# Patient Record
Sex: FEMALE | Race: BLACK OR AFRICAN AMERICAN | Employment: UNEMPLOYED | URBAN - METROPOLITAN AREA
[De-identification: names, ages, dates, MRNs, and addresses within clinical notes are randomized per-mention and may not be internally consistent; named-entity substitution may affect disease eponyms.]

---

## 2017-04-26 ENCOUNTER — HOSPITAL ENCOUNTER (EMERGENCY)
Age: 37
Discharge: HOME | End: 2017-04-26
Payer: COMMERCIAL

## 2017-04-26 VITALS
RESPIRATION RATE: 18 BRPM | TEMPERATURE: 98.8 F | OXYGEN SATURATION: 100 % | WEIGHT: 174 LBS | HEART RATE: 85 BPM | BODY MASS INDEX: 33.98 KG/M2 | DIASTOLIC BLOOD PRESSURE: 74 MMHG | SYSTOLIC BLOOD PRESSURE: 118 MMHG

## 2017-04-26 DIAGNOSIS — M54.50 CHRONIC LEFT-SIDED LOW BACK PAIN WITHOUT SCIATICA: ICD-10-CM

## 2017-04-26 DIAGNOSIS — G89.29 CHRONIC LEFT-SIDED LOW BACK PAIN WITHOUT SCIATICA: ICD-10-CM

## 2017-04-26 DIAGNOSIS — R60.9 DEPENDENT EDEMA: Primary | ICD-10-CM

## 2017-04-26 LAB
ALBUMIN SERPL-MCNC: 4.3 G/DL (ref 3.9–4.9)
ALP BLD-CCNC: 61 U/L (ref 40–130)
ALT SERPL-CCNC: 10 U/L (ref 0–33)
ANION GAP SERPL CALCULATED.3IONS-SCNC: 20 MEQ/L (ref 7–13)
AST SERPL-CCNC: 16 U/L (ref 0–35)
BACTERIA: ABNORMAL /HPF
BASOPHILS ABSOLUTE: 0 K/UL (ref 0–0.2)
BASOPHILS RELATIVE PERCENT: 0.4 %
BILIRUB SERPL-MCNC: 0.4 MG/DL (ref 0–1.2)
BILIRUBIN URINE: ABNORMAL
BLOOD, URINE: ABNORMAL
BUN BLDV-MCNC: 19 MG/DL (ref 6–20)
CALCIUM SERPL-MCNC: 9.3 MG/DL (ref 8.6–10.2)
CHLORIDE BLD-SCNC: 98 MEQ/L (ref 98–107)
CLARITY: ABNORMAL
CO2: 18 MEQ/L (ref 22–29)
COLOR: ABNORMAL
CREAT SERPL-MCNC: 0.68 MG/DL (ref 0.5–0.9)
EOSINOPHILS ABSOLUTE: 0 K/UL (ref 0–0.7)
EOSINOPHILS RELATIVE PERCENT: 0.1 %
EPITHELIAL CELLS, UA: ABNORMAL /HPF
GFR AFRICAN AMERICAN: >60
GFR NON-AFRICAN AMERICAN: >60
GLOBULIN: 3.7 G/DL (ref 2.3–3.5)
GLUCOSE BLD-MCNC: 82 MG/DL (ref 74–109)
GLUCOSE URINE: NEGATIVE MG/DL
HCT VFR BLD CALC: 36 % (ref 37–47)
HEMOGLOBIN: 11.7 G/DL (ref 12–16)
KETONES, URINE: >=80 MG/DL
LEUKOCYTE ESTERASE, URINE: ABNORMAL
LYMPHOCYTES ABSOLUTE: 0.9 K/UL (ref 1–4.8)
LYMPHOCYTES RELATIVE PERCENT: 10.8 %
MCH RBC QN AUTO: 28.4 PG (ref 27–31.3)
MCHC RBC AUTO-ENTMCNC: 32.4 % (ref 33–37)
MCV RBC AUTO: 87.8 FL (ref 82–100)
MONOCYTES ABSOLUTE: 0.5 K/UL (ref 0.2–0.8)
MONOCYTES RELATIVE PERCENT: 6.5 %
MUCUS: PRESENT
NEUTROPHILS ABSOLUTE: 6.8 K/UL (ref 1.4–6.5)
NEUTROPHILS RELATIVE PERCENT: 82.2 %
NITRITE, URINE: NEGATIVE
PDW BLD-RTO: 15.4 % (ref 11.5–14.5)
PH UA: 5.5 (ref 5–9)
PLATELET # BLD: 192 K/UL (ref 130–400)
POTASSIUM SERPL-SCNC: 3.8 MEQ/L (ref 3.5–5.1)
PRO-BNP: 44 PG/ML
PROTEIN UA: 30 MG/DL
RBC # BLD: 4.11 M/UL (ref 4.2–5.4)
RBC UA: ABNORMAL /HPF (ref 0–2)
SODIUM BLD-SCNC: 136 MEQ/L (ref 132–144)
SPECIFIC GRAVITY UA: 1.03 (ref 1–1.03)
TOTAL PROTEIN: 8 G/DL (ref 6.4–8.1)
URINE REFLEX TO CULTURE: YES
UROBILINOGEN, URINE: 1 E.U./DL
WBC # BLD: 8.3 K/UL (ref 4.8–10.8)
WBC UA: ABNORMAL /HPF (ref 0–5)

## 2017-04-26 PROCEDURE — 2580000003 HC RX 258: Performed by: PERSONAL EMERGENCY RESPONSE ATTENDANT

## 2017-04-26 PROCEDURE — 6360000002 HC RX W HCPCS: Performed by: PERSONAL EMERGENCY RESPONSE ATTENDANT

## 2017-04-26 PROCEDURE — 96375 TX/PRO/DX INJ NEW DRUG ADDON: CPT

## 2017-04-26 PROCEDURE — 36415 COLL VENOUS BLD VENIPUNCTURE: CPT

## 2017-04-26 PROCEDURE — 87086 URINE CULTURE/COLONY COUNT: CPT

## 2017-04-26 PROCEDURE — 99284 EMERGENCY DEPT VISIT MOD MDM: CPT

## 2017-04-26 PROCEDURE — 81001 URINALYSIS AUTO W/SCOPE: CPT

## 2017-04-26 PROCEDURE — 83880 ASSAY OF NATRIURETIC PEPTIDE: CPT

## 2017-04-26 PROCEDURE — 96374 THER/PROPH/DIAG INJ IV PUSH: CPT

## 2017-04-26 PROCEDURE — 80053 COMPREHEN METABOLIC PANEL: CPT

## 2017-04-26 PROCEDURE — 85025 COMPLETE CBC W/AUTO DIFF WBC: CPT

## 2017-04-26 RX ORDER — MORPHINE SULFATE 4 MG/ML
4 INJECTION, SOLUTION INTRAMUSCULAR; INTRAVENOUS ONCE
Status: COMPLETED | OUTPATIENT
Start: 2017-04-26 | End: 2017-04-26

## 2017-04-26 RX ORDER — ONDANSETRON 2 MG/ML
4 INJECTION INTRAMUSCULAR; INTRAVENOUS ONCE
Status: COMPLETED | OUTPATIENT
Start: 2017-04-26 | End: 2017-04-26

## 2017-04-26 RX ORDER — 0.9 % SODIUM CHLORIDE 0.9 %
500 INTRAVENOUS SOLUTION INTRAVENOUS ONCE
Status: COMPLETED | OUTPATIENT
Start: 2017-04-26 | End: 2017-04-26

## 2017-04-26 RX ADMIN — MORPHINE SULFATE 2 MG: 4 INJECTION, SOLUTION INTRAMUSCULAR; INTRAVENOUS at 18:36

## 2017-04-26 RX ADMIN — SODIUM CHLORIDE 500 ML: 9 INJECTION, SOLUTION INTRAVENOUS at 18:33

## 2017-04-26 RX ADMIN — ONDANSETRON 4 MG: 2 INJECTION, SOLUTION INTRAMUSCULAR; INTRAVENOUS at 18:34

## 2017-04-26 ASSESSMENT — ENCOUNTER SYMPTOMS
VOMITING: 0
SHORTNESS OF BREATH: 0
COUGH: 0
SORE THROAT: 0
DIARRHEA: 0
BACK PAIN: 1
BLOOD IN STOOL: 0
NAUSEA: 0
ABDOMINAL PAIN: 0
RHINORRHEA: 0
COLOR CHANGE: 0

## 2017-04-26 ASSESSMENT — PAIN DESCRIPTION - ORIENTATION: ORIENTATION: RIGHT;LEFT;LOWER

## 2017-04-26 ASSESSMENT — PAIN DESCRIPTION - DESCRIPTORS: DESCRIPTORS: ACHING;PRESSURE

## 2017-04-26 ASSESSMENT — PAIN SCALES - GENERAL
PAINLEVEL_OUTOF10: 9
PAINLEVEL_OUTOF10: 7

## 2017-04-26 ASSESSMENT — PAIN DESCRIPTION - PAIN TYPE: TYPE: ACUTE PAIN

## 2017-04-26 ASSESSMENT — PAIN DESCRIPTION - LOCATION: LOCATION: LEG

## 2017-04-27 ENCOUNTER — OFFICE VISIT (OUTPATIENT)
Dept: INTERNAL MEDICINE | Age: 37
End: 2017-04-27

## 2017-04-27 VITALS
WEIGHT: 174 LBS | DIASTOLIC BLOOD PRESSURE: 80 MMHG | TEMPERATURE: 97.9 F | HEIGHT: 61 IN | BODY MASS INDEX: 32.85 KG/M2 | HEART RATE: 75 BPM | OXYGEN SATURATION: 98 % | SYSTOLIC BLOOD PRESSURE: 98 MMHG | RESPIRATION RATE: 72 BRPM

## 2017-04-27 DIAGNOSIS — M54.50 ACUTE BILATERAL LOW BACK PAIN WITHOUT SCIATICA: ICD-10-CM

## 2017-04-27 DIAGNOSIS — S76.319A HAMSTRING STRAIN, UNSPECIFIED LATERALITY, INITIAL ENCOUNTER: ICD-10-CM

## 2017-04-27 DIAGNOSIS — G89.29 CHRONIC BILATERAL THORACIC BACK PAIN: Primary | ICD-10-CM

## 2017-04-27 DIAGNOSIS — M79.604 PAIN IN BOTH LOWER EXTREMITIES: ICD-10-CM

## 2017-04-27 DIAGNOSIS — M79.605 PAIN IN BOTH LOWER EXTREMITIES: ICD-10-CM

## 2017-04-27 DIAGNOSIS — M54.6 CHRONIC BILATERAL THORACIC BACK PAIN: Primary | ICD-10-CM

## 2017-04-27 PROCEDURE — 99213 OFFICE O/P EST LOW 20 MIN: CPT | Performed by: FAMILY MEDICINE

## 2017-04-27 ASSESSMENT — PATIENT HEALTH QUESTIONNAIRE - PHQ9
2. FEELING DOWN, DEPRESSED OR HOPELESS: 0
SUM OF ALL RESPONSES TO PHQ QUESTIONS 1-9: 0
1. LITTLE INTEREST OR PLEASURE IN DOING THINGS: 0
SUM OF ALL RESPONSES TO PHQ9 QUESTIONS 1 & 2: 0

## 2017-04-28 LAB — URINE CULTURE, ROUTINE: NORMAL

## 2017-05-01 ENCOUNTER — HOSPITAL ENCOUNTER (OUTPATIENT)
Dept: GENERAL RADIOLOGY | Age: 37
Discharge: HOME | End: 2017-05-01
Payer: COMMERCIAL

## 2017-05-01 DIAGNOSIS — M54.6 CHRONIC BILATERAL THORACIC BACK PAIN: ICD-10-CM

## 2017-05-01 DIAGNOSIS — G89.29 CHRONIC BILATERAL THORACIC BACK PAIN: ICD-10-CM

## 2017-05-01 PROCEDURE — 72072 X-RAY EXAM THORAC SPINE 3VWS: CPT

## 2017-05-01 PROCEDURE — 72110 X-RAY EXAM L-2 SPINE 4/>VWS: CPT

## 2017-05-10 ENCOUNTER — HOSPITAL ENCOUNTER (OUTPATIENT)
Dept: PHYSICAL THERAPY | Age: 37
Setting detail: THERAPIES SERIES
Discharge: HOME | End: 2017-05-10
Payer: COMMERCIAL

## 2017-06-06 ENCOUNTER — OFFICE VISIT (OUTPATIENT)
Dept: INTERNAL MEDICINE | Age: 37
End: 2017-06-06

## 2017-06-06 VITALS
HEART RATE: 71 BPM | WEIGHT: 174.6 LBS | RESPIRATION RATE: 16 BRPM | SYSTOLIC BLOOD PRESSURE: 100 MMHG | TEMPERATURE: 98.1 F | OXYGEN SATURATION: 96 % | DIASTOLIC BLOOD PRESSURE: 60 MMHG | BODY MASS INDEX: 34.28 KG/M2 | HEIGHT: 60 IN

## 2017-06-06 DIAGNOSIS — M54.50 CHRONIC BILATERAL LOW BACK PAIN WITHOUT SCIATICA: Primary | ICD-10-CM

## 2017-06-06 DIAGNOSIS — Z12.31 SCREENING MAMMOGRAM, ENCOUNTER FOR: ICD-10-CM

## 2017-06-06 DIAGNOSIS — G89.29 CHRONIC BILATERAL LOW BACK PAIN WITHOUT SCIATICA: Primary | ICD-10-CM

## 2017-06-06 PROCEDURE — 99213 OFFICE O/P EST LOW 20 MIN: CPT | Performed by: FAMILY MEDICINE

## 2017-12-05 ENCOUNTER — OFFICE VISIT (OUTPATIENT)
Dept: INTERNAL MEDICINE | Age: 37
End: 2017-12-05

## 2017-12-05 VITALS
RESPIRATION RATE: 16 BRPM | DIASTOLIC BLOOD PRESSURE: 78 MMHG | WEIGHT: 176 LBS | HEIGHT: 60 IN | BODY MASS INDEX: 34.55 KG/M2 | SYSTOLIC BLOOD PRESSURE: 112 MMHG | HEART RATE: 79 BPM | OXYGEN SATURATION: 97 % | TEMPERATURE: 98.2 F

## 2017-12-05 DIAGNOSIS — Z01.419 WELL WOMAN EXAM WITH ROUTINE GYNECOLOGICAL EXAM: Primary | ICD-10-CM

## 2017-12-05 PROCEDURE — G8427 DOCREV CUR MEDS BY ELIG CLIN: HCPCS | Performed by: FAMILY MEDICINE

## 2017-12-05 PROCEDURE — 1036F TOBACCO NON-USER: CPT | Performed by: FAMILY MEDICINE

## 2017-12-05 PROCEDURE — 99395 PREV VISIT EST AGE 18-39: CPT | Performed by: FAMILY MEDICINE

## 2017-12-05 PROCEDURE — G8417 CALC BMI ABV UP PARAM F/U: HCPCS | Performed by: FAMILY MEDICINE

## 2017-12-05 PROCEDURE — G8484 FLU IMMUNIZE NO ADMIN: HCPCS | Performed by: FAMILY MEDICINE

## 2017-12-05 NOTE — PROGRESS NOTES
and family histories were reviewed and updated as appropriate. PHYSICAL EXAM   General Appearance:  Alert oriented pleasant cooperative  Lungs: Clear to auscultation no wheezes rhonchi or rales  Heart: Regular rate and rhythm without murmurs rubs or gallops  Breast exam normal breasts bilaterally without nipple discharge masses or adenopathy  Abdomen: Soft nontender no masses rebound or guarding  Genitalia: Normal external genitalia and no prolapse noted no abnormalities noted. Pap done with normal cervix no discharge in the vaginal area or ulcerations. Bimanual exam no midline or adnexal masses or tenderness. Extremities: No rashes or edema and brace on left ankle neurologically intact. Assessment:  1. Well woman exam with routine gynecological exam  Pap Smear  Continue with tight and exercise and therapy with left ankle brace per Dr. Armando Parraa will get a mammogram on her next year when she turns 34. Plan:  No current outpatient prescriptions on file. No current facility-administered medications for this visit. Orders Placed This Encounter   Procedures    Pap Smear     Patient History:    No LMP recorded. OBGYN Status: Having periods  Past Surgical History:  :  SECTION  No date: EYE SURGERY      Smoking status: Never Smoker                                                              Smokeless tobacco: Never Used                           Standing Status:   Future     Standing Expiration Date:   2018     Order Specific Question:   Collection Type     Answer: Thin Prep     Order Specific Question:   Prior Abnormal Pap Test     Answer:   No     Order Specific Question:   Screening or Diagnostic     Answer:   Screening     Order Specific Question:   HPV Requested? Answer:   Yes - If Abnormal Reflex HPV     Order Specific Question:   High Risk Patient     Answer:   N/A       No orders of the defined types were placed in this encounter.             Return in about 1 year (around 12/5/2018).     Dr. Jose M Buck      12/5/17  5:14 PM

## 2017-12-06 DIAGNOSIS — Z01.419 WELL WOMAN EXAM WITH ROUTINE GYNECOLOGICAL EXAM: ICD-10-CM

## 2018-10-03 ENCOUNTER — OFFICE VISIT (OUTPATIENT)
Dept: INTERNAL MEDICINE CLINIC | Age: 38
End: 2018-10-03
Payer: COMMERCIAL

## 2018-10-03 VITALS
TEMPERATURE: 98 F | HEART RATE: 66 BPM | DIASTOLIC BLOOD PRESSURE: 70 MMHG | SYSTOLIC BLOOD PRESSURE: 126 MMHG | RESPIRATION RATE: 16 BRPM | HEIGHT: 60 IN | OXYGEN SATURATION: 98 % | WEIGHT: 175.6 LBS | BODY MASS INDEX: 34.47 KG/M2

## 2018-10-03 DIAGNOSIS — E78.2 MIXED HYPERLIPIDEMIA: Primary | ICD-10-CM

## 2018-10-03 DIAGNOSIS — Z11.4 ENCOUNTER FOR SCREENING FOR HIV: ICD-10-CM

## 2018-10-03 DIAGNOSIS — Z12.31 VISIT FOR SCREENING MAMMOGRAM: ICD-10-CM

## 2018-10-03 PROCEDURE — G8427 DOCREV CUR MEDS BY ELIG CLIN: HCPCS | Performed by: FAMILY MEDICINE

## 2018-10-03 PROCEDURE — 99213 OFFICE O/P EST LOW 20 MIN: CPT | Performed by: FAMILY MEDICINE

## 2018-10-03 PROCEDURE — G8484 FLU IMMUNIZE NO ADMIN: HCPCS | Performed by: FAMILY MEDICINE

## 2018-10-03 PROCEDURE — 1036F TOBACCO NON-USER: CPT | Performed by: FAMILY MEDICINE

## 2018-10-03 PROCEDURE — G8417 CALC BMI ABV UP PARAM F/U: HCPCS | Performed by: FAMILY MEDICINE

## 2018-10-03 ASSESSMENT — PATIENT HEALTH QUESTIONNAIRE - PHQ9
SUM OF ALL RESPONSES TO PHQ QUESTIONS 1-9: 0
SUM OF ALL RESPONSES TO PHQ9 QUESTIONS 1 & 2: 0
1. LITTLE INTEREST OR PLEASURE IN DOING THINGS: 0
2. FEELING DOWN, DEPRESSED OR HOPELESS: 0
SUM OF ALL RESPONSES TO PHQ QUESTIONS 1-9: 0

## 2018-10-03 NOTE — PROGRESS NOTES
numbness or tingling. No loss of function. Hematology: Negative for bleeding and easy bruising. Immunology:  Negative for environmental allergies and food allergies. Physical Exam    Patient's medication, allergies, past medical, surgical, social and family histories were reviewed and updated as appropriate. PHYSICAL EXAM   General appearance: Alert oriented pleasant cooperative in no acute distress. HEENT: Eyes clear nonicteric facial muscles symmetrical.  TMs and canals normal hearing good ex line neck: Soft nontender no adenopathy or masses no carotid bruits  Lungs: Clear to auscultation no wheezes rhonchi or rales  Heart: Regular rate and rhythm without murmurs rubs or gallops  Extremities: No rashes or edema she does a brace at her ankle limits the right ankle and she is walking without any other ambulatory aid this time. Assessment:   Diagnosis Orders   1. Mixed hyperlipidemia  Comprehensive Metabolic Panel    Lipid, Fasting   2. Visit for screening mammogram  [facility] DIGITAL SCREEN W CAD BILATERAL   3. Encounter for screening for HIV  Hiv-1,-2 W/Reflex To Hiv-1 Western Blot               Plan:  Current Outpatient Prescriptions   Medication Sig Dispense Refill    Multiple Vitamins-Minerals (HAIR SKIN AND NAILS FORMULA PO) Take 1 tablet by mouth daily       No current facility-administered medications for this visit.       Orders Placed This Encounter   Procedures    [facility] DIGITAL SCREEN W CAD BILATERAL     Standing Status:   Future     Standing Expiration Date:   10/3/2019     Scheduling Instructions:      US if needed     Order Specific Question:   Reason for exam:     Answer:   routine    Hiv-1,-2 W/Reflex To Hiv-1 Western Blot     Standing Status:   Future     Standing Expiration Date:   10/3/2019    Comprehensive Metabolic Panel     Standing Status:   Future     Standing Expiration Date:   10/3/2019    Lipid, Fasting     Standing Status:   Future     Standing Expiration Date:   10/3/2019

## 2018-10-19 DIAGNOSIS — Z11.4 ENCOUNTER FOR SCREENING FOR HIV: ICD-10-CM

## 2018-10-19 DIAGNOSIS — E78.2 MIXED HYPERLIPIDEMIA: ICD-10-CM

## 2018-10-19 LAB
ALBUMIN SERPL-MCNC: 4 G/DL (ref 3.9–4.9)
ALP BLD-CCNC: 39 U/L (ref 40–130)
ALT SERPL-CCNC: 14 U/L (ref 0–33)
ANION GAP SERPL CALCULATED.3IONS-SCNC: 12 MEQ/L (ref 7–13)
AST SERPL-CCNC: 13 U/L (ref 0–35)
BILIRUB SERPL-MCNC: <0.2 MG/DL (ref 0–1.2)
BUN BLDV-MCNC: 19 MG/DL (ref 6–20)
CALCIUM SERPL-MCNC: 8.9 MG/DL (ref 8.6–10.2)
CHLORIDE BLD-SCNC: 104 MEQ/L (ref 98–107)
CHOLESTEROL, FASTING: 178 MG/DL (ref 0–199)
CO2: 25 MEQ/L (ref 22–29)
CREAT SERPL-MCNC: 0.64 MG/DL (ref 0.5–0.9)
GFR AFRICAN AMERICAN: >60
GFR NON-AFRICAN AMERICAN: >60
GLOBULIN: 3.7 G/DL (ref 2.3–3.5)
GLUCOSE BLD-MCNC: 91 MG/DL (ref 74–109)
HDLC SERPL-MCNC: 42 MG/DL (ref 40–59)
LDL CHOLESTEROL CALCULATED: 123 MG/DL (ref 0–129)
POTASSIUM SERPL-SCNC: 3.7 MEQ/L (ref 3.5–5.1)
SODIUM BLD-SCNC: 141 MEQ/L (ref 132–144)
TOTAL PROTEIN: 7.7 G/DL (ref 6.4–8.1)
TRIGLYCERIDE, FASTING: 66 MG/DL (ref 0–200)

## 2018-10-20 LAB — HIV-1 AND HIV-2 ANTIBODIES: NEGATIVE

## 2018-11-07 ENCOUNTER — HOSPITAL ENCOUNTER (OUTPATIENT)
Dept: WOMENS IMAGING | Age: 38
Discharge: HOME | End: 2018-11-09
Payer: COMMERCIAL

## 2018-11-07 DIAGNOSIS — Z12.31 VISIT FOR SCREENING MAMMOGRAM: ICD-10-CM

## 2018-11-07 PROCEDURE — 77067 SCR MAMMO BI INCL CAD: CPT

## 2019-09-26 ENCOUNTER — OFFICE VISIT (OUTPATIENT)
Dept: FAMILY MEDICINE CLINIC | Age: 39
End: 2019-09-26
Payer: COMMERCIAL

## 2019-09-26 VITALS
RESPIRATION RATE: 16 BRPM | DIASTOLIC BLOOD PRESSURE: 64 MMHG | OXYGEN SATURATION: 97 % | TEMPERATURE: 98.3 F | HEIGHT: 60 IN | BODY MASS INDEX: 34.29 KG/M2 | HEART RATE: 82 BPM | SYSTOLIC BLOOD PRESSURE: 100 MMHG

## 2019-09-26 DIAGNOSIS — Z12.31 VISIT FOR SCREENING MAMMOGRAM: ICD-10-CM

## 2019-09-26 DIAGNOSIS — E78.2 MIXED HYPERLIPIDEMIA: Primary | ICD-10-CM

## 2019-09-26 PROCEDURE — G8417 CALC BMI ABV UP PARAM F/U: HCPCS | Performed by: FAMILY MEDICINE

## 2019-09-26 PROCEDURE — G8427 DOCREV CUR MEDS BY ELIG CLIN: HCPCS | Performed by: FAMILY MEDICINE

## 2019-09-26 PROCEDURE — 1036F TOBACCO NON-USER: CPT | Performed by: FAMILY MEDICINE

## 2019-09-26 PROCEDURE — 99213 OFFICE O/P EST LOW 20 MIN: CPT | Performed by: FAMILY MEDICINE

## 2019-09-26 RX ORDER — PREDNISOLONE ACETATE 10 MG/ML
SUSPENSION/ DROPS OPHTHALMIC
Refills: 1 | COMMUNITY
Start: 2019-09-16 | End: 2020-02-20 | Stop reason: ALTCHOICE

## 2019-09-26 ASSESSMENT — PATIENT HEALTH QUESTIONNAIRE - PHQ9
SUM OF ALL RESPONSES TO PHQ QUESTIONS 1-9: 0
SUM OF ALL RESPONSES TO PHQ9 QUESTIONS 1 & 2: 0
2. FEELING DOWN, DEPRESSED OR HOPELESS: 0
SUM OF ALL RESPONSES TO PHQ QUESTIONS 1-9: 0
1. LITTLE INTEREST OR PLEASURE IN DOING THINGS: 0

## 2019-09-26 NOTE — PROGRESS NOTES
bruising. Immunology:  Negative for environmental allergies and food allergies. Physical Exam    Patient's medication, allergies, past medical, surgical, social and family histories were reviewed and updated as appropriate. PHYSICAL EXAM   General appearance: Alert oriented pleasant cooperative no acute distress in wheelchair  HEENT: Eyes clear nonicteric facial muscles symmetrical.  Hearing comprehension appropriate  Neck: Soft nontender no adenopathy  Lungs: Clear to auscultation no wheezes rhonchi or rales  Heart: Regular rate and rhythm without murmurs rubs or gallops. Assessment:   Diagnosis Orders   1. Mixed hyperlipidemia  Lipid Panel    Comprehensive Metabolic Panel   2. Visit for screening mammogram  [facility] DIGITAL DIAGNOSTIC W OR WO CAD BILATERAL               Plan:  Current Outpatient Medications   Medication Sig Dispense Refill    Multiple Vitamins-Minerals (HAIR SKIN AND NAILS FORMULA PO) Take 1 tablet by mouth daily      prednisoLONE acetate (PRED FORTE) 1 % ophthalmic suspension PLACE 1 DROP INTO OPERATIVE EYE 4 TIMES A DAY FOR 10 DAYS THEN TAPER AS DIRECTED  1     No current facility-administered medications for this visit. Orders Placed This Encounter   Procedures    [facility] DIGITAL DIAGNOSTIC W OR WO CAD BILATERAL     Standing Status:   Future     Standing Expiration Date:   9/25/2020     Scheduling Instructions:      US if needed     Order Specific Question:   Reason for exam:     Answer:   routine    Lipid Panel     Standing Status:   Future     Standing Expiration Date:   9/25/2020     Order Specific Question:   Is Patient Fasting?/# of Hours     Answer:   unknown    Comprehensive Metabolic Panel     Standing Status:   Future     Standing Expiration Date:   9/25/2020       No orders of the defined types were placed in this encounter. She refuses a flu shot        Return in about 1 year (around 9/26/2020).     Dr. Meet Evans      9/26/19  10:03 AM

## 2019-11-13 ENCOUNTER — HOSPITAL ENCOUNTER (OUTPATIENT)
Dept: WOMENS IMAGING | Age: 39
Discharge: HOME | End: 2019-11-15
Payer: COMMERCIAL

## 2019-11-13 ENCOUNTER — APPOINTMENT (OUTPATIENT)
Dept: ULTRASOUND IMAGING | Age: 39
End: 2019-11-13
Payer: COMMERCIAL

## 2019-11-13 DIAGNOSIS — Z12.31 VISIT FOR SCREENING MAMMOGRAM: ICD-10-CM

## 2019-11-13 DIAGNOSIS — Z12.31 ENCOUNTER FOR SCREENING MAMMOGRAM FOR BREAST CANCER: ICD-10-CM

## 2019-11-13 PROCEDURE — 77063 BREAST TOMOSYNTHESIS BI: CPT

## 2019-11-18 ENCOUNTER — TELEPHONE (OUTPATIENT)
Dept: FAMILY MEDICINE CLINIC | Age: 39
End: 2019-11-18

## 2019-11-25 ENCOUNTER — APPOINTMENT (OUTPATIENT)
Dept: ULTRASOUND IMAGING | Age: 39
End: 2019-11-25
Payer: COMMERCIAL

## 2019-11-25 ENCOUNTER — HOSPITAL ENCOUNTER (OUTPATIENT)
Dept: WOMENS IMAGING | Age: 39
Discharge: HOME | End: 2019-11-27
Payer: COMMERCIAL

## 2019-11-25 DIAGNOSIS — Z12.31 VISIT FOR SCREENING MAMMOGRAM: ICD-10-CM

## 2019-11-25 PROCEDURE — G0279 TOMOSYNTHESIS, MAMMO: HCPCS

## 2019-12-13 DIAGNOSIS — E78.2 MIXED HYPERLIPIDEMIA: ICD-10-CM

## 2019-12-13 LAB
ALBUMIN SERPL-MCNC: 4 G/DL (ref 3.5–4.6)
ALP BLD-CCNC: 47 U/L (ref 40–130)
ALT SERPL-CCNC: 11 U/L (ref 0–33)
ANION GAP SERPL CALCULATED.3IONS-SCNC: 11 MEQ/L (ref 9–15)
AST SERPL-CCNC: 13 U/L (ref 0–35)
BILIRUB SERPL-MCNC: <0.2 MG/DL (ref 0.2–0.7)
BUN BLDV-MCNC: 20 MG/DL (ref 6–20)
CALCIUM SERPL-MCNC: 9.4 MG/DL (ref 8.5–9.9)
CHLORIDE BLD-SCNC: 102 MEQ/L (ref 95–107)
CHOLESTEROL, TOTAL: 191 MG/DL (ref 0–199)
CO2: 26 MEQ/L (ref 20–31)
CREAT SERPL-MCNC: 0.68 MG/DL (ref 0.5–0.9)
GFR AFRICAN AMERICAN: >60
GFR NON-AFRICAN AMERICAN: >60
GLOBULIN: 4.2 G/DL (ref 2.3–3.5)
GLUCOSE BLD-MCNC: 93 MG/DL (ref 70–99)
HDLC SERPL-MCNC: 48 MG/DL (ref 40–59)
LDL CHOLESTEROL CALCULATED: 127 MG/DL (ref 0–129)
POTASSIUM SERPL-SCNC: 4.2 MEQ/L (ref 3.4–4.9)
SODIUM BLD-SCNC: 139 MEQ/L (ref 135–144)
TOTAL PROTEIN: 8.2 G/DL (ref 6.3–8)
TRIGL SERPL-MCNC: 79 MG/DL (ref 0–150)

## 2019-12-14 DIAGNOSIS — R77.9 ELEVATED SERUM PROTEIN LEVEL: Primary | ICD-10-CM

## 2020-02-20 ENCOUNTER — OFFICE VISIT (OUTPATIENT)
Dept: FAMILY MEDICINE CLINIC | Age: 40
End: 2020-02-20
Payer: COMMERCIAL

## 2020-02-20 VITALS
TEMPERATURE: 97.6 F | WEIGHT: 152 LBS | HEIGHT: 61 IN | OXYGEN SATURATION: 99 % | DIASTOLIC BLOOD PRESSURE: 78 MMHG | BODY MASS INDEX: 28.7 KG/M2 | SYSTOLIC BLOOD PRESSURE: 120 MMHG | HEART RATE: 77 BPM | RESPIRATION RATE: 16 BRPM

## 2020-02-20 PROCEDURE — 1036F TOBACCO NON-USER: CPT | Performed by: FAMILY MEDICINE

## 2020-02-20 PROCEDURE — G8417 CALC BMI ABV UP PARAM F/U: HCPCS | Performed by: FAMILY MEDICINE

## 2020-02-20 PROCEDURE — G8484 FLU IMMUNIZE NO ADMIN: HCPCS | Performed by: FAMILY MEDICINE

## 2020-02-20 PROCEDURE — G8427 DOCREV CUR MEDS BY ELIG CLIN: HCPCS | Performed by: FAMILY MEDICINE

## 2020-02-20 PROCEDURE — 99213 OFFICE O/P EST LOW 20 MIN: CPT | Performed by: FAMILY MEDICINE

## 2020-02-20 ASSESSMENT — PATIENT HEALTH QUESTIONNAIRE - PHQ9
SUM OF ALL RESPONSES TO PHQ9 QUESTIONS 1 & 2: 0
SUM OF ALL RESPONSES TO PHQ QUESTIONS 1-9: 0
SUM OF ALL RESPONSES TO PHQ QUESTIONS 1-9: 0
1. LITTLE INTEREST OR PLEASURE IN DOING THINGS: 0
2. FEELING DOWN, DEPRESSED OR HOPELESS: 0

## 2020-02-26 ENCOUNTER — HOSPITAL ENCOUNTER (OUTPATIENT)
Dept: PHYSICAL THERAPY | Age: 40
Setting detail: THERAPIES SERIES
Discharge: HOME | End: 2020-02-26
Payer: COMMERCIAL

## 2020-03-03 ENCOUNTER — HOSPITAL ENCOUNTER (OUTPATIENT)
Dept: PHYSICAL THERAPY | Age: 40
Setting detail: THERAPIES SERIES
Discharge: HOME | End: 2020-03-03
Payer: COMMERCIAL

## 2020-03-03 PROCEDURE — 97162 PT EVAL MOD COMPLEX 30 MIN: CPT

## 2020-03-03 NOTE — PLAN OF CARE
Flexion: 4/5  R Knee Extension: 4+/5  R Ankle Dorsiflexion: 4/5  R Ankle Plantar flexion: 2/5  R Ankle Inversion: 4-/5  R Ankle Eversion: 3-/5  R Great Toe Extension: 4/5  Strength LLE  L Hip Flexion: 3+/5  L Hip Extension: 2/5  L Hip ABduction: 3-/5  L Knee Flexion: 4/5  L Knee Extension: 4+/5  L Ankle Dorsiflexion: 3+/5  L Ankle Plantar Flexion: 2/5  L Ankle Inversion: 3+/5  L Ankle Eversion: 3+/5  L Great Toe Extension: 4/5        Strength Other  Other: Decreased core strength observed based off functional mobility   [] yes  [x] no   Long term goal 2: Patient will ambulate independently for 150ft with sc with improved bilateral LE clearance and symmetry. Ambulation 1  Surface: carpet  Device: Rolling Walker  Assistance: Stand by assistance  Quality of Gait: decreased bilateral LE clearance during gait, forward flexed posture  Gait Deviations: Slow Marlys, Decreased step length, Decreased step height, Shuffles  Distance: 50 ft    [] yes  [x] no   Long term goal 3: Patient will ascend/descend 4-6'' steps x 3 using bilateral HRs independently with reciprocal pattern. Stairs  # Steps : 4  Stairs Height: 6\"  Rails: Bilateral  Assistance: Stand by assistance  Comment: non-reciprocal pattern     [] yes  [x] no   Long term goal 4: Grider balance >/= 45/56 to demonstrate improved standing balance and reduce risk for falls. Outcomes Measures:  Grider Balance Score: 23        [] yes  [x] no       Body structures, Functions, Activity limitations: Decreased ROM, Decreased strength, Decreased functional mobility , Decreased endurance, Decreased balance, Decreased posture  Assessment: Patient reports difficulty with ambulation after left ankle surgery last year with several months of NWB due to wound care. Upon PT evaluation, patient demonstrates decreased ankle ROM with impaired LE/core strength making ambulation more difficulty. Currently patient uses ww for ambulation and report multiple falls at home.   Further outpatient

## 2020-03-03 NOTE — PROGRESS NOTES
50 ft  Stairs  # Steps : 4  Stairs Height: 6\"  Rails: Bilateral  Assistance: Stand by assistance  Comment: non-reciprocal pattern     Transfers  Sit to Stand: Modified independent  Stand to sit: Modified independent  Comment: uses bilateral UEs to perform transfers    Strength RLE  R Hip Flexion: 3+/5  R Hip Extension: 2/5  R Hip ABduction: 3-/5  R Knee Flexion: 4/5  R Knee Extension: 4+/5  R Ankle Dorsiflexion: 4/5  R Ankle Plantar flexion: 2/5  R Ankle Inversion: 4-/5  R Ankle Eversion: 3-/5  R Great Toe Extension: 4/5  Strength LLE  L Hip Flexion: 3+/5  L Hip Extension: 2/5  L Hip ABduction: 3-/5  L Knee Flexion: 4/5  L Knee Extension: 4+/5  L Ankle Dorsiflexion: 3+/5  L Ankle Plantar Flexion: 2/5  L Ankle Inversion: 3+/5  L Ankle Eversion: 3+/5  L Great Toe Extension: 4/5        Strength Other  Other: Decreased core strength observed based off functional mobility             AROM LLE (degrees)  L Ankle Dorsiflexion 0-20: neutral  L Ankle Plantar Flexion 0-45: 40 deg  L Ankle Forefoot Inversion 0-40: 20 deg  L Ankle Forefoot Eversion 0-20: 10 deg                 Observation/Palpation  Posture: Fair(rounded shoulders, forward head posture)  Edema: slight edema in left ankle  Bed mobility  Rolling to Left: Modified independent  Rolling to Right: Modified independent  Supine to Sit: Modified independent  Sit to Supine: Modified independent  Comment: increased time needed to complete          Additional Measures  Other: Grider balance: 23/56       Exercises:   Exercises  Exercise 1: ankle ROM*  Exercise 2: gastroc stretch*  Exercise 3: Tband ankle strengthening*  Exercise 4: 2 way SLR, bridging*  Exercise 5: seated exercises: hip adduction with ball, hip abduction with band, marching, ankle pumps, LAQ, hamstring curls*  Exercise 6: NuStep*  Exercise 7: static standing balance*  Exercise 8: gait training in // bars to increase step length and clearance*  Exercise 9: static standing on foam*    *Indicates exercise,modality, or manual techniques to be initiated when appropriate  Assessment: Body structures, Functions, Activity limitations: Decreased ROM, Decreased strength, Decreased functional mobility , Decreased endurance, Decreased balance, Decreased posture  Assessment: Patient reports difficulty with ambulation after left ankle surgery last year with several months of NWB due to wound care. Upon PT evaluation, patient demonstrates decreased ankle ROM with impaired LE/core strength making ambulation more difficulty. Currently patient uses ww for ambulation and report multiple falls at home. Further outpatient PT recommended to improve strength, ROM, and balance for overall quality of life. Prognosis: Good  Discharge Recommendations: Continue to assess pending progress        Decision Making: Medium Complexity  History: left ankle sx, hx of head injury, hx of left knee dislocation  Exam: decreased LE strength, decreased balance, impaired ankle ROM  Clinical Presentation: evolving        Plan  Frequency/Duration:  Plan  Times per week: 2  Plan weeks: 8  Current Treatment Recommendations: Strengthening, ROM, Balance Training, Functional Mobility Training, Transfer Training, Gait Training, Stair training, Neuromuscular Re-education, Manual Therapy - Soft Tissue Mobilization, Manual Therapy - Joint Manipulation, Home Exercise Program, Safety Education & Training, Patient/Caregiver Education & Training, Equipment Evaluation, Education, & procurement, Modalities, Aquatics         Patient Education  New Education Provided: PT Education: Goals;PT Role;Plan of Care    POST-PAIN     Pain Rating (0-10 pain scale):   0/10  Location and pain description same as pre-treatment unless indicated. Action: [] NA  [] Call Physician  [] Perform HEP  [] Meds as prescribed    Evaluation and patient rights have been reviewed and patient agrees with plan of care.   Yes  [x]  No  []   Explain:       Jose Martin Fall Risk Assessment  Risk Electronically signed by Luz Lehman PT on 3/3/20 at 1:43 PM

## 2020-03-09 ENCOUNTER — HOSPITAL ENCOUNTER (OUTPATIENT)
Dept: PHYSICAL THERAPY | Age: 40
Setting detail: THERAPIES SERIES
Discharge: HOME | End: 2020-03-09
Payer: COMMERCIAL

## 2020-03-09 PROCEDURE — 97112 NEUROMUSCULAR REEDUCATION: CPT

## 2020-03-09 PROCEDURE — 97110 THERAPEUTIC EXERCISES: CPT

## 2020-03-11 ENCOUNTER — HOSPITAL ENCOUNTER (OUTPATIENT)
Dept: PHYSICAL THERAPY | Age: 40
Setting detail: THERAPIES SERIES
Discharge: HOME | End: 2020-03-11
Payer: COMMERCIAL

## 2020-03-11 PROCEDURE — 97112 NEUROMUSCULAR REEDUCATION: CPT

## 2020-03-11 PROCEDURE — 97110 THERAPEUTIC EXERCISES: CPT

## 2020-03-11 ASSESSMENT — PAIN DESCRIPTION - ORIENTATION: ORIENTATION: LEFT

## 2020-03-11 ASSESSMENT — PAIN DESCRIPTION - LOCATION: LOCATION: ANKLE

## 2020-03-11 ASSESSMENT — PAIN SCALES - GENERAL: PAINLEVEL_OUTOF10: 5

## 2020-03-11 NOTE — PROGRESS NOTES
impaired balance, decreased LE/core strength  Prognosis: Good       Goals:  Short term goals  Time Frame for Short term goals: 4 weeks  Short term goal 1: Patient will demonstrate increased ease with performing bed mobility and transfers independently. Short term goal 2: Patient [facility] for improved functional tolerance. Short term goal 3: Patient will be independent with HEP. Long term goals  Time Frame for Long term goals : 8 weeks  Long term goal 1: Patient will increase strength in bilateral LEs >/= 4+/5 for improved ambulation and standing. Long term goal 2: Patient will ambulate independently for 150ft with sc with improved bilateral LE clearance and symmetry. Long term goal 3: Patient will ascend/descend 4-6'' steps x 3 using bilateral HRs independently with reciprocal pattern. Long term goal 4: Grider balance >/= 45/56 to demonstrate improved standing balance and reduce risk for falls. Progress toward goals: strength, balance    POST-PAIN       Pain Rating (0-10 pain scale):  5 /10   Location and pain description same as pre-treatment unless indicated. Action: [] NA   [] Perform HEP  [x] Meds as prescribed  [] Modalities as prescribed   [] Call Physician     Frequency/Duration:  Plan  Times per week: 2  Plan weeks: 8  Current Treatment Recommendations: Strengthening, ROM, Balance Training, Functional Mobility Training, Transfer Training, Gait Training, Stair training, Neuromuscular Re-education, Manual Therapy - Soft Tissue Mobilization, Manual Therapy - Joint Manipulation, Home Exercise Program, Safety Education & Training, Patient/Caregiver Education & Training, Equipment Evaluation, Education, & procurement, Modalities, Aquatics  Plan Comment: transfer care to [facility] PT     Pt to continue current HEP. See objective section for any therapeutic exercise changes, additions or modifications this date.     PT Individual Minutes  Time In: 6687  Time Out: 3216  Minutes: 59  Timed Code Treatment Minutes: 58 Minutes  Procedure Minutes:0     Timed Activity Minutes Units   Ther Ex 35 2   Neuro aaron 23 2       Signature:  Electronically signed by Nani Lopez PT on 3/11/20 at 1:00 PM EDT

## 2020-03-13 ENCOUNTER — APPOINTMENT (OUTPATIENT)
Dept: PHYSICAL THERAPY | Age: 40
End: 2020-03-13
Payer: COMMERCIAL

## 2020-03-16 ENCOUNTER — HOSPITAL ENCOUNTER (OUTPATIENT)
Dept: PHYSICAL THERAPY | Age: 40
Setting detail: THERAPIES SERIES
Discharge: HOME | End: 2020-03-16
Payer: COMMERCIAL

## 2020-03-16 NOTE — PROGRESS NOTES
[address]       Physical Therapy  Cancellation/No-show Note  Patient Name:  Myesha Nash  :  1980   Date:  3/16/2020  Referring Practitioner: Dr. Cassandra Díaz   Diagnosis: instability lt ankle joint, PTTD, Lt leg weakness, balance disorder     Visit Information:  PT Visit Information  PT Insurance Information: [employer]  Total # of Visits Approved: 30  Total # of Visits to Date: 3  No Show: 0  Canceled Appointment: 1  Progress Note Counter: 3/16, cxl 3/16    For today's appointment patient:  [x]  Cancelled  []  Rescheduled appointment  []  No-show   []  Called pt to remind of next appointment     Reason given by patient:  [x]  Patient ill  []  Conflicting appointment  []  No transportation    []  Conflict with work  []  No reason given  []  Other:       Comments:       Signature: Electronically signed by Elmira Nolasco PTA on 3/16/20 at 9:26 AM EDT

## 2020-03-18 ENCOUNTER — HOSPITAL ENCOUNTER (OUTPATIENT)
Dept: PHYSICAL THERAPY | Age: 40
Setting detail: THERAPIES SERIES
Discharge: HOME | End: 2020-03-18
Payer: COMMERCIAL

## 2020-03-18 PROCEDURE — 97110 THERAPEUTIC EXERCISES: CPT

## 2020-03-18 PROCEDURE — 97112 NEUROMUSCULAR REEDUCATION: CPT

## 2020-03-18 NOTE — PROGRESS NOTES
[address] [address]  Outpatient Physical Therapy    Treatment Note        Date: 3/18/2020  Patient: Jac Qureshi  : 1980  ACCT #: [de-identified]  Referring Practitioner: Dr. Tayler Mays   Diagnosis: instability lt ankle joint, PTTD, Lt leg weakness, balance disorder     Visit Information:  PT Visit Information  PT Insurance Information: [employer]  Total # of Visits Approved: 30  Total # of Visits to Date: 4  No Show: 0  Canceled Appointment: 1  Progress Note Counter:     Subjective: Reports was late coming from testing for son and missed last appointment. Has been compliant with HEP and completed some exercises this am before coming to PT. HEP Compliance:  [x] Good [] Fair [] Poor [] Reports not doing due to:    Vital Signs  Patient Currently in Pain: No   Pain Screening  Patient Currently in Pain: No    OBJECTIVE:   Exercises  Exercise 4: 2 way SLR x10 ea kaz - A/A with hip abd, vc's for QS with SLR  Exercise 5: Sink ex x10  Exercise 7: Static standin UE 1' x2, no UEs 1' x2  Exercise 8: Gait drills: F in // bars focusing on increased step length and foot clearance  Exercise 10: Bridges 5s x10  Exercise 11: Clams x10 kaz  Exercise 14: Supine hip abd with SB x10 kaz - focusing on toe up vs ER hip  Exercise 15: Single stepping over s/c x10 kaz  Exercise 20: HEP: sink ex    Assessment:   Activity Tolerance  Activity Tolerance: Patient Tolerated treatment well    Body structures, Functions, Activity limitations: Decreased ROM, Decreased strength, Decreased functional mobility , Decreased endurance, Decreased balance, Decreased posture  Assessment: Vc's needed for QS with each SLR due to quad lag when completing. Continues to require cuing with ambulating in and out of clinic to improve upright posture and foot clearance.   Initiated sink ex to progress strengthening at home - pt requires vc's to reduce speed and improve ROM  Treatment Diagnosis: LE weakness, impaired balance, decreased LE/core Minutes  Procedure Minutes:0     Timed Activity Minutes Units   Ther Ex 25 2   Neuro aaron 30 2       Signature:  Electronically signed by Cammie Arguello PT on 3/18/20 at 11:01 AM EDT

## 2020-03-19 ENCOUNTER — HOSPITAL ENCOUNTER (OUTPATIENT)
Dept: PHYSICAL THERAPY | Age: 40
Setting detail: THERAPIES SERIES
Discharge: HOME | End: 2020-03-19
Payer: COMMERCIAL

## 2020-03-19 PROCEDURE — 97112 NEUROMUSCULAR REEDUCATION: CPT

## 2020-03-19 PROCEDURE — 97110 THERAPEUTIC EXERCISES: CPT

## 2020-03-19 PROCEDURE — 97116 GAIT TRAINING THERAPY: CPT

## 2020-03-19 NOTE — PROGRESS NOTES
before RB. Initiated hip hikes and ankle strengthening to progress strength. Initiated NDT to improve foot clearance with fair follow through d/t fatigue. Treatment Diagnosis: LE weakness, impaired balance, decreased LE/core strength          Goals:  Short term goals  Time Frame for Short term goals: 4 weeks  Short term goal 1: Patient will demonstrate increased ease with performing bed mobility and transfers independently. Short term goal 2: Patient will increase ankle AROM to [facility] for improved functional tolerance. Short term goal 3: Patient will be independent with HEP. Long term goals  Time Frame for Long term goals : 8 weeks  Long term goal 1: Patient will increase strength in bilateral LEs >/= 4+/5 for improved ambulation and standing. Long term goal 2: Patient will ambulate independently for 150ft with sc with improved bilateral LE clearance and symmetry. Long term goal 3: Patient will ascend/descend 4-6'' steps x 3 using bilateral HRs independently with reciprocal pattern. Long term goal 4: Grider balance >/= 45/56 to demonstrate improved standing balance and reduce risk for falls. Progress toward goals:balance, strength, gait    POST-PAIN       Pain Rating (0-10 pain scale):  0 /10   Location and pain description same as pre-treatment unless indicated.    Action: [x] NA   [] Perform HEP  [] Meds as prescribed  [] Modalities as prescribed   [] Call Physician     Frequency/Duration:  Plan  Times per week: 2  Plan weeks: 8  Current Treatment Recommendations: Strengthening, ROM, Balance Training, Functional Mobility Training, Transfer Training, Gait Training, Stair training, Neuromuscular Re-education, Manual Therapy - Soft Tissue Mobilization, Manual Therapy - Joint Manipulation, Home Exercise Program, Safety Education & Training, Patient/Caregiver Education & Training, Equipment Evaluation, Education, & procurement, Modalities, Aquatics  Plan Comment[email or web address]     Pt to continue

## 2020-03-23 ENCOUNTER — HOSPITAL ENCOUNTER (OUTPATIENT)
Dept: PHYSICAL THERAPY | Age: 40
Setting detail: THERAPIES SERIES
Discharge: HOME | End: 2020-03-23
Payer: COMMERCIAL

## 2020-03-23 PROCEDURE — 97110 THERAPEUTIC EXERCISES: CPT

## 2020-03-23 ASSESSMENT — PAIN SCALES - GENERAL: PAINLEVEL_OUTOF10: 0

## 2020-03-25 ENCOUNTER — APPOINTMENT (OUTPATIENT)
Dept: PHYSICAL THERAPY | Age: 40
End: 2020-03-25
Payer: COMMERCIAL

## 2020-03-30 ENCOUNTER — APPOINTMENT (OUTPATIENT)
Dept: PHYSICAL THERAPY | Age: 40
End: 2020-03-30
Payer: COMMERCIAL

## 2020-04-01 ENCOUNTER — HOSPITAL ENCOUNTER (OUTPATIENT)
Dept: PHYSICAL THERAPY | Age: 40
Setting detail: THERAPIES SERIES
Discharge: HOME | End: 2020-04-01
Payer: COMMERCIAL

## 2020-04-03 NOTE — PROGRESS NOTES
Physical Therapy-Left Message for Patient Note                                  Date:  4/3/2020  Patient Name:  Arden Lorenzo  :  1980   MRN:  [id number]  Referring Practitioner: Dr. Margaret Milton   Diagnosis: instability lt ankle joint, PTTD, Lt leg weakness, balance disorder        Attempt to reach patient by phone this date. Message left with the following information: Therapist called to f/u on pt's completion of HEP as well as inquire about use of teletherapy.        Signature: Electronically signed by Danyell Bowen PTA on 4/3/20 at 9:07 AM EDT
posture  Assessment: Patient demonstrates fatigue with exercises needing rest breaks for improved tolerance. Patient demonstrates understanding of exercises but needs occasionally verbal cues. At this time due to Matthewloulou patient will be placed on hold. Treatment Diagnosis: LE weakness, impaired balance, decreased LE/core strength          Goals:  Short term goals  Time Frame for Short term goals: 4 weeks  Short term goal 1: Patient will demonstrate increased ease with performing bed mobility and transfers independently. Short term goal 2: Patient will increase ankle AROM to [facility] for improved functional tolerance. Short term goal 3: Patient will be independent with HEP. Long term goals  Time Frame for Long term goals : 8 weeks  Long term goal 1: Patient will increase strength in bilateral LEs >/= 4+/5 for improved ambulation and standing. Long term goal 2: Patient will ambulate independently for 150ft with sc with improved bilateral LE clearance and symmetry. Long term goal 3: Patient will ascend/descend 4-6'' steps x 3 using bilateral HRs independently with reciprocal pattern. Long term goal 4: Grider balance >/= 45/56 to demonstrate improved standing balance and reduce risk for falls. Progress toward goals: strength, gait    POST-PAIN       Pain Rating (0-10 pain scale):   0/10   Location and pain description same as pre-treatment unless indicated.    Action: [] NA   [] Perform HEP  [] Meds as prescribed  [] Modalities as prescribed   [] Call Physician     Frequency/Duration:  Plan  Times per week: 2  Plan weeks: 8  Specific instructions for Next Treatment: pt on hold due to COVID  Current Treatment Recommendations: Strengthening, ROM, Balance Training, Functional Mobility Training, Transfer Training, Gait Training, Stair training, Neuromuscular Re-education, Manual Therapy - Soft Tissue Mobilization, Manual Therapy - Joint Manipulation, Home Exercise Program, Safety Education & Training, Patient/Caregiver

## 2020-04-20 NOTE — PROGRESS NOTES
Therapist spoke with [address] 9[address] parent/guardian of Kemal Lanier on 4/20/2020 to discuss diverting Vipin Reyes's plan of care to a virtual visit platform. Therapist reviewed Consent for Telehealth Services document with patient/guardian: Your Provider(s) have discussed the use of Telehealth and the Service with you, including the benefits and risks of such and you have provided oral consent to your Provider(s) for the use of Telehealth and the Service. Patient/guardian Verbalized consent. Patient/guardian Requested paper copy of consent form. Therapist collected a home inventory of technology and materials to plan for virtual visits. Technology: Android phone. Printer: No  My Chart Access: No   Email: "[email or web address]  Patient Communication Preference: Phone  Space: Open area    Current insurance coverage reviewed. Yes   Therapist reviewed Teletherapy Expectations document with parent/guardian of Kemal Lanier. Parent/guardian Verbalized consent.        Signature: Electronically signed by Esperanza Alston PTA on 4/20/20 at 3:02 PM EDT

## 2020-04-20 NOTE — PROGRESS NOTES
Physical Therapy Home Exercise Program Follow Up Note                                  Date:  2020  Patient Name:  Mark Goodman  :  1980   MRN:  [id number]  Referring Practitioner: Dr. Gerald Albrecht   Diagnosis: instability lt ankle joint, PTTD, Lt leg weakness, balance disorder        Therapist called patient to follow up on HEP. Pt reports compliance with HEP. Requested progressed HEP. Therapist sent pt progressed HEP of banded bridges and clams, as well as YTB, to progress LE strengthening. Pt also expressed interest in video therapy. Obtained verbal consents and scheduled pt for videotherapy next week.      Signature: Electronically signed by Imelda Abernathy PTA on 20 at 3:14 PM EDT

## 2020-05-05 ENCOUNTER — HOSPITAL ENCOUNTER (OUTPATIENT)
Dept: PHYSICAL THERAPY | Age: 40
Setting detail: THERAPIES SERIES
Discharge: HOME | End: 2020-05-05
Payer: COMMERCIAL

## 2020-05-05 NOTE — PROGRESS NOTES
Therapy                            Cancellation/No-show Note      Date:  2020  Patient Name:  Michael Luciano  :  1980   MRN:  [id number]  Referring Practitioner: Dr. Buckner Osgood   Diagnosis: instability lt ankle joint, PTTD, Lt leg weakness, balance disorder     Visit Information:  PT Visit Information  PT Insurance Information: [employer]  Total # of Visits Approved: 30  Total # of Visits to Date: 6  No Show: 1  Canceled Appointment: 2  Progress Note Counter:  (cx 20)    For today's appointment patient:  [x]  Cancelled  []  Rescheduled appointment  []  No-show   []  Called pt to remind of next appointment     Reason given by patient:  []  Patient ill  []  Conflicting appointment  []  No transportation    []  Conflict with work  []  No reason given  [x]  Other:  Patient arrived for virtual visit 32' late. Pt with poor video and sound quality. Appointment cancelled due to technical problems. Pt did schedule to return to outpatient clinic for next week. [x] Pt has future appointments scheduled, no follow up needed  [] Pt requests to be on hold.     Reason:   If > 2 weeks please discuss with therapist.  [] Therapist to call pt for follow up     Comments:       Signature: Electronically signed by Mandy Aguilar PT on 20 at 2:45 PM EDT

## 2020-05-12 ENCOUNTER — HOSPITAL ENCOUNTER (OUTPATIENT)
Dept: PHYSICAL THERAPY | Age: 40
Setting detail: THERAPIES SERIES
Discharge: HOME | End: 2020-05-12
Payer: COMMERCIAL

## 2020-05-12 PROCEDURE — 97116 GAIT TRAINING THERAPY: CPT

## 2020-05-12 PROCEDURE — 97112 NEUROMUSCULAR REEDUCATION: CPT

## 2020-05-12 NOTE — PROGRESS NOTES
[address]     Ph: [phone]  Fax: [phone]    [] Certification  [x] Recertification []  Plan of Care  [] Progress Note [] Discharge      To:  Dr. Kirill Ivory       From:  Ric Kwok, PT  Patient: Gavi Nash     : 1980  Diagnosis: instability lt ankle joint, PTTD, Lt leg weakness, balance disorder      Date: 2020  Treatment Diagnosis: LE weakness, impaired balance, decreased LE/core strength       Progress Report Period from:  3/3/2020  to 2020    Total # of Visits to Date: 7   No Show: 1    Canceled Appointment: 2     OBJECTIVE:   Short Term Goals - Time Frame for Short term goals: 4 weeks    Goals Current/Discharge status  Met   Short term goal 1: Patient will demonstrate increased ease with performing bed mobility and transfers independently. Bed mobility  Rolling to Left: Modified independent  Rolling to Right: Modified independent  Supine to Sit: Modified independent  Sit to Supine: Modified independent  Scooting: Modified independent  Comment: Increased time and effort to complete  Bed Mobility  Scooting: Modified independent     Transfers  Sit to Stand: Modified independent  Stand to sit: Modified independent  Comment: Relies on kaz UEs or post knee use [] yes  [x] no   Short term goal 2: Patient will increase ankle AROM to [facility] for improved functional tolerance. AROM LLE (degrees)  L Ankle Dorsiflexion 0-20: -3 deg  L Ankle Plantar Flexion 0-45: 40 deg  L Ankle Forefoot Inversion 0-40: 32 deg  L Ankle Forefoot Eversion 0-20: 15 deg [] yes  [x] no   Short term goal 3: Patient will be independent with HEP. Independent and ongoing [x] yes  [] no     Long Term Goals - Time Frame for Long term goals : 8 weeks  Goals Current/ Discharge status Met   Long term goal 1: Patient will increase strength in bilateral LEs >/= 4+/5 for improved ambulation and standing.  Strength RLE  R Hip Flexion: 4-/5  R Hip Extension: 2-/5  R Hip ABduction: 3-/5  R Knee Flexion: 5/5  R Knee Extension: 5/5  R Ankle Dorsiflexion: 4-/5  R Ankle Inversion: 4-/5  R Ankle Eversion: 3/5  Strength LLE  L Hip Flexion: 4/5  L Hip Extension: 2-/5  L Hip ABduction: 3-/5  L Knee Flexion: 4/5  L Knee Extension: 5/5  L Ankle Dorsiflexion: 3+/5  L Ankle Inversion: 3+/5  L Ankle Eversion: 3+/5 [] yes  [x] no   Long term goal 2: Patient will ambulate independently for 150ft with sc with improved bilateral LE clearance and symmetry. Ambulation 1  Surface: carpet  Device: Rolling Walker  Quality of Gait: Poor kaz foot clearance with Lt foot drop and decreased toe clearance, mild FWD flexed posture, vc's to improve foot clearance with poor carryover, decreased kaz hip and knee flexion  Gait Deviations: Slow Marlys, Decreased step length, Decreased step height  Distance: 115' [] yes  [x] no   Long term goal 3: Patient will ascend/descend 4-6'' steps x 3 using bilateral HRs independently with reciprocal pattern. Stairs  # Steps : 12  Stairs Height: 6\"  Rails: Bilateral  Assistance: Stand by assistance, Supervision  Comment: NR [] yes  [x] no   Long term goal 4: Grider balance >/= 45/56 to demonstrate improved standing balance and reduce risk for falls. Grider Balance Score: 18 [] yes  [x] no    NEW GOAL: TUG with WW </= 40sec to improve safety with mobility. Timed Up and Go: 64. 1(with [employer]) [] yes  [x] no        Body structures, Functions, Activity limitations: Decreased ROM, Decreased strength, Decreased functional mobility , Decreased endurance, Decreased balance, Decreased posture  Assessment: Pt reassessed as pt has not been seen in >30days due to COVID 19 pandemic. Pt with a significant decline in static standing balance as seen with 5 point decrease in Grider score. Pt with no improvement in kaz LE strength with some changes in AROM.   Pt continues to ambulate with poor kaz foto clearance with a Lt foot drop with no improvement with

## 2020-05-18 ENCOUNTER — HOSPITAL ENCOUNTER (OUTPATIENT)
Dept: PHYSICAL THERAPY | Age: 40
Setting detail: THERAPIES SERIES
Discharge: HOME | End: 2020-05-18
Payer: COMMERCIAL

## 2020-05-18 PROCEDURE — 97110 THERAPEUTIC EXERCISES: CPT

## 2020-05-18 PROCEDURE — 97112 NEUROMUSCULAR REEDUCATION: CPT

## 2020-05-18 PROCEDURE — 97116 GAIT TRAINING THERAPY: CPT

## 2020-05-18 NOTE — PROGRESS NOTES
Patient/Caregiver Education & Training, Equipment Evaluation, Education, & procurement, Modalities, Aquatics  Plan Comment: transfer care to [facility]     Pt to continue current HEP. See objective section for any therapeutic exercise changes, additions or modifications this date.          PT Individual Minutes  Time In: 8423  Time Out: 3923  Minutes: 60     Procedure Minutes:0     Timed Activity Minutes Units   Ther Ex 35 2   Gait 10 1   Neuro 15 1       Signature:  Electronically signed by Radhames Carter PTA on 5/18/20 at 4:11 PM EDT

## 2020-05-20 ENCOUNTER — HOSPITAL ENCOUNTER (OUTPATIENT)
Dept: PHYSICAL THERAPY | Age: 40
Setting detail: THERAPIES SERIES
Discharge: HOME | End: 2020-05-20
Payer: COMMERCIAL

## 2020-05-20 PROCEDURE — 97110 THERAPEUTIC EXERCISES: CPT

## 2020-05-20 PROCEDURE — 97112 NEUROMUSCULAR REEDUCATION: CPT

## 2020-05-27 ENCOUNTER — HOSPITAL ENCOUNTER (OUTPATIENT)
Dept: PHYSICAL THERAPY | Age: 40
Setting detail: THERAPIES SERIES
Discharge: HOME | End: 2020-05-27
Payer: COMMERCIAL

## 2020-05-27 PROCEDURE — 97110 THERAPEUTIC EXERCISES: CPT

## 2020-05-27 PROCEDURE — 97112 NEUROMUSCULAR REEDUCATION: CPT

## 2020-05-27 NOTE — PROGRESS NOTES
term goals  Time Frame for Short term goals: 4 weeks  Short term goal 1: Patient will demonstrate increased ease with performing bed mobility and transfers independently. Short term goal 2: Patient will increase ankle AROM to [facility] for improved functional tolerance. Short term goal 3: Patient will be independent with HEP. Long term goals  Time Frame for Long term goals : 8 weeks  Long term goal 1: Patient will increase strength in bilateral LEs >/= 4+/5 for improved ambulation and standing. Long term goal 2: Patient will ambulate independently for 150ft with sc with improved bilateral LE clearance and symmetry. Long term goal 3: Patient will ascend/descend 4-6'' steps x 3 using bilateral HRs independently with reciprocal pattern. Long term goal 4: Grider balance >/= 45/56 to demonstrate improved standing balance and reduce risk for falls. Progress toward goals: Balance, strength, gait    POST-PAIN       Pain Rating (0-10 pain scale):   2/10 ; LE soreness  Location and pain description same as pre-treatment unless indicated. Action: [] NA   [] Perform HEP  [] Meds as prescribed  [x] Modalities as prescribed   [] Call Physician     Frequency/Duration:  Plan  Times per week: 2  Plan weeks: 8  Current Treatment Recommendations: Strengthening, ROM, Balance Training, Functional Mobility Training, Transfer Training, Gait Training, Stair training, Neuromuscular Re-education, Manual Therapy - Soft Tissue Mobilization, Manual Therapy - Joint Manipulation, Home Exercise Program, Safety Education & Training, Patient/Caregiver Education & Training, Equipment Evaluation, Education, & procurement, Modalities, Aquatics  Plan Comment: transfer care to [facility]     Pt to continue current HEP. See objective section for any therapeutic exercise changes, additions or modifications this date.          PT Individual Minutes  Time In: 1402  Time Out: 1500  Minutes: 62     Procedure Minutes:0     Timed Activity Minutes Units   Ther Ex 23 2   Neuro 35 2       Signature:  Electronically signed by Griffin Eubanks PTA on 5/27/20 at 12:02 PM EDT

## 2020-06-03 ENCOUNTER — HOSPITAL ENCOUNTER (OUTPATIENT)
Dept: PHYSICAL THERAPY | Age: 40
Setting detail: THERAPIES SERIES
Discharge: HOME | End: 2020-06-03
Payer: COMMERCIAL

## 2020-06-03 PROCEDURE — 97112 NEUROMUSCULAR REEDUCATION: CPT

## 2020-06-03 PROCEDURE — 97110 THERAPEUTIC EXERCISES: CPT

## 2020-06-03 ASSESSMENT — PAIN SCALES - GENERAL: PAINLEVEL_OUTOF10: 8

## 2020-06-03 ASSESSMENT — PAIN DESCRIPTION - ORIENTATION: ORIENTATION: LEFT

## 2020-06-03 ASSESSMENT — PAIN DESCRIPTION - LOCATION: LOCATION: ANKLE

## 2020-06-03 ASSESSMENT — PAIN DESCRIPTION - DESCRIPTORS: DESCRIPTORS: SORE

## 2020-06-03 NOTE — PROGRESS NOTES
Treatment Minutes: 55 Minutes  Procedure Minutes:0     Timed Activity Minutes Units   Ther Ex 10 1   Neuro 50 3       Signature:  Electronically signed by Rafaela Nunez PTA on 6/3/20 at 11:11 AM EDT

## 2020-06-08 ENCOUNTER — HOSPITAL ENCOUNTER (OUTPATIENT)
Dept: PHYSICAL THERAPY | Age: 40
Setting detail: THERAPIES SERIES
Discharge: HOME | End: 2020-06-08
Payer: COMMERCIAL

## 2020-06-08 PROCEDURE — 97112 NEUROMUSCULAR REEDUCATION: CPT

## 2020-06-08 PROCEDURE — 97116 GAIT TRAINING THERAPY: CPT

## 2020-06-08 ASSESSMENT — PAIN DESCRIPTION - LOCATION: LOCATION: ANKLE

## 2020-06-08 ASSESSMENT — PAIN DESCRIPTION - DESCRIPTORS: DESCRIPTORS: SORE

## 2020-06-08 ASSESSMENT — PAIN SCALES - GENERAL: PAINLEVEL_OUTOF10: 7

## 2020-06-08 ASSESSMENT — PAIN DESCRIPTION - ORIENTATION: ORIENTATION: LEFT

## 2020-06-08 NOTE — PROGRESS NOTES
Functions, Activity limitations: Decreased ROM, Decreased strength, Decreased functional mobility , Decreased endurance, Decreased balance, Decreased posture  Assessment: Pt demo's slowly improving Grider score, however continued risk for falls at 27/56. Pt continues to have difficulty with varied NISHA unsupported, however able to stand 1\" in // bars w/o UE support with supervision. Pt able to perform stair training with NR<> reciprocal pattern inconsistantly. Pt ambulates with decreased foot clearance and heel strike, improved with VCs, poor carryover. Treatment Diagnosis: LE weakness, impaired balance, decreased LE/core strength          Goals:  Short term goals  Time Frame for Short term goals: 4 weeks  Short term goal 1: Patient will demonstrate increased ease with performing bed mobility and transfers independently. Short term goal 2: Patient will increase ankle AROM to [facility] for improved functional tolerance. Short term goal 3: Patient will be independent with HEP. Long term goals  Time Frame for Long term goals : 8 weeks  Long term goal 1: Patient will increase strength in bilateral LEs >/= 4+/5 for improved ambulation and standing. Long term goal 2: Patient will ambulate independently for 150ft with sc with improved bilateral LE clearance and symmetry. Long term goal 3: Patient will ascend/descend 4-6'' steps x 3 using bilateral HRs independently with reciprocal pattern. Long term goal 4: Grider balance >/= 45/56 to demonstrate improved standing balance and reduce risk for falls. Progress toward goals:balance, stairs    POST-PAIN       Pain Rating (0-10 pain scale): 7  /10   Location and pain description same as pre-treatment unless indicated.    Action: [] NA   [] Perform HEP  [x] Meds as prescribed  [] Modalities as prescribed   [] Call Physician     Frequency/Duration:  Plan  Times per week: 2  Plan weeks: 8  Current Treatment Recommendations: Strengthening, ROM, Balance Training, Functional Mobility

## 2020-06-10 ENCOUNTER — HOSPITAL ENCOUNTER (OUTPATIENT)
Dept: PHYSICAL THERAPY | Age: 40
Setting detail: THERAPIES SERIES
Discharge: HOME | End: 2020-06-10
Payer: COMMERCIAL

## 2020-06-10 PROCEDURE — 97110 THERAPEUTIC EXERCISES: CPT

## 2020-06-10 PROCEDURE — 97112 NEUROMUSCULAR REEDUCATION: CPT

## 2020-06-10 ASSESSMENT — PAIN DESCRIPTION - LOCATION: LOCATION: ANKLE;FOOT

## 2020-06-10 ASSESSMENT — PAIN SCALES - GENERAL: PAINLEVEL_OUTOF10: 8

## 2020-06-10 ASSESSMENT — PAIN DESCRIPTION - ORIENTATION: ORIENTATION: LEFT

## 2020-06-10 ASSESSMENT — PAIN DESCRIPTION - DESCRIPTORS: DESCRIPTORS: SORE

## 2020-06-10 NOTE — PROGRESS NOTES
term goals  Time Frame for Short term goals: 4 weeks  Short term goal 1: Patient will demonstrate increased ease with performing bed mobility and transfers independently. Short term goal 2: Patient will increase ankle AROM to [facility] for improved functional tolerance. Short term goal 3: Patient will be independent with HEP. Long term goals  Time Frame for Long term goals : 8 weeks  Long term goal 1: Patient will increase strength in bilateral LEs >/= 4+/5 for improved ambulation and standing. Long term goal 2: Patient will ambulate independently for 150ft with sc with improved bilateral LE clearance and symmetry. Long term goal 3: Patient will ascend/descend 4-6'' steps x 3 using bilateral HRs independently with reciprocal pattern. Long term goal 4: Grider balance >/= 45/56 to demonstrate improved standing balance and reduce risk for falls. Progress toward goals: Balance, strength    POST-PAIN       Pain Rating (0-10 pain scale):   8/10   Location and pain description same as pre-treatment unless indicated. Action: [] NA   [] Perform HEP  [x] Meds as prescribed  [] Modalities as prescribed   [] Call Physician     Frequency/Duration:  Plan  Times per week: 2  Plan weeks: 8  Current Treatment Recommendations: Strengthening, ROM, Balance Training, Functional Mobility Training, Transfer Training, Gait Training, Stair training, Neuromuscular Re-education, Manual Therapy - Soft Tissue Mobilization, Manual Therapy - Joint Manipulation, Home Exercise Program, Safety Education & Training, Patient/Caregiver Education & Training, Equipment Evaluation, Education, & procurement, Modalities, Aquatics  Plan Comment: transfer care to [facility]     Pt to continue current HEP. See objective section for any therapeutic exercise changes, additions or modifications this date.          PT Individual Minutes  Time In: 2772  Time Out: 1500  Minutes: 57  Timed Code Treatment Minutes: 56 Minutes  Procedure Minutes:0     Timed Activity Minutes Units   Ther Ex 33 2   Neuro 23 2       Signature:  Electronically signed by Leila Neville PTA on 6/10/20 at 10:28 AM EDT

## 2020-06-15 ENCOUNTER — HOSPITAL ENCOUNTER (OUTPATIENT)
Dept: PHYSICAL THERAPY | Age: 40
Setting detail: THERAPIES SERIES
Discharge: HOME | End: 2020-06-15
Payer: COMMERCIAL

## 2020-06-15 PROCEDURE — 97112 NEUROMUSCULAR REEDUCATION: CPT

## 2020-06-15 PROCEDURE — 97116 GAIT TRAINING THERAPY: CPT

## 2020-06-15 ASSESSMENT — PAIN DESCRIPTION - ORIENTATION: ORIENTATION: LEFT

## 2020-06-15 ASSESSMENT — PAIN SCALES - GENERAL: PAINLEVEL_OUTOF10: 7

## 2020-06-15 ASSESSMENT — PAIN DESCRIPTION - DESCRIPTORS: DESCRIPTORS: THROBBING

## 2020-06-15 ASSESSMENT — PAIN DESCRIPTION - LOCATION: LOCATION: ANKLE

## 2020-06-15 NOTE — PROGRESS NOTES
[address] [address]  Outpatient Physical Therapy    Treatment Note        Date: 6/15/2020  Patient: Bruce Kehr  : 1980  ACCT #: [de-identified]  Referring Practitioner: Dr. Eduard Morrell   Diagnosis: instability lt ankle joint, PTTD, Lt leg weakness, balance disorder     Visit Information:  PT Visit Information  PT Insurance Information: [employer]  Total # of Visits Approved: 30  Total # of Visits to Date: 14  No Show: 2  Canceled Appointment: 2  Progress Note Counter:     Subjective: Pt reporting inc'd pain d/t forgetting to take a pain pill     HEP Compliance:  [] Good [x] Fair [x] Poor [] Reports not doing due to:    Vital Signs  Patient Currently in Pain: Yes   Pain Screening  Patient Currently in Pain: Yes  Pain Assessment  Pain Level: 7  Pain Location: Ankle  Pain Orientation: Left  Pain Descriptors: Throbbing    OBJECTIVE:   Exercises  Exercise 2: 4\" pivot/GS x 10 kza  Exercise 8: Gait drills: F/L/R and march and hold- VCs to improve posture,trialed gait training with SC for technique and 1 ue support  Exercise 11: Standing balance: reaching for bean bags within and OOBOS,  0-1 UE support  Exercise 17: 4\" alt taps 2 UE support required, trialed 1 ue on same side with fear x 5 Rt, x 2 Lt  Exercise 20: HEP: Inc compliance with HEP, (Ankle TB)           Ambulation 1  Surface: carpet  Device: Rolling Walker  Assistance: Stand by assistance  Quality of Gait: Decreased Kaz hip flexion for foot clearance, decreased marlys, decreased heelstrike, improved HS with VCs, poor carryover  Gait Deviations: Slow Marlys, Decreased step length, Decreased step height  Distance: 100ft     *Indicates exercise, modality, or manual techniques to be initiated when appropriate    Assessment:         Body structures, Functions, Activity limitations: Decreased ROM, Decreased strength, Decreased functional mobility , Decreased endurance, Decreased balance, Decreased posture  Assessment: Continued static and dynamic care to [facility]     Pt to continue current HEP. See objective section for any therapeutic exercise changes, additions or modifications this date.          PT Individual Minutes  Time In: 4481  Time Out: 1500  Minutes: 62     Procedure Minutes:0   Timed Activity Minutes Units   Neuro 42 3   Gait 15 1       Signature:  Electronically signed by Yasmin Keller PTA on 6/15/20 at 5:19 PM EDT

## 2020-06-16 NOTE — PROGRESS NOTES
[address]       Physical Therapy  Cancellation/No-show Note  Patient Name:  Keesha Estrada  :  1980   Date:  2020  Referring Practitioner: Dr. Alyson Deleon   Diagnosis: instability lt ankle joint, PTTD, Lt leg weakness, balance disorder     Visit Information:  PT Visit Information  PT Insurance Information: [employer]  Total # of Visits Approved: 30  Total # of Visits to Date: 14  No Show: 2  Canceled Appointment: 3  Progress Note Counter:  Cs 2020    For today's appointment patient:  [x]  Cancelled  []  Rescheduled appointment  []  No-show   []  Called pt to remind of next appointment     Reason given by patient:  []  Patient ill  [x]  Conflicting appointment  []  No transportation    []  Conflict with work  []  No reason given  []  Other:       Comments:       Signature: Electronically signed by Zina Ponce PTA on 20 at 10:10 AM EDT

## 2020-06-17 ENCOUNTER — HOSPITAL ENCOUNTER (OUTPATIENT)
Dept: PHYSICAL THERAPY | Age: 40
Setting detail: THERAPIES SERIES
Discharge: HOME | End: 2020-06-17
Payer: COMMERCIAL

## 2020-06-19 PROBLEM — R26.9 ABNORMALITY OF GAIT: Status: ACTIVE | Noted: 2019-09-19

## 2020-06-19 PROBLEM — Z96.1 PSEUDOPHAKIA: Status: ACTIVE | Noted: 2019-10-29

## 2020-06-19 PROBLEM — M25.569 PAIN IN JOINT, LOWER LEG: Status: ACTIVE | Noted: 2020-06-19

## 2020-06-19 PROBLEM — Z98.890 S/P YAG CAPSULOTOMY, RIGHT: Status: ACTIVE | Noted: 2019-10-29

## 2020-06-19 PROBLEM — R53.1 WEAKNESS: Status: ACTIVE | Noted: 2019-09-19

## 2020-06-22 ENCOUNTER — HOSPITAL ENCOUNTER (OUTPATIENT)
Dept: PHYSICAL THERAPY | Age: 40
Setting detail: THERAPIES SERIES
Discharge: HOME | End: 2020-06-22
Payer: COMMERCIAL

## 2020-06-22 ENCOUNTER — OFFICE VISIT (OUTPATIENT)
Dept: NEUROLOGY | Age: 40
End: 2020-06-22
Payer: COMMERCIAL

## 2020-06-22 VITALS
HEART RATE: 85 BPM | DIASTOLIC BLOOD PRESSURE: 69 MMHG | WEIGHT: 143 LBS | BODY MASS INDEX: 27.47 KG/M2 | SYSTOLIC BLOOD PRESSURE: 112 MMHG

## 2020-06-22 PROBLEM — M21.372 FOOT DROP, LEFT FOOT: Status: ACTIVE | Noted: 2020-06-22

## 2020-06-22 PROBLEM — R26.0 ATAXIC GAIT: Status: ACTIVE | Noted: 2019-09-19

## 2020-06-22 PROBLEM — M54.16 LUMBAR RADICULOPATHY: Status: ACTIVE | Noted: 2020-06-22

## 2020-06-22 PROBLEM — M21.962 ACQUIRED DEFORMITY OF LEFT FOOT: Status: ACTIVE | Noted: 2020-06-22

## 2020-06-22 PROCEDURE — G8427 DOCREV CUR MEDS BY ELIG CLIN: HCPCS | Performed by: PSYCHIATRY & NEUROLOGY

## 2020-06-22 PROCEDURE — 97112 NEUROMUSCULAR REEDUCATION: CPT

## 2020-06-22 PROCEDURE — 97110 THERAPEUTIC EXERCISES: CPT

## 2020-06-22 PROCEDURE — 1036F TOBACCO NON-USER: CPT | Performed by: PSYCHIATRY & NEUROLOGY

## 2020-06-22 PROCEDURE — 97116 GAIT TRAINING THERAPY: CPT

## 2020-06-22 PROCEDURE — G8417 CALC BMI ABV UP PARAM F/U: HCPCS | Performed by: PSYCHIATRY & NEUROLOGY

## 2020-06-22 PROCEDURE — 99204 OFFICE O/P NEW MOD 45 MIN: CPT | Performed by: PSYCHIATRY & NEUROLOGY

## 2020-06-22 ASSESSMENT — ENCOUNTER SYMPTOMS
NAUSEA: 0
SHORTNESS OF BREATH: 0
CHOKING: 0
TROUBLE SWALLOWING: 0
BACK PAIN: 0
PHOTOPHOBIA: 0
COLOR CHANGE: 0
VOMITING: 0

## 2020-06-22 NOTE — PROGRESS NOTES
[address] [address]  Outpatient Physical Therapy    Treatment Note        Date: 2020  Patient: Myla Landau  : 1980  ACCT #: [de-identified]  Referring Practitioner: Dr. Isabel Márquez   Diagnosis: instability lt ankle joint, PTTD, Lt leg weakness, balance disorder     Visit Information:  PT Visit Information  PT Insurance Information: [employer]  Total # of Visits Approved: 30  Total # of Visits to Date: 15  No Show: 2  Canceled Appointment: 3  Progress Note Counter: 15/16     Subjective: Pt reports upcoming MRI for Brain, and Lt foot/ankle. Pain meds before tx today.      HEP Compliance:  [] Good [x] Fair [x] Poor [] Reports not doing due to:    Vital Signs  Patient Currently in Pain: Denies   Pain Screening  Patient Currently in Pain: Denies    OBJECTIVE:   Exercises  Exercise 1: STS from chair 2x 5( timed, see trans) without UEs  Exercise 2: 4\" pivot/GS x 8 kaz  Exercise 3: single stepping with 1 ue support, focus on stride length  Exercise 8: Gait drills: F/L/R and march and hold- VCs to improve posture  Exercise 17: 4\" alt taps 2 UE support required x 10  Exercise 20: HEP:  (verbal)STS from chair        Ambulation 1  Surface: carpet  Device: Rolling Walker  Assistance: Stand by assistance, Supervision  Quality of Gait: Decreased Kaz hip flexion for foot clearance, decreased marlys, decreased heelstrike, improved HS with VCs, poor carryover  Gait Deviations: Slow Marlys, Decreased step length, Decreased step height  Distance: clinical       Stairs  # Steps : 12  Stairs Height: 6\"  Rails: Bilateral  Assistance: Stand by assistance, Supervision, Contact guard assistance  Comment: NR<> Recip with verbal cues and CGA for recip, tends to stop with ea step      Transfers  Comment: 5 x STS w/o Ue, post use= 45.93\",  x 5reps with fwd reach and decreased LE bracing, 2nd attempt approx 53secs with le bracing and occ 1 ue support, tall chair    Strength: [x] NT  [] MMT completed:     ROM: [] NT  [x] ROM measurements:      AROM LLE (degrees)  L Ankle Dorsiflexion 0-20: 20 deg  L Ankle Plantar Flexion 0-45: 39 deg  L Ankle Forefoot Inversion 0-40: 38 deg  L Ankle Forefoot Eversion 0-20: 20 deg     *Indicates exercise, modality, or manual techniques to be initiated when appropriate    Assessment: Body structures, Functions, Activity limitations: Decreased ROM, Decreased strength, Decreased functional mobility , Decreased endurance, Decreased balance, Decreased posture  Assessment: Pt progressing towards stair goal with cueing for reciprocal pattern. Pt with improved Lt ankle rom improving functional mobility however pt continues to demo inconsistant decr'd foot clearance during Gait. Pt desires to progress to no AD, however, pt fearful without kaz ue support. Treatment Diagnosis: LE weakness, impaired balance, decreased LE/core strength          Goals:  Short term goals  Time Frame for Short term goals: 4 weeks  Short term goal 1: Patient will demonstrate increased ease with performing bed mobility and transfers independently. Short term goal 2: Patient will increase ankle AROM to [facility] for improved functional tolerance. Short term goal 3: Patient will be independent with HEP. Long term goals  Time Frame for Long term goals : 8 weeks  Long term goal 1: Patient will increase strength in bilateral LEs >/= 4+/5 for improved ambulation and standing. Long term goal 2: Patient will ambulate independently for 150ft with sc with improved bilateral LE clearance and symmetry. Long term goal 3: Patient will ascend/descend 4-6'' steps x 3 using bilateral HRs independently with reciprocal pattern. Long term goal 4: Grider balance >/= 45/56 to demonstrate improved standing balance and reduce risk for falls. Progress toward goals:balance, gait/stairs    POST-PAIN       Pain Rating (0-10 pain scale):   8/10   Location and pain description same as pre-treatment unless indicated.    Action: [] NA   [] Perform HEP  [x] Meds as prescribed  [] Modalities as prescribed   [] Call Physician     Frequency/Duration:  Plan  Times per week: 2  Plan weeks: 8  Specific instructions for Next Treatment: D/C nv? Current Treatment Recommendations: Strengthening, ROM, Balance Training, Functional Mobility Training, Transfer Training, Gait Training, Stair training, Neuromuscular Re-education, Manual Therapy - Soft Tissue Mobilization, Manual Therapy - Joint Manipulation, Home Exercise Program, Safety Education & Training, Patient/Caregiver Education & Training, Equipment Evaluation, Education, & procurement, Modalities, Aquatics  Plan Comment: transfer care to [facility]     Pt to continue current HEP. See objective section for any therapeutic exercise changes, additions or modifications this date.          PT Individual Minutes  Time In: 2361  Time Out: 1500  Minutes: 62     Procedure Minutes:0     Timed Activity Minutes Units   Ther Ex 15 1   Neuro 28 2   Gait 10 1       Signature:  Electronically signed by Estephanie Rizzo PTA on 6/22/20 at 4:16 PM EDT

## 2020-06-22 NOTE — PROGRESS NOTES
Subjective:      Patient ID: Cristi Beal is a 37 y.o. female who presents today for:  Chief Complaint   Patient presents with    New Patient     leg weakness, had surgery on left foot and then got an ulcer on same foot so she wasnt aloowed to exercise or wlk so they think its muscle atrophy, no numbness or weaknes, but loss of balance, no recent falls she catches herself. HPI 40-year-old right-handed female with a history of leg weakness. Patient tore a tendon in the year  when she tripped over a stationary bike up. She has had multiple surgeries in the left foot. Patient reports of pain in the foot when she starts to walk. She does not have pain at a stationary level when she is not walking. She has mild weakness of foot plantar flexion. There appears to be another history 5 years ago she fell in the bathroom and had a head injury with sutures. She has had walking and balance issues since then. She did not lose consciousness at that time. She is also complained of back pain which occurred at the same time. She is walking with a walker. She has been seen by few doctors. Extensive foot surgeries have been done on the left. She was born full-term normal delivery with a  section and was in special at school. She denies any bladder discomfort. She is otherwise independent in her activity of daily living. Denies  any ongoing headaches denies any neck pain and no numbness or tingling of her lower extremity or upper extremities.     Past Medical History:   Diagnosis Date    Dislocated knee     Right knee    PTTD (posterior tibial tendon dysfunction)      Past Surgical History:   Procedure Laterality Date    CATARACT REMOVAL WITH IMPLANT Right     CATARACT REMOVAL WITH IMPLANT Left      SECTION  2013    EYE SURGERY      FOOT SURGERY Left      Social History     Socioeconomic History    Marital status:      Spouse name: Not on file    Number of children: found. Lab Results   Component Value Date    WBC 8.3 04/26/2017    RBC 4.11 04/26/2017    HGB 11.7 04/26/2017    HCT 36.0 04/26/2017    MCV 87.8 04/26/2017    MCH 28.4 04/26/2017    MCHC 32.4 04/26/2017    RDW 15.4 04/26/2017     04/26/2017    MPV 10.7 06/09/2014     Lab Results   Component Value Date     12/13/2019    K 4.2 12/13/2019     12/13/2019    CO2 26 12/13/2019    BUN 20 12/13/2019    CREATININE 0.68 12/13/2019    GFRAA >60.0 12/13/2019    LABGLOM >60.0 12/13/2019    GLUCOSE 93 12/13/2019    PROT 8.2 12/13/2019    LABALBU 4.0 12/13/2019    CALCIUM 9.4 12/13/2019    BILITOT <0.2 12/13/2019    ALKPHOS 47 12/13/2019    AST 13 12/13/2019    ALT 11 12/13/2019     Lab Results   Component Value Date    PROTIME 9.4 01/11/2013    INR 0.9 01/11/2013     No results found for: TSH, YRRWISAS07, FOLATE, FERRITIN, IRON, TIBC, PTRFSAT, TSH, FREET4  Lab Results   Component Value Date    TRIG 79 12/13/2019    HDL 48 12/13/2019    LDLCALC 127 12/13/2019     Lab Results   Component Value Date    LABBENZ NotDTCD 01/11/2013     No results found for: LITHIUM, DILFRTOT, VALPROATE    Assessment:       Diagnosis Orders   1. Ataxic gait  MRI Brain WO Contrast    MRI Lumbar Spine WO Contrast   2. Foot drop, left foot     3. Acquired deformity of left foot     4. Lumbar radiculopathy       Gait ataxia with loss of balance which appears to be multifactorial.  Patient had  considerable left foot deformities from surgical interventions with almost no Achilles tendon. She has mild foot drop with weakness of dorsiflexion plantarflexion eversion and inversion. This is all related to her foot injury patient does not report any paresthesias but only has pain upon walking and this is pressure pain in the foot. This does not suggest a neuropathy. An underlying lumbar Radiculopatht must be  Ruled  out given that she had a fall in the past and complains of back pain.     Also patient had head injury without any loss of

## 2020-06-24 ENCOUNTER — HOSPITAL ENCOUNTER (OUTPATIENT)
Dept: PHYSICAL THERAPY | Age: 40
Setting detail: THERAPIES SERIES
Discharge: HOME | End: 2020-06-24
Payer: COMMERCIAL

## 2020-06-24 PROCEDURE — 97110 THERAPEUTIC EXERCISES: CPT

## 2020-06-24 PROCEDURE — 97112 NEUROMUSCULAR REEDUCATION: CPT

## 2020-06-24 ASSESSMENT — PAIN DESCRIPTION - DESCRIPTORS: DESCRIPTORS: THROBBING

## 2020-06-24 ASSESSMENT — PAIN SCALES - GENERAL: PAINLEVEL_OUTOF10: 4

## 2020-06-24 ASSESSMENT — PAIN DESCRIPTION - ORIENTATION: ORIENTATION: LEFT

## 2020-06-24 ASSESSMENT — PAIN DESCRIPTION - LOCATION: LOCATION: ANKLE

## 2020-06-24 NOTE — PROGRESS NOTES
[address] [address]  Outpatient Physical Therapy    Treatment Note        Date: 2020  Patient: Km Herrera  : 1980  ACCT #: [de-identified]  Referring Practitioner: Dr. Eunice Carlson   Diagnosis: instability lt ankle joint, PTTD, Lt leg weakness, balance disorder     Visit Information:  PT Visit Information  PT Insurance Information: [employer]  Total # of Visits Approved: 30  Total # of Visits to Date: 16  No Show: 2  Canceled Appointment: 3  Progress Note Counter:     Subjective: Pt reports not taking pain meds before session. Feels she could benefit from continuing with PT to address balance and strength. HEP Compliance:  [] Good [x] Fair [] Poor [] Reports not doing due to:    Vital Signs  Patient Currently in Pain: Yes   Pain Screening  Patient Currently in Pain: Yes  Pain Assessment  Pain Level: 4  Pain Location: Ankle  Pain Orientation: Left  Pain Descriptors: Throbbing    OBJECTIVE:   Exercises  Exercise 2: 4\" pivot x 10 kaz  Exercise 8: Gait drills: F/L/R and march and - VCs to improve posture  Exercise 10: Alt toe taps on 4'' box 1-2 UE support  Exercise 19: Grider tasks-                    Ambulation 1  Surface: carpet  Device: Rolling Walker  Assistance: Stand by assistance, Supervision  Quality of Gait: Flexed posture, decreased hip flexion for foot clearance, Trendelenburg with stance on Lt  Distance: 150ft              Strength: [] NT  [x] MMT completed:  Strength RLE  R Hip Flexion: 4-/5  R Hip Extension: 2-/5  R Hip ABduction: 3-/5  R Knee Flexion: 5/5  R Knee Extension: 5/5  R Ankle Dorsiflexion: 4-/5  Strength LLE  L Hip Flexion: 4/5  L Hip Extension: 2-/5  L Hip ABduction: 3-/5  L Knee Flexion: 4/5  L Knee Extension: 5/5  L Ankle Dorsiflexion: 3+/5             *Indicates exercise, modality, or manual techniques to be initiated when appropriate    Assessment:         Body structures, Functions, Activity limitations: Decreased ROM, Decreased strength, Decreased functional mobility , Decreased endurance, Decreased balance, Decreased posture  Assessment: Pt with no change in strength through MMT. Decrease in Grider score by 3 points, increased difficulty completing dynamic tasks without UEs with pt reporting fear of falling. Education on importance of increasing activity and HEP to further improve strength and balance. Will decrease to one time a week as pt is plateauing towards goals. Treatment Diagnosis: LE weakness, impaired balance, decreased LE/core strength  Prognosis: Good       Goals:  Short term goals  Time Frame for Short term goals: 4 weeks  Short term goal 1: Patient will demonstrate increased ease with performing bed mobility and transfers independently. Short term goal 2: Patient will increase ankle AROM to [facility] for improved functional tolerance. Short term goal 3: Patient will be independent with HEP. Long term goals  Time Frame for Long term goals : 8 weeks  Long term goal 1: Patient will increase strength in bilateral LEs >/= 4+/5 for improved ambulation and standing. Long term goal 2: Patient will ambulate independently for 150ft with sc with improved bilateral LE clearance and symmetry. Long term goal 3: Patient will ascend/descend 4-6'' steps x 3 using bilateral HRs independently with reciprocal pattern. Long term goal 4: Grider balance >/= 45/56 to demonstrate improved standing balance and reduce risk for falls. Progress toward goals: Balance, strength    POST-PAIN       Pain Rating (0-10 pain scale):  6 /10   Location and pain description same as pre-treatment unless indicated.    Action: [] NA   [] Perform HEP  [x] Meds as prescribed  [] Modalities as prescribed   [] Call Physician     Frequency/Duration:  Plan  Times per week: 2  Plan weeks: 8  Specific instructions for Next Treatment: Decrease frequency  Current Treatment Recommendations: Strengthening, ROM, Balance Training, Functional Mobility Training, Transfer Training, Gait Training, Stair training, Neuromuscular Re-education, Manual Therapy - Soft Tissue Mobilization, Manual Therapy - Joint Manipulation, Home Exercise Program, Safety Education & Training, Patient/Caregiver Education & Training, Equipment Evaluation, Education, & procurement, Modalities, Aquatics  Plan Comment: transfer care to [facility] PT     Pt to continue current HEP. See objective section for any therapeutic exercise changes, additions or modifications this date.          PT Individual Minutes  Time In: 2608  Time Out: 4484  Minutes: 57  Timed Code Treatment Minutes: 56 Minutes  Procedure Minutes:0     Timed Activity Minutes Units   Ther Ex 10 1   Neuro 46 3       Signature:  Electronically signed by Jacquelyn Andrade PTA on 6/24/20 at 12:56 PM EDT

## 2020-06-29 ENCOUNTER — HOSPITAL ENCOUNTER (OUTPATIENT)
Dept: PHYSICAL THERAPY | Age: 40
Setting detail: THERAPIES SERIES
Discharge: HOME | End: 2020-06-29
Payer: COMMERCIAL

## 2020-06-29 PROCEDURE — 97112 NEUROMUSCULAR REEDUCATION: CPT

## 2020-06-29 PROCEDURE — 97110 THERAPEUTIC EXERCISES: CPT

## 2020-06-29 ASSESSMENT — PAIN DESCRIPTION - DESCRIPTORS: DESCRIPTORS: THROBBING

## 2020-06-29 ASSESSMENT — PAIN DESCRIPTION - LOCATION: LOCATION: ANKLE

## 2020-06-29 ASSESSMENT — PAIN DESCRIPTION - ORIENTATION: ORIENTATION: LEFT

## 2020-06-29 ASSESSMENT — PAIN SCALES - GENERAL: PAINLEVEL_OUTOF10: 7

## 2020-06-29 NOTE — PROGRESS NOTES
Goals:  Short term goals  Time Frame for Short term goals: 2 weeks  Short term goal 1: Patient will be independent with HEP. Long term goals  Time Frame for Long term goals : 8 weeks  Long term goal 1: Patient will increase strength in bilateral LEs >/= 4+/5 for improved ambulation and standing. Long term goal 2: Patient will ambulate independently for 150ft with sc with improved bilateral LE clearance and symmetry. Long term goal 3: Patient will ascend/descend 4-6'' steps x 3 using bilateral HRs independently with reciprocal pattern. Long term goal 4: Grider balance >/= 45/56 to demonstrate improved standing balance and reduce risk for falls. Progress toward goals: strength, gait    POST-PAIN       Pain Rating (0-10 pain scale):   5/10   Location and pain description same as pre-treatment unless indicated. Action: [] NA   [] Perform HEP  [x] Meds as prescribed  [] Modalities as prescribed   [] Call Physician     Frequency/Duration:  Plan  Times per week: 1  Plan weeks: 4  Current Treatment Recommendations: Strengthening, ROM, Balance Training, Functional Mobility Training, Transfer Training, Gait Training, Stair training, Neuromuscular Re-education, Manual Therapy - Soft Tissue Mobilization, Manual Therapy - Joint Manipulation, Home Exercise Program, Safety Education & Training, Patient/Caregiver Education & Training, Equipment Evaluation, Education, & procurement, Modalities, Aquatics  Plan Comment: transfer care to [facility] PT     Pt to continue current HEP. See objective section for any therapeutic exercise changes, additions or modifications this date.          PT Individual Minutes  Time In: 4335  Time Out: 1600  Minutes: 57  Timed Code Treatment Minutes: 55 Minutes  Procedure Minutes:0     Timed Activity Minutes Units   Neuro 25 2   There ex 30 2       Signature:  Electronically signed by Marina Chen PTA on 6/29/20 at 9:26 AM EDT

## 2020-07-08 ENCOUNTER — HOSPITAL ENCOUNTER (OUTPATIENT)
Dept: PHYSICAL THERAPY | Age: 40
Setting detail: THERAPIES SERIES
Discharge: HOME | End: 2020-07-08
Payer: COMMERCIAL

## 2020-07-08 PROCEDURE — 97112 NEUROMUSCULAR REEDUCATION: CPT

## 2020-07-08 PROCEDURE — 97110 THERAPEUTIC EXERCISES: CPT

## 2020-07-08 ASSESSMENT — PAIN DESCRIPTION - LOCATION: LOCATION: ANKLE

## 2020-07-08 ASSESSMENT — PAIN SCALES - GENERAL: PAINLEVEL_OUTOF10: 7

## 2020-07-08 ASSESSMENT — PAIN DESCRIPTION - DESCRIPTORS: DESCRIPTORS: SORE

## 2020-07-08 ASSESSMENT — PAIN DESCRIPTION - ORIENTATION: ORIENTATION: LEFT

## 2020-07-08 NOTE — PROGRESS NOTES
[address] [address]  Outpatient Physical Therapy    Treatment Note        Date: 2020  Patient: Vance Singer  : 1980  ACCT #: [de-identified]  Referring Practitioner: Dr. Khushbu Lobo   Diagnosis: instability lt ankle joint, PTTD, Lt leg weakness, balance disorder     Visit Information:  PT Visit Information  [facility]  Total # of Visits Approved: 30  Total # of Visits to Date: 25  No Show: 2  Canceled Appointment: 3  Progress Note Counter: 2/    Subjective: Pt reports doing stairs more at home at [address] [place] [address], [address]. HEP Compliance:  [] Good [x] Fair [] Poor [] Reports not doing due to:    Vital Signs  Patient Currently in Pain: Yes   Pain Screening  Patient Currently in Pain: Yes  Pain Assessment  Pain Level: 7  Pain Location: Ankle  Pain Orientation: Left  Pain Descriptors: Sore    OBJECTIVE:   Exercises  Exercise 1: STS x5 without UEs  Exercise 7: 4'' step ups F/L 6-10 reps  Exercise 8: Gait drills: F/L/R and march and hold- VCs to improve posture  Exercise 11: Standing balance: reaching for cones within and OOBOS, 1 UE support  Exercise 15: Single stepping over s/c with reaching x10 F/L  Exercise 17: 4\" alt taps 2 UE support required x 10  Exercise 18: Hip hikes x 10 in seated - VCs to improve technique               *Indicates exercise, modality, or manual techniques to be initiated when appropriate    Assessment: Body structures, Functions, Activity limitations: Decreased ROM, Decreased strength, Decreased functional mobility , Decreased endurance, Decreased balance, Decreased posture  Assessment: Initiated step ups to increase LE Strength. VCs to increase step length such as with step ups and gait. Pt with increased fatigue at end of session, required W/C ride to exit facility.    Treatment Diagnosis: LE weakness, impaired balance, decreased LE/core strength  Prognosis: Good       Goals:  Short term goals  Time Frame for Short term goals: 2 weeks  Short term goal 1: Patient will be independent with HEP. Long term goals  Time Frame for Long term goals : 8 weeks  Long term goal 1: Patient will increase strength in bilateral LEs >/= 4+/5 for improved ambulation and standing. Long term goal 2: Patient will ambulate independently for 150ft with sc with improved bilateral LE clearance and symmetry. Long term goal 3: Patient will ascend/descend 4-6'' steps x 3 using bilateral HRs independently with reciprocal pattern. Long term goal 4: Grider balance >/= 45/56 to demonstrate improved standing balance and reduce risk for falls. Progress toward goals: Balance, gait    POST-PAIN       Pain Rating (0-10 pain scale):   3/10   Location and pain description same as pre-treatment unless indicated. Action: [] NA   [] Perform HEP  [x] Meds as prescribed  [] Modalities as prescribed   [] Call Physician     Frequency/Duration:  Plan  Times per week: 1  Plan weeks: 4  Current Treatment Recommendations: Strengthening, ROM, Balance Training, Functional Mobility Training, Transfer Training, Gait Training, Stair training, Neuromuscular Re-education, Manual Therapy - Soft Tissue Mobilization, Manual Therapy - Joint Manipulation, Home Exercise Program, Safety Education & Training, Patient/Caregiver Education & Training, Equipment Evaluation, Education, & procurement, Modalities, Aquatics  Plan Comment: transfer care to [facility] PT     Pt to continue current HEP. See objective section for any therapeutic exercise changes, additions or modifications this date.          PT Individual Minutes  Time In: 3914  Time Out: 1500  Minutes: 57  Timed Code Treatment Minutes: 55 Minutes  Procedure Minutes:0     Timed Activity Minutes Units   Ther Ex 23 2   Neuro 32 2       Signature:  Electronically signed by Jose Lorenz PTA on 7/8/20 at 8:14 AM EDT

## 2020-07-15 ENCOUNTER — HOSPITAL ENCOUNTER (OUTPATIENT)
Dept: PHYSICAL THERAPY | Age: 40
Setting detail: THERAPIES SERIES
Discharge: HOME | End: 2020-07-15
Payer: COMMERCIAL

## 2020-07-15 PROCEDURE — 97110 THERAPEUTIC EXERCISES: CPT

## 2020-07-15 PROCEDURE — 97116 GAIT TRAINING THERAPY: CPT

## 2020-07-15 PROCEDURE — 97112 NEUROMUSCULAR REEDUCATION: CPT

## 2020-07-15 NOTE — PROGRESS NOTES
[address] [address]  Outpatient Physical Therapy    Treatment Note        Date: 7/15/2020  Patient: Julita Juárez  : 1980  ACCT #: [de-identified]  Referring Practitioner: Dr. Jessie Welsh   Diagnosis: instability lt ankle joint, PTTD, Lt leg weakness, balance disorder     Visit Information:  PT Visit Information  PT Insurance Information: [employer]  Total # of Visits Approved: 30  Total # of Visits to Date: 23  No Show: 2  Canceled Appointment: 3  Progress Note Counter: 3/4    Subjective: Pt reports occ using S/C at home. Discussed with pt that [employer] would best be used at this time due to instability and decreased balance. Pt reports she recently received a manual treadmill, only propels from pt walking on it vs electric. HEP Compliance:  [x] Good [] Fair [] Poor [] Reports not doing due to:    Vital Signs  Patient Currently in Pain: Yes   Pain Screening  Patient Currently in Pain: Yes    OBJECTIVE:   Exercises  Exercise 4: Static standing with fingertip support: FT/FA, semitandem, FA EC  Exercise 6: Nustep Level 2 for 8 min to increase LE strength and endurance   Exercise 7: 4'' step ups F/L x10  Exercise 10: Alt toe taps on 4'' box 1 UE support  Exercise 14: Grider tasks: alt taps with 1-2 ue support  Exercise 15: Single stepping over s/c with reaching x10 F/L  Exercise 18: Hip hikes x 10 in seated - VCs to improve technique  Exercise 20: HEP: reviewed/education on current HEP; sheets re-issued to patient                   Ambulation 1  Surface: carpet  Device: Rolling Walker  Assistance: Stand by assistance, Supervision  Quality of Gait: Improved Thomas foot clearance with initial gait, decreased with fatigue, flexed posture  Distance: 115ft                       *Indicates exercise, modality, or manual techniques to be initiated when appropriate    Assessment:         Body structures, Functions, Activity limitations: Decreased ROM, Decreased strength, Decreased functional mobility , Decreased endurance, therapeutic exercise changes, additions or modifications this date.          PT Individual Minutes  Time In: 4588  Time Out: 1501  Minutes: 58  Timed Code Treatment Minutes: 56 Minutes  Procedure Minutes:0     Timed Activity Minutes Units   Ther Ex 16 1   Gait 10 1   Neuro 30 2       Signature:  Electronically signed by Madeleine Mcburney, PTA on 7/15/20 at 11:33 AM EDT

## 2020-07-22 ENCOUNTER — HOSPITAL ENCOUNTER (OUTPATIENT)
Dept: PHYSICAL THERAPY | Age: 40
Setting detail: THERAPIES SERIES
Discharge: HOME | End: 2020-07-22
Payer: COMMERCIAL

## 2020-07-22 PROCEDURE — 97110 THERAPEUTIC EXERCISES: CPT

## 2020-07-22 PROCEDURE — 97112 NEUROMUSCULAR REEDUCATION: CPT

## 2020-07-22 PROCEDURE — 97116 GAIT TRAINING THERAPY: CPT

## 2020-07-22 ASSESSMENT — PAIN DESCRIPTION - ORIENTATION: ORIENTATION: LEFT

## 2020-07-22 ASSESSMENT — PAIN DESCRIPTION - LOCATION: LOCATION: ANKLE

## 2020-07-22 ASSESSMENT — PAIN SCALES - GENERAL: PAINLEVEL_OUTOF10: 9

## 2020-07-22 ASSESSMENT — PAIN DESCRIPTION - DESCRIPTORS: DESCRIPTORS: ACHING

## 2020-07-22 NOTE — PROGRESS NOTES
[address]     Ph: [phone]  Fax: [phone]    [] Certification  [] Recertification []  Plan of Care  [] Progress Note [x] Discharge      To:  Dr. Fatimah Bahena       From:  Otis Ohara, PT  Patient: Moose Escobedo     : 1980  Diagnosis: instability lt ankle joint, PTTD, Lt leg weakness, balance disorder      Date: 2020  Treatment Diagnosis: LE weakness, impaired balance, decreased LE/core strength       Progress Report Period from:  2020  to 2020    Total # of Visits to Date: 20   No Show: 2    Canceled Appointment: 3     OBJECTIVE:   Short Term Goals - Time Frame for Short term goals: 2 weeks    Goals Current/Discharge status  Met   Short term goal 1: Patient will be independent with HEP. Independent; Fair compliance [x] yes  [] no     Long Term Goals - Time Frame for Long term goals : 8 weeks  Goals Current/ Discharge status Met   Long term goal 1: Patient will increase strength in bilateral LEs >/= 4+/5 for improved ambulation and standing. Strength RLE  R Hip Flexion: 4-/5  R Hip Extension: 2-/5  R Hip ABduction: 3-/5  R Knee Flexion: 5/5  R Knee Extension: 5/5  Strength LLE  L Hip Flexion: 4-/5  L Hip Extension: 2-/5  L Hip ABduction: 3-/5  L Knee Flexion: 4/5  L Knee Extension: 5/5 [] yes  [x] no   Long term goal 2: Patient will ambulate independently for 150ft with sc with improved bilateral LE clearance and symmetry. Pt ambulates using [address] as safest device, fair foot clearance depending on fatigue, VCs to improve posture [] yes  [x] no   Long term goal 3: Patient will ascend/descend 4-6'' steps x 3 using bilateral HRs independently with reciprocal pattern. 4-6'' steps x3 with Thomas HRs, prefers NR pattern  [] yes  [x] no   Long term goal 4: Grider balance >/= 45/56 to demonstrate improved standing balance and reduce risk for falls.  Grider/56 [] yes  [x] no       Body structures, Functions, Activity limitations: Decreased ROM, Decreased strength, Decreased functional mobility , Decreased endurance, Decreased balance, Decreased posture  Assessment: Pt scoring 21/56 on Grider test, decrease by 3 points since last tested possibly due to fear of falling. No change in strength through MMT. Pt plateauing towards goals, will D/C at this time. Education on importance of increasing activity and compliance with HEP to further progress/maintain gains. Prognosis: Good      PLAN: [x] D/C  Frequency/Duration:  Plan  Plan Comment: D/C                     Patient Status:[] Continue/ Initiate plan of Care    [x] Discharge PT. Recommend pt continue with HEP. [] Additional visits requested, Please re-certify for additional visits:        Obj info by:  Electronically signed by Dick Hickman PTA on 7/22/2020 at 3:11 PM\    Signature: Electronically signed by Flip Valles PT on 7/27/2020 at 3:19 PM      If you have any questions or concerns, please don't hesitate to call. Thank you for your referral.    I have reviewed this plan of care and certify a need for medically necessary rehabilitation services.     Physician Signature:__________________________________________________________  Date:  Please sign and return

## 2020-07-22 NOTE — PROGRESS NOTES
*Indicates exercise, modality, or manual techniques to be initiated when appropriate    Assessment: Body structures, Functions, Activity limitations: Decreased ROM, Decreased strength, Decreased functional mobility , Decreased endurance, Decreased balance, Decreased posture  Assessment: Pt scoring 21/56 on Grider test, decrease by 3 points since last tested possibly due to fear of falling. No change in strength through MMT. Pt plateauing towards goals, will D/C at this time. Education on importance of increasing activity and compliance with HEP to further progress/maintain gains. Treatment Diagnosis: LE weakness, impaired balance, decreased LE/core strength  Prognosis: Good       Goals:  Short term goals  Time Frame for Short term goals: 2 weeks  Short term goal 1: Patient will be independent with HEP. Long term goals  Time Frame for Long term goals : 8 weeks  Long term goal 1: Patient will increase strength in bilateral LEs >/= 4+/5 for improved ambulation and standing. Long term goal 2: Patient will ambulate independently for 150ft with sc with improved bilateral LE clearance and symmetry. Long term goal 3: Patient will ascend/descend 4-6'' steps x 3 using bilateral HRs independently with reciprocal pattern. Long term goal 4: Grider balance >/= 45/56 to demonstrate improved standing balance and reduce risk for falls. Progress toward goals: Balance, strength     POST-PAIN       Pain Rating (0-10 pain scale):   6/10   Location and pain description same as pre-treatment unless indicated. Action: [] NA   [] Perform HEP  [x] Meds as prescribed  [] Modalities as prescribed   [] Call Physician     Frequency/Duration:  Plan  Plan Comment: D/C     Pt to continue current HEP. See objective section for any therapeutic exercise changes, additions or modifications this date.          PT Individual Minutes  Time In: 1400  Time Out: 1500  Minutes: 60  Timed Code Treatment Minutes: 58 Minutes  Procedure Minutes:0     Timed Activity Minutes Units   Ther Ex 30 2   Gait  10 1   Neuro 18 1       Signature:  Electronically signed by Camila Lopes PTA on 7/22/20 at 11:31 AM EDT

## 2020-08-04 ENCOUNTER — TELEPHONE (OUTPATIENT)
Dept: NEUROLOGY | Age: 40
End: 2020-08-04

## 2020-08-04 NOTE — TELEPHONE ENCOUNTER
FYI      MRI Lumbar spine was approved    MRI brain was denied because the chart notes talked only about back issues    Veldon Goldmann

## 2020-08-05 ENCOUNTER — APPOINTMENT (OUTPATIENT)
Dept: MRI IMAGING | Age: 40
End: 2020-08-05
Payer: COMMERCIAL

## 2020-08-05 ENCOUNTER — HOSPITAL ENCOUNTER (OUTPATIENT)
Dept: MRI IMAGING | Age: 40
Discharge: HOME | End: 2020-08-07
Payer: COMMERCIAL

## 2020-08-05 PROCEDURE — 72148 MRI LUMBAR SPINE W/O DYE: CPT

## 2020-08-18 ENCOUNTER — OFFICE VISIT (OUTPATIENT)
Dept: FAMILY MEDICINE CLINIC | Age: 40
End: 2020-08-18
Payer: COMMERCIAL

## 2020-08-18 VITALS
HEIGHT: 61 IN | DIASTOLIC BLOOD PRESSURE: 76 MMHG | OXYGEN SATURATION: 97 % | SYSTOLIC BLOOD PRESSURE: 118 MMHG | WEIGHT: 151 LBS | RESPIRATION RATE: 16 BRPM | BODY MASS INDEX: 28.51 KG/M2 | TEMPERATURE: 98.4 F | HEART RATE: 92 BPM

## 2020-08-18 DIAGNOSIS — M47.816 ARTHRITIS OF LUMBAR SPINE: ICD-10-CM

## 2020-08-18 DIAGNOSIS — R77.9 ELEVATED SERUM PROTEIN LEVEL: ICD-10-CM

## 2020-08-18 PROBLEM — R53.1 WEAKNESS: Status: RESOLVED | Noted: 2019-09-19 | Resolved: 2020-08-18

## 2020-08-18 PROBLEM — Z98.890 S/P YAG CAPSULOTOMY, RIGHT: Status: RESOLVED | Noted: 2019-10-29 | Resolved: 2020-08-18

## 2020-08-18 LAB
ALBUMIN SERPL-MCNC: 4.1 G/DL (ref 3.5–4.6)
ALP BLD-CCNC: 45 U/L (ref 40–130)
ALT SERPL-CCNC: 10 U/L (ref 0–33)
ANION GAP SERPL CALCULATED.3IONS-SCNC: 10 MEQ/L (ref 9–15)
AST SERPL-CCNC: 14 U/L (ref 0–35)
BILIRUB SERPL-MCNC: 0.3 MG/DL (ref 0.2–0.7)
BUN BLDV-MCNC: 25 MG/DL (ref 6–20)
CALCIUM SERPL-MCNC: 9.2 MG/DL (ref 8.5–9.9)
CHLORIDE BLD-SCNC: 107 MEQ/L (ref 95–107)
CO2: 26 MEQ/L (ref 20–31)
CREAT SERPL-MCNC: 0.71 MG/DL (ref 0.5–0.9)
GFR AFRICAN AMERICAN: >60
GFR NON-AFRICAN AMERICAN: >60
GLOBULIN: 3.8 G/DL (ref 2.3–3.5)
GLUCOSE BLD-MCNC: 87 MG/DL (ref 70–99)
POTASSIUM SERPL-SCNC: 4.3 MEQ/L (ref 3.4–4.9)
SEDIMENTATION RATE, ERYTHROCYTE: 55 MM (ref 0–20)
SODIUM BLD-SCNC: 143 MEQ/L (ref 135–144)
TOTAL PROTEIN: 7.9 G/DL (ref 6.3–8)

## 2020-08-18 PROCEDURE — G8417 CALC BMI ABV UP PARAM F/U: HCPCS | Performed by: FAMILY MEDICINE

## 2020-08-18 PROCEDURE — G8427 DOCREV CUR MEDS BY ELIG CLIN: HCPCS | Performed by: FAMILY MEDICINE

## 2020-08-18 PROCEDURE — 99213 OFFICE O/P EST LOW 20 MIN: CPT | Performed by: FAMILY MEDICINE

## 2020-08-18 PROCEDURE — 1036F TOBACCO NON-USER: CPT | Performed by: FAMILY MEDICINE

## 2020-08-18 NOTE — PROGRESS NOTES
Patient: Ryan Freitas    YOB: 1980    Date: 8/18/20    Chief Complaint   Patient presents with    Hyperlipidemia     follow up. She had MRI of her back done 8/5/2020. Patient Active Problem List    Diagnosis Date Noted    Foot drop, left foot 06/22/2020    Acquired deformity of left foot 06/22/2020    Lumbar radiculopathy 06/22/2020    Pain in joint, lower leg 06/19/2020     Overview Note:     Overview:   right knee pain as it was dislocated      Posterior tibial tendon dysfunction     Pseudophakia 10/29/2019    Ataxic gait 09/19/2019    Mixed hyperlipidemia 06/08/2015    Obesity, unspecified 09/16/2005     Overview Note:     Overview: Body Mass Index is 33.02 kg/(m^2). Lost about five pounds since 6/05         No Known Allergies    Vitals:    08/18/20 1128   BP: 118/76   Site: Right Upper Arm   Position: Sitting   Cuff Size: Small Adult   Pulse: 92   Resp: 16   Temp: 98.4 °F (36.9 °C)   TempSrc: Oral   SpO2: 97%   Weight: 151 lb (68.5 kg)   Height: 5' 1\" (1.549 m)      Body mass index is 28.53 kg/m². PHQ Scores 2/20/2020 9/26/2019 10/3/2018 4/27/2017   PHQ2 Score 0 0 0 0   PHQ9 Score 0 0 0 0     Interpretation of Total Score Depression Severity: 1-4 = Minimal depression, 5-9 = Mild depression, 10-14 = Moderate depression, 15-19 = Moderately severe depression, 20-27 = Severe depression    HPI    She comes in today to follow-up on her general medical health. She is actually feeling well and has recently seen a neurologist.  This is a great idea as she is reached maximum therapeutic value on physical therapy she has not she still has quite a bit of a foot drop and needs to walk with a walker. We reviewed the results of the MRI of her back which shows a lot of arthritis but no fractures or tumors. She does have some cystic structures that may be an issue and I do not know what additional testing needs to be done.   I totally agree that she has the MRI of her brain done which was denied by insurance. Hopefully neurology will be able to get this approved. I need to follow-up on her elevated protein level. She is up-to-date in everything else and is not due for mammogram until later in the year. Review of Systems    Constitutional: Negative for fatigue, fever and sweats. HEENT: Negative for eye discharge and vision loss. Negative for ear drainage, hearing loss and nasal drainage. Respiratory: Negative for cough, dyspnea and wheezing. Cardiovascular:  Negative for chest pain, claudication and irregular heartbeat/palpitations. Gastrointestinal: Negative for abdominal pain, nausea, constipation and diarrhea. Genitourinary: Negative for dysuria, hematuria, polyuria, dysmenorrhea, menorrhagia and vaginal discharge. Metabolic/Endocrine: Negative for cold intolerance, heat intolerance, polydipsia and polyphagia. No unintended weight loss or weight gain. Neuro/Psychiatric: Negative for gait disturbance. Negative for psychiatric symptoms. Dermatologic: Negative for pruritus and rash. Musculoskeletal: Negative for bone/joint symptoms. No numbness or tingling. No loss of function. Hematology: Negative for bleeding and easy bruising. Immunology:  Negative for environmental allergies and food allergies. Physical Exam    Patient's medication, allergies, past medical, surgical, social and family histories were reviewed and updated as appropriate. PHYSICAL EXAM   General appearance: Alert oriented pleasant cooperative no acute distress wearing a mask vitals excellent  Lungs: Clear to auscultation without wheezes rhonchi or rales  Heart: Regular rate and rhythm without murmurs rubs or gallops  Extremities: I was able to watch her walker she even has trouble standing up without the use of the walker. And then she walks with a foot her left foot turned out in a foot slapping gait. Using a wheeled walker. Assessment:   Diagnosis Orders   1.  Elevated serum protein level  Protein Electrophoresis, Serum    Immunofixation Electrophoresis    Comprehensive Metabolic Panel   2. Arthritis of lumbar spine  Sedimentation Rate   3. Instability of left ankle joint                 Plan:  Current Outpatient Medications   Medication Sig Dispense Refill    MULTIPLE VITAMINS-MINERALS ER PO Take 1 tablet by mouth       No current facility-administered medications for this visit. Orders Placed This Encounter   Procedures    Protein Electrophoresis, Serum     Standing Status:   Future     Standing Expiration Date:   8/18/2021    Immunofixation Electrophoresis     Standing Status:   Future     Standing Expiration Date:   8/18/2021    Comprehensive Metabolic Panel     Standing Status:   Future     Standing Expiration Date:   8/18/2021    Sedimentation Rate     Standing Status:   Future     Standing Expiration Date:   8/18/2021       No orders of the defined types were placed in this encounter. Return in about 1 year (around 8/18/2021).     Dr. Gayatri Sanchez      8/18/20  11:48 AM

## 2020-08-21 LAB
ALBUMIN SERPL-MCNC: 3.95 G/DL (ref 3.75–5.01)
ALPHA-1-GLOBULIN: 0.31 G/DL (ref 0.19–0.46)
ALPHA-2-GLOBULIN: 0.81 G/DL (ref 0.48–1.05)
BETA GLOBULIN: 1.07 G/DL (ref 0.48–1.1)
EER IMMUNOFIX ELECTROPHORESIS GEL: NORMAL
GAMMA GLOBULIN: 1.45 G/DL (ref 0.62–1.51)
IMMUNOFIX ELECTROPHORESIS GEL: NORMAL
PROTEIN ELECTROPHORESIS, SERUM: NORMAL
SPE/IFE INTERPRETATION: NORMAL
TOTAL PROTEIN: 7.6 G/DL (ref 6.3–8.2)

## 2020-08-31 DIAGNOSIS — M47.816 ARTHRITIS OF LUMBAR SPINE: ICD-10-CM

## 2020-09-02 LAB
ANA IGG, ELISA: NORMAL
CCP IGG ANTIBODIES: 4 UNITS (ref 0–19)
RHEUMATOID FACTOR: <10 IU/ML (ref 0–14)

## 2020-09-09 ENCOUNTER — TELEPHONE (OUTPATIENT)
Dept: FAMILY MEDICINE CLINIC | Age: 40
End: 2020-09-09

## 2020-09-09 RX ORDER — ETODOLAC 400 MG/1
400 TABLET, FILM COATED ORAL 2 TIMES DAILY PRN
Qty: 60 TABLET | Refills: 0 | Status: SHIPPED | OUTPATIENT
Start: 2020-09-09 | End: 2020-10-06

## 2020-09-09 NOTE — TELEPHONE ENCOUNTER
Patient called and is asking if you can send in something for her arthritis. I asked her to be more specific about the medication. She said you know about her arthritis and she doesn't see Dr. Meche Mccormack until the end of the month. She did not have the name of a medication, just stating she needs something to take.   Please advise

## 2020-09-30 ENCOUNTER — OFFICE VISIT (OUTPATIENT)
Dept: NEUROLOGY | Age: 40
End: 2020-09-30
Payer: COMMERCIAL

## 2020-09-30 VITALS — SYSTOLIC BLOOD PRESSURE: 119 MMHG | HEART RATE: 89 BPM | DIASTOLIC BLOOD PRESSURE: 72 MMHG

## 2020-09-30 PROCEDURE — 99214 OFFICE O/P EST MOD 30 MIN: CPT | Performed by: PSYCHIATRY & NEUROLOGY

## 2020-09-30 PROCEDURE — G8417 CALC BMI ABV UP PARAM F/U: HCPCS | Performed by: PSYCHIATRY & NEUROLOGY

## 2020-09-30 PROCEDURE — G8427 DOCREV CUR MEDS BY ELIG CLIN: HCPCS | Performed by: PSYCHIATRY & NEUROLOGY

## 2020-09-30 PROCEDURE — 1036F TOBACCO NON-USER: CPT | Performed by: PSYCHIATRY & NEUROLOGY

## 2020-09-30 ASSESSMENT — ENCOUNTER SYMPTOMS
VOMITING: 0
SHORTNESS OF BREATH: 0
BACK PAIN: 0
NAUSEA: 0
TROUBLE SWALLOWING: 0
COLOR CHANGE: 0
CHOKING: 0
PHOTOPHOBIA: 0

## 2020-09-30 NOTE — PROGRESS NOTES
Subjective:      Patient ID: Hebert Smith is a 37 y.o. female who presents today for:  Chief Complaint   Patient presents with    Follow-up     Pt is still having balance problems she is walking with a walker. last fall was about a month ago. Pt states that she was told to use the walked till they knew what was going on with her back.  Results     MRI result to go over. HPI 78-year-old right-handed female with a history of leg weakness. Patient tore a tendon in the year  when she tripped over a stationary bike. She had multiple surgeries in the left foot and since then she has had difficulty walking. She 5 years ago fell in the bathroom hit her head and had a head injury. The details of this are not clear the extent of the injury is not clear and she has been walking since then. Her concerns are a central pathology and we had recommended an MRI of the brain the insurance company refused we had obtained an MRI of the lumbosacral spine which does not show anything significant. She still has difficulty walking. She was quite independent in her activity of daily living otherwise prior to this. She has not any bowel or bladder dysfunction. No sensory levels are reported. She fell in August when he lost her balance but not have any injuries.     Past Medical History:   Diagnosis Date    Dislocated knee     Right knee    PTTD (posterior tibial tendon dysfunction)      Past Surgical History:   Procedure Laterality Date    CATARACT REMOVAL WITH IMPLANT Right     CATARACT REMOVAL WITH IMPLANT Left      SECTION  2013    EYE SURGERY      FOOT SURGERY Left      Social History     Socioeconomic History    Marital status:      Spouse name: Not on file    Number of children: Not on file    Years of education: Not on file    Highest education level: Not on file   Occupational History    Not on file   Social Needs    Financial resource strain: Not on file   Andrew-Marilyn insecurity     Worry: Not on file     Inability: Not on file    Transportation needs     Medical: Not on file     Non-medical: Not on file   Tobacco Use    Smoking status: Never Smoker    Smokeless tobacco: Never Used   Substance and Sexual Activity    Alcohol use: No    Drug use: No    Sexual activity: Not Currently   Lifestyle    Physical activity     Days per week: Not on file     Minutes per session: Not on file    Stress: Not on file   Relationships    Social connections     Talks on phone: Not on file     Gets together: Not on file     Attends Mosque service: Not on file     Active member of club or organization: Not on file     Attends meetings of clubs or organizations: Not on file     Relationship status: Not on file    Intimate partner violence     Fear of current or ex partner: Not on file     Emotionally abused: Not on file     Physically abused: Not on file     Forced sexual activity: Not on file   Other Topics Concern    Not on file   Social History Narrative    Not on file     Family History   Problem Relation Age of Onset    Hypertension Mother     Diabetes Maternal Uncle     Cancer Maternal Grandmother         stomach cancer     No Known Allergies    Current Outpatient Medications   Medication Sig Dispense Refill    etodolac (LODINE) 400 MG tablet Take 1 tablet by mouth 2 times daily as needed (arthritis pain) 60 tablet 0    MULTIPLE VITAMINS-MINERALS ER PO Take 1 tablet by mouth       No current facility-administered medications for this visit. Review of Systems   Constitutional: Negative for fever. HENT: Negative for ear pain, tinnitus and trouble swallowing. Eyes: Negative for photophobia and visual disturbance. Respiratory: Negative for choking and shortness of breath. Cardiovascular: Negative for chest pain and palpitations. Gastrointestinal: Negative for nausea and vomiting.    Musculoskeletal: Negative for back pain, gait problem, joint swelling, myalgias, neck pain and neck stiffness. Skin: Negative for color change. Allergic/Immunologic: Negative for food allergies. Neurological: Negative for dizziness, tremors, seizures, syncope, facial asymmetry, speech difficulty, weakness, light-headedness, numbness and headaches. Psychiatric/Behavioral: Negative for behavioral problems, confusion, hallucinations and sleep disturbance. Objective:   /72 (Site: Left Upper Arm, Position: Sitting, Cuff Size: Medium Adult)   Pulse 89     Physical Exam  Vitals signs reviewed. Eyes:      Pupils: Pupils are equal, round, and reactive to light. Neck:      Musculoskeletal: Normal range of motion. Cardiovascular:      Rate and Rhythm: Normal rate and regular rhythm. Heart sounds: No murmur. Pulmonary:      Effort: Pulmonary effort is normal.      Breath sounds: Normal breath sounds. Abdominal:      General: Bowel sounds are normal.   Musculoskeletal: Normal range of motion. Skin:     General: Skin is warm. Neurological:      Mental Status: She is alert and oriented to person, place, and time. Cranial Nerves: No cranial nerve deficit. Sensory: No sensory deficit. Motor: No abnormal muscle tone. Coordination: Coordination normal.      Gait: Gait abnormal.      Deep Tendon Reflexes: Reflexes abnormal. Babinski sign absent on the right side. Babinski sign absent on the left side. Psychiatric:         Mood and Affect: Mood normal.     Some ankle reflex notable on the foot on the left but otherwise she is hyperreflexic. She is mild spastic gait and walks with a walker    Mri Lumbar Spine Wo Contrast    Result Date: 8/5/2020  MRI lumbar spine HISTORY: Ataxic gait, back pain and leg weakness. COMPARISON: 5/1/2017 lumbar spine x-ray. TECHNIQUE: Sagittal T1, T2, and inversion recovery. Axial T1 and T2. FINDINGS: For the purposes of enumerating the lumbar disc levels the lowest lumbar-type disc will be referred to as L5-S1. Conus medullaris is unremarkable terminating just below the L1-2 disc level. No tethered cord. The lumbar spinal canal is capacious. Canal does not definitively meet the criteria of dural ectasia with the thecal sac AP diameter not significantly greater at S1 than the more cephalad levels (e.g. 15-17 mm at S1 compared to 15-17 mm at the remaining levels), and borderline posterior vertebral body scalloping no greater than 3.5 mm. Mild dextroscoliosis. Vertebral body heights are maintained. No compression deformities or marrow replacing process. T11-12 disc is included on the sagittal T1 and inversion recovery. It demonstrates mild to moderate narrowing and desiccation. No disc-related anterior extradural defect, spinal canal or foraminal stenosis T12-L1 disc has moderate to severe disc space narrowing and desiccation. Moderate anterior endplate spurs and disc bulging. No posterior disc related anterior extradural defect, canal or foraminal stenosis. L1-2 (L1): Disc space height and degree of hydration unremarkable. No disc-related anterior extradural defect, canal or foraminal stenosis. L2-3 (L2): Mild narrowing of the left side of the disc related to concavity of the scoliosis. Disc level is otherwise unremarkable. No canal or foraminal stenosis. L3-4 (L3): Moderate disc space narrowing and diffuse loss of T2 signal indicating desiccation. Subtle anterior disc bulging and endplate spurring. No significant posterior disc anterior extradural defect. No canal or foraminal stenosis. L4-5 (L4): Disc space height and degree of hydration is unremarkable. Loss of the posterior disc concavity but no significant mass effect on the thecal sac. No canal or foraminal stenosis. As previously described the spinal canal is very capacious at this level measuring 20 mm in AP dimension. Mild left greater than right facet joint arthrosis. L5-S1 (L5): Disc space height and degree of hydration maintained. No canal stenosis.  Moderate to severe left facet joint arthrosis with associated mild to moderate foraminal stenosis. Facet joint arthrosis on the right is mild and there is no significant  foraminal narrowing. 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 There is no definite spondylolysis. There is a cluster of parapelvic cysts on the left measuring in maximum transverse dimension 3.9 x 3.2 cm. There is a question of thickening of the anterior wall versus artifact. 2 x 1 cm right parapelvic cyst.     Thoracolumbar junction and L3-4 degenerative disc disease without disc herniation or canal stenosis. Moderate-severe left L5-S1 facet arthrosis with associated mild to moderate foraminal stenosis. Capacious lumbar spinal canal not definitively meeting criteria of dural ectasia. Parapelvic cysts larger on the left. Mild anterior wall thickening on the left. Recommend correlation with ultrasound.       Lab Results   Component Value Date    WBC 8.3 04/26/2017    RBC 4.11 04/26/2017    HGB 11.7 04/26/2017    HCT 36.0 04/26/2017    MCV 87.8 04/26/2017    MCH 28.4 04/26/2017    MCHC 32.4 04/26/2017    RDW 15.4 04/26/2017     04/26/2017    MPV 10.7 06/09/2014     Lab Results   Component Value Date     08/18/2020    K 4.3 08/18/2020     08/18/2020    CO2 26 08/18/2020    BUN 25 08/18/2020    CREATININE 0.71 08/18/2020    GFRAA >60.0 08/18/2020    LABGLOM >60.0 08/18/2020    GLUCOSE 87 08/18/2020    PROT 7.9 08/18/2020    PROT 7.6 08/18/2020    LABALBU 4.1 08/18/2020    LABALBU 3.95 08/18/2020    CALCIUM 9.2 08/18/2020    BILITOT 0.3 08/18/2020    ALKPHOS 45 08/18/2020    AST 14 08/18/2020    ALT 10 08/18/2020     Lab Results   Component Value Date    PROTIME 9.4 01/11/2013    INR 0.9 01/11/2013     No results found for: TSH, NLLYHXAL04, FOLATE, FERRITIN, IRON, TIBC, PTRFSAT, TSH, FREET4  Lab Results   Component Value Date    TRIG 79 12/13/2019    HDL 48 12/13/2019    LDLCALC 127 12/13/2019     Lab Results

## 2020-10-16 ENCOUNTER — HOSPITAL ENCOUNTER (OUTPATIENT)
Dept: MRI IMAGING | Age: 40
Discharge: HOME | End: 2020-10-18
Payer: COMMERCIAL

## 2020-10-16 PROCEDURE — 70551 MRI BRAIN STEM W/O DYE: CPT

## 2020-10-16 PROCEDURE — 72141 MRI NECK SPINE W/O DYE: CPT

## 2020-10-26 ENCOUNTER — TELEPHONE (OUTPATIENT)
Dept: NEUROLOGY | Age: 40
End: 2020-10-26

## 2020-10-26 NOTE — TELEPHONE ENCOUNTER
Call patient on home number and cell phone both with no answers.   Message left advising her no new orders at this time and that treatment plan will be discussed at her follow-up appointment

## 2020-10-26 NOTE — TELEPHONE ENCOUNTER
----- Message from Sathya Pichardo MD sent at 10/25/2020  1:42 PM EDT -----  Patient's MRI  Brain  was reviewed and patients have extensive white matter lesions some are burnt out old lesions and these findings are consistent underlying diagnosis of multiple sclerosis which have been going on for years. This can be discussed further in a follow-up for further treatment options if at all. Cervical spine MRI does not show anything significant.

## 2021-01-06 ENCOUNTER — OFFICE VISIT (OUTPATIENT)
Dept: NEUROLOGY | Age: 41
End: 2021-01-06
Payer: COMMERCIAL

## 2021-01-06 VITALS — SYSTOLIC BLOOD PRESSURE: 112 MMHG | DIASTOLIC BLOOD PRESSURE: 59 MMHG | HEART RATE: 85 BPM

## 2021-01-06 DIAGNOSIS — R26.0 ATAXIC GAIT: ICD-10-CM

## 2021-01-06 DIAGNOSIS — G35 MS (MULTIPLE SCLEROSIS) (HCC): Primary | ICD-10-CM

## 2021-01-06 DIAGNOSIS — G35 MS (MULTIPLE SCLEROSIS) (HCC): ICD-10-CM

## 2021-01-06 DIAGNOSIS — M21.372 FOOT DROP, LEFT FOOT: ICD-10-CM

## 2021-01-06 LAB
ALBUMIN SERPL-MCNC: 4.1 G/DL (ref 3.5–4.6)
ALP BLD-CCNC: 48 U/L (ref 40–130)
ALT SERPL-CCNC: 10 U/L (ref 0–33)
AST SERPL-CCNC: 13 U/L (ref 0–35)
BASOPHILS ABSOLUTE: 0 K/UL (ref 0–0.2)
BASOPHILS RELATIVE PERCENT: 0.6 %
BILIRUB SERPL-MCNC: 0.3 MG/DL (ref 0.2–0.7)
BILIRUBIN DIRECT: <0.2 MG/DL (ref 0–0.4)
BILIRUBIN, INDIRECT: NORMAL MG/DL (ref 0–0.6)
EOSINOPHILS ABSOLUTE: 0.1 K/UL (ref 0–0.7)
EOSINOPHILS RELATIVE PERCENT: 1.6 %
HAV IGM SER IA-ACNC: NORMAL
HCT VFR BLD CALC: 36.2 % (ref 37–47)
HEMOGLOBIN: 11.9 G/DL (ref 12–16)
HEPATITIS B CORE IGM ANTIBODY: NORMAL
HEPATITIS B SURFACE ANTIGEN INTERPRETATION: NORMAL
HEPATITIS C ANTIBODY INTERPRETATION: NORMAL
HEPATITIS INTERPRETATION:: NORMAL
LYMPHOCYTES ABSOLUTE: 0.8 K/UL (ref 1–4.8)
LYMPHOCYTES RELATIVE PERCENT: 9.9 %
MCH RBC QN AUTO: 31.4 PG (ref 27–31.3)
MCHC RBC AUTO-ENTMCNC: 32.8 % (ref 33–37)
MCV RBC AUTO: 95.7 FL (ref 82–100)
MONOCYTES ABSOLUTE: 0.6 K/UL (ref 0.2–0.8)
MONOCYTES RELATIVE PERCENT: 7.7 %
NEUTROPHILS ABSOLUTE: 6.2 K/UL (ref 1.4–6.5)
NEUTROPHILS RELATIVE PERCENT: 80.2 %
PDW BLD-RTO: 15.4 % (ref 11.5–14.5)
PLATELET # BLD: 202 K/UL (ref 130–400)
RBC # BLD: 3.78 M/UL (ref 4.2–5.4)
TOTAL PROTEIN: 7.5 G/DL (ref 6.3–8)
WBC # BLD: 7.7 K/UL (ref 4.8–10.8)

## 2021-01-06 PROCEDURE — G8484 FLU IMMUNIZE NO ADMIN: HCPCS | Performed by: PSYCHIATRY & NEUROLOGY

## 2021-01-06 PROCEDURE — G8427 DOCREV CUR MEDS BY ELIG CLIN: HCPCS | Performed by: PSYCHIATRY & NEUROLOGY

## 2021-01-06 PROCEDURE — 99214 OFFICE O/P EST MOD 30 MIN: CPT | Performed by: PSYCHIATRY & NEUROLOGY

## 2021-01-06 PROCEDURE — G8417 CALC BMI ABV UP PARAM F/U: HCPCS | Performed by: PSYCHIATRY & NEUROLOGY

## 2021-01-06 PROCEDURE — 1036F TOBACCO NON-USER: CPT | Performed by: PSYCHIATRY & NEUROLOGY

## 2021-01-06 ASSESSMENT — ENCOUNTER SYMPTOMS
COLOR CHANGE: 0
SHORTNESS OF BREATH: 0
TROUBLE SWALLOWING: 0
NAUSEA: 0
CHOKING: 0
BACK PAIN: 1
PHOTOPHOBIA: 0
VOMITING: 0

## 2021-01-06 NOTE — PROGRESS NOTES
Subjective:      Patient ID: Adeline Alatorre is a 41 y.o. female who presents today for:  Chief Complaint   Patient presents with    Follow-up     Patient states that her back has been bothering her so bad at times that she can't moce, especially if she does to much moving around. Other wise no concerns today. HPI 49-year-old right-handed female with a history of extremity weakness. Patient has had multiple surgeries in the left foot and since has difficulty walking. Patient's gait ataxia appears to be worsening and demyelination seen in multiple sclerosis a consideration there for the MRI of the brain. The insurance continued  to refuse the MRI. We tried again and we were able to obtain the MRI. Of the cervical spine is normal there appears to be 2 areas of interest which may show demyelination  Of the brain shows extensive white matter lesions with burnt out lesions with axonal loss as well as demyelinating. These findings are consistent with multiple sclerosis. Patient finally has a diagnosis which we are trying to pursue for the last whole year. On close questioning does appear that her symptoms have been going on for years. Her mother tells me that she has been walking with difficulty since she was young. Patient has a primary progressive course. She has slowly worsened over time. She is doing very well actually in terms of walking with a walker now.   She is not any clinical relapses  Past Medical History:   Diagnosis Date    Dislocated knee     Right knee    PTTD (posterior tibial tendon dysfunction)      Past Surgical History:   Procedure Laterality Date    CATARACT REMOVAL WITH IMPLANT Right     CATARACT REMOVAL WITH IMPLANT Left      SECTION  2013    EYE SURGERY      FOOT SURGERY Left      Social History     Socioeconomic History    Marital status:      Spouse name: Not on file    Number of children: Not on file    Years of education: Not on file  Highest education level: Not on file   Occupational History    Not on file   Social Needs    Financial resource strain: Not on file    Food insecurity     Worry: Not on file     Inability: Not on file    Transportation needs     Medical: Not on file     Non-medical: Not on file   Tobacco Use    Smoking status: Never Smoker    Smokeless tobacco: Never Used   Substance and Sexual Activity    Alcohol use: No    Drug use: No    Sexual activity: Not Currently   Lifestyle    Physical activity     Days per week: Not on file     Minutes per session: Not on file    Stress: Not on file   Relationships    Social connections     Talks on phone: Not on file     Gets together: Not on file     Attends [employer] service: Not on file     Active member of club or organization: Not on file     Attends meetings of clubs or organizations: Not on file     Relationship status: Not on file    Intimate partner violence     Fear of current or ex partner: Not on file     Emotionally abused: Not on file     Physically abused: Not on file     Forced sexual activity: Not on file   Other Topics Concern    Not on file   Social History Narrative    Not on file     Family History   Problem Relation Age of Onset    Hypertension Mother     Diabetes Maternal Uncle     Cancer Maternal Grandmother         stomach cancer     No Known Allergies    Current Outpatient Medications   Medication Sig Dispense Refill    vitamin D 25 MCG (1000 UT) CAPS Take by mouth      etodolac (LODINE) 400 MG tablet TAKE 1 TABLET BY MOUTH 2 TIMES DAILY AS NEEDED FOR (ARTHRITIS PAIN) 60 tablet 2    MULTIPLE VITAMINS-MINERALS ER PO Take 1 tablet by mouth       No current facility-administered medications for this visit. Review of Systems   Constitutional: Negative for fever. HENT: Negative for ear pain, tinnitus and trouble swallowing. Eyes: Negative for photophobia and visual disturbance. Respiratory: Negative for choking and shortness of breath. Cardiovascular: Negative for chest pain and palpitations. Gastrointestinal: Negative for nausea and vomiting. Musculoskeletal: Positive for back pain and gait problem. Negative for joint swelling, myalgias, neck pain and neck stiffness. Skin: Negative for color change. Allergic/Immunologic: Negative for food allergies. Neurological: Positive for tremors and weakness. Negative for dizziness, seizures, syncope, facial asymmetry, speech difficulty, light-headedness, numbness and headaches. Psychiatric/Behavioral: Negative for behavioral problems, confusion, hallucinations and sleep disturbance. Objective:   BP (!) 112/59 (Site: Right Upper Arm, Position: Sitting, Cuff Size: Large Adult)   Pulse 85     Physical Exam  Vitals signs reviewed. Eyes:      Pupils: Pupils are equal, round, and reactive to light. Neck:      Musculoskeletal: Normal range of motion. Cardiovascular:      Rate and Rhythm: Normal rate and regular rhythm. Heart sounds: No murmur. Pulmonary:      Effort: Pulmonary effort is normal.      Breath sounds: Normal breath sounds. Abdominal:      General: Bowel sounds are normal.   Musculoskeletal: Normal range of motion. Skin:     General: Skin is warm. Neurological:      Mental Status: She is alert and oriented to person, place, and time. Cranial Nerves: No cranial nerve deficit. Sensory: No sensory deficit. Motor: No abnormal muscle tone. Coordination: Coordination normal.      Deep Tendon Reflexes: Reflexes are normal and symmetric. Babinski sign absent on the right side. Babinski sign absent on the left side. Psychiatric:         Mood and Affect: Mood normal.     Gait ataxia with walking with a walker she is hyperreflexic throughout. She has ankle weakness on the left.     Mri Cervical Spine Wo Contrast    Result Date: 10/16/2020 EXAMINATION: MRI BRAIN WO CONTRAST, MRI CERVICAL SPINE WO CONTRAST CLINICAL HISTORY: R26.0 Ataxic gait ICD10 COMPARISONS: Brain CT from August 29, 2014 TECHNIQUE: Multiplanar multisequence images of the brain were obtained without contrast. Diffusion perfusion imaging was obtained. FINDINGS:  There are no extra-axial collections. There is no evidence of hemorrhage. There are no areas of signal abnormality on perfusion diffusion imaging to suggest ischemia. The susceptibility images do not demonstrate evidence of hemosiderin deposition within  the brain parenchyma or the leptomeninges. There is preservation of the gray-white matter differentiation. There are extensive areas of T2/FLAIR signal abnormality in the subcortical and periventricular white matter in the confluent distribution especially surrounding the atria of both lateral ventricles. Some lesions are oriented perpendicular to the corpus callosum and there are other scattered discontiguous subcortical lesions. There are no white matter lesions in the posterior fossa. There is prominence of the sulci and ventricles consistent with moderate global cerebral atrophy and chronic involutional changes. The midline structures are intact, the corpus callosum is within normal limits. The region of the pineal gland and the sella turcica are unremarkable. There are no space-occupying lesions in the posterior fossa. The basilar cisterns are patent. The craniocervical junction is unremarkable. The visualized portions of the orbits are within normal limits, the globes are intact. The visualized portions of the paranasal sinuses are within normal limits. The calvarium and soft tissues are unremarkable. disc herniation or central canal narrowing. The facet joints are unremarkable. There is no narrowing of the neural foramina. C6-7:There is no disc herniation or central canal narrowing. The facet joints are unremarkable. There is no narrowing of the neural foramina. C7-T1:There is no disc herniation or central canal narrowing. The facet joints are unremarkable. There is no narrowing of the neural foramina. IMPRESSION:  1. There is no acute fracture or subluxation. 2. The cord is normal in course and caliber. There are foci of signal abnormality most likely between C3 and C6 on both the right and left sides of the cord which may represent areas of demyelination. The findings may be secondary to edema and process such as multiple sclerosis versus prior ischemia or other etiologies. 3. There is mild multilevel spondylosis including intervertebral disc space narrowing at every level and very degrees of disc bulges as described in greater detail in each level above.     Mri Brain Wo Contrast    Result Date: 10/16/2020 disc herniation or central canal narrowing. The facet joints are unremarkable. There is no narrowing of the neural foramina. C6-7:There is no disc herniation or central canal narrowing. The facet joints are unremarkable. There is no narrowing of the neural foramina. C7-T1:There is no disc herniation or central canal narrowing. The facet joints are unremarkable. There is no narrowing of the neural foramina. IMPRESSION:  1. There is no acute fracture or subluxation. 2. The cord is normal in course and caliber. There are foci of signal abnormality most likely between C3 and C6 on both the right and left sides of the cord which may represent areas of demyelination. The findings may be secondary to edema and process such as multiple sclerosis versus prior ischemia or other etiologies. 3. There is mild multilevel spondylosis including intervertebral disc space narrowing at every level and very degrees of disc bulges as described in greater detail in each level above.       Lab Results   Component Value Date    WBC 8.3 04/26/2017    RBC 4.11 04/26/2017    HGB 11.7 04/26/2017    HCT 36.0 04/26/2017    MCV 87.8 04/26/2017    MCH 28.4 04/26/2017    MCHC 32.4 04/26/2017    RDW 15.4 04/26/2017     04/26/2017    MPV 10.7 06/09/2014     Lab Results   Component Value Date     08/18/2020    K 4.3 08/18/2020     08/18/2020    CO2 26 08/18/2020    BUN 25 08/18/2020    CREATININE 0.71 08/18/2020    GFRAA >60.0 08/18/2020    LABGLOM >60.0 08/18/2020    GLUCOSE 87 08/18/2020    PROT 7.9 08/18/2020    PROT 7.6 08/18/2020    LABALBU 4.1 08/18/2020    LABALBU 3.95 08/18/2020    CALCIUM 9.2 08/18/2020    BILITOT 0.3 08/18/2020    ALKPHOS 45 08/18/2020    AST 14 08/18/2020    ALT 10 08/18/2020     Lab Results   Component Value Date    PROTIME 9.4 01/11/2013    INR 0.9 01/11/2013     No results found for: TSH, AGRZRRRY40, FOLATE, FERRITIN, IRON, TIBC, PTRFSAT, TSH, FREET4  Lab Results   Component Value Date TRIG 79 12/13/2019    HDL 48 12/13/2019    LDLCALC 127 12/13/2019     Lab Results   Component Value Date    LABBENZ NotDTCD 01/11/2013     No results found for: LITHIUM, DILFRTOT, VALPROATE    Assessment:       Diagnosis Orders   1. MS (multiple sclerosis) ([facility] [address]     2. Foot drop, left foot     3. Ataxic gait     Primary progressive multiple sclerosis just diagnosed today. Patient actually was seen here for a foot drop with injuries and gait ataxia. We had continue to pursue MRIs though the insurance company continued to refuse this. We were finally able to get the MRI of the brain and cervical spine and it is positive for multiple sclerosis. Has classic white matter lesions burnt out lesions at T1 lesions and atrophy. Patient has been slowly worsening with falls. I recommended that patient should be treated with [facility] [place]. We will pre-CERT her for the same with laboratory test.  We have shown her the scans and discussed the expectations as oculus is not a treatment for MS but will prevent further progression and she may show some response as well. Patient does understand the expectations of the medications and side effects which have been discussed. Patient had extensive relations for her left foot deformity though or suspect has always been demyelination. Facundo Henning MD, [place], American Board of Psychiatry & Neurology  Board Certified in Vascular Neurology  Board Certified in Neuromuscular Medicine  Certified in [facility]:      No orders of the defined types were placed in this encounter. No orders of the defined types were placed in this encounter. No follow-ups on file.       Tripp Mcdermott MD

## 2021-01-07 ENCOUNTER — TELEPHONE (OUTPATIENT)
Dept: NEUROLOGY | Age: 41
End: 2021-01-07

## 2021-01-08 LAB
QUANTI TB GOLD PLUS: NEGATIVE
QUANTI TB1 MINUS NIL: 0 IU/ML (ref 0–0.34)
QUANTI TB2 MINUS NIL: 0 IU/ML (ref 0–0.34)
QUANTIFERON MITOGEN: 7.94 IU/ML
QUANTIFERON NIL: 0.04 IU/ML

## 2021-01-09 LAB
VARICELLA ZOSTER AB IGM: 0.25 ISR
VZV IGG SER QL IA: 1331 IV

## 2021-02-02 ENCOUNTER — TELEPHONE (OUTPATIENT)
Dept: NEUROLOGY | Age: 41
End: 2021-02-02

## 2021-02-02 RX ORDER — DIPHENHYDRAMINE HCL 25 MG
25 TABLET ORAL ONCE
Status: CANCELLED
Start: 2021-02-02 | End: 2021-02-02

## 2021-02-02 RX ORDER — METHYLPREDNISOLONE SODIUM SUCCINATE 125 MG/2ML
125 INJECTION, POWDER, LYOPHILIZED, FOR SOLUTION INTRAMUSCULAR; INTRAVENOUS ONCE
Status: CANCELLED | OUTPATIENT
Start: 2021-02-02

## 2021-02-02 RX ORDER — ACETAMINOPHEN 325 MG/1
650 TABLET ORAL ONCE
Status: CANCELLED | OUTPATIENT
Start: 2021-02-02

## 2021-02-02 RX ORDER — SODIUM CHLORIDE 9 MG/ML
INJECTION, SOLUTION INTRAVENOUS CONTINUOUS
Status: CANCELLED | OUTPATIENT
Start: 2021-02-02

## 2021-02-03 ENCOUNTER — TELEPHONE (OUTPATIENT)
Dept: NEUROLOGY | Age: 41
End: 2021-02-03

## 2021-02-03 DIAGNOSIS — G35 MS (MULTIPLE SCLEROSIS) (HCC): Primary | ICD-10-CM

## 2021-02-03 NOTE — TELEPHONE ENCOUNTER
Patient is needing to have CMP and paxton virus labs to start ocrevus    I pended Please sign     Thanks Meche Mclaughlin     . ..

## 2021-02-15 RX ORDER — ETODOLAC 400 MG/1
TABLET, FILM COATED ORAL
Qty: 60 TABLET | Refills: 2 | Status: SHIPPED | OUTPATIENT
Start: 2021-02-15 | End: 2021-08-30

## 2021-02-15 NOTE — TELEPHONE ENCOUNTER
Requested Prescriptions     Pending Prescriptions Disp Refills    etodolac (LODINE) 400 MG tablet 60 tablet 2

## 2021-02-24 DIAGNOSIS — G35 MS (MULTIPLE SCLEROSIS) (HCC): ICD-10-CM

## 2021-02-24 LAB
ALBUMIN SERPL-MCNC: 4.1 G/DL (ref 3.5–4.6)
ALP BLD-CCNC: 40 U/L (ref 40–130)
ALT SERPL-CCNC: 11 U/L (ref 0–33)
ANION GAP SERPL CALCULATED.3IONS-SCNC: 13 MEQ/L (ref 9–15)
AST SERPL-CCNC: 11 U/L (ref 0–35)
BILIRUB SERPL-MCNC: <0.2 MG/DL (ref 0.2–0.7)
BUN BLDV-MCNC: 24 MG/DL (ref 6–20)
CALCIUM SERPL-MCNC: 9.2 MG/DL (ref 8.5–9.9)
CHLORIDE BLD-SCNC: 107 MEQ/L (ref 95–107)
CO2: 22 MEQ/L (ref 20–31)
CREAT SERPL-MCNC: 0.8 MG/DL (ref 0.5–0.9)
GFR AFRICAN AMERICAN: >60
GFR NON-AFRICAN AMERICAN: >60
GLOBULIN: 3.3 G/DL (ref 2.3–3.5)
GLUCOSE BLD-MCNC: 111 MG/DL (ref 70–99)
POTASSIUM SERPL-SCNC: 3.8 MEQ/L (ref 3.4–4.9)
SODIUM BLD-SCNC: 142 MEQ/L (ref 135–144)
TOTAL PROTEIN: 7.4 G/DL (ref 6.3–8)

## 2021-03-01 LAB
JC VIRUS SOURCE: NORMAL
JC VIRUS, DNA: NOT DETECTED

## 2021-03-15 ENCOUNTER — TELEPHONE (OUTPATIENT)
Dept: NEUROLOGY | Age: 41
End: 2021-03-15

## 2021-03-15 NOTE — TELEPHONE ENCOUNTER
Advised [facility] PA department that labs were completed and to run PA for ocrevus    Patient advised also    erica

## 2021-03-23 ENCOUNTER — TELEPHONE (OUTPATIENT)
Dept: NEUROLOGY | Age: 41
End: 2021-03-23

## 2021-03-23 DIAGNOSIS — G35 MS (MULTIPLE SCLEROSIS) (HCC): Primary | ICD-10-CM

## 2021-03-23 NOTE — TELEPHONE ENCOUNTER
Per verbal with Wan Reynolds who talked to Dr. Tray Yao, going to start Aubagio since ocrevus was denied.      All labs have been completed except urinalysis    Please sign,    Thanks erica

## 2021-03-31 DIAGNOSIS — G35 MULTIPLE SCLEROSIS (HCC): Primary | ICD-10-CM

## 2021-03-31 RX ORDER — RENAGEL 800 MG/1
14 TABLET ORAL DAILY
Qty: 30 TABLET | Refills: 5 | Status: SHIPPED | OUTPATIENT
Start: 2021-03-31 | End: 2021-04-14 | Stop reason: SDUPTHER

## 2021-04-14 ENCOUNTER — OFFICE VISIT (OUTPATIENT)
Dept: NEUROLOGY | Age: 41
End: 2021-04-14
Payer: COMMERCIAL

## 2021-04-14 VITALS
BODY MASS INDEX: 28.61 KG/M2 | OXYGEN SATURATION: 99 % | DIASTOLIC BLOOD PRESSURE: 76 MMHG | HEART RATE: 88 BPM | SYSTOLIC BLOOD PRESSURE: 112 MMHG | WEIGHT: 151.4 LBS

## 2021-04-14 DIAGNOSIS — G35 MULTIPLE SCLEROSIS (HCC): Primary | ICD-10-CM

## 2021-04-14 DIAGNOSIS — M21.372 FOOT DROP, LEFT FOOT: ICD-10-CM

## 2021-04-14 DIAGNOSIS — R26.0 ATAXIC GAIT: ICD-10-CM

## 2021-04-14 PROCEDURE — G8417 CALC BMI ABV UP PARAM F/U: HCPCS | Performed by: PSYCHIATRY & NEUROLOGY

## 2021-04-14 PROCEDURE — 99214 OFFICE O/P EST MOD 30 MIN: CPT | Performed by: PSYCHIATRY & NEUROLOGY

## 2021-04-14 PROCEDURE — 1036F TOBACCO NON-USER: CPT | Performed by: PSYCHIATRY & NEUROLOGY

## 2021-04-14 PROCEDURE — G8427 DOCREV CUR MEDS BY ELIG CLIN: HCPCS | Performed by: PSYCHIATRY & NEUROLOGY

## 2021-04-14 RX ORDER — RENAGEL 800 MG/1
14 TABLET ORAL DAILY
Qty: 30 TABLET | Refills: 5 | Status: SHIPPED | OUTPATIENT
Start: 2021-04-14 | End: 2021-05-14

## 2021-04-14 ASSESSMENT — ENCOUNTER SYMPTOMS
VOMITING: 0
NAUSEA: 0
TROUBLE SWALLOWING: 0
SHORTNESS OF BREATH: 0
CHOKING: 0
PHOTOPHOBIA: 0
COLOR CHANGE: 0
BACK PAIN: 0

## 2021-04-14 NOTE — PROGRESS NOTES
Subjective:      Patient ID: Andres Alvarez is a 41 y.o. female who presents today for:  Chief Complaint   Patient presents with    Follow-up     Pt states that the medication is helping she states shes been able to walk on the treadmill. She states today she believes she overdid herself because she ran on the treadmill and didn't walk and she did it for 10 minutes instead of 5 like normal and couldn't walk to her moms car. Pt states she had a fall last Friday but states it was before she got her medication.  Other     Pts mother wants to know if she is able to get the covid shot and also wants to know if her daughter can get a shot for MS that she's seen advertised on TV. HPI 35-year right-handed female with history of multiple sclerosis. She was walking the treadmill and believes that she overdid it and she is quite tired. She is fatigued. Patient has gait ataxia which appears to be worsening. Recommend an MRI with insurance continues to refuse this. Patient has extensive white matter lesions and but doubt lesions. Patient had difficulty walking which is young patient has a primary progressive course we had recommended Ocrevus and we had previously started her for the same. Cristofer Dixon was denied  Patient appears to have some relapses though she had not called me she had 1 relapse which infected walking for about 2 weeks. She denies any recent falls no new bowel or bladder dysfunction. Main issues appears to be gait issues.   In general weakness    Past Medical History:   Diagnosis Date    Dislocated knee     Right knee    PTTD (posterior tibial tendon dysfunction)      Past Surgical History:   Procedure Laterality Date    CATARACT REMOVAL WITH IMPLANT Right     CATARACT REMOVAL WITH IMPLANT Left      SECTION  2013    EYE SURGERY      FOOT SURGERY Left      Social History     Socioeconomic History    Marital status:      Spouse name: Not on file    Number of children: Not on file    Years of education: Not on file    Highest education level: Not on file   Occupational History    Not on file   Social Needs    Financial resource strain: Not on file    Food insecurity     Worry: Not on file     Inability: Not on file    Transportation needs     Medical: Not on file     Non-medical: Not on file   Tobacco Use    Smoking status: Never Smoker    Smokeless tobacco: Never Used   Substance and Sexual Activity    Alcohol use: No    Drug use: No    Sexual activity: Not Currently   Lifestyle    Physical activity     Days per week: Not on file     Minutes per session: Not on file    Stress: Not on file   Relationships    Social connections     Talks on phone: Not on file     Gets together: Not on file     Attends Gnosticism service: Not on file     Active member of club or organization: Not on file     Attends meetings of clubs or organizations: Not on file     Relationship status: Not on file    Intimate partner violence     Fear of current or ex partner: Not on file     Emotionally abused: Not on file     Physically abused: Not on file     Forced sexual activity: Not on file   Other Topics Concern    Not on file   Social History Narrative    Not on file     Family History   Problem Relation Age of Onset    Hypertension Mother     Diabetes Maternal Uncle     Cancer Maternal Grandmother         stomach cancer     No Known Allergies    Current Outpatient Medications   Medication Sig Dispense Refill    AUBAGIO 14 MG TABS Take 14 mg by mouth daily 30 tablet 5    vitamin D 25 MCG (1000 UT) CAPS Take by mouth      MULTIPLE VITAMINS-MINERALS ER PO Take 1 tablet by mouth      etodolac (LODINE) 400 MG tablet TAKE 1 TABLET BY MOUTH 2 TIMES DAILY AS NEEDED FOR (ARTHRITIS PAIN) (Patient not taking: Reported on 4/14/2021) 60 tablet 2     No current facility-administered medications for this visit. Review of Systems   Constitutional: Negative for fever.    HENT: Negative for ear pain, tinnitus and trouble swallowing. Eyes: Negative for photophobia and visual disturbance. Respiratory: Negative for choking and shortness of breath. Cardiovascular: Negative for chest pain and palpitations. Gastrointestinal: Negative for nausea and vomiting. Musculoskeletal: Negative for back pain, gait problem, joint swelling, myalgias, neck pain and neck stiffness. Skin: Negative for color change. Allergic/Immunologic: Negative for food allergies. Neurological: Negative for dizziness, tremors, seizures, syncope, facial asymmetry, speech difficulty, weakness, light-headedness, numbness and headaches. Psychiatric/Behavioral: Negative for behavioral problems, confusion, hallucinations and sleep disturbance. Objective:   /76 (Site: Left Upper Arm, Position: Sitting, Cuff Size: Medium Adult)   Pulse 88   Wt 151 lb 6.4 oz (68.7 kg)   SpO2 99%   BMI 28.61 kg/m²     Physical Exam  Vitals signs reviewed. Eyes:      Pupils: Pupils are equal, round, and reactive to light. Neck:      Musculoskeletal: Normal range of motion. Cardiovascular:      Rate and Rhythm: Normal rate and regular rhythm. Heart sounds: No murmur. Pulmonary:      Effort: Pulmonary effort is normal.      Breath sounds: Normal breath sounds. Abdominal:      General: Bowel sounds are normal.   Musculoskeletal: Normal range of motion. Skin:     General: Skin is warm. Neurological:      Mental Status: She is alert and oriented to person, place, and time. Cranial Nerves: No cranial nerve deficit. Sensory: No sensory deficit. Motor: No abnormal muscle tone. Coordination: Coordination normal.      Deep Tendon Reflexes: Reflexes are normal and symmetric. Babinski sign absent on the right side. Babinski sign absent on the left side. Comments: In addition to this patient has a foot drop and deformity and lower extremity spasticity in the lower extremity strength of 3/5.   She is somewhat weaker today. Psychiatric:         Mood and Affect: Mood normal.         Mri Cervical Spine Wo Contrast    Result Date: 10/16/2020  EXAMINATION: MRI BRAIN WO CONTRAST, MRI CERVICAL SPINE WO CONTRAST CLINICAL HISTORY: R26.0 Ataxic gait ICD10 COMPARISONS: Brain CT from August 29, 2014 TECHNIQUE: Multiplanar multisequence images of the brain were obtained without contrast. Diffusion perfusion imaging was obtained. FINDINGS:  There are no extra-axial collections. There is no evidence of hemorrhage. There are no areas of signal abnormality on perfusion diffusion imaging to suggest ischemia. The susceptibility images do not demonstrate evidence of hemosiderin deposition within  the brain parenchyma or the leptomeninges. There is preservation of the gray-white matter differentiation. There are extensive areas of T2/FLAIR signal abnormality in the subcortical and periventricular white matter in the confluent distribution especially surrounding the atria of both lateral ventricles. Some lesions are oriented perpendicular to the corpus callosum and there are other scattered discontiguous subcortical lesions. There are no white matter lesions in the posterior fossa. There is prominence of the sulci and ventricles consistent with moderate global cerebral atrophy and chronic involutional changes. The midline structures are intact, the corpus callosum is within normal limits. The region of the pineal gland and the sella turcica are unremarkable. There are no space-occupying lesions in the posterior fossa. The basilar cisterns are patent. The craniocervical junction is unremarkable. The visualized portions of the orbits are within normal limits, the globes are intact. The visualized portions of the paranasal sinuses are within normal limits. The calvarium and soft tissues are unremarkable. 1. There are no acute intracranial changes, there is no evidence of hemorrhage or acute ischemia.  2. There are extensive areas There is prominence of the sulci and ventricles consistent with moderate global cerebral atrophy and chronic involutional changes. The midline structures are intact, the corpus callosum is within normal limits. The region of the pineal gland and the sella turcica are unremarkable. There are no space-occupying lesions in the posterior fossa. The basilar cisterns are patent. The craniocervical junction is unremarkable. The visualized portions of the orbits are within normal limits, the globes are intact. The visualized portions of the paranasal sinuses are within normal limits. The calvarium and soft tissues are unremarkable. 1. There are no acute intracranial changes, there is no evidence of hemorrhage or acute ischemia. 2. There are extensive areas of T2/FLAIR signal abnormality in the periventricular and subcortical white matter with a confluent distribution. Some lesions are oriented perpendicular to the corpus callosum. The findings are nonspecific but may be associated with a demyelinating process such as multiple sclerosis versus chronic microangiopathy, vasculitis or other demyelinating process. EXAMINATION: MRI BRAIN WO CONTRAST, MRI CERVICAL SPINE WO CONTRAST CLINICAL HISTORY: R26.0 Ataxic gait ICD10 COMPARISONS: NONE AVAILABLE TECHNIQUE: Multiplanar multisequence MRI images of the cervical spine were obtained without contrast. FINDINGS:  There is no acute fracture. There is preservation of the vertebral body heights. There is intervertebral disc desiccation and disc space narrowing at every level. The bone marrow signal is within normal limits. The cord is normal in course and caliber. There are areas of signal abnormality noted within the cord to the right side at C3-4, also on the right side at C4, and on the left at C5-6. These may represent foci of demyelination. There is no prevertebral soft tissue swelling. Anterior soft tissues are unremarkable. The craniocervical junction is unremarkable.  C2-3: There is a  indenting the anterior thecal sac but not abutting the cord with mild central canal narrowing. The facet joints are unremarkable. There is no narrowing of the neural foramina. C3-4:There is a less than 2 mm disc bulge flattening the anterior portion of the thecal sac without narrowing of the central canal.  The facet joints are unremarkable. There is no narrowing of the neural foramina. C4-5:There is a less than 2 mm disc bulge flattening the anterior portion of the thecal sac without narrowing of the central canal.  The facet joints are unremarkable. There is no narrowing of the neural foramina. C5-6:There is no disc herniation or central canal narrowing. The facet joints are unremarkable. There is no narrowing of the neural foramina. C6-7:There is no disc herniation or central canal narrowing. The facet joints are unremarkable. There is no narrowing of the neural foramina. C7-T1:There is no disc herniation or central canal narrowing. The facet joints are unremarkable. There is no narrowing of the neural foramina. IMPRESSION:  1. There is no acute fracture or subluxation. 2. The cord is normal in course and caliber. There are foci of signal abnormality most likely between C3 and C6 on both the right and left sides of the cord which may represent areas of demyelination. The findings may be secondary to edema and process such as multiple sclerosis versus prior ischemia or other etiologies. 3. There is mild multilevel spondylosis including intervertebral disc space narrowing at every level and very degrees of disc bulges as described in greater detail in each level above.       Lab Results   Component Value Date    WBC 7.7 01/06/2021    RBC 3.78 01/06/2021    HGB 11.9 01/06/2021    HCT 36.2 01/06/2021    MCV 95.7 01/06/2021    MCH 31.4 01/06/2021    MCHC 32.8 01/06/2021    RDW 15.4 01/06/2021     01/06/2021    MPV 10.7 06/09/2014     Lab Results   Component Value Date     02/24/2021    K Requested Specialty:   Physical Therapy     Number of Visits Requested:   1     Orders Placed This Encounter   Medications    AUBAGIO 14 MG TABS     Sig: Take 14 mg by mouth daily     Dispense:  30 tablet     Refill:  5       No follow-ups on file.       Elysia Cho MD

## 2021-05-03 ENCOUNTER — HOSPITAL ENCOUNTER (OUTPATIENT)
Dept: PHYSICAL THERAPY | Age: 41
Setting detail: THERAPIES SERIES
Discharge: HOME | End: 2021-05-03
Payer: COMMERCIAL

## 2021-05-03 PROCEDURE — 97162 PT EVAL MOD COMPLEX 30 MIN: CPT

## 2021-05-03 ASSESSMENT — PAIN DESCRIPTION - LOCATION: LOCATION: BACK

## 2021-05-03 ASSESSMENT — PAIN DESCRIPTION - PAIN TYPE: TYPE: CHRONIC PAIN

## 2021-05-03 NOTE — PROGRESS NOTES
[address]  PHYSICAL THERAPY EVALUATION    Date: 5/3/2021  Patient Name: Ricardo Koehler       MRN: [id number]   Account: [de-identified]   : 1980  (41 y.o.)   Gender: female   Referring Practitioner: Dilma Sepulveda MD                 Diagnosis: ataxic gait  Treatment Diagnosis: impaired gait, decreased balance, LE weakness  Additional Pertinent Hx: Multiple Sclerosis        Past Medical History:  has a past medical history of Dislocated knee and PTTD (posterior tibial tendon dysfunction). Past Surgical History:   has a past surgical history that includes  section (); eye surgery; Cataract removal with implant (Right); Cataract removal with implant (Left); and Foot surgery (Left). Vital Signs  Patient Currently in Pain: Yes   Pain Screening  Patient Currently in Pain: Yes  Pain Assessment  Pain Assessment: 0-10  Pain Level: 5  Pain Type: Chronic pain  Pain Location: Back  Pain Orientation: Lower  Pain Descriptors: Sore     Lives With: Son  Type of Home: Apartment  Home Layout: One level  Home Access: Level entry  Bathroom Shower/Tub: Tub/Shower unit  Bathroom Equipment: Tub transfer bench  Home Equipment: Rolling walker  ADL Assistance: Independent  Homemaking Assistance: Independent  Ambulation Assistance: Independent  Transfer Assistance: Independent  Active : No  Patient's  Info: Mother or Father  Leisure & Hobbies: seated home games with son, walking treadmill, visit family        Subjective:  Subjective: Pt referred to PT for ataxic gait secondary to MS. Pt has been using a walker for the last year. Reports she was diagnosed with MS last year. Pt denies any falls over the last month. Lives in 1st floor apartment with 5 yo son, noting she is independent with bathing, dressing, and cooking but does not drive. Bathing and dressing performed sitting.  Pt has a self propelled treadmill which she walks on a few minutes a couple times a week, noting it tires her out. Pt has a hx of LBP, which she is using medication to help manage. Pt reports no new changes in bowel/bladder control. Objective:   Ambulation 1  Surface: carpet  Device: Rolling Walker  Assistance: Modified Independent  Gait Deviations: Slow Leonardo, Decreased step length, Decreased step height, Shuffles  Comments: flexed standing posture, reliant on walker for stability, increased step length displayed with cues but unable to consistently perform        Transfers  Sit to Stand: Modified independent  Stand to sit: Modified independent  Comment: relies on UEs for balance    Strength RLE  Comment: 4-/5 via MMT throughout hip, knee, ankle  Strength LLE  Comment: 4-/5 via MMT throughout hip, knee, ankle     Strength Other  Other: 5x sit to stand: 29 sec, chair with armrest    Observation/Palpation  Observation: ambulates with FWW     Bed mobility  Bridging: Independent  Rolling to Left: Independent  Rolling to Right: Independent  Supine to Sit: Independent  Sit to Supine: Independent        Additional Measures  Other: TU seconds       Exercises:   Exercises  Exercise 1: *NS, low resistance  Exercise 2: *LE strengthening (chair and sink exercises)  Exercise 3: *gait training  Exercise 4: *balance exercises (static, dynamic, foam)  Exercise 5: *cont education on energy conservation for MS  Modalities:  Modalities  Cryotherapy (Minutes\Location): *  Manual:  Manual therapy  PROM: *LE stretches as needed  *Indicates exercise,modality, or manual techniques to be initiated when appropriate    Assessment: Body structures, Functions, Activity limitations: Decreased functional mobility , Decreased balance, Decreased strength, Decreased endurance  Assessment: Pt is a 42 yo female referred for PT eval of ataxic gait secondary to MS. Pt displays decreased leonardo with shuffling pattern and increased reliance on FWW for stability.  Pt displays decreased LE extremity strength via MMT and 5x sit to stand of 29 seconds. TUG assessment required 37 seconds with pt required increased time for directional changes around bolster and in prep for stand to sit transfers. Pt can benefit from continued therapy services to work on strength, balance, gait quality, safety awareness, and HEP knowledge to maintain highest level of safety and function with daily activity. Prognosis: Fair  Discharge Recommendations: Continue to assess pending progress        Decision Making: Medium Complexity  History: MS, falls, hx of LBP  Exam: 5x STS: 29 sec, TU sec  Clinical Presentation: evolving        Plan  Frequency/Duration:  Plan  Times per week: 2  Plan weeks: 6 or 12 visits  Current Treatment Recommendations: Balance Training, Functional Mobility Training, Gait Training, Patient/Caregiver Education & Training, Strengthening, Home Exercise Program  Plan Comment: pt request 2x/wk vs 3; reliant on family for transportation; 61' tx sessions - will adjust to 36' as appropriate         Patient Education  New Education Provided: PT Education: PT Role;Plan of Care;Goals; Disease Specific Education  Patient Education: Discussed importance of energy conservation with MS    POST-PAIN     Pain Rating (0-10 pain scale): no change reported  Location and pain description same as pre-treatment unless indicated. Action: [] NA  [] Call Physician  [] Perform HEP  [x] Meds as prescribed    Evaluation and patient rights have been reviewed and patient agrees with plan of care.   Yes  [x]  No  []   Explain:       Philippe Fall Risk Assessment  Risk Factor Scale  Score   History of Falls [x] Yes  [] No 25  0 25   Secondary Diagnosis [x] Yes (M.S.)  [] No 15  0 15   Ambulatory Aid [] Furniture  [x] Crutches/cane/walker  [] None/bedrest/wheelchair/nurse 30  15  0 15   IV/Heparin Lock [] Yes  [x] No 20  0    Gait/Transferring [] Impaired  [x] Weak  [] Normal/bedrest/immobile 20  10  0 10   Mental Status [] Forgets limitations  [x] Oriented to own ability 15  0 Total: 65     Based on the Assessment score: check the appropriate box. []  No intervention needed   Low =   Score of 0-24  []  Use standard prevention interventions Moderate =  Score of 24-44   [] Discuss fall prevention strategies   [] Indicate moderate falls risk on eval  [x]  Use high risk prevention interventions High = Score of 45 and higher   [x] Discuss fall prevention strategies   [x] Provide supervision during treatment time    Goals  Long term goals  Time Frame for Long term goals : 6 weeks  Long term goal 1: Pt will consistently ambulate 50ft intervals with kaz foot clearance and upright posture to improve safety with home ambulation  Long term goal 2: Pt will improve standing balance to safely perform fwd and lateral reaching with single UE support without signs of instability  Long term goal 3: Pt will improve TUG by 10 seconds to display improved gait and safety with ambulation.   Long term goal 4: Pt will diplay improved LE strength as measure by 5x sit to  < 20 seconds         PT Individual Minutes  Time In: 1545  Time Out: 5775  Minutes: 50     Procedure Minutes: 50 min eval       Electronically signed by Josephine Dan PT on 5/3/21 at 4:57 PM EDT

## 2021-05-03 NOTE — PROGRESS NOTES
[facility] [address], [address]     Ph: [phone]  Fax: [phone]    [] Certification  [] Recertification [x]  Plan of Care  [] Progress Note [] Discharge      To:  Mak Ashby MD      From:  Kandi Mascorro, PT, DPT  Patient: Gloria Tovar     : 1980  Diagnosis: ataxic gait     Date: 5/3/2021  Treatment Diagnosis: impaired gait, decreased balance, LE weakness       Progress Report Period from:  5/3/2021  to 5/3/2021    Total # of Visits to Date: 1   No Show: 0    Canceled Appointment: 0     OBJECTIVE:   Long Term Goals - Time Frame for Long term goals : 6 weeks  Goals Current/ Discharge status Met   Long term goal 1: Pt will consistently ambulate 50ft intervals with kaz foot clearance and upright posture to improve safety with home ambulation Shuffling gait with FWW; flexed posture with increased reliance on UEs for balance [] yes  [x] no   Long term goal 2: Pt will improve standing balance to safely perform fwd and lateral reaching with single UE support without signs of instability Reliant on UEs for balance; will continue to focus on increase safe LOS for home activity [] yes  [x] no   Long term goal 3: Pt will improve TUG by 10 seconds to display improved gait and safety with ambulation. TUG 37 sec [] yes  [x] no   Long term goal 4: Pt will diplay improved LE strength as measure by 5x sit to  < 20 seconds Strength RLE  Comment: 4-/5 via MMT throughout hip, knee, ankle  Strength LLE  Comment: 4-/5 via MMT throughout hip, knee, ankle        Strength Other  Other: 5x sit to stand: 29 sec, chair with armrest   [] yes  [x] no       Body structures, Functions, Activity limitations: Decreased functional mobility , Decreased balance, Decreased strength, Decreased endurance  Assessment: Pt is a 42 yo female referred for PT eval of ataxic gait secondary to MS.  Pt displays decreased leonardo with shuffling pattern and

## 2021-05-10 ENCOUNTER — HOSPITAL ENCOUNTER (OUTPATIENT)
Dept: PHYSICAL THERAPY | Age: 41
Setting detail: THERAPIES SERIES
Discharge: HOME | End: 2021-05-10
Payer: COMMERCIAL

## 2021-05-10 NOTE — PROGRESS NOTES
[address]       Physical Therapy  Cancellation/No-show Note  Patient Name:  Selena Bejarano  :  1980   Date:  5/10/2021  Referring Practitioner: Warren Conn MD  Diagnosis: ataxic gait    Visit Information:  PT Visit Information  PT Insurance Information: [employer]  Total # of Visits Approved: 30  Total # of Visits to Date: 1  No Show: 0  Canceled Appointment: 1  Progress Note Counter:  Cx 5/10/21    For today's appointment patient:  [x]  Cancelled  []  Rescheduled appointment  []  No-show   []  Called pt to remind of next appointment     Reason given by patient:  []  Patient ill  [x]  Conflicting appointment  []  No transportation    []  Conflict with work  []  No reason given  []  Other:       Comments:       Signature: Electronically signed by Kendell Pa PTA on 5/10/21 at 12:07 PM EDT

## 2021-05-12 ENCOUNTER — HOSPITAL ENCOUNTER (OUTPATIENT)
Dept: PHYSICAL THERAPY | Age: 41
Setting detail: THERAPIES SERIES
Discharge: HOME | End: 2021-05-12
Payer: COMMERCIAL

## 2021-05-12 PROCEDURE — 97116 GAIT TRAINING THERAPY: CPT

## 2021-05-12 PROCEDURE — 97112 NEUROMUSCULAR REEDUCATION: CPT

## 2021-05-12 ASSESSMENT — PAIN DESCRIPTION - DESCRIPTORS: DESCRIPTORS: SORE

## 2021-05-12 NOTE — PROGRESS NOTES
[id number] [address]  Outpatient Physical Therapy    Treatment Note        Date: 2021  Patient: Verenice Chen  : 1980  ACCT #: [de-identified]  Referring Practitioner: Jose Gallardo MD  Diagnosis: ataxic gait    Visit Information:  PT Visit Information  PT Insurance Information: [employer]  Total # of Visits Approved: 30  Total # of Visits to Date: 2  No Show: 0  Canceled Appointment: 1  Progress Note Counter:     Subjective: Pt noting increased fatigue up entry to clinic, noting she walked on her treadmill today for 5 min around 10am. Pt says she usually has to sit x 1 hour to recover before continuing with daily activities. Pt reports she went a couple days without MS medication and has resumed taking it today. HEP Compliance:  [] Good [] Fair [] Poor [] Reports not doing due to:    Vital Signs  Patient Currently in Pain: Yes   Pain Screening  Patient Currently in Pain: Yes  Pain Assessment  Pain Assessment: 0-10  Pain Level: 5  Pain Location: (Right upper back/shoulder)  Pain Descriptors: Sore    OBJECTIVE:   Exercises  Exercise 2: Hooklying: bent knee fallouts x 10  Exercise 3: Heel slides (supine) focus on motor control staying along slideboard x 5-10 kaz  Exercise 4: Seated hip ABD/ADD heel slides: focus on motor control staying along slideboard x 10 kaz  Exercise 5: Attempted supine DKTC w/ p-ball; unable to perform  Exercise 6: Education on energy conservation (chairs throughout home)  Exercise 7: Gait training, focus on step sequencing, maintaining close proximity to AD, and upright posture    Strength: [x] NT  [] MMT completed:    ROM: [x] NT  [] ROM measurements:     *Indicates exercise, modality, or manual techniques to be initiated when appropriate    Assessment:       Body structures, Functions, Activity limitations: Decreased functional mobility , Decreased balance, Decreased strength, Decreased endurance  Assessment: Slow and unsteady gait displayed throughout clinic, with pt reporting increased fatigue after using home treadmill. Further educated pt on the importance of energy conservation and trying to avoid overexertion to further prevent MS setback or falls. Focused exercises on motor control tasks for LEs. Pt challenged to consistently control placement and AROM of kaz LEs, R deviance greater than L this date. Persistent verbal cues for gait sequencing required this date to ensure safe proximity to AD and consistent step pattern/sequencing. Pt requiring increased time to cover short distances throughout department. Pt assisted out to family car via [address] [address] for patient safety and energy conservation. Treatment Diagnosis: impaired gait, decreased balance, LE weakness  Prognosis: Fair       Goals:     Long term goals  Time Frame for Long term goals : 6 weeks  Long term goal 1: Pt will consistently ambulate 50ft intervals with kaz foot clearance and upright posture to improve safety with home ambulation  Long term goal 2: Pt will improve standing balance to safely perform fwd and lateral reaching with single UE support without signs of instability  Long term goal 3: Pt will improve TUG by 10 seconds to display improved gait and safety with ambulation. Long term goal 4: Pt will diplay improved LE strength as measure by 5x sit to  < 20 seconds  Progress toward goals: cont toward all     POST-PAIN       Pain Rating (0-10 pain scale):  5 /10   Location and pain description same as pre-treatment unless indicated.    Action: [] NA   [] Perform HEP  [x] Meds as prescribed  [] Modalities as prescribed   [] Call Physician     Frequency/Duration:  Plan  Times per week: 2  Plan weeks: 6 or 12 visits  Current Treatment Recommendations: Balance Training, Functional Mobility Training, Gait Training, Patient/Caregiver Education & Training, Strengthening, Home Exercise Program  Plan Comment: pt request 2x/wk vs 3; reliant on family for transportation; 61' tx sessions - will adjust to 36' as appropriate     Pt to continue current HEP. See objective section for any therapeutic exercise changes, additions or modifications this date.        PT Individual Minutes  Time In: 1411  Time Out: 1451  Minutes: 40  Timed Code Treatment Minutes: 38 Minutes  Procedure Minutes: 0     Timed Activity Minutes Units   NMR 25 2   Gait  13 1       Signature:  Electronically signed by Jimi Yang PT on 5/12/21 at 6:33 PM EDT

## 2021-05-17 ENCOUNTER — HOSPITAL ENCOUNTER (OUTPATIENT)
Dept: PHYSICAL THERAPY | Age: 41
Setting detail: THERAPIES SERIES
Discharge: HOME | End: 2021-05-17
Payer: COMMERCIAL

## 2021-05-17 PROCEDURE — 97110 THERAPEUTIC EXERCISES: CPT

## 2021-05-17 PROCEDURE — 97112 NEUROMUSCULAR REEDUCATION: CPT

## 2021-05-17 ASSESSMENT — PAIN DESCRIPTION - DESCRIPTORS: DESCRIPTORS: SORE

## 2021-05-17 ASSESSMENT — PAIN DESCRIPTION - ORIENTATION: ORIENTATION: LOWER

## 2021-05-17 ASSESSMENT — PAIN SCALES - GENERAL: PAINLEVEL_OUTOF10: 5

## 2021-05-17 NOTE — PROGRESS NOTES
technique with therex. VC for safety awareness with transfers and [address] management with pt attempting to pull up on [address] qwith STS vs push from surface. Fair carryover of cues. Assisted pt out to car via [address]. Treatment Diagnosis: impaired gait, decreased balance, LE weakness        Goals:  Long term goals  Time Frame for Long term goals : 6 weeks  Long term goal 1: Pt will consistently ambulate 50ft intervals with kaz foot clearance and upright posture to improve safety with home ambulation  Long term goal 2: Pt will improve standing balance to safely perform fwd and lateral reaching with single UE support without signs of instability  Long term goal 3: Pt will improve TUG by 10 seconds to display improved gait and safety with ambulation. Long term goal 4: Pt will diplay improved LE strength as measure by 5x sit to  < 20 seconds  Progress toward goals: Progressing towards all    POST-PAIN       Pain Rating (0-10 pain scale):   0/10   Location and pain description same as pre-treatment unless indicated. Action: [] NA   [x] Perform HEP  [] Meds as prescribed  [] Modalities as prescribed   [] Call Physician     Frequency/Duration:  Plan  Times per week: 2  Plan weeks: 6 or 12 visits  Current Treatment Recommendations: Balance Training, Functional Mobility Training, Gait Training, Patient/Caregiver Education & Training, Strengthening, Home Exercise Program  Plan Comment: pt request 2x/wk vs 3; reliant on family for transportation; 61' tx sessions - will adjust to 36' as appropriate     Pt to continue current HEP. See objective section for any therapeutic exercise changes, additions or modifications this date.          PT Individual Minutes  Time In: 0660  Time Out: 2000  Minutes: 56  Timed Code Treatment Minutes: 54 Minutes  Procedure Minutes: 0     Timed Activity Minutes Units   Ther Ex 44 3   Neuro 10 1       Signature:  Electronically signed by Donna Silver PTA on 5/17/21 at 3:51 PM EDT

## 2021-05-19 ENCOUNTER — HOSPITAL ENCOUNTER (OUTPATIENT)
Dept: PHYSICAL THERAPY | Age: 41
Setting detail: THERAPIES SERIES
Discharge: HOME | End: 2021-05-19
Payer: COMMERCIAL

## 2021-05-19 ENCOUNTER — HOSPITAL ENCOUNTER (OUTPATIENT)
Dept: PHYSICAL THERAPY | Age: 41
Setting detail: THERAPIES SERIES
End: 2021-05-19
Payer: COMMERCIAL

## 2021-05-19 PROCEDURE — 97112 NEUROMUSCULAR REEDUCATION: CPT

## 2021-05-19 PROCEDURE — 97110 THERAPEUTIC EXERCISES: CPT

## 2021-05-19 NOTE — PROGRESS NOTES
however unable to maintain static standing unsupported for more than 15''. Pt requesting to end early due to fatigue from Covid shot. Treatment Diagnosis: impaired gait, decreased balance, LE weakness  Prognosis: Fair       Goals:       Long term goals  Time Frame for Long term goals : 6 weeks  Long term goal 1: Pt will consistently ambulate 50ft intervals with kaz foot clearance and upright posture to improve safety with home ambulation  Long term goal 2: Pt will improve standing balance to safely perform fwd and lateral reaching with single UE support without signs of instability  Long term goal 3: Pt will improve TUG by 10 seconds to display improved gait and safety with ambulation. Long term goal 4: Pt will display improved LE strength as measure by 5x sit to  < 20 seconds  Progress toward goals: Balance, strength   POST-PAIN       Pain Rating (0-10 pain scale):   0/10   Location and pain description same as pre-treatment unless indicated. Action: [x] NA   [] Perform HEP  [] Meds as prescribed  [] Modalities as prescribed   [] Call Physician     Frequency/Duration:  Plan  Times per week: 2  Plan weeks: 6 or 12 visits  Current Treatment Recommendations: Balance Training, Functional Mobility Training, Gait Training, Patient/Caregiver Education & Training, Strengthening, Home Exercise Program  Plan Comment: pt request 2x/wk vs 3; reliant on family for transportation; 61' tx sessions - will adjust to 36' as appropriate     Pt to continue current HEP. See objective section for any therapeutic exercise changes, additions or modifications this date.          PT Individual Minutes  Time In: 1401  Time Out: 1500  Minutes: 59  Timed Code Treatment Minutes: 59 Minutes  Procedure Minutes:0   Timed Activity Minutes Units   Ther Ex 40 3   Neuro 19 1       Signature:  Electronically signed by Farida Potter PTA on 5/19/21 at 1:51 PM EDT

## 2021-05-24 ENCOUNTER — HOSPITAL ENCOUNTER (OUTPATIENT)
Dept: PHYSICAL THERAPY | Age: 41
Setting detail: THERAPIES SERIES
Discharge: HOME | End: 2021-05-24
Payer: COMMERCIAL

## 2021-05-24 PROCEDURE — 97112 NEUROMUSCULAR REEDUCATION: CPT

## 2021-05-24 PROCEDURE — 97116 GAIT TRAINING THERAPY: CPT

## 2021-05-24 PROCEDURE — 97110 THERAPEUTIC EXERCISES: CPT

## 2021-05-24 NOTE — PROGRESS NOTES
[address] [address]  Outpatient Physical Therapy    Treatment Note        Date: 2021  Patient: Tigist Perez  : 1980  ACCT #: [de-identified]  Referring Practitioner: Fela Ventura MD  Diagnosis: ataxic gait    Visit Information:  PT Visit Information  PT Insurance Information: [employer]  Total # of Visits Approved: 30  Total # of Visits to Date: 5  No Show: 0  Canceled Appointment: 1  Progress Note Counter:     Subjective: Pt reports no pain today, only if its hot. Compliant with HEP. Pt cancelled Wed appt d/t conflicting appt. for her son. HEP Compliance:  [x] Good [x] Fair [] Poor [] Reports not doing due to:    Vital Signs  Patient Currently in Pain: No   Pain Screening  Patient Currently in Pain: No    OBJECTIVE:   Exercises  Exercise 1: Nustep L1 x 7 min to improve LE strength for improved tolerance to functional activities  Exercise 2: LAQ 5 sec x 10 b/l  Exercise 3: seated lateral hip flexion over 3'' box x10-12 Thomas  Exercise 4: H/L hip add with ball 5 sec x 11, abd with YTB x11  Exercise 5: Bridge with ball between knees 5\"x10 (Min A)  Exercise 8: FTSTS from mat with no  UEs on [employer] and posterior LE bracing on mat37.56 sec  Exercise 9: Static stand without UE support: FA 7\"-33\", x 2 post LOB, attempting self correction  Exercise 10: Seated posture ex: scap retractions, shrugs, rolls x10 ea  Exercise 11: H/L alt ER x10  Exercise 14: stepping with unilateral support on [employer], CLOSE sba, FA<>FT, FWD<> bACK       Ambulation 1  Surface: carpet  Device: Rolling Walker  Assistance: Modified Independent  Quality of Gait: Decreased Thomas foot clearance, absent heel strike, increased UE use, VCs for foot clearance, poor caryover  Distance: 50ft    Strength: [x] NT  [] MMT completed:        *Indicates exercise, modality, or manual techniques to be initiated when appropriate    Assessment:         Body structures, Functions, Activity limitations: Decreased functional mobility , Decreased balance, Decreased strength, Decreased endurance  Assessment: Cont'd strengthening ex's with continued decreased core and stability in bridging. Pt tolerated standing stepping F/L with 1 ue support with confidence increasing with reps. Static stand and STS with improved times this date. Treatment Diagnosis: impaired gait, decreased balance, LE weakness  Prognosis: Fair       Goals:       Long term goals  Time Frame for Long term goals : 6 weeks  Long term goal 1: Pt will consistently ambulate 50ft intervals with kaz foot clearance and upright posture to improve safety with home ambulation  Long term goal 2: Pt will improve standing balance to safely perform fwd and lateral reaching with single UE support without signs of instability  Long term goal 3: Pt will improve TUG by 10 seconds to display improved gait and safety with ambulation. Long term goal 4: Pt will display improved LE strength as measure by 5x sit to  < 20 seconds  Progress toward goals:Balance, strength, gait    POST-PAIN       Pain Rating (0-10 pain scale):  0 /10   Location and pain description same as pre-treatment unless indicated. Action: [x] NA   [] Perform HEP  [] Meds as prescribed  [] Modalities as prescribed   [] Call Physician     Frequency/Duration:  Plan  Times per week: 2  Plan weeks: 6 or 12 visits  Current Treatment Recommendations: Balance Training, Functional Mobility Training, Gait Training, Patient/Caregiver Education & Training, Strengthening, Home Exercise Program  Plan Comment: pt request 2x/wk vs 3; reliant on family for transportation; 61' tx sessions - will adjust to 36' as appropriate     Pt to continue current HEP. See objective section for any therapeutic exercise changes, additions or modifications this date.          PT Individual Minutes  Time In: 7515  Time Out: 3994  Minutes: 54     Procedure Minutes:0     Timed Activity Minutes Units   Ther Ex 30 2   Gait 10 1   Neuro 14 1       Signature:  Electronically signed by Kimberly Fishman, PTA on 5/24/21 at 5:27 PM EDT

## 2021-05-26 ENCOUNTER — APPOINTMENT (OUTPATIENT)
Dept: PHYSICAL THERAPY | Age: 41
End: 2021-05-26
Payer: COMMERCIAL

## 2021-06-01 ENCOUNTER — HOSPITAL ENCOUNTER (OUTPATIENT)
Dept: PHYSICAL THERAPY | Age: 41
Setting detail: THERAPIES SERIES
Discharge: HOME | End: 2021-06-01
Payer: COMMERCIAL

## 2021-06-01 PROCEDURE — 97110 THERAPEUTIC EXERCISES: CPT

## 2021-06-01 PROCEDURE — 97112 NEUROMUSCULAR REEDUCATION: CPT

## 2021-06-01 NOTE — PROGRESS NOTES
[address] [address]  Outpatient Physical Therapy    Treatment Note        Date: 2021  Patient: Terry Mabry  : 1980  ACCT #: [de-identified]  Referring Practitioner: Mora Gaffney MD  Diagnosis: ataxic gait    Visit Information:  PT Visit Information  PT Insurance Information: [employer]  Total # of Visits Approved: 30  Total # of Visits to Date: 6  No Show: 0  Canceled Appointment: 1  Progress Note Counter:     Subjective: Patient reports compliance with HEP since last visit. Continues to ambulate short distances around her home using ww. HEP Compliance:  [x] Good [] Fair [] Poor [] Reports not doing due to:    Vital Signs  Patient Currently in Pain: No   Pain Screening  Patient Currently in Pain: No    OBJECTIVE:   Exercises  Exercise 1: Nustep L1.5 x 7 min to improve LE strength for improved tolerance to functional activities  Exercise 3: seated lateral hip flexion over 3'' box x10-12 Thomas  Exercise 4: H/L hip add with ball 5 sec x 15, abd with YTB x15  Exercise 5: Bridge with ball between knees 5\"x15 (Min A)  Exercise 8: FTSTS from mat without UE x5  Exercise 9: Static stand without UE support: WBOS 30\", horizontal/vertical head turns  Exercise 10: rows/lats YTB x10      Ambulation 1  Surface: carpet  Device: Rolling Walker  Assistance: Modified Independent  Quality of Gait: Decreased Thomas foot clearance, absent heel strike, increased UE use, VCs for foot clearance, poor caryover, inconsistent step length  Distance: 60ft  Assessment: Body structures, Functions, Activity limitations: Decreased functional mobility , Decreased balance, Decreased strength, Decreased endurance  Assessment: continued current POC with focus on bilateral hip strength and balance to decrease falls risk. Patient completes static standing balance activities without UE support. Continues to require assist to prevent left hip ER during bridges.   Treatment Diagnosis: impaired gait, decreased balance, LE

## 2021-06-03 ENCOUNTER — HOSPITAL ENCOUNTER (OUTPATIENT)
Dept: PHYSICAL THERAPY | Age: 41
Setting detail: THERAPIES SERIES
Discharge: HOME | End: 2021-06-03
Payer: COMMERCIAL

## 2021-06-03 PROCEDURE — 97112 NEUROMUSCULAR REEDUCATION: CPT

## 2021-06-03 PROCEDURE — 97110 THERAPEUTIC EXERCISES: CPT

## 2021-06-03 NOTE — PROGRESS NOTES
ambulation  Long term goal 2: Pt will improve standing balance to safely perform fwd and lateral reaching with single UE support without signs of instability  Long term goal 3: Pt will improve TUG by 10 seconds to display improved gait and safety with ambulation. Long term goal 4: Pt will display improved LE strength as measure by 5x sit to  < 20 seconds  Progress toward goals: continue towards all     POST-PAIN       Pain Rating (0-10 pain scale):   /10   Location and pain description same as pre-treatment unless indicated. Action: [] NA   [] Perform HEP  [] Meds as prescribed  [] Modalities as prescribed   [] Call Physician     Frequency/Duration:  Plan  Times per week: 2  Plan weeks: 6 or 12 visits  Current Treatment Recommendations: Balance Training, Functional Mobility Training, Gait Training, Patient/Caregiver Education & Training, Strengthening, Home Exercise Program  Plan Comment: pt request 2x/wk vs 3; reliant on family for transportation; 61' tx sessions - will adjust to 36' as appropriate     Pt to continue current HEP. See objective section for any therapeutic exercise changes, additions or modifications this date.   PT Individual Minutes  Time In: 1400  Time Out: 1500  Minutes: 60  Timed Code Treatment Minutes: 60 Minutes  Procedure Minutes:0     Timed Activity Minutes Units   Ther Ex 40 3   Neuro  20 1       Signature:  Electronically signed by Jackelyn Fuentes PTA on 6/3/21 at 3:57 PM EDT

## 2021-06-07 ENCOUNTER — HOSPITAL ENCOUNTER (OUTPATIENT)
Dept: PHYSICAL THERAPY | Age: 41
Setting detail: THERAPIES SERIES
Discharge: HOME | End: 2021-06-07
Payer: COMMERCIAL

## 2021-06-07 PROCEDURE — 97112 NEUROMUSCULAR REEDUCATION: CPT

## 2021-06-07 PROCEDURE — 97110 THERAPEUTIC EXERCISES: CPT

## 2021-06-07 NOTE — PROGRESS NOTES
ambulate 50ft intervals with kaz foot clearance and upright posture to improve safety with home ambulation  Long term goal 2: Pt will improve standing balance to safely perform fwd and lateral reaching with single UE support without signs of instability  Long term goal 3: Pt will improve TUG by 10 seconds to display improved gait and safety with ambulation. Long term goal 4: Pt will display improved LE strength as measure by 5x sit to  < 20 seconds  Progress toward goals: Strength, gait    POST-PAIN       Pain Rating (0-10 pain scale):  0 /10 ; Fatigue  Location and pain description same as pre-treatment unless indicated. Action: [x] NA   [] Perform HEP  [] Meds as prescribed  [] Modalities as prescribed   [] Call Physician     Frequency/Duration:  Plan  Times per week: 2  Plan weeks: 6 or 12 visits  Current Treatment Recommendations: Balance Training, Functional Mobility Training, Gait Training, Patient/Caregiver Education & Training, Strengthening, Home Exercise Program  Plan Comment: pt request 2x/wk vs 3; reliant on family for transportation; 61' tx sessions - will adjust to 36' as appropriate     Pt to continue current HEP. See objective section for any therapeutic exercise changes, additions or modifications this date.          PT Individual Minutes  Time In: 2746  Time Out: 2711  Minutes: 57  Timed Code Treatment Minutes: 56 Minutes  Procedure Minutes:0     Timed Activity Minutes Units   Ther Ex 30 2   Neuro 26 2       Signature:  Electronically signed by Dae Dimaond PTA on 6/7/21 at 9:33 AM EDT

## 2021-06-09 ENCOUNTER — HOSPITAL ENCOUNTER (OUTPATIENT)
Dept: PHYSICAL THERAPY | Age: 41
Setting detail: THERAPIES SERIES
Discharge: HOME | End: 2021-06-09
Payer: COMMERCIAL

## 2021-06-09 PROCEDURE — 97116 GAIT TRAINING THERAPY: CPT

## 2021-06-09 PROCEDURE — 97112 NEUROMUSCULAR REEDUCATION: CPT

## 2021-06-09 NOTE — PROGRESS NOTES
[address] [address]  Outpatient Physical Therapy    Treatment Note        Date: 2021  Patient: Maya Langford  : 1980  ACCT #: [de-identified]  Referring Practitioner: Gunner Moreno MD  Diagnosis: ataxic gait    Visit Information:  PT Visit Information  PT Insurance Information: [employer]  Total # of Visits Approved: 30  Total # of Visits to Date: 9  No Show: 0  Canceled Appointment: 2  Progress Note Counter:     Subjective: Pt reports falling on Tuesday when going out to the car. Pt's mom had to help pull her up. Lt leg just got stuck. Today was able to pull herself up from sitting on the porch today when Lt leg wouldn't move. Pt feels she would benefit from further PT to improve her balance but is unable to give examples of activities at home that are difficult.      HEP Compliance:  [x] Good [] Fair [] Poor [] Reports not doing due to:    Vital Signs  Patient Currently in Pain: No   Pain Screening  Patient Currently in Pain: No    OBJECTIVE:   Exercises  Exercise 9: Static stand without UE support: 18\" x2, 17\"; standing with 1 UE support 2'  Exercise 11: Gait drills: March, lateral x2 laps with Thomas UE support  Exercise 14: Standing with 1 UE support with bean bag toss  Exercise 20: HEP: standing with 1 UE support    Ambulation 1  Surface: carpet  Device: Rolling Walker  Assistance: Supervision  Quality of Gait: Decreased Thomas foot clearance, absent heel strike, increased UE use, VCs for foot clearance, poor carryover, inconsistent step length  Gait Deviations: Slow Marlys, Decreased step length, Decreased step height, Shuffles  Distance: 70'    Assessment:   Activity Tolerance  Activity Tolerance: Patient Tolerated treatment well    Body structures, Functions, Activity limitations: Decreased functional mobility , Decreased balance, Decreased strength, Decreased endurance  Assessment: Pt able to ambulate further distance with WW but continues to require vc's to improve posture, foot clearance and heel strike. Pt with decreased static standing balance without UE support. Requires frequent seated RBs throughout session due to fatigue. Discussed continuing vs D/C at end of POC to conserve visits. Treatment Diagnosis: impaired gait, decreased balance, LE weakness  Prognosis: Fair       Goals:  Long term goals  Time Frame for Long term goals : 6 weeks  Long term goal 1: Pt will consistently ambulate 50ft intervals with kaz foot clearance and upright posture to improve safety with home ambulation  Long term goal 2: Pt will improve standing balance to safely perform fwd and lateral reaching with single UE support without signs of instability  Long term goal 3: Pt will improve TUG by 10 seconds to display improved gait and safety with ambulation. Long term goal 4: Pt will display improved LE strength as measure by 5x sit to  < 20 seconds  Progress toward goals: standing, gait    POST-PAIN       Pain Rating (0-10 pain scale):0   /10   Location and pain description same as pre-treatment unless indicated. Action: [x] NA   [] Perform HEP  [] Meds as prescribed  [] Modalities as prescribed   [] Call Physician     Frequency/Duration:  Plan  Times per week: 2  Plan weeks: 6 or 12 visits  Current Treatment Recommendations: Balance Training, Functional Mobility Training, Gait Training, Patient/Caregiver Education & Training, Strengthening, Home Exercise Program  Plan Comment: pt request 2x/wk vs 3; reliant on family for transportation; 61' tx sessions - will adjust to 36' as appropriate     Pt to continue current HEP. See objective section for any therapeutic exercise changes, additions or modifications this date.       PT Individual Minutes  Time In: 8776  Time Out: 4611  Minutes: 54  Timed Code Treatment Minutes: 53 Minutes  Procedure Minutes:0     Timed Activity Minutes Units   Neuro aaron 40 3   Gait 13 1       Signature:  Electronically signed by Cee Lundy PT on 6/9/21 at 2:43 PM EDT

## 2021-06-14 ENCOUNTER — HOSPITAL ENCOUNTER (OUTPATIENT)
Dept: PHYSICAL THERAPY | Age: 41
Setting detail: THERAPIES SERIES
Discharge: HOME | End: 2021-06-14
Payer: COMMERCIAL

## 2021-06-14 PROCEDURE — 97110 THERAPEUTIC EXERCISES: CPT

## 2021-06-14 PROCEDURE — 97116 GAIT TRAINING THERAPY: CPT

## 2021-06-14 PROCEDURE — 97112 NEUROMUSCULAR REEDUCATION: CPT

## 2021-06-14 NOTE — PROGRESS NOTES
[address] [address]  Outpatient Physical Therapy    Treatment Note        Date: 2021  Patient: Andre Tsang  : 1980  ACCT #: [de-identified]  Referring Practitioner: Zeynep Hernandez MD  Diagnosis: ataxic gait    Visit Information:  PT Visit Information  PT Insurance Information: [employer]  Total # of Visits Approved: 30  Total # of Visits to Date: 10  No Show: 0  Canceled Appointment: 2  Progress Note Counter: 10/12    Subjective: No new info this date. HEP Compliance:  [x] Good [] Fair [] Poor [] Reports not doing due to:    Vital Signs  Patient Currently in Pain: No   Pain Screening  Patient Currently in Pain: No    OBJECTIVE:   Exercises  Exercise 4: H/L hip add with ball 5 sec x 20, abd with YTB x20  Exercise 5: Bridge with ball between knees 5\"x15 (Min A)  Exercise 9: Static stand without UE support: 7\", 15\"; standing with 1 UE support 2'  Exercise 11: Gait drills: March, lateral x2 laps with Thomas UE support  Exercise 13: Pball : double arm raise in seated x10, chop/lifts in seated x10  Exercise 14: Standing with 1 UE support with bean bag toss       Ambulation 1  Surface: carpet  Device: Rolling Walker  Assistance: Supervision  Quality of Gait: Decreased Thomas foot clearance, absent heel strike, increased UE use, VCs for foot clearance, poor carryover, inconsistent step length  Gait Deviations: Slow Marlys, Decreased step length, Decreased step height, Shuffles  Distance: 60'    Ambulation 2  Surface - 2: carpet  Device 2: Rolling Walker  Assistance 2: Supervision  Quality of Gait 2: Decreased Thomas foot clearance, absent heel strike, increased UE use, VCs for foot clearance, poor carryover, inconsistent step length  Gait Deviations: Slow Marlys, Decreased step length, Decreased step height, Shuffles  Distance: 20ft    *Indicates exercise, modality, or manual techniques to be initiated when appropriate    Assessment:      Body structures, Functions, Activity limitations: Decreased functional mobility , Decreased balance, Decreased strength, Decreased endurance  Assessment: Pt able to tolerate standing ex's with seated RB between due to fatigue. Pt didn't walk as far after standing ex's due to fatigue with decreased right step length and height with increased vc's. Pt demo'd good standing balance with one hand assist and throwing bean bags. Treatment Diagnosis: impaired gait, decreased balance, LE weakness  Prognosis: Fair       Goals:       Long term goals  Time Frame for Long term goals : 6 weeks  Long term goal 1: Pt will consistently ambulate 50ft intervals with kaz foot clearance and upright posture to improve safety with home ambulation  Long term goal 2: Pt will improve standing balance to safely perform fwd and lateral reaching with single UE support without signs of instability  Long term goal 3: Pt will improve TUG by 10 seconds to display improved gait and safety with ambulation. Long term goal 4: Pt will display improved LE strength as measure by 5x sit to  < 20 seconds  Progress toward goals: Continued POC to improve ambulation, balance and strength. POST-PAIN       Pain Rating (0-10 pain scale):   0/10   Location and pain description same as pre-treatment unless indicated. Action: [x] NA   [] Perform HEP  [] Meds as prescribed  [] Modalities as prescribed   [] Call Physician     Frequency/Duration:  Plan  Times per week: 2  Plan weeks: 6 or 12 visits  Current Treatment Recommendations: Balance Training, Functional Mobility Training, Gait Training, Patient/Caregiver Education & Training, Strengthening, Home Exercise Program  Plan Comment: pt request 2x/wk vs 3; reliant on family for transportation; 61' tx sessions - will adjust to 36' as appropriate     Pt to continue current HEP. See objective section for any therapeutic exercise changes, additions or modifications this date.          PT Individual Minutes  Time In: 1503  Time Out: 8810  Minutes: 54  Timed Code Treatment Minutes: 54 Minutes  Procedure Minutes:n/a     Timed Activity Minutes Units   Gait        14       1   Neuro        20       2   therex        20       1       Signature:  Electronically signed by Gerald Batista PTA on 6/14/21 at 4:20 PM EDT

## 2021-06-16 ENCOUNTER — HOSPITAL ENCOUNTER (OUTPATIENT)
Dept: PHYSICAL THERAPY | Age: 41
Setting detail: THERAPIES SERIES
Discharge: HOME | End: 2021-06-16
Payer: COMMERCIAL

## 2021-06-16 PROCEDURE — 97116 GAIT TRAINING THERAPY: CPT

## 2021-06-16 PROCEDURE — 97110 THERAPEUTIC EXERCISES: CPT

## 2021-06-16 PROCEDURE — 97112 NEUROMUSCULAR REEDUCATION: CPT

## 2021-06-16 NOTE — PROGRESS NOTES
[address] [address]  Outpatient Physical Therapy    Treatment Note        Date: 2021  Patient: Fariba He  : 1980  ACCT #: [de-identified]  Referring Practitioner: Arnold Parrish MD  Diagnosis: ataxic gait    Visit Information:  PT Visit Information  PT Insurance Information: [employer]  Total # of Visits Approved: 30  Total # of Visits to Date: 6  No Show: 0  Canceled Appointment: 2  Progress Note Counter:     Subjective: Pt reports compliance with HEP. HEP Compliance:  [x] Good [] Fair [] Poor [] Reports not doing due to:    Vital Signs  Patient Currently in Pain: No   Pain Screening  Patient Currently in Pain: No    OBJECTIVE:   Exercises  Exercise 6: Single stepping in 4'' box F/L Thomas UE support  Exercise 9: Static stand without UE support: 7\",  5'' 12''  Exercise 11: Gait drills: March, lateral, retro, over obstacles x2 laps with Thomas UE support  Exercise 16: TU.13sec with [employer]           *Indicates exercise, modality, or manual techniques to be initiated when appropriate    Assessment: Body structures, Functions, Activity limitations: Decreased functional mobility , Decreased balance, Decreased strength, Decreased endurance  Assessment: Pt requiring increased time to complete TUG today. Initiated single stepping into 4'' box to facilitate foot clearance and increase step length. Pt easily fatigued with standing activities. Anticipate D/C [place] as pt is beginning to [place] towards goals.   Treatment Diagnosis: impaired gait, decreased balance, LE weakness  Prognosis: Fair       Goals:       Long term goals  Time Frame for Long term goals : 6 weeks  Long term goal 1: Pt will consistently ambulate 50ft intervals with thomas foot clearance and upright posture to improve safety with home ambulation  Long term goal 2: Pt will improve standing balance to safely perform fwd and lateral reaching with single UE support without signs of instability  Long term goal 3: Pt will improve TUG by 10 seconds to display improved gait and safety with ambulation. Long term goal 4: Pt will display improved LE strength as measure by 5x sit to  < 20 seconds  Progress toward goals: Strength, ambulation     POST-PAIN       Pain Rating (0-10 pain scale):  0 /10   Location and pain description same as pre-treatment unless indicated. Action: [x] NA   [] Perform HEP  [] Meds as prescribed  [] Modalities as prescribed   [] Call Physician     Frequency/Duration:  Plan  Times per week: 2  Plan weeks: 6 or 12 visits  Current Treatment Recommendations: Balance Training, Functional Mobility Training, Gait Training, Patient/Caregiver Education & Training, Strengthening, Home Exercise Program  Plan Comment: pt request 2x/wk vs 3; reliant on family for transportation; 61' tx sessions - will adjust to 36' as appropriate     Pt to continue current HEP. See objective section for any therapeutic exercise changes, additions or modifications this date.          PT Individual Minutes  Time In: 9058  Time Out: 1500  Minutes: 55  Timed Code Treatment Minutes: 53 Minutes  Procedure Minutes:0     Timed Activity Minutes Units   Ther Ex 10 1   Neuro 30 2   Gait 13 1       Signature:  Electronically signed by Terrie Albarran PTA on 6/16/21 at 11:04 AM EDT

## 2021-06-21 ENCOUNTER — HOSPITAL ENCOUNTER (OUTPATIENT)
Dept: PHYSICAL THERAPY | Age: 41
Setting detail: THERAPIES SERIES
Discharge: HOME | End: 2021-06-21
Payer: COMMERCIAL

## 2021-06-21 PROCEDURE — 97116 GAIT TRAINING THERAPY: CPT

## 2021-06-21 PROCEDURE — 97110 THERAPEUTIC EXERCISES: CPT

## 2021-06-21 PROCEDURE — 97112 NEUROMUSCULAR REEDUCATION: CPT

## 2021-06-21 NOTE — PROGRESS NOTES
[address] [address]  Outpatient Physical Therapy    Treatment Note        Date: 2021  Patient: Dee Thomas  : 1980  ACCT #: [de-identified]  Referring Practitioner: Deana Singh MD  Diagnosis: ataxic gait  Treatment Diagnosis: impaired gait, decreased balance, LE weakness    Visit Information:  PT Visit Information  PT Insurance Information: [employer]  Total # of Visits Approved: 30  Total # of Visits to Date: 12  No Show: 0  Canceled Appointment: 2  Progress Note Counter:     Subjective: No reported falls. Feels she is ready for D/C, compliant with HEP. HEP Compliance:  [x] Good [] Fair [] Poor [] Reports not doing due to:    Vital Signs  Patient Currently in Pain: No   Pain Screening  Patient Currently in Pain: No    OBJECTIVE:   Exercises  Exercise 1: Scifit level 2 for 6'  to increase LE Strength and tolerance for functional activities. Exercise 6: Single stepping in 4'' box F/L Thomas UE support  Exercise 7: Gait training, focus on step sequencing, maintaining close proximity to AD, and upright posture  Exercise 11: Gait drills: March, lateral x2 laps  Exercise 16: TU.97sec with [employer]  Exercise 17: 5xSTS from chair with UEs: 26.27sec      Ambulation 1  Surface: carpet  Device: Rolling Walker  Assistance: Supervision  Quality of Gait: Decreased Thomas foot clearance, absent heel strike, increased UE use, VCs for foot clearance, poor carryover, inconsistent step length  Distance: 40ft- limited by fatigue           Strength: [] NT  [x] MMT completed:  Strength RLE  Comment: Hip flex 4/5, knee ext 4/5, knee flex 4-/5, DF 4/5  Strength LLE  Comment: Hip flex 4-/5, knee flex/ext 4-/5, DF 3-/5               *Indicates exercise, modality, or manual techniques to be initiated when appropriate    Assessment:         Body structures, Functions, Activity limitations: Decreased functional mobility , Decreased balance, Decreased strength, Decreased endurance  Assessment: Pt with min increase in Rt LE strength with improved STS goal, progressing towards goal. Continues to require constant cues to increase foot clearance with ambulation. Education on importance of continuing with HEP at home in order to further progress. Pt to be discharged at this time. Treatment Diagnosis: impaired gait, decreased balance, LE weakness  Prognosis: Fair     Plan: D/C    Goals:       Long term goals  Time Frame for Long term goals : 6 weeks  Long term goal 1: Pt will consistently ambulate 50ft intervals with kaz foot clearance and upright posture to improve safety with home ambulation  Long term goal 2: Pt will improve standing balance to safely perform fwd and lateral reaching with single UE support without signs of instability  Long term goal 3: Pt will improve TUG by 10 seconds to display improved gait and safety with ambulation. Long term goal 4: Pt will display improved LE strength as measure by 5x sit to  < 20 seconds  Progress toward goals: Strength     POST-PAIN       Pain Rating (0-10 pain scale):   0/10   Location and pain description same as pre-treatment unless indicated. Action: [x] NA   [] Perform HEP  [] Meds as prescribed  [] Modalities as prescribed   [] Call Physician     Frequency/Duration:  Plan  Plan Comment: D/C     Pt to continue current HEP. See objective section for any therapeutic exercise changes, additions or modifications this date.          PT Individual Minutes  Time In: 9806  Time Out: 1500  Minutes: 56  Timed Code Treatment Minutes: 54 Minutes  Procedure Minutes:0     Timed Activity Minutes Units   Ther Ex 10 1   Neuro 30 2   Gait 14 1       Signature:  Electronically signed by Sophia Galvez PTA on 6/21/21 at 9:07 AM EDT

## 2021-06-21 NOTE — PROGRESS NOTES
[address]     Ph: [phone]  Fax: [phone]    [] Certification  [] Recertification []  Plan of Care  [] Progress Note [x] Discharge      To:  Ian Flores MD      From:Ignacia Forrester, PT  Patient: Louisa Ohara     : 1980  Diagnosis: ataxic gait     Date: 2021  Treatment Diagnosis: impaired gait, decreased balance, LE weakness       Progress Report Period from:  5/3/2021 to 2021    Total # of Visits to Date: 12   No Show: 0    Canceled Appointment: 2     OBJECTIVE:   Long Term Goals - Time Frame for Long term goals : 6 weeks  Goals Current/ Discharge status Met   Long term goal 1: Pt will consistently ambulate 50ft intervals with kaz foot clearance and upright posture to improve safety with home ambulation Ambulates up to 60ft with SBA/Supervision demonstrates decreased Kaz foot clearance and step length, fatigues quickly. [x] yes  [x] no   Long term goal 2: Pt will improve standing balance to safely perform fwd and lateral reaching with single UE support without signs of instability Improved stability with reaching with unilateral support [x] yes  [] no   Long term goal 3: Pt will improve TUG by 10 seconds to display improved gait and safety with ambulation.  TU.97sec with [employer] [] yes  [x] no   Long term goal 4: Pt will display improved LE strength as measure by 5x sit to  < 20 seconds 5xSTS from chair with UEs: 26.27sec    Strength RLE  Comment: Hip flex 4/5, knee ext 4/5, knee flex 4-/5, DF 4/5  Strength LLE  Comment: Hip flex 4-/5, knee flex/ext 4-/5, DF 3-/5 [] yes  [x] no     Body structures, Functions, Activity limitations: Decreased functional mobility , Decreased balance, Decreased strength, Decreased endurance  Assessment: Pt with min increase in Rt LE strength with improved STS goal, progressing towards goal. Continues to require constant cues to increase foot clearance with ambulation. Education on importance of continuing with HEP at home in order to further progress. Pt to be discharged at this time. Prognosis: Fair    PLAN: [x] D/C  Frequency/Duration:  Plan  Plan Comment: D/C                               Patient Status:[] Continue/ Initiate plan of Care    [x] Discharge PT. Recommend pt continue with HEP. [] Additional visits requested, Please re-certify for additional visits:        Obj info by:  Electronically signed by Clint Hinojosa PTA on 6/21/2021 at 2:37 PM    Signature: Electronically signed by Joanne De Paz PT on 6/23/2021 at 4:43 PM      If you have any questions or concerns, please don't hesitate to call.   Thank you for your referral.

## 2021-06-23 ENCOUNTER — APPOINTMENT (OUTPATIENT)
Dept: PHYSICAL THERAPY | Age: 41
End: 2021-06-23
Payer: COMMERCIAL

## 2021-08-30 ENCOUNTER — OFFICE VISIT (OUTPATIENT)
Dept: NEUROLOGY | Age: 41
End: 2021-08-30
Payer: COMMERCIAL

## 2021-08-30 VITALS — DIASTOLIC BLOOD PRESSURE: 68 MMHG | HEART RATE: 89 BPM | SYSTOLIC BLOOD PRESSURE: 104 MMHG

## 2021-08-30 DIAGNOSIS — R26.0 ATAXIC GAIT: ICD-10-CM

## 2021-08-30 DIAGNOSIS — M54.16 LUMBAR RADICULOPATHY: ICD-10-CM

## 2021-08-30 DIAGNOSIS — G35 MULTIPLE SCLEROSIS (HCC): Primary | ICD-10-CM

## 2021-08-30 DIAGNOSIS — M21.372 FOOT DROP, LEFT FOOT: ICD-10-CM

## 2021-08-30 PROCEDURE — 99214 OFFICE O/P EST MOD 30 MIN: CPT | Performed by: PSYCHIATRY & NEUROLOGY

## 2021-08-30 PROCEDURE — G8417 CALC BMI ABV UP PARAM F/U: HCPCS | Performed by: PSYCHIATRY & NEUROLOGY

## 2021-08-30 PROCEDURE — 1036F TOBACCO NON-USER: CPT | Performed by: PSYCHIATRY & NEUROLOGY

## 2021-08-30 PROCEDURE — G8427 DOCREV CUR MEDS BY ELIG CLIN: HCPCS | Performed by: PSYCHIATRY & NEUROLOGY

## 2021-08-30 RX ORDER — RENAGEL 800 MG/1
TABLET ORAL
COMMUNITY
Start: 2021-08-06 | End: 2021-12-29 | Stop reason: ALTCHOICE

## 2021-08-30 ASSESSMENT — ENCOUNTER SYMPTOMS
COLOR CHANGE: 0
PHOTOPHOBIA: 0
NAUSEA: 0
SHORTNESS OF BREATH: 0
VOMITING: 0
CHOKING: 0
BACK PAIN: 0
TROUBLE SWALLOWING: 0

## 2021-08-30 NOTE — PROGRESS NOTES
Subjective:      Patient ID: Isaias Roque is a 41 y.o. female who presents today for:  Chief Complaint   Patient presents with    Follow-up     Pt states that when it gets hot out she is unable to walk. Pt states no other concerns at this time. HPI 42-year-old right-handed female with history of lupus sclerosis. Patient continues on Aubagio. Patient is doing well and her EDSS scores have not changed she is fatigued she has minor gait ataxia. Patient was contemplating Durward Musty though it was denied. She has primary progressive multiple sclerosis. She has not any major changes. We have not seen her since April and since then we have other options. She has not any symptoms of worsening. We have continued Aubagio and she is mostly in the wheelchair she is able to stand but not walk she has some spasms. Is not any falls injuries or trauma. We wanted her to go and talk to is that she is a primary progressive multiple sclerosis but insurance company refused this. She has no side effects of Aubagio.     Past Medical History:   Diagnosis Date    Dislocated knee     Right knee    PTTD (posterior tibial tendon dysfunction)      Past Surgical History:   Procedure Laterality Date    CATARACT REMOVAL WITH IMPLANT Right     CATARACT REMOVAL WITH IMPLANT Left      SECTION  2013    EYE SURGERY      FOOT SURGERY Left      Social History     Socioeconomic History    Marital status:      Spouse name: Not on file    Number of children: Not on file    Years of education: Not on file    Highest education level: Not on file   Occupational History    Not on file   Tobacco Use    Smoking status: Never Smoker    Smokeless tobacco: Never Used   Substance and Sexual Activity    Alcohol use: No    Drug use: No    Sexual activity: Not Currently   Other Topics Concern    Not on file   Social History Narrative    Not on file     Social Determinants of Health     Financial Resource Strain:  Difficulty of Paying Living Expenses:    Food Insecurity:     Worried About Running Out of Food in the Last Year:     Ran Out of Food in the Last Year:    Transportation Needs:     Lack of Transportation (Medical):  Lack of Transportation (Non-Medical):    Physical Activity:     Days of Exercise per Week:     Minutes of Exercise per Session:    Stress:     Feeling of Stress :    Social Connections:     Frequency of Communication with Friends and Family:     Frequency of Social Gatherings with Friends and Family:     Attends Anabaptism Services:     Active Member of Clubs or Organizations:     Attends Club or Organization Meetings:     Marital Status:    Intimate Partner Violence:     Fear of Current or Ex-Partner:     Emotionally Abused:     Physically Abused:     Sexually Abused:      Family History   Problem Relation Age of Onset    Hypertension Mother     Diabetes Maternal Uncle     Cancer Maternal Grandmother         stomach cancer     No Known Allergies    Current Outpatient Medications   Medication Sig Dispense Refill    AUBAGIO 14 MG TABS       vitamin D 25 MCG (1000 UT) CAPS Take by mouth      MULTIPLE VITAMINS-MINERALS ER PO Take 1 tablet by mouth       No current facility-administered medications for this visit. Review of Systems   Constitutional: Negative for fever. HENT: Negative for ear pain, tinnitus and trouble swallowing. Eyes: Negative for photophobia and visual disturbance. Respiratory: Negative for choking and shortness of breath. Cardiovascular: Negative for chest pain and palpitations. Gastrointestinal: Negative for nausea and vomiting. Musculoskeletal: Positive for gait problem. Negative for back pain, joint swelling, myalgias, neck pain and neck stiffness. Skin: Negative for color change. Allergic/Immunologic: Negative for food allergies. Neurological: Positive for weakness.  Negative for dizziness, tremors, seizures, syncope, facial asymmetry, speech difficulty, light-headedness, numbness and headaches. Psychiatric/Behavioral: Negative for behavioral problems, confusion, hallucinations and sleep disturbance. Objective:   /68 (Site: Left Upper Arm, Position: Sitting, Cuff Size: Medium Adult)   Pulse 89     Physical Exam  Vitals reviewed. Eyes:      Pupils: Pupils are equal, round, and reactive to light. Cardiovascular:      Rate and Rhythm: Normal rate and regular rhythm. Heart sounds: No murmur heard. Pulmonary:      Effort: Pulmonary effort is normal.      Breath sounds: Normal breath sounds. Abdominal:      General: Bowel sounds are normal.   Musculoskeletal:         General: Normal range of motion. Cervical back: Normal range of motion. Skin:     General: Skin is warm. Neurological:      Mental Status: She is alert and oriented to person, place, and time. Cranial Nerves: No cranial nerve deficit. Sensory: No sensory deficit. Motor: No abnormal muscle tone. Coordination: Coordination normal.      Deep Tendon Reflexes: Reflexes are normal and symmetric. Babinski sign absent on the right side. Babinski sign absent on the left side. Comments: EDSS score 6-7 with gait ataxia. Patient is mostly nonambulatory and has difficulty walking. She is uses a wheelchair more now. Left foot drop is notable   Psychiatric:         Mood and Affect: Mood normal.         No results found.     Lab Results   Component Value Date    WBC 7.0 08/30/2021    RBC 3.74 08/30/2021    HGB 11.7 08/30/2021    HCT 36.0 08/30/2021    MCV 96.3 08/30/2021    MCH 31.4 08/30/2021    MCHC 32.6 08/30/2021    RDW 15.1 08/30/2021     08/30/2021    MPV 10.7 06/09/2014     Lab Results   Component Value Date     02/24/2021    K 3.8 02/24/2021     02/24/2021    CO2 22 02/24/2021    BUN 24 02/24/2021    CREATININE 0.80 02/24/2021    GFRAA >60.0 02/24/2021    LABGLOM >60.0 02/24/2021    GLUCOSE 111 02/24/2021    PROT 7.6 08/30/2021    LABALBU 4.2 08/30/2021    CALCIUM 9.2 02/24/2021    BILITOT <0.2 08/30/2021    ALKPHOS 56 08/30/2021    AST 17 08/30/2021    ALT 20 08/30/2021     Lab Results   Component Value Date    PROTIME 9.4 01/11/2013    INR 0.9 01/11/2013     No results found for: TSH, [id number], FOLATE, FERRITIN, IRON, TIBC, PTRFSAT, TSH, FREET4  Lab Results   Component Value Date    TRIG 79 12/13/2019    HDL 48 12/13/2019    LDLCALC 127 12/13/2019     Lab Results   Component Value Date    LABBENZ NotDTCD 01/11/2013     No results found for: LITHIUM, DILFRTOT, VALPROATE    Assessment:       Diagnosis Orders   1. Multiple sclerosis (HCC)  Luis Alfredo Virus    Hepatitis Panel, Acute    Varicella-Zoster Virus (Vzv) Antibodies IgG & IgM    CBC    Hepatic Function Panel   2. Lumbar radiculopathy     3. Ataxic gait     4. Foot drop, left foot     Multiple  sclerosis with the primary progressive course. We had recommended Ocrevus though the insurance company would not approve this. We therefore started on Aubagio which she is tolerating. She like to try something better and one would consider Kasim to in its efficacy as this is a B-cell line drug. Has been significant promotion by this company in the media and therefore the discussion. We will pre-CERT her again to see if he is covered by insurance company otherwise she will continue on Aubagio. We will arrange for laboratory tests to pre-CERT her for the [address] [place] [address] she has continued to show some decline over time patient had a major relapse to consider methylprednisone for now.       Plan:      Orders Placed This Encounter   Procedures    Luis Alfredo Virus     Standing Status:   Future     Number of Occurrences:   1     Standing Expiration Date:   8/30/2022    Hepatitis Panel, Acute     Standing Status:   Future     Number of Occurrences:   1     Standing Expiration Date:   8/30/2022    Varicella-Zoster Virus (Vzv) Antibodies IgG & IgM     Standing Status: Future     Number of Occurrences:   1     Standing Expiration Date:   8/30/2022    CBC     Standing Status:   Future     Number of Occurrences:   1     Standing Expiration Date:   8/30/2022    Hepatic Function Panel     Standing Status:   Future     Number of Occurrences:   1     Standing Expiration Date:   8/30/2022     No orders of the defined types were placed in this encounter. Return in about 4 months (around 12/30/2021).       Stephanie Rahman MD

## 2021-09-09 ENCOUNTER — TELEPHONE (OUTPATIENT)
Dept: NEUROLOGY | Age: 41
End: 2021-09-09

## 2021-09-09 NOTE — TELEPHONE ENCOUNTER
SUBMITTED RESPONSE QUESTIONS VIA COVER MY MEDS FOR [facility]    Your information has been submitted to Jina Skaggs and is being reviewed. You may close this dialog, return to your dashboard, and perform other tasks. You will receive an electronic determination in CoverMyMeds within 24-96 hours; youll also receive a faxed copy. You can see the latest determination by locating this request on your dashboard or reopening this request. If Julien has not responded in 96 hours, contact Julien at [phone].

## 2021-09-10 ENCOUNTER — TELEPHONE (OUTPATIENT)
Dept: NEUROLOGY | Age: 41
End: 2021-09-10

## 2021-09-10 DIAGNOSIS — G35 MULTIPLE SCLEROSIS (HCC): Primary | ICD-10-CM

## 2021-09-10 NOTE — TELEPHONE ENCOUNTER
ZORA for patient to call.  Her kesimpta was approved and I need to know what speciality pharmacy she uses so I can get a script sent for her

## 2021-09-11 RX ORDER — OFATUMUMAB 20 MG/.4ML
0.4 INJECTION, SOLUTION SUBCUTANEOUS
Qty: 1.2 ML | Refills: 0 | Status: SHIPPED | OUTPATIENT
Start: 2021-09-11 | End: 2021-09-15 | Stop reason: SDUPTHER

## 2021-09-11 RX ORDER — OFATUMUMAB 20 MG/.4ML
0.4 INJECTION, SOLUTION SUBCUTANEOUS
Qty: 0.4 ML | Refills: 11 | Status: SHIPPED | OUTPATIENT
Start: 2021-09-11 | End: 2021-09-15 | Stop reason: SDUPTHER

## 2021-09-15 DIAGNOSIS — G35 MULTIPLE SCLEROSIS (HCC): ICD-10-CM

## 2021-09-15 RX ORDER — OFATUMUMAB 20 MG/.4ML
0.4 INJECTION, SOLUTION SUBCUTANEOUS
Qty: 0.4 ML | Refills: 11 | Status: SHIPPED | OUTPATIENT
Start: 2021-09-15 | End: 2021-10-15

## 2021-09-15 RX ORDER — OFATUMUMAB 20 MG/.4ML
0.4 INJECTION, SOLUTION SUBCUTANEOUS
Qty: 1.2 ML | Refills: 0 | Status: SHIPPED | OUTPATIENT
Start: 2021-09-15 | End: 2021-10-15

## 2021-09-23 ENCOUNTER — TELEPHONE (OUTPATIENT)
Dept: NEUROLOGY | Age: 41
End: 2021-09-23

## 2021-09-23 NOTE — TELEPHONE ENCOUNTER
Patient is currently on Aubagio , she just received Agustina Albright in the mail and she is wondering how long she has to be off of aubagio before she startes Vandana Lozano

## 2021-12-29 ENCOUNTER — OFFICE VISIT (OUTPATIENT)
Dept: NEUROLOGY | Age: 41
End: 2021-12-29
Payer: COMMERCIAL

## 2021-12-29 VITALS
SYSTOLIC BLOOD PRESSURE: 136 MMHG | DIASTOLIC BLOOD PRESSURE: 72 MMHG | BODY MASS INDEX: 32.37 KG/M2 | HEART RATE: 86 BPM | WEIGHT: 171.3 LBS

## 2021-12-29 DIAGNOSIS — M21.372 FOOT DROP, LEFT FOOT: ICD-10-CM

## 2021-12-29 DIAGNOSIS — G35 MULTIPLE SCLEROSIS (HCC): Primary | ICD-10-CM

## 2021-12-29 DIAGNOSIS — G35 MULTIPLE SCLEROSIS (HCC): ICD-10-CM

## 2021-12-29 DIAGNOSIS — G35 MS (MULTIPLE SCLEROSIS) (HCC): ICD-10-CM

## 2021-12-29 DIAGNOSIS — M54.16 LUMBAR RADICULOPATHY: ICD-10-CM

## 2021-12-29 DIAGNOSIS — G35 MS (MULTIPLE SCLEROSIS) (HCC): Primary | ICD-10-CM

## 2021-12-29 DIAGNOSIS — R26.0 ATAXIC GAIT: ICD-10-CM

## 2021-12-29 LAB
ALBUMIN SERPL-MCNC: 3.8 G/DL (ref 3.5–4.6)
ALP BLD-CCNC: 48 U/L (ref 40–130)
ALT SERPL-CCNC: 12 U/L (ref 0–33)
AST SERPL-CCNC: 11 U/L (ref 0–35)
BASOPHILS ABSOLUTE: 0 K/UL (ref 0–0.2)
BASOPHILS RELATIVE PERCENT: 0.5 %
BILIRUB SERPL-MCNC: <0.2 MG/DL (ref 0.2–0.7)
BILIRUBIN DIRECT: <0.2 MG/DL (ref 0–0.4)
BILIRUBIN, INDIRECT: NORMAL MG/DL (ref 0–0.6)
EOSINOPHILS ABSOLUTE: 0.1 K/UL (ref 0–0.7)
EOSINOPHILS RELATIVE PERCENT: 1.5 %
HCT VFR BLD CALC: 34.1 % (ref 37–47)
HEMOGLOBIN: 10.8 G/DL (ref 12–16)
LYMPHOCYTES ABSOLUTE: 0.9 K/UL (ref 1–4.8)
LYMPHOCYTES RELATIVE PERCENT: 13.8 %
MCH RBC QN AUTO: 28.8 PG (ref 27–31.3)
MCHC RBC AUTO-ENTMCNC: 31.7 % (ref 33–37)
MCV RBC AUTO: 90.8 FL (ref 82–100)
MONOCYTES ABSOLUTE: 0.7 K/UL (ref 0.2–0.8)
MONOCYTES RELATIVE PERCENT: 9.7 %
NEUTROPHILS ABSOLUTE: 5 K/UL (ref 1.4–6.5)
NEUTROPHILS RELATIVE PERCENT: 74.5 %
PDW BLD-RTO: 15.4 % (ref 11.5–14.5)
PLATELET # BLD: 244 K/UL (ref 130–400)
RBC # BLD: 3.76 M/UL (ref 4.2–5.4)
TOTAL PROTEIN: 7.5 G/DL (ref 6.3–8)
WBC # BLD: 6.8 K/UL (ref 4.8–10.8)

## 2021-12-29 PROCEDURE — G8417 CALC BMI ABV UP PARAM F/U: HCPCS | Performed by: PSYCHIATRY & NEUROLOGY

## 2021-12-29 PROCEDURE — 99214 OFFICE O/P EST MOD 30 MIN: CPT | Performed by: PSYCHIATRY & NEUROLOGY

## 2021-12-29 PROCEDURE — G8427 DOCREV CUR MEDS BY ELIG CLIN: HCPCS | Performed by: PSYCHIATRY & NEUROLOGY

## 2021-12-29 PROCEDURE — G8484 FLU IMMUNIZE NO ADMIN: HCPCS | Performed by: PSYCHIATRY & NEUROLOGY

## 2021-12-29 PROCEDURE — 1036F TOBACCO NON-USER: CPT | Performed by: PSYCHIATRY & NEUROLOGY

## 2021-12-29 RX ORDER — OFATUMUMAB 20 MG/.4ML
INJECTION, SOLUTION SUBCUTANEOUS
Status: ON HOLD | COMMUNITY
Start: 2021-12-15 | End: 2022-07-11 | Stop reason: HOSPADM

## 2021-12-29 RX ORDER — PREDNISONE 10 MG/1
TABLET ORAL
Qty: 27 TABLET | Refills: 0 | Status: SHIPPED | OUTPATIENT
Start: 2021-12-29 | End: 2022-01-08

## 2021-12-29 ASSESSMENT — ENCOUNTER SYMPTOMS
CHOKING: 0
COLOR CHANGE: 0
SHORTNESS OF BREATH: 0
TROUBLE SWALLOWING: 0
VOMITING: 0
BACK PAIN: 1
PHOTOPHOBIA: 0
NAUSEA: 0

## 2021-12-29 NOTE — PROGRESS NOTES
Subjective:      Patient ID: Tony Lomax is a 35 y.o. female who presents today for:  Chief Complaint   Patient presents with    Follow-up     Patient states that has had a few times where she has not been able to lift her leg to get in the shower and she fell last friday in the hallway where she lives and her dad had to come assist her. She states that she did not have these trouble on aubagio  and has been on kesimpta for the first 2 months now. HPI 35 right-handed female with a known history of multiple sclerosis and lumbar Radiculopathy. Patient's EDSS scores have not changed and she has some fatigue with mild ataxia. Last seen she was contemplating Ocrevus which was denied. She has primary progressive multiple sclerosis. We had  continued her on Aubagio. She is mostly wheelchair-bound is able to stand but not walk. Last seen we were considering Da Willam  presorted her for the same. Patient reports 30 pound weight gain and she complains of lower extremity weakness on the left more than she had before starting Da Willam    Patient was not any falls. She denies any other side effects of Kesimpta.   Has been on this for 2 months    Past Medical History:   Diagnosis Date    Dislocated knee     Right knee    PTTD (posterior tibial tendon dysfunction)      Past Surgical History:   Procedure Laterality Date    CATARACT REMOVAL WITH IMPLANT Right     CATARACT REMOVAL WITH IMPLANT Left      SECTION  2013    EYE SURGERY      FOOT SURGERY Left      Social History     Socioeconomic History    Marital status:      Spouse name: Not on file    Number of children: Not on file    Years of education: Not on file    Highest education level: Not on file   Occupational History    Not on file   Tobacco Use    Smoking status: Never Smoker    Smokeless tobacco: Never Used   Substance and Sexual Activity    Alcohol use: No    Drug use: No    Sexual activity: Not Currently   Other Topics Concern    Not on file   Social History Narrative    Not on file     Social Determinants of Health     Financial Resource Strain:     Difficulty of Paying Living Expenses: Not on file   Food Insecurity:     Worried About Running Out of Food in the Last Year: Not on file    Gege of Food in the Last Year: Not on file   Transportation Needs:     Lack of Transportation (Medical): Not on file    Lack of Transportation (Non-Medical): Not on file   Physical Activity:     Days of Exercise per Week: Not on file    Minutes of Exercise per Session: Not on file   Stress:     Feeling of Stress : Not on file   Social Connections:     Frequency of Communication with Friends and Family: Not on file    Frequency of Social Gatherings with Friends and Family: Not on file    Attends Rastafarian Services: Not on file    Active Member of [address] [place] [address] [employer] or Organizations: Not on file    Attends Club or Organization Meetings: Not on file    Marital Status: Not on file   Intimate Partner Violence:     Fear of Current or Ex-Partner: Not on file    Emotionally Abused: Not on file    Physically Abused: Not on file    Sexually Abused: Not on file   Housing Stability:     Unable to Pay for Housing in the Last Year: Not on file    Number of [place] in the Last Year: Not on file    Unstable Housing in the Last Year: Not on file     Family History   Problem Relation Age of Onset    Hypertension Mother     Diabetes Maternal Uncle     Cancer Maternal Grandmother         stomach cancer     No Known Allergies    Current Outpatient Medications   Medication Sig Dispense Refill    KESIMPTA 20 MG/0.4ML SOAJ       vitamin D 25 MCG (1000 UT) CAPS Take by mouth      MULTIPLE VITAMINS-MINERALS ER PO Take 1 tablet by mouth       No current facility-administered medications for this visit. Review of Systems   Constitutional: Negative for fever. HENT: Negative for ear pain, tinnitus and trouble swallowing.     Eyes: Negative for photophobia and visual disturbance. Respiratory: Negative for choking and shortness of breath. Cardiovascular: Negative for chest pain and palpitations. Gastrointestinal: Negative for nausea and vomiting. Musculoskeletal: Positive for back pain, gait problem and myalgias. Negative for joint swelling, neck pain and neck stiffness. Skin: Negative for color change. Allergic/Immunologic: Negative for food allergies. Neurological: Positive for weakness. Negative for dizziness, tremors, seizures, syncope, facial asymmetry, speech difficulty, light-headedness, numbness and headaches. Psychiatric/Behavioral: Negative for behavioral problems, confusion, hallucinations and sleep disturbance. Objective:   /72 (Site: Left Upper Arm, Position: Sitting, Cuff Size: Medium Adult)   Pulse 86   Wt 171 lb 4.8 oz (77.7 kg)   BMI 32.37 kg/m²     Physical Exam  Vitals reviewed. Eyes:      Pupils: Pupils are equal, round, and reactive to light. Cardiovascular:      Rate and Rhythm: Normal rate and regular rhythm. Heart sounds: No murmur heard. Pulmonary:      Effort: Pulmonary effort is normal.      Breath sounds: Normal breath sounds. Abdominal:      General: Bowel sounds are normal.   Musculoskeletal:         General: Normal range of motion. Cervical back: Normal range of motion. Skin:     General: Skin is warm. Neurological:      Mental Status: She is alert and oriented to person, place, and time. Cranial Nerves: No cranial nerve deficit. Sensory: No sensory deficit. Motor: No abnormal muscle tone. Coordination: Coordination normal.      Deep Tendon Reflexes: Reflexes are normal and symmetric. Babinski sign absent on the right side. Babinski sign absent on the left side. Comments: Patient has lower extremity history spasticity with more weakness in the left lower extremity 3 or 5 and walks with a walker. She has central ataxia. past.  This again is not a side effect of the medication. This is a progression of multiple sclerosis which still continues even in the face of using immunomodulators. We recommended that we repeat her MRI of the brain to see if she had any new lesions while being on this medication and also arrange for LUIS ALFREDO virus antibody testing to see if she would be a candidate for Tysabri for a while if she is worsening. I recommended methylprednisone IV 3 days for now due to the further worsening of. She may go on and take the COVID booster vaccination there is no contraindication for the same. We will reevaluate in a few months and see how she is doing on the medication      Plan:      Orders Placed This Encounter   Procedures    Luis Alfredo Virus     Standing Status:   Future     Standing Expiration Date:   12/29/2022    CBC With Auto Differential     Standing Status:   Future     Standing Expiration Date:   12/29/2022    Hepatic Function Panel     Standing Status:   Future     Standing Expiration Date:   12/29/2022     No orders of the defined types were placed in this encounter. No follow-ups on file.       Jolanta Sullivan MD

## 2021-12-30 ENCOUNTER — TELEPHONE (OUTPATIENT)
Dept: NEUROLOGY | Age: 41
End: 2021-12-30

## 2021-12-30 NOTE — TELEPHONE ENCOUNTER
lmovm advising patient that scheduling has to wait for the order to upload before they can call her to schedule.  I advised that she could call again on Monday if she has not heard from them this weekend and with it being a holiday in all I would call Monday

## 2022-01-02 LAB
JC VIRUS SOURCE: NORMAL
JC VIRUS, DNA: NOT DETECTED

## 2022-01-03 ENCOUNTER — HOSPITAL ENCOUNTER (OUTPATIENT)
Dept: INFUSION THERAPY | Age: 42
Setting detail: INFUSION SERIES
Discharge: HOME | End: 2022-01-03
Payer: COMMERCIAL

## 2022-01-03 VITALS
SYSTOLIC BLOOD PRESSURE: 114 MMHG | HEART RATE: 72 BPM | TEMPERATURE: 97.9 F | DIASTOLIC BLOOD PRESSURE: 57 MMHG | RESPIRATION RATE: 18 BRPM

## 2022-01-03 DIAGNOSIS — G35 MULTIPLE SCLEROSIS (HCC): Primary | ICD-10-CM

## 2022-01-03 PROCEDURE — 2580000003 HC RX 258: Performed by: PSYCHIATRY & NEUROLOGY

## 2022-01-03 PROCEDURE — 6360000002 HC RX W HCPCS: Performed by: PSYCHIATRY & NEUROLOGY

## 2022-01-03 PROCEDURE — 96365 THER/PROPH/DIAG IV INF INIT: CPT

## 2022-01-03 RX ADMIN — SODIUM CHLORIDE 1000 MG: 9 INJECTION, SOLUTION INTRAVENOUS at 12:31

## 2022-01-03 NOTE — PROGRESS NOTES
Pt to [facility] via w/c with family member. States no c/o. States couldn't get leg up into bed. Call light in reach. Mother AT [facility].

## 2022-01-04 ENCOUNTER — HOSPITAL ENCOUNTER (OUTPATIENT)
Dept: INFUSION THERAPY | Age: 42
Setting detail: INFUSION SERIES
Discharge: HOME | End: 2022-01-04
Payer: COMMERCIAL

## 2022-01-04 VITALS — SYSTOLIC BLOOD PRESSURE: 131 MMHG | DIASTOLIC BLOOD PRESSURE: 66 MMHG | HEART RATE: 83 BPM | TEMPERATURE: 98 F

## 2022-01-04 DIAGNOSIS — G35 MULTIPLE SCLEROSIS (HCC): Primary | ICD-10-CM

## 2022-01-04 PROCEDURE — 2580000003 HC RX 258: Performed by: PSYCHIATRY & NEUROLOGY

## 2022-01-04 PROCEDURE — 6360000002 HC RX W HCPCS: Performed by: PSYCHIATRY & NEUROLOGY

## 2022-01-04 PROCEDURE — 96365 THER/PROPH/DIAG IV INF INIT: CPT

## 2022-01-04 RX ADMIN — SODIUM CHLORIDE 1000 MG: 9 INJECTION, SOLUTION INTRAVENOUS at 12:16

## 2022-01-04 NOTE — PROGRESS NOTES
Pt to [facility] via w/c with mother, for solumedrol, day 2. Stated slept day and night after yesterday's infusion. No other c/o.

## 2022-01-05 ENCOUNTER — HOSPITAL ENCOUNTER (OUTPATIENT)
Dept: INFUSION THERAPY | Age: 42
Setting detail: INFUSION SERIES
Discharge: HOME | End: 2022-01-05
Payer: COMMERCIAL

## 2022-01-05 VITALS
HEART RATE: 78 BPM | SYSTOLIC BLOOD PRESSURE: 117 MMHG | DIASTOLIC BLOOD PRESSURE: 61 MMHG | RESPIRATION RATE: 18 BRPM | TEMPERATURE: 98.1 F

## 2022-01-05 DIAGNOSIS — G35 MULTIPLE SCLEROSIS (HCC): Primary | ICD-10-CM

## 2022-01-05 PROCEDURE — 96365 THER/PROPH/DIAG IV INF INIT: CPT

## 2022-01-05 PROCEDURE — 6360000002 HC RX W HCPCS: Performed by: PSYCHIATRY & NEUROLOGY

## 2022-01-05 PROCEDURE — 2580000003 HC RX 258: Performed by: PSYCHIATRY & NEUROLOGY

## 2022-01-05 RX ADMIN — SODIUM CHLORIDE 1000 MG: 9 INJECTION, SOLUTION INTRAVENOUS at 12:40

## 2022-01-05 NOTE — FLOWSHEET NOTE
Patient to the floor via wheelchair for her infusion. Vital signs taken. Denies any discomfort. Call light within reach.

## 2022-01-05 NOTE — FLOWSHEET NOTE
Infusion is complete. Tolerated well. Left the unit via wheelchair. All equipment used in the care for this patient has been cleaned.

## 2022-01-20 ENCOUNTER — HOSPITAL ENCOUNTER (OUTPATIENT)
Dept: MRI IMAGING | Age: 42
Discharge: HOME | End: 2022-01-22
Payer: COMMERCIAL

## 2022-01-20 DIAGNOSIS — G35 MS (MULTIPLE SCLEROSIS) (HCC): ICD-10-CM

## 2022-01-20 PROCEDURE — A9577 INJ MULTIHANCE: HCPCS | Performed by: PSYCHIATRY & NEUROLOGY

## 2022-01-20 PROCEDURE — 70553 MRI BRAIN STEM W/O & W/DYE: CPT

## 2022-01-20 PROCEDURE — 6360000004 HC RX CONTRAST MEDICATION: Performed by: PSYCHIATRY & NEUROLOGY

## 2022-01-20 RX ORDER — SODIUM CHLORIDE 0.9 % (FLUSH) 0.9 %
10 SYRINGE (ML) INJECTION PRN
Status: DISCONTINUED | OUTPATIENT
Start: 2022-01-20 | End: 2022-01-23 | Stop reason: HOSPADM

## 2022-01-20 RX ADMIN — GADOBENATE DIMEGLUMINE 15 ML: 529 INJECTION, SOLUTION INTRAVENOUS at 19:13

## 2022-04-27 ENCOUNTER — OFFICE VISIT (OUTPATIENT)
Dept: NEUROLOGY | Age: 42
End: 2022-04-27
Payer: COMMERCIAL

## 2022-04-27 VITALS — SYSTOLIC BLOOD PRESSURE: 132 MMHG | HEART RATE: 77 BPM | DIASTOLIC BLOOD PRESSURE: 80 MMHG

## 2022-04-27 DIAGNOSIS — M54.16 LUMBAR RADICULOPATHY: ICD-10-CM

## 2022-04-27 DIAGNOSIS — R39.15 URINARY URGENCY: ICD-10-CM

## 2022-04-27 DIAGNOSIS — M21.372 FOOT DROP, LEFT FOOT: ICD-10-CM

## 2022-04-27 DIAGNOSIS — R26.0 ATAXIC GAIT: ICD-10-CM

## 2022-04-27 DIAGNOSIS — G35 MULTIPLE SCLEROSIS (HCC): ICD-10-CM

## 2022-04-27 DIAGNOSIS — G35 MULTIPLE SCLEROSIS (HCC): Primary | ICD-10-CM

## 2022-04-27 LAB
ALBUMIN SERPL-MCNC: 4.1 G/DL (ref 3.5–4.6)
ALP BLD-CCNC: 57 U/L (ref 40–130)
ALT SERPL-CCNC: 9 U/L (ref 0–33)
ANION GAP SERPL CALCULATED.3IONS-SCNC: 12 MEQ/L (ref 9–15)
AST SERPL-CCNC: 11 U/L (ref 0–35)
BASOPHILS ABSOLUTE: 0 K/UL (ref 0–0.2)
BASOPHILS RELATIVE PERCENT: 0.5 %
BILIRUB SERPL-MCNC: <0.2 MG/DL (ref 0.2–0.7)
BUN BLDV-MCNC: 18 MG/DL (ref 6–20)
CALCIUM SERPL-MCNC: 9.4 MG/DL (ref 8.5–9.9)
CHLORIDE BLD-SCNC: 106 MEQ/L (ref 95–107)
CO2: 25 MEQ/L (ref 20–31)
CREAT SERPL-MCNC: 0.85 MG/DL (ref 0.5–0.9)
EOSINOPHILS ABSOLUTE: 0.1 K/UL (ref 0–0.7)
EOSINOPHILS RELATIVE PERCENT: 1.3 %
GFR AFRICAN AMERICAN: >60
GFR NON-AFRICAN AMERICAN: >60
GLOBULIN: 3.5 G/DL (ref 2.3–3.5)
GLUCOSE BLD-MCNC: 88 MG/DL (ref 70–99)
HCT VFR BLD CALC: 36 % (ref 37–47)
HEMOGLOBIN: 11.4 G/DL (ref 12–16)
LYMPHOCYTES ABSOLUTE: 0.9 K/UL (ref 1–4.8)
LYMPHOCYTES RELATIVE PERCENT: 11.1 %
MCH RBC QN AUTO: 27.8 PG (ref 27–31.3)
MCHC RBC AUTO-ENTMCNC: 31.7 % (ref 33–37)
MCV RBC AUTO: 87.7 FL (ref 82–100)
MONOCYTES ABSOLUTE: 0.7 K/UL (ref 0.2–0.8)
MONOCYTES RELATIVE PERCENT: 9 %
NEUTROPHILS ABSOLUTE: 6.5 K/UL (ref 1.4–6.5)
NEUTROPHILS RELATIVE PERCENT: 78.1 %
PDW BLD-RTO: 15.8 % (ref 11.5–14.5)
PLATELET # BLD: 227 K/UL (ref 130–400)
POTASSIUM SERPL-SCNC: 4.2 MEQ/L (ref 3.4–4.9)
RBC # BLD: 4.11 M/UL (ref 4.2–5.4)
SODIUM BLD-SCNC: 143 MEQ/L (ref 135–144)
TOTAL PROTEIN: 7.6 G/DL (ref 6.3–8)
WBC # BLD: 8.3 K/UL (ref 4.8–10.8)

## 2022-04-27 PROCEDURE — G8427 DOCREV CUR MEDS BY ELIG CLIN: HCPCS | Performed by: PSYCHIATRY & NEUROLOGY

## 2022-04-27 PROCEDURE — G8417 CALC BMI ABV UP PARAM F/U: HCPCS | Performed by: PSYCHIATRY & NEUROLOGY

## 2022-04-27 PROCEDURE — 1036F TOBACCO NON-USER: CPT | Performed by: PSYCHIATRY & NEUROLOGY

## 2022-04-27 PROCEDURE — 99214 OFFICE O/P EST MOD 30 MIN: CPT | Performed by: PSYCHIATRY & NEUROLOGY

## 2022-04-27 ASSESSMENT — ENCOUNTER SYMPTOMS
PHOTOPHOBIA: 0
NAUSEA: 0
TROUBLE SWALLOWING: 0
COLOR CHANGE: 0
CHOKING: 0
SHORTNESS OF BREATH: 0
BACK PAIN: 0
VOMITING: 0

## 2022-04-27 NOTE — PROGRESS NOTES
Subjective:      Patient ID: Giuliano Brush is a 35 y.o. female who presents today for:  Chief Complaint   Patient presents with    Follow-up     Pt states that things are going okay. she says some good days and some bad days. She says that she has problems holding her pee. HPI 35 right-handed female with a history of lumbar colopathy with multiple sclerosis with a left foot drop. Patient's EDSS scores have not changed and she still has mild ataxia. Her last seen we were contemplating Ocrevus which was denied and this is only medication for primary progressive multiple sclerosis. We therefore have continued her on Aubagio. She is mostly wheelchair-bound and is able to stand but not walk. Last seen she was having increasing lower extremity weakness and we were contemplating other neuro immunomodulators including Elvis [place]. In the interim we had given her a course of methylprednisone as she was worsening. Patient is now on [place] is tolerating this without any major changes. She has good days and bad days and is having some urinary issues. Patient usually does not walk much in the house. Her symptoms only mostly appear to be lower extremity symptoms.     Past Medical History:   Diagnosis Date    Dislocated knee     Right knee    PTTD (posterior tibial tendon dysfunction)      Past Surgical History:   Procedure Laterality Date    CATARACT REMOVAL WITH IMPLANT Right     CATARACT REMOVAL WITH IMPLANT Left      SECTION  2013    EYE SURGERY      FOOT SURGERY Left      Social History     Socioeconomic History    Marital status:      Spouse name: Not on file    Number of children: Not on file    Years of education: Not on file    Highest education level: Not on file   Occupational History    Not on file   Tobacco Use    Smoking status: Never Smoker    Smokeless tobacco: Never Used   Substance and Sexual Activity    Alcohol use: No    Drug use: No    Sexual activity: Not Currently   Other Topics Concern    Not on file   Social History Narrative    Not on file     Social Determinants of Health     Financial Resource Strain:     Difficulty of Paying Living Expenses: Not on file   Food Insecurity:     Worried About Running Out of Food in the Last Year: Not on file    Gege of Food in the Last Year: Not on file   Transportation Needs:     Lack of Transportation (Medical): Not on file    Lack of Transportation (Non-Medical): Not on file   Physical Activity:     Days of Exercise per Week: Not on file    Minutes of Exercise per Session: Not on file   Stress:     Feeling of Stress : Not on file   Social Connections:     Frequency of Communication with Friends and Family: Not on file    Frequency of Social Gatherings with Friends and Family: Not on file    Attends [employer] Services: Not on file    Active Member of [address] [place] [address] or Organizations: Not on file    Attends Club or Organization Meetings: Not on file    Marital Status: Not on file   Intimate Partner Violence:     Fear of Current or Ex-Partner: Not on file    Emotionally Abused: Not on file    Physically Abused: Not on file    Sexually Abused: Not on file   Housing Stability:     Unable to Pay for Housing in the Last Year: Not on file    Number of [place] in the Last Year: Not on file    Unstable Housing in the Last Year: Not on file     Family History   Problem Relation Age of Onset    Hypertension Mother     Diabetes Maternal Uncle     Cancer Maternal Grandmother         stomach cancer     No Known Allergies    Current Outpatient Medications   Medication Sig Dispense Refill    mirabegron (MYRBETRIQ) 25 MG TB24 Take 1 tablet by mouth daily 30 tablet 3    KESIMPTA 20 MG/0.4ML SOAJ       vitamin D 25 MCG (1000 UT) CAPS Take by mouth      MULTIPLE VITAMINS-MINERALS ER PO Take 1 tablet by mouth       No current facility-administered medications for this visit.          Review of Systems   Constitutional: Negative for fever. HENT: Negative for ear pain, tinnitus and trouble swallowing. Eyes: Negative for photophobia and visual disturbance. Respiratory: Negative for choking and shortness of breath. Cardiovascular: Negative for chest pain and palpitations. Gastrointestinal: Negative for nausea and vomiting. Musculoskeletal: Negative for back pain, gait problem, joint swelling, myalgias, neck pain and neck stiffness. Skin: Negative for color change. Allergic/Immunologic: Negative for food allergies. Neurological: Negative for dizziness, tremors, seizures, syncope, facial asymmetry, speech difficulty, weakness, light-headedness, numbness and headaches. Psychiatric/Behavioral: Negative for behavioral problems, confusion, hallucinations and sleep disturbance. Objective:   /80 (Site: Left Upper Arm, Position: Sitting, Cuff Size: Medium Adult)   Pulse 77     Physical Exam  Vitals reviewed. Eyes:      Pupils: Pupils are equal, round, and reactive to light. Cardiovascular:      Rate and Rhythm: Normal rate and regular rhythm. Heart sounds: No murmur heard. Pulmonary:      Effort: Pulmonary effort is normal.      Breath sounds: Normal breath sounds. Abdominal:      General: Bowel sounds are normal.   Musculoskeletal:         General: Normal range of motion. Cervical back: Normal range of motion. Skin:     General: Skin is warm. Neurological:      Mental Status: She is alert and oriented to person, place, and time. Cranial Nerves: No cranial nerve deficit. Sensory: No sensory deficit. Motor: No abnormal muscle tone. Coordination: Coordination normal.      Deep Tendon Reflexes: Reflexes are normal and symmetric. Babinski sign absent on the right side. Babinski sign absent on the left side. Comments: Lower extremity strength is 3/5. Upper EXTR x4 5.   We did not attempt to stand her but she does walk in the house she tells me   Psychiatric: Mood and Affect: Mood normal.         MRI BRAIN W WO CONTRAST    Result Date: 1/21/2022  EXAMINATION: MRI BRAIN W WO CONTRAST CLINICAL HISTORY: G35 MS (multiple sclerosis) ([address] ICD10 COMPARISONS: Brain MRI from October 16, 2020 TECHNIQUE: Multiplanar multisequence images of the brain were obtained before and after IV administration of contrast. Diffusion perfusion imaging was obtained. BRAIN MRI FINDINGS:  There are no extra-axial collections. There is no evidence of hemorrhage. There are no areas of perfusion diffusion signal abnormality to suggest ischemia. The susceptibility images do not demonstrate evidence of hemosiderin deposition within the brain parenchyma or the leptomeninges. There is preservation of the gray-white matter differentiation. There are marked areas of T2/FLAIR increased signal in the subcortical and periventricular white matter of both hemispheres with areas of confluence. There is involvement of corpus callosum and there are areas of signal dropout on T1 imaging. There are no areas of abnormal enhancement after IV contrast administration. There is prominence of the sulci and ventricles consistent with moderate global cerebral atrophy and chronic involutional changes out of proportion to the patient's age. The midline structures are intact, the corpus callosum is within normal limits. The region of the pineal gland and the sella turcica are unremarkable. There are no space-occupying lesions in the posterior fossa. The basilar cisterns are patent. The craniocervical junction is unremarkable. The visualized portions of the orbits are within normal limits, the globes are intact. The visualized portions of the paranasal sinuses are within normal limits. The calvarium and soft tissues are unremarkable. There are extensive areas of signal abnormality in the subcortical and periventricular white matter and involving the corpus callosum without enhancement to suggest active inflammation.  The findings are similar to the previous study and may reflect severe chronic microangiopathy, vasculitis, MS or other demyelinating process. Lab Results   Component Value Date    WBC 6.8 12/29/2021    RBC 3.76 12/29/2021    HGB 10.8 12/29/2021    HCT 34.1 12/29/2021    MCV 90.8 12/29/2021    MCH 28.8 12/29/2021    MCHC 31.7 12/29/2021    RDW 15.4 12/29/2021     12/29/2021    MPV 10.7 06/09/2014     Lab Results   Component Value Date     02/24/2021    K 3.8 02/24/2021     02/24/2021    CO2 22 02/24/2021    BUN 24 02/24/2021    CREATININE 0.80 02/24/2021    GFRAA >60.0 02/24/2021    LABGLOM >60.0 02/24/2021    GLUCOSE 111 02/24/2021    PROT 7.5 12/29/2021    LABALBU 3.8 12/29/2021    CALCIUM 9.2 02/24/2021    BILITOT <0.2 12/29/2021    ALKPHOS 48 12/29/2021    AST 11 12/29/2021    ALT 12 12/29/2021     Lab Results   Component Value Date    PROTIME 9.4 01/11/2013    INR 0.9 01/11/2013     No results found for: TSH, [id number], FOLATE, FERRITIN, IRON, TIBC, PTRFSAT, RETICCOUNT, TSH, FREET4  Lab Results   Component Value Date    TRIG 79 12/13/2019    HDL 48 12/13/2019    LDLCALC 127 12/13/2019     Lab Results   Component Value Date    LABBENZ NotDTCD 01/11/2013     No results found for: LITHIUM, DILFRTOT, VALPROATE    Assessment:       Diagnosis Orders   1. Multiple sclerosis (HCC)  CBC with Auto Differential    Comprehensive Metabolic Panel   2. Lumbar radiculopathy     3. Ataxic gait     4. Foot drop, left foot     5. Urinary urgency     Multiple sclerosis with primary progressive course. Patient was recommended to go on to [facility] as this is only medication for primary progressive multiple sclerosis. Insurance denied this therefore we continued on Aubagio. We then consider another B-cell line drug for Rowena Sequin  which is approved and she has had 3 injections of the same. She has not had any side effects of the same. History of her main issues appears to be lower extremity weakness.   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 She is having some urinary urgency and we will try first on Myrbetriq and see if this helps if not we may consider Vesicare. Patient has persisted with a left foot drop which is an old finding EDSS scores running at around 6. Will arrange for laboratory tests as she has just been started on [address]:      Orders Placed This Encounter   Procedures    CBC with Auto Differential     Standing Status:   Future     Standing Expiration Date:   4/27/2023    Comprehensive Metabolic Panel     Standing Status:   Future     Standing Expiration Date:   4/27/2023     Orders Placed This Encounter   Medications    mirabegron (MYRBETRIQ) 25 MG TB24     Sig: Take 1 tablet by mouth daily     Dispense:  30 tablet     Refill:  3       No follow-ups on file.       Ella Haq MD

## 2022-06-18 ENCOUNTER — HOSPITAL ENCOUNTER (INPATIENT)
Age: 42
LOS: 9 days | Discharge: NURSING, REHAB OR LONG-TERM CARE | DRG: 043 | End: 2022-06-27
Attending: INTERNAL MEDICINE | Admitting: INTERNAL MEDICINE
Payer: COMMERCIAL

## 2022-06-18 ENCOUNTER — APPOINTMENT (OUTPATIENT)
Dept: GENERAL RADIOLOGY | Age: 42
DRG: 043 | End: 2022-06-18
Payer: COMMERCIAL

## 2022-06-18 DIAGNOSIS — M25.559 HIP PAIN: ICD-10-CM

## 2022-06-18 DIAGNOSIS — M79.604 PAIN OF RIGHT LOWER EXTREMITY: ICD-10-CM

## 2022-06-18 DIAGNOSIS — R26.2 UNABLE TO AMBULATE: ICD-10-CM

## 2022-06-18 DIAGNOSIS — R53.1 GENERAL WEAKNESS: ICD-10-CM

## 2022-06-18 DIAGNOSIS — R31.9 URINARY TRACT INFECTION WITH HEMATURIA, SITE UNSPECIFIED: ICD-10-CM
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
## 2022-06-18 DIAGNOSIS — G35 MS (MULTIPLE SCLEROSIS) (HCC): Primary | ICD-10-CM

## 2022-06-18 DIAGNOSIS — N39.0 URINARY TRACT INFECTION WITH HEMATURIA, SITE UNSPECIFIED: ICD-10-CM

## 2022-06-18 DIAGNOSIS — M54.2 NECK PAIN: ICD-10-CM

## 2022-06-18 LAB
ALBUMIN SERPL-MCNC: 3.5 G/DL (ref 3.5–4.6)
ALP BLD-CCNC: 56 U/L (ref 40–130)
ALT SERPL-CCNC: 90 U/L (ref 0–33)
ANION GAP SERPL CALCULATED.3IONS-SCNC: 13 MEQ/L (ref 9–15)
AST SERPL-CCNC: 207 U/L (ref 0–35)
BACTERIA: ABNORMAL /HPF
BASOPHILS ABSOLUTE: 0 K/UL (ref 0–0.2)
BASOPHILS RELATIVE PERCENT: 0.3 %
BILIRUB SERPL-MCNC: 0.4 MG/DL (ref 0.2–0.7)
BILIRUBIN URINE: NEGATIVE
BLOOD, URINE: ABNORMAL
BUN BLDV-MCNC: 28 MG/DL (ref 6–20)
CALCIUM SERPL-MCNC: 9.1 MG/DL (ref 8.5–9.9)
CHLORIDE BLD-SCNC: 102 MEQ/L (ref 95–107)
CLARITY: ABNORMAL
CO2: 26 MEQ/L (ref 20–31)
COLOR: YELLOW
CREAT SERPL-MCNC: 0.66 MG/DL (ref 0.5–0.9)
EOSINOPHILS ABSOLUTE: 0.1 K/UL (ref 0–0.7)
EOSINOPHILS RELATIVE PERCENT: 0.7 %
EPITHELIAL CELLS, UA: >100 /HPF (ref 0–5)
GFR AFRICAN AMERICAN: >60
GFR NON-AFRICAN AMERICAN: >60
GLOBULIN: 3.6 G/DL (ref 2.3–3.5)
GLUCOSE BLD-MCNC: 115 MG/DL (ref 70–99)
GLUCOSE URINE: NEGATIVE MG/DL
HCT VFR BLD CALC: 34.1 % (ref 37–47)
HEMOGLOBIN: 10.9 G/DL (ref 12–16)
HYALINE CASTS: ABNORMAL /LPF (ref 0–5)
KETONES, URINE: 15 MG/DL
LEUKOCYTE ESTERASE, URINE: ABNORMAL
LYMPHOCYTES ABSOLUTE: 0.8 K/UL (ref 1–4.8)
LYMPHOCYTES RELATIVE PERCENT: 5.1 %
MAGNESIUM: 2.3 MG/DL (ref 1.7–2.4)
MCH RBC QN AUTO: 27.2 PG (ref 27–31.3)
MCHC RBC AUTO-ENTMCNC: 31.9 % (ref 33–37)
MCV RBC AUTO: 85.5 FL (ref 82–100)
MONOCYTES ABSOLUTE: 1.3 K/UL (ref 0.2–0.8)
MONOCYTES RELATIVE PERCENT: 9.1 %
NEUTROPHILS ABSOLUTE: 12.6 K/UL (ref 1.4–6.5)
NEUTROPHILS RELATIVE PERCENT: 84.8 %
NITRITE, URINE: POSITIVE
PDW BLD-RTO: 17 % (ref 11.5–14.5)
PH UA: 6 (ref 5–9)
PLATELET # BLD: 236 K/UL (ref 130–400)
POTASSIUM SERPL-SCNC: 3.7 MEQ/L (ref 3.4–4.9)
PROTEIN UA: 100 MG/DL
RBC # BLD: 3.99 M/UL (ref 4.2–5.4)
RBC UA: ABNORMAL /HPF (ref 0–2)
SODIUM BLD-SCNC: 141 MEQ/L (ref 135–144)
SPECIFIC GRAVITY UA: 1.04 (ref 1–1.03)
TOTAL PROTEIN: 7.1 G/DL (ref 6.3–8)
TROPONIN: <0.01 NG/ML (ref 0–0.01)
URINE REFLEX TO CULTURE: YES
UROBILINOGEN, URINE: 1 E.U./DL
WBC # BLD: 14.9 K/UL (ref 4.8–10.8)
WBC UA: ABNORMAL /HPF (ref 0–5)

## 2022-06-18 PROCEDURE — 87077 CULTURE AEROBIC IDENTIFY: CPT

## 2022-06-18 PROCEDURE — 84484 ASSAY OF TROPONIN QUANT: CPT

## 2022-06-18 PROCEDURE — 1210000000 HC MED SURG R&B

## 2022-06-18 PROCEDURE — 6370000000 HC RX 637 (ALT 250 FOR IP): Performed by: PHYSICIAN ASSISTANT

## 2022-06-18 PROCEDURE — 73552 X-RAY EXAM OF FEMUR 2/>: CPT

## 2022-06-18 PROCEDURE — 2580000003 HC RX 258: Performed by: PHYSICIAN ASSISTANT

## 2022-06-18 PROCEDURE — 87186 SC STD MICRODIL/AGAR DIL: CPT

## 2022-06-18 PROCEDURE — 73502 X-RAY EXAM HIP UNI 2-3 VIEWS: CPT

## 2022-06-18 PROCEDURE — 83735 ASSAY OF MAGNESIUM: CPT

## 2022-06-18 PROCEDURE — 80053 COMPREHEN METABOLIC PANEL: CPT

## 2022-06-18 PROCEDURE — 6360000002 HC RX W HCPCS: Performed by: PHYSICIAN ASSISTANT

## 2022-06-18 PROCEDURE — 36415 COLL VENOUS BLD VENIPUNCTURE: CPT

## 2022-06-18 PROCEDURE — 72050 X-RAY EXAM NECK SPINE 4/5VWS: CPT

## 2022-06-18 PROCEDURE — 85025 COMPLETE CBC W/AUTO DIFF WBC: CPT

## 2022-06-18 PROCEDURE — 96374 THER/PROPH/DIAG INJ IV PUSH: CPT

## 2022-06-18 PROCEDURE — 81001 URINALYSIS AUTO W/SCOPE: CPT

## 2022-06-18 PROCEDURE — 99221 1ST HOSP IP/OBS SF/LOW 40: CPT | Performed by: NURSE PRACTITIONER

## 2022-06-18 PROCEDURE — 87086 URINE CULTURE/COLONY COUNT: CPT

## 2022-06-18 PROCEDURE — 99285 EMERGENCY DEPT VISIT HI MDM: CPT

## 2022-06-18 RX ORDER — KETOROLAC TROMETHAMINE 30 MG/ML
30 INJECTION, SOLUTION INTRAMUSCULAR; INTRAVENOUS ONCE
Status: COMPLETED | OUTPATIENT
Start: 2022-06-18 | End: 2022-06-18

## 2022-06-18 RX ORDER — SULFAMETHOXAZOLE AND TRIMETHOPRIM 800; 160 MG/1; MG/1
1 TABLET ORAL ONCE
Status: COMPLETED | OUTPATIENT
Start: 2022-06-18 | End: 2022-06-18

## 2022-06-18 RX ORDER — ENOXAPARIN SODIUM 100 MG/ML
40 INJECTION SUBCUTANEOUS DAILY
Status: DISCONTINUED | OUTPATIENT
Start: 2022-06-19 | End: 2022-06-27 | Stop reason: HOSPADM

## 2022-06-18 RX ORDER — 0.9 % SODIUM CHLORIDE 0.9 %
500 INTRAVENOUS SOLUTION INTRAVENOUS ONCE
Status: COMPLETED | OUTPATIENT
Start: 2022-06-18 | End: 2022-06-18

## 2022-06-18 RX ADMIN — SODIUM CHLORIDE 1000 MG: 9 INJECTION, SOLUTION INTRAVENOUS at 21:25

## 2022-06-18 RX ADMIN — KETOROLAC TROMETHAMINE 30 MG: 30 INJECTION, SOLUTION INTRAMUSCULAR; INTRAVENOUS at 19:23

## 2022-06-18 RX ADMIN — SODIUM CHLORIDE 500 ML: 9 INJECTION, SOLUTION INTRAVENOUS at 19:25

## 2022-06-18 RX ADMIN — SULFAMETHOXAZOLE AND TRIMETHOPRIM 1 TABLET: 800; 160 TABLET ORAL at 19:22

## 2022-06-18 ASSESSMENT — ENCOUNTER SYMPTOMS
NAUSEA: 0
EYES NEGATIVE: 1
ANAL BLEEDING: 0
APNEA: 0
ALLERGIC/IMMUNOLOGIC NEGATIVE: 1
BACK PAIN: 0
ABDOMINAL PAIN: 0
EYE DISCHARGE: 0
WHEEZING: 0
COUGH: 0
RHINORRHEA: 0
SORE THROAT: 0
VOMITING: 0
PHOTOPHOBIA: 0
VOICE CHANGE: 0
ABDOMINAL DISTENTION: 0
SHORTNESS OF BREATH: 0

## 2022-06-18 ASSESSMENT — PAIN DESCRIPTION - LOCATION
LOCATION: HIP

## 2022-06-18 ASSESSMENT — PAIN - FUNCTIONAL ASSESSMENT: PAIN_FUNCTIONAL_ASSESSMENT: 0-10

## 2022-06-18 ASSESSMENT — PAIN DESCRIPTION - DESCRIPTORS
DESCRIPTORS: ACHING
DESCRIPTORS: ACHING

## 2022-06-18 ASSESSMENT — PAIN DESCRIPTION - PAIN TYPE: TYPE: ACUTE PAIN

## 2022-06-18 ASSESSMENT — PAIN DESCRIPTION - ORIENTATION
ORIENTATION: RIGHT;LEFT
ORIENTATION: RIGHT;LEFT

## 2022-06-18 ASSESSMENT — PAIN SCALES - GENERAL
PAINLEVEL_OUTOF10: 6
PAINLEVEL_OUTOF10: 0
PAINLEVEL_OUTOF10: 9
PAINLEVEL_OUTOF10: 9

## 2022-06-18 NOTE — ED PROVIDER NOTES
[address] [facility] ED  eMERGENCY dEPARTMENT eNCOUnter      Pt Name: Dov Randall  MRN: [id number]  [place] 1980  Date of evaluation: 6/18/2022  Provider: Ina Badillo Dr       Chief Complaint   Patient presents with    Hip Pain         HISTORY OF PRESENT ILLNESS   (Location/Symptom, Timing/Onset,Context/Setting, Quality, Duration, Modifying Factors, Severity)  Note limiting factors. Dov Randall is a 35 y.o. female who presents to the emergency department patient has left hip pain and right hip pain after falling states she became dizzy and fell states this happens as she has MS. She denies head injury neck pain back pain chest pain abdominal pain arm pain. Denies difficulty hearing seeing or speaking. Denies any nosebleeds bleeding with urination. Symptoms mild to moderate severity worse with touch or motion    HPI    NursingNotes were reviewed. REVIEW OF SYSTEMS    (2-9 systems for level 4, 10 or more for level 5)     Review of Systems   Constitutional: Negative for activity change, appetite change and unexpected weight change. HENT: Negative for ear discharge, nosebleeds and voice change. Eyes: Negative for discharge and visual disturbance. Respiratory: Negative for apnea and cough. Cardiovascular: Negative for chest pain. Gastrointestinal: Negative for abdominal distention, abdominal pain, anal bleeding, nausea and vomiting. Genitourinary: Negative for hematuria. Musculoskeletal: Positive for arthralgias. Negative for back pain, joint swelling, neck pain and neck stiffness. Skin: Negative for pallor. Neurological: Positive for dizziness and weakness. Negative for facial asymmetry, speech difficulty and headaches. Psychiatric/Behavioral: Negative for behavioral problems, self-injury and sleep disturbance. All other systems reviewed and are negative.       Except as noted above the remainder of the review of systems was reviewed and negative. PAST MEDICAL HISTORY     Past Medical History:   Diagnosis Date    Dislocated knee     Right knee    PTTD (posterior tibial tendon dysfunction)          SURGICALHISTORY       Past Surgical History:   Procedure Laterality Date    CATARACT REMOVAL WITH IMPLANT Right     CATARACT REMOVAL WITH IMPLANT Left      SECTION  2013    EYE SURGERY      FOOT SURGERY Left          CURRENT MEDICATIONS       Previous Medications    KESIMPTA 20 MG/0.4ML SOAJ        MIRABEGRON (MYRBETRIQ) 25 MG TB24    Take 1 tablet by mouth daily    MULTIPLE VITAMINS-MINERALS ER PO    Take 1 tablet by mouth    VITAMIN D 25 MCG (1000 UT) CAPS    Take by mouth            Patient has no known allergies. FAMILY HISTORY       Family History   Problem Relation Age of Onset    Hypertension Mother     Diabetes Maternal Uncle     Cancer Maternal Grandmother         stomach cancer          SOCIAL HISTORY       Social History     Socioeconomic History    Marital status:      Spouse name: None    Number of children: None    Years of education: None    Highest education level: None   Occupational History    None   Tobacco Use    Smoking status: Never Smoker    Smokeless tobacco: Never Used   Substance and Sexual Activity    Alcohol use: No    Drug use: No    Sexual activity: Not Currently   Other Topics Concern    None   Social History Narrative    None     Social Determinants of Health     Financial Resource Strain:     Difficulty of Paying Living Expenses: Not on file   Food Insecurity:     Worried About Running Out of Food in the Last Year: Not on file    Gege of Food in the Last Year: Not on file   Transportation Needs:     Lack of Transportation (Medical): Not on file    Lack of Transportation (Non-Medical):  Not on file   Physical Activity:     Days of Exercise per Week: Not on file    Minutes of Exercise per Session: Not on file   Stress:     Feeling of Stress : Not on file   Social Connections:     Frequency of Communication with Friends and Family: Not on file    Frequency of Social Gatherings with Friends and Family: Not on file    Attends Quaker Services: Not on file    Active Member of Clubs or Organizations: Not on file    Attends Club or Organization Meetings: Not on file    Marital Status: Not on file   Intimate Partner Violence:     Fear of Current or Ex-Partner: Not on file    Emotionally Abused: Not on file    Physically Abused: Not on file    Sexually Abused: Not on file   Housing Stability:     Unable to Pay for Housing in the Last Year: Not on file    Number of [place] in the Last Year: Not on file    Unstable Housing in the Last Year: Not on file       SCREENINGS   Ajnali Coma Scale  Eye Opening: Spontaneous  Best Verbal Response: Oriented  Best Motor Response: Obeys commands  Anjali Coma Scale Score: 15  Ebola Virus Disease (EVD) Screening   Temp: 98.8 °F (37.1 °C)  CIWA Assessment  BP: 124/67  Heart Rate: 90    PHYSICAL EXAM    (up to 7 for level 4, 8 or more for level 5)     ED Triage Vitals [06/18/22 1612]   BP Temp Temp src Heart Rate Resp SpO2 Height Weight   138/84 98.8 °F (37.1 °C) -- 99 16 98 % 5' 1\" (1.549 m) 141 lb (64 kg)       Physical Exam    RESULTS     EKG: All EKG's are interpreted by the Emergency Department Physician who either signs or Co-signsthis chart in the absence of a cardiologist.    EKG normal sinus rate 88 bpm  ms QRS 74 ms  ms QT PRT axes 36, 5,7    RADIOLOGY:   Non-plain filmimages such as CT, Ultrasound and MRI are read by the radiologist. Plain radiographic images are visualized and preliminarily interpreted by the emergency physician with the below findings:         Interpretation per the Radiologist below, if available at the time ofthis note:    XR CERVICAL SPINE (4-5 VIEWS)    (Results Pending)   XR HIP LEFT (2-3 VIEWS)    (Results Pending)   XR FEMUR RIGHT (MIN 2 VIEWS)    (Results Pending)         ED BEDSIDE ULTRASOUND:   Performed by ED Physician - none    LABS:  Labs Reviewed   COMPREHENSIVE METABOLIC PANEL - Abnormal; Notable for the following components:       Result Value    Glucose 115 (*)     BUN 28 (*)     ALT 90 (*)      (*)     Globulin 3.6 (*)     All other components within normal limits   CBC WITH AUTO DIFFERENTIAL - Abnormal; Notable for the following components:    WBC 14.9 (*)     RBC 3.99 (*)     Hemoglobin 10.9 (*)     Hematocrit 34.1 (*)     MCHC 31.9 (*)     RDW 17.0 (*)     Neutrophils Absolute 12.6 (*)     Lymphocytes Absolute 0.8 (*)     Monocytes Absolute 1.3 (*)     All other components within normal limits   URINALYSIS WITH REFLEX TO CULTURE - Abnormal; Notable for the following components:    Clarity, UA TURBID (*)     Ketones, Urine 15 (*)     Blood, Urine LARGE (*)     Protein,  (*)     Nitrite, Urine POSITIVE (*)     Leukocyte Esterase, Urine SMALL (*)     All other components within normal limits   MICROSCOPIC URINALYSIS - Abnormal; Notable for the following components:    RBC, UA 10-20 (*)     Bacteria, UA MODERATE (*)     WBC, UA  (*)     Epithelial Cells, UA >100 (*)     All other components within normal limits   CULTURE, URINE   MAGNESIUM   TROPONIN       All other labs were within normal range or not returned as of this dictation.     EMERGENCY DEPARTMENT COURSE and DIFFERENTIAL DIAGNOSIS/MDM:   Vitals:    Vitals:    06/18/22 1612 06/18/22 1815 06/18/22 2043   BP: 138/84 (!) 167/76 124/67   Pulse: 99 81 90   Resp: 16 16    Temp: 98.8 °F (37.1 °C)     SpO2: 98% 100%    Weight: 141 lb (64 kg)     Height: 5' 1\" (1.549 m)              MDM  Number of Diagnoses or Management Options  Fall, initial encounter  General weakness  Hip pain  MS (multiple sclerosis) (HCC)  Neck pain  Pain of right lower extremity  Unable to ambulate  Urinary tract infection with hematuria, site unspecified  Diagnosis management comments: Patient states she does have MS this is her baseline. she walks with a walker and states she has not gotten any weaker she sees Dr. Amarjit Anderson for MS. patient reiterated this when asked a second time. Reviewed Dr. Amarjit Anderson, neurologist's notes. He noted the patient is almost wheelchair-bound at this time with increasing weakness. Patient repeatedly asked for discharge to home family at bedside notes that she is having issues walking even with her walker. patient states that this is only intermittently, patient we discussed the patient states she she is at her baseline this is not new weakness. Patient again requests disposition home. Discussed with patient that if she worsens she is to come back to include increasing weakness new symptoms. She is to drink more fluids as she was dehydrated secondary to urine specific of 1.040. With family at bedside patient request discharged home. We know that took patient extra assistance to get back into bed in the emergency room patient states that he took 4 nurses I discussed with patient and family that if she cannot walk or ambulate to her baseline we should contact neurology and do what they recommend discussed with Dr. Amarjit Anderson he recommends admission with a gram of Solu-Medrol. Will contact hospitalist for admission discussed with Dr. Alcira Duncan patient is a full code. Amount and/or Complexity of Data Reviewed  Clinical lab tests: ordered and reviewed  Tests in the radiology section of CPT®: ordered and reviewed  Discuss the patient with other providers: yes        CRITICAL CARE TIME         CONSULTS:  IP CONSULT TO NEUROLOGY    PROCEDURES:  Unless otherwise noted below, none     Procedures    FINAL IMPRESSION      1. MS (multiple sclerosis) ([facility] [address]    2. Fall, initial encounter    3. General weakness    4. Unable to ambulate    5. Urinary tract infection with hematuria, site unspecified    6. Hip pain    7. Pain of right lower extremity    8.  Neck pain          DISPOSITION/PLAN   DISPOSITION        PATIENT REFERRED TO:  No follow-up provider specified.     DISCHARGE MEDICATIONS:  New Prescriptions    No medications on file          (Please note that portions of this note were completed with a voice recognition program.  Efforts were made to edit the dictations but occasionally words are mis-transcribed.)    Alexsandra Contreras PA-C (electronically signed)  Attending Emergency Physician       Alexsandra Contreras PA-C  06/18/22 2398

## 2022-06-18 NOTE — ED NOTES
Unable to perform orthostatic blood pressures due to pt unable to stand      Gay Campos RN  06/18/22 1941

## 2022-06-18 NOTE — ED TRIAGE NOTES
Patient brought in by EMS for c/o fall, states she was walking to the bathroom and lost her balance. C/o bilateral hip pain, MSPs intact, denies any LOC.

## 2022-06-19 LAB
ACETAMINOPHEN LEVEL: <5 UG/ML (ref 10–30)
ALBUMIN SERPL-MCNC: 3.2 G/DL (ref 3.5–4.6)
ALP BLD-CCNC: 47 U/L (ref 40–130)
ALT SERPL-CCNC: 72 U/L (ref 0–33)
ANION GAP SERPL CALCULATED.3IONS-SCNC: 9 MEQ/L (ref 9–15)
AST SERPL-CCNC: 116 U/L (ref 0–35)
BASOPHILS ABSOLUTE: 0 K/UL (ref 0–0.2)
BASOPHILS RELATIVE PERCENT: 0 %
BILIRUB SERPL-MCNC: <0.2 MG/DL (ref 0.2–0.7)
BILIRUBIN DIRECT: <0.2 MG/DL (ref 0–0.4)
BILIRUBIN URINE: NEGATIVE
BILIRUBIN, INDIRECT: ABNORMAL MG/DL (ref 0–0.6)
BLOOD, URINE: NEGATIVE
BUN BLDV-MCNC: 24 MG/DL (ref 6–20)
C-REACTIVE PROTEIN, HIGH SENSITIVITY: 143.5 MG/L (ref 0–5)
CALCIUM SERPL-MCNC: 8.5 MG/DL (ref 8.5–9.9)
CHLORIDE BLD-SCNC: 102 MEQ/L (ref 95–107)
CLARITY: ABNORMAL
CO2: 27 MEQ/L (ref 20–31)
COLOR: YELLOW
CREAT SERPL-MCNC: 0.57 MG/DL (ref 0.5–0.9)
EOSINOPHILS ABSOLUTE: 0 K/UL (ref 0–0.7)
EOSINOPHILS RELATIVE PERCENT: 0.1 %
GFR AFRICAN AMERICAN: >60
GFR NON-AFRICAN AMERICAN: >60
GLUCOSE BLD-MCNC: 170 MG/DL (ref 70–99)
GLUCOSE URINE: 500 MG/DL
HAV IGM SER IA-ACNC: NONREACTIVE
HCT VFR BLD CALC: 30.9 % (ref 37–47)
HEMOGLOBIN: 9.8 G/DL (ref 12–16)
HEPATITIS B CORE IGM ANTIBODY: NONREACTIVE
HEPATITIS B SURFACE ANTIGEN: NONREACTIVE
HEPATITIS C ANTIBODY: NONREACTIVE
INR BLD: 1.2
KETONES, URINE: 15 MG/DL
LACTATE DEHYDROGENASE: 340 U/L (ref 135–214)
LEUKOCYTE ESTERASE, URINE: NEGATIVE
LYMPHOCYTES ABSOLUTE: 0.3 K/UL (ref 1–4.8)
LYMPHOCYTES RELATIVE PERCENT: 2.7 %
MCH RBC QN AUTO: 27.5 PG (ref 27–31.3)
MCHC RBC AUTO-ENTMCNC: 31.9 % (ref 33–37)
MCV RBC AUTO: 86.3 FL (ref 82–100)
MONOCYTES ABSOLUTE: 0.1 K/UL (ref 0.2–0.8)
MONOCYTES RELATIVE PERCENT: 1.3 %
NEUTROPHILS ABSOLUTE: 10.1 K/UL (ref 1.4–6.5)
NEUTROPHILS RELATIVE PERCENT: 95.9 %
NITRITE, URINE: NEGATIVE
PDW BLD-RTO: 16.6 % (ref 11.5–14.5)
PH UA: 6.5 (ref 5–9)
PLATELET # BLD: 224 K/UL (ref 130–400)
POTASSIUM REFLEX MAGNESIUM: 3.8 MEQ/L (ref 3.4–4.9)
PROTEIN UA: ABNORMAL MG/DL
PROTHROMBIN TIME: 15.4 SEC (ref 12.3–14.9)
RBC # BLD: 3.58 M/UL (ref 4.2–5.4)
SALICYLATE, SERUM: <0.3 MG/DL (ref 15–30)
SEDIMENTATION RATE, ERYTHROCYTE: 101 MM (ref 0–20)
SODIUM BLD-SCNC: 138 MEQ/L (ref 135–144)
SPECIFIC GRAVITY UA: 1.04 (ref 1–1.03)
T4 FREE: 1.2 NG/DL (ref 0.84–1.68)
TOTAL CK: 4188 U/L (ref 0–170)
TOTAL PROTEIN: 6.2 G/DL (ref 6.3–8)
TSH REFLEX: 0.25 UIU/ML (ref 0.44–3.86)
URINE REFLEX TO CULTURE: ABNORMAL
UROBILINOGEN, URINE: 1 E.U./DL
WBC # BLD: 10.5 K/UL (ref 4.8–10.8)

## 2022-06-19 PROCEDURE — 85610 PROTHROMBIN TIME: CPT

## 2022-06-19 PROCEDURE — 80143 DRUG ASSAY ACETAMINOPHEN: CPT

## 2022-06-19 PROCEDURE — 83615 LACTATE (LD) (LDH) ENZYME: CPT

## 2022-06-19 PROCEDURE — 85652 RBC SED RATE AUTOMATED: CPT

## 2022-06-19 PROCEDURE — 80074 ACUTE HEPATITIS PANEL: CPT

## 2022-06-19 PROCEDURE — 80076 HEPATIC FUNCTION PANEL: CPT

## 2022-06-19 PROCEDURE — 6370000000 HC RX 637 (ALT 250 FOR IP): Performed by: NURSE PRACTITIONER

## 2022-06-19 PROCEDURE — 99222 1ST HOSP IP/OBS MODERATE 55: CPT | Performed by: PSYCHIATRY & NEUROLOGY

## 2022-06-19 PROCEDURE — 2580000003 HC RX 258: Performed by: INTERNAL MEDICINE

## 2022-06-19 PROCEDURE — 36415 COLL VENOUS BLD VENIPUNCTURE: CPT

## 2022-06-19 PROCEDURE — 86256 FLUORESCENT ANTIBODY TITER: CPT

## 2022-06-19 PROCEDURE — 86141 C-REACTIVE PROTEIN HS: CPT

## 2022-06-19 PROCEDURE — 1210000000 HC MED SURG R&B

## 2022-06-19 PROCEDURE — 80179 DRUG ASSAY SALICYLATE: CPT

## 2022-06-19 PROCEDURE — 84443 ASSAY THYROID STIM HORMONE: CPT

## 2022-06-19 PROCEDURE — 81003 URINALYSIS AUTO W/O SCOPE: CPT

## 2022-06-19 PROCEDURE — 2580000003 HC RX 258: Performed by: NURSE PRACTITIONER

## 2022-06-19 PROCEDURE — 82550 ASSAY OF CK (CPK): CPT

## 2022-06-19 PROCEDURE — 85025 COMPLETE CBC W/AUTO DIFF WBC: CPT

## 2022-06-19 PROCEDURE — 84439 ASSAY OF FREE THYROXINE: CPT

## 2022-06-19 PROCEDURE — 6360000002 HC RX W HCPCS: Performed by: NURSE PRACTITIONER

## 2022-06-19 PROCEDURE — 80048 BASIC METABOLIC PNL TOTAL CA: CPT

## 2022-06-19 PROCEDURE — 6360000002 HC RX W HCPCS: Performed by: INTERNAL MEDICINE

## 2022-06-19 RX ORDER — LORAZEPAM 2 MG/ML
1 INJECTION INTRAMUSCULAR
Status: ACTIVE | OUTPATIENT
Start: 2022-06-19 | End: 2022-06-19

## 2022-06-19 RX ORDER — SODIUM CHLORIDE 9 MG/ML
INJECTION, SOLUTION INTRAVENOUS CONTINUOUS
Status: DISPENSED | OUTPATIENT
Start: 2022-06-19 | End: 2022-06-19

## 2022-06-19 RX ORDER — MULTIVITAMIN WITH IRON
1 TABLET ORAL DAILY
Status: DISCONTINUED | OUTPATIENT
Start: 2022-06-19 | End: 2022-06-27 | Stop reason: HOSPADM

## 2022-06-19 RX ORDER — VITAMIN B COMPLEX
1000 TABLET ORAL DAILY
Status: DISCONTINUED | OUTPATIENT
Start: 2022-06-19 | End: 2022-06-27 | Stop reason: HOSPADM

## 2022-06-19 RX ADMIN — THERA TABS 1 TABLET: TAB at 08:34

## 2022-06-19 RX ADMIN — Medication 1000 UNITS: at 08:34

## 2022-06-19 RX ADMIN — SODIUM CHLORIDE 1000 MG: 9 INJECTION, SOLUTION INTRAVENOUS at 20:47

## 2022-06-19 RX ADMIN — CEFTRIAXONE SODIUM 1000 MG: 1 INJECTION, POWDER, FOR SOLUTION INTRAMUSCULAR; INTRAVENOUS at 01:23

## 2022-06-19 RX ADMIN — ENOXAPARIN SODIUM 40 MG: 100 INJECTION SUBCUTANEOUS at 08:34

## 2022-06-19 RX ADMIN — SODIUM CHLORIDE: 9 INJECTION, SOLUTION INTRAVENOUS at 01:22

## 2022-06-19 ASSESSMENT — ENCOUNTER SYMPTOMS
CHOKING: 0
COLOR CHANGE: 0
SHORTNESS OF BREATH: 0
NAUSEA: 0
TROUBLE SWALLOWING: 0
BACK PAIN: 1
VOMITING: 0
PHOTOPHOBIA: 0

## 2022-06-19 ASSESSMENT — PAIN SCALES - GENERAL
PAINLEVEL_OUTOF10: 0
PAINLEVEL_OUTOF10: 0

## 2022-06-19 NOTE — CARE COORDINATION
[facility] Case Management Initial Discharge Assessment    Met with Patient to discuss discharge plan. PCP: Flor Shoemaker PA-C          Date of Last Visit:     [facility] Patient: No        [facility] Notified: no    If no PCP, list provided? N/A    Discharge Planning    Living Arrangements: at home dependent on family care    Who do you live with? Mother at [address]    Who helps you with your care:  self or family    If lives at home:     Do you have any barriers navigating in your home? yes - USES WALKER AND W/C, APT IS FIRST FLOOR    Patient can perform ADL? Yes    Current Services (outpatient and in home) :  FOOD STAMPS    Dialysis: No    Is transportation available to get to your appointments? Yes    DME Equipment:  yes - 2 WHEELCHAIRS, SHOWER CHAIR    Respiratory equipment: None    Respiratory provider:  no     Pharmacy:  yes - [facility] [address] with Medication Assistance Program?  No      Patient agreeable to [facility] [place]? Yes, [address], [facility]    Patient agreeable to SNF/Rehab? N/A    Other discharge needs identified? Other [address], W/C REPAIR,  LARGE SHOWER CHAIR    Does Patient Have a High-Risk for Readmission Diagnosis (CHF, PN, MI, COPD)? No         Initial Discharge Plan? (Note: please see concurrent daily documentation for any updates after initial note). RETURN TO HOME    Readmission Risk              Risk of Unplanned Readmission:  10         Electronically signed by Thiago Shelley RN on 6/19/2022 at 3:14 PM

## 2022-06-19 NOTE — PROGRESS NOTES
06/19/22    From:HOME WITH MOM    Admit: MS EXACERBATION     PMH:MS    Anticipated Discharge Disposition: WON HOME WITH SON    Patient Mobility or PT/OT ordered:  Consults: NEURO    Clinical: UTI    Barriers to Discharge:IV ROCEPHIN    Assessments: CMI DONE

## 2022-06-19 NOTE — ED NOTES
Pt was being helped by RN to go to the bathroom today for urine sample   Pt was unable to stand with the help of 4 nurses at  Bedside to assist her     PA N[facility] OF [facility] aware         Carmen Anguiano RN  06/18/22 2030

## 2022-06-19 NOTE — PROGRESS NOTES
BILITOT <0.2 06/19/2022    ALKPHOS 47 06/19/2022     06/19/2022    ALT 72 06/19/2022       ASSESSMENT AND PLAN      # MS flare  - cont 1g Solumedrol daily  - neuro consulted for management  - symptoms improved drastically today  - PT/OT    # Elevated LFTs  - no alcohol hx, no abx pain, no hx of liver dz  - awaiting hepatitis panel and RUQ US  - if worsening or develops abd pain, would consider GI consult    # Bilateral LE edema L>R  - will obtain US to rule out DVT    # Leukocytosis  - possibly due to UTI. UA positive though asymptomatic  - cont abx and follow cultures  - likely to be worsened by steroids. Monitor with daily labs    Disposition: MS flare management per neuro. Continuing steroids. Awaiting further testing for elevated LFTs.  PT/OT    Fredi Jaime DO  Internal Medicine   Hospitalist    >35 minutes in total care time

## 2022-06-19 NOTE — PROGRESS NOTES
1170: Secure message sent to Dr. Luis Lemus: Patient has an ultrasound scheduled in the AM. Can you please place an order \"OK to leave floor without tele\".  Electronically signed by Rachel Maravilla RN on 6/19/2022 at 12:53 PM

## 2022-06-19 NOTE — PLAN OF CARE
Problem: Discharge Planning  Goal: Discharge to home or other facility with appropriate resources  Outcome: Progressing  Flowsheets  Taken 6/18/2022 2341  Discharge to home or other facility with appropriate resources: Identify barriers to discharge with patient and caregiver  Taken 6/18/2022 2337  Discharge to home or other facility with appropriate resources:   Identify barriers to discharge with patient and caregiver   Identify discharge learning needs (meds, wound care, etc)     Problem: Pain  Goal: Verbalizes/displays adequate comfort level or baseline comfort level  Outcome: Progressing     Problem: Safety - Adult  Goal: Free from fall injury  Outcome: Progressing

## 2022-06-19 NOTE — CONSULTS
Subjective:      Patient ID: Alonzo Hamilton is a 35 y.o. female who presents today for multiple sclerosis exacerbation. HPI 78-year-old right-handed female admitted with increasing weakness of the lower extremity as well as hip pain bilaterally. Patient has history of multiple sclerosis and she tells me that when she has flareup she has the symptoms. She was treated in April with methylprednisone as well. Patient is noted to stand without assistance and therefore came to the hospital.  She denies any fevers or urinary tract symptoms. Patient is on Skip Baca a B-cell line drug for multiple sclerosis. Prior to this so we had her on Aubagio. We were contemplating a previous though we were more in favor of consent on a monthly visit. She has received 4 doses which is monthly. She has extensive white matter disease. She does not ambulate much but she is able to walk around in the house and manage her self. This does fluctuate. She has not recent falls injuries or trauma. Review of Systems   Constitutional: Negative for fever. HENT: Negative for ear pain, tinnitus and trouble swallowing. Eyes: Negative for photophobia and visual disturbance. Respiratory: Negative for choking and shortness of breath. Cardiovascular: Negative for chest pain and palpitations. Gastrointestinal: Negative for nausea and vomiting. Musculoskeletal: Positive for back pain, gait problem and myalgias. Negative for joint swelling, neck pain and neck stiffness. Skin: Negative for color change. Allergic/Immunologic: Negative for food allergies. Neurological: Positive for weakness. Negative for dizziness, tremors, seizures, syncope, facial asymmetry, speech difficulty, light-headedness, numbness and headaches. Psychiatric/Behavioral: Negative for behavioral problems, confusion, hallucinations and sleep disturbance.        Past Medical History:   Diagnosis Date    Dislocated knee 1990    Right knee    PTTD (posterior tibial tendon dysfunction)      Past Surgical History:   Procedure Laterality Date    CATARACT REMOVAL WITH IMPLANT Right     CATARACT REMOVAL WITH IMPLANT Left      SECTION  2013    EYE SURGERY      FOOT SURGERY Left      Social History     Socioeconomic History    Marital status:      Spouse name: Not on file    Number of children: Not on file    Years of education: Not on file    Highest education level: Not on file   Occupational History    Not on file   Tobacco Use    Smoking status: Never Smoker    Smokeless tobacco: Never Used   Substance and Sexual Activity    Alcohol use: No    Drug use: No    Sexual activity: Not Currently   Other Topics Concern    Not on file   Social History Narrative    Not on file     Social Determinants of Health     Financial Resource Strain:     Difficulty of Paying Living Expenses: Not on file   Food Insecurity:     Worried About [address] [employer] in the Last Year: Not on file    Gege of Food in the Last Year: Not on file   Transportation Needs:     Lack of Transportation (Medical): Not on file    Lack of Transportation (Non-Medical):  Not on file   Physical Activity:     Days of Exercise per Week: Not on file    Minutes of Exercise per Session: Not on file   Stress:     Feeling of Stress : Not on file   Social Connections:     Frequency of Communication with Friends and Family: Not on file    Frequency of Social Gatherings with Friends and Family: Not on file    Attends Muslim Services: Not on file    Active Member of Clubs or Organizations: Not on file    Attends Club or Organization Meetings: Not on file    Marital Status: Not on file   Intimate Partner Violence:     Fear of Current or Ex-Partner: Not on file    Emotionally Abused: Not on file    Physically Abused: Not on file    Sexually Abused: Not on file   Housing Stability:     Unable to Pay for Housing in the Last Year: Not on file    Number of Places Lived in the Last Year: Not on file    Unstable Housing in the Last Year: Not on file     Family History   Problem Relation Age of Onset    Hypertension Mother     Diabetes Maternal Uncle     Cancer Maternal Grandmother         stomach cancer     No Known Allergies  Current Facility-Administered Medications   Medication Dose Route Frequency Provider Last Rate Last Admin    cefTRIAXone (ROCEPHIN) 1000 mg IVPB in 50 mL D5W minibag  1,000 mg IntraVENous Q24H Nicoletto Bolzan-Roche, APRN - CNP   Stopped at 06/19/22 0153    Vitamin D (CHOLECALCIFEROL) tablet 1,000 Units  1,000 Units Oral Daily Nicoletto Bolzan-Roche, APRN - CNP   1,000 Units at 06/19/22 0834    multivitamin 1 tablet  1 tablet Oral Daily Nicoletto Bolzan-Roche, APRN - CNP   1 tablet at 06/19/22 0834    LORazepam (ATIVAN) injection 1 mg  1 mg IntraVENous Once PRN Heidy Childs MD        enoxaparin (LOVENOX) injection 40 mg  40 mg SubCUTAneous Daily Briana Habermann Sedar, DO   40 mg at 06/19/22 0834    methylPREDNISolone sodium (SOLU-MEDROL) 1,000 mg in sodium chloride 0.9 % 250 mL IVPB  1,000 mg IntraVENous Daily Margrette Chuy PADILLA Sedar, DO            Objective:   /72   Pulse 84   Temp 97.5 °F (36.4 °C) (Oral)   Resp 18   Ht 5' 1\" (1.549 m)   Wt 141 lb (64 kg)   LMP 06/01/2022   SpO2 97%   BMI 26.64 kg/m²     Physical Exam  Vitals reviewed. Eyes:      Pupils: Pupils are equal, round, and reactive to light. Cardiovascular:      Rate and Rhythm: Normal rate and regular rhythm. Heart sounds: No murmur heard. Pulmonary:      Effort: Pulmonary effort is normal.      Breath sounds: Normal breath sounds. Abdominal:      General: Bowel sounds are normal.   Musculoskeletal:         General: Normal range of motion. Cervical back: Normal range of motion. Skin:     General: Skin is warm. Neurological:      Mental Status: She is alert and oriented to person, place, and time. Cranial Nerves: No cranial nerve deficit.       Sensory: No sensory deficit. Motor: No abnormal muscle tone. Coordination: Coordination normal.      Deep Tendon Reflexes: Reflexes are normal and symmetric. Babinski sign absent on the right side. Babinski sign absent on the left side. Comments: Examination notable for lower extremity weakness of 4/5 she has more proximal weakness than distal weakness and she is hyperreflexic throughout. Gait is deferred. Hip evaluation is noted to show pain in the hips bilaterally   Psychiatric:         Mood and Affect: Mood normal.         XR CERVICAL SPINE (4-5 VIEWS)    Result Date: 6/19/2022  EXAMINATION: XR CERVICAL SPINE (8 VIEWS) CLINICAL HISTORY: FALL. NECK PAIN COMPARISONS: None available. FINDINGS: Vertebral bodies normal in height and alignment. No prevertebral soft tissue swelling. No fracture, dislocation, bone lesion. NEGATIVE CERVICAL SPINE    XR HIP LEFT (2-3 VIEWS)    Result Date: 6/19/2022  EXAMINATION: XR HIP LEFT (2 VIEWS) CLINICAL HISTORY: FALL. BILATERAL HIP PAIN. COMPARISONS: None available. FINDINGS: Bilateral tubal ligation clips identified within pelvic inlet. No fracture, dislocation, bone lesion. NEGATIVE PELVIS AND LEFT HIP     XR FEMUR RIGHT (MIN 2 VIEWS)    Result Date: 6/19/2022  EXAMINATION: XR FEMUR RIGHT (4 VIEWS) CLINICAL HISTORY: FALL. PAIN. COMPARISONS: None available. FINDINGS: No fracture, dislocation, bone lesion.      NEGATIVE RIGHT FEMUR      Lab Results   Component Value Date    WBC 10.5 06/19/2022    RBC 3.58 06/19/2022    HGB 9.8 06/19/2022    HCT 30.9 06/19/2022    MCV 86.3 06/19/2022    MCH 27.5 06/19/2022    MCHC 31.9 06/19/2022    RDW 16.6 06/19/2022     06/19/2022    MPV 10.7 06/09/2014     Lab Results   Component Value Date     06/19/2022    K 3.8 06/19/2022     06/19/2022    CO2 27 06/19/2022    BUN 24 06/19/2022    CREATININE 0.57 06/19/2022    GFRAA >60.0 06/19/2022    LABGLOM >60.0 06/19/2022    GLUCOSE 170 06/19/2022    PROT 6.2 06/19/2022 LABALBU 3.2 06/19/2022    CALCIUM 8.5 06/19/2022    BILITOT <0.2 06/19/2022    ALKPHOS 47 06/19/2022     06/19/2022    ALT 72 06/19/2022     Lab Results   Component Value Date    PROTIME 15.4 06/19/2022    INR 1.2 06/19/2022     No results found for: TSH, VCMRFRKE15, FOLATE, FERRITIN, IRON, TIBC, PTRFSAT, RETICCOUNT, TSH, FREET4  Lab Results   Component Value Date    TRIG 79 12/13/2019    HDL 48 12/13/2019    LDLCALC 127 12/13/2019     Lab Results   Component Value Date    LABBENZ NotDTCD 01/11/2013     No results found for: LITHIUM, DILFRTOT, VALPROATE    Assessment:   Multiple sclerosis with exacerbation with added hip pain and lower extremity weakness. Given that she has this going on for a while an MRI of the lumbosacral spine is recommended. She has extensive white matter disease and recently was started on Stephania Benji by me and she has received about 4 doses which is monthly and a subcutaneous injection of B-cell line drug. Patient prior to this was on Aubagio. Her EDSS score remains around 4-5 and does fluctuate. During exacerbation she is worse. Her last exacerbation was in April and we have treated her with methylprednisone. I do not see any acute changes to consider an MRI at this time of the brain as the management for now is unlikely to change unless in the future if she does not respond to Stephania Benji    Patient may be transferred to rehabilitation with 3 days of methylprednisone for now. Urine examination is noted. We will arrange for other tests rule out arthritis as well. This consultation includes my consultation with emergency room. Facundo Le MD, Erica Dewey, American Board of Psychiatry & Neurology  Board Certified in Vascular Neurology  Board Certified in Neuromuscular Medicine  Certified in [facility]:

## 2022-06-19 NOTE — H&P
[place] [address] MEDICINE    HISTORY AND PHYSICAL EXAM    PATIENT NAME:  Socrates Holley    MRN:  [id number]  SERVICE DATE:  2022   SERVICE TIME:  10:33 PM    Primary Care Physician: Paula Murphy PA-C         SUBJECTIVE  CHIEF COMPLAINT:  Hip Pain       HPI: Patient being admitted for MS exacerbation. Patient is a pleasant, alert and oriented x3, -American female, 43-year-old. Patient states that she had a fall due to weakness in her legs and pain in her hips bilaterally. Patient denies any syncope or loss of consciousness. Patient also denies any head or neck injury. Patient has a history of MS and reports that she has had bilateral hip pain for a few days and this often happens when she has a MS flareup. Patient had a dose of Solu-Medrol in the ED and she states that she actually feels much better. Unfortunately patient was not able to stand with assistance due to weakness while in the ED. Otherwise, patient denies any fever, cough, shortness of breath, chest pain, abdominal pain, nausea, or vomiting. Patient also denies any dysuria, decreased urine, or polyuria. Patient reports that she feels like she has not drank much water especially with the recent heat and only drink 1-1/2 bottles today. Patient reports she feels that she is dehydrated slightly. Patient reports that she takes medications for MS but no other health problems. Patient denies use of alcohol, tobacco, or illicit drugs including marijuana.     PAST MEDICAL HISTORY:    Past Medical History:   Diagnosis Date    Dislocated knee     Right knee    PTTD (posterior tibial tendon dysfunction)      PAST SURGICAL HISTORY:    Past Surgical History:   Procedure Laterality Date    CATARACT REMOVAL WITH IMPLANT Right     CATARACT REMOVAL WITH IMPLANT Left      SECTION  2013    EYE SURGERY      FOOT SURGERY Left      FAMILY HISTORY:    Family History   Problem Relation Age of Onset    Hypertension Mother  Diabetes Maternal Uncle     Cancer Maternal Grandmother         stomach cancer     SOCIAL HISTORY:    Social History     Socioeconomic History    Marital status:      Spouse name: Not on file    Number of children: Not on file    Years of education: Not on file    Highest education level: Not on file   Occupational History    Not on file   Tobacco Use    Smoking status: Never Smoker    Smokeless tobacco: Never Used   Substance and Sexual Activity    Alcohol use: No    Drug use: No    Sexual activity: Not Currently   Other Topics Concern    Not on file   Social History Narrative    Not on file     Social Determinants of Health     Financial Resource Strain:     Difficulty of Paying Living Expenses: Not on file   Food Insecurity:     Worried About Running Out of Food in the Last Year: Not on file    Gege of Food in the Last Year: Not on file   Transportation Needs:     Lack of Transportation (Medical): Not on file    Lack of Transportation (Non-Medical):  Not on file   Physical Activity:     Days of Exercise per Week: Not on file    Minutes of Exercise per Session: Not on file   Stress:     Feeling of Stress : Not on file   Social Connections:     Frequency of Communication with Friends and Family: Not on file    Frequency of Social Gatherings with Friends and Family: Not on file    Attends Yazidism Services: Not on file    Active Member of [address] [place] [address] [employer] or Organizations: Not on file    Attends Club or Organization Meetings: Not on file    Marital Status: Not on file   Intimate Partner Violence:     Fear of Current or Ex-Partner: Not on file    Emotionally Abused: Not on file    Physically Abused: Not on file    Sexually Abused: Not on file   Housing Stability:     Unable to Pay for Housing in the Last Year: Not on file    Number of [place] in the Last Year: Not on file    Unstable Housing in the Last Year: Not on file     MEDICATIONS:   Prior to Admission medications Medication Sig Start Date End Date Taking? Authorizing Provider   mirabegron (MYRBETRIQ) 25 MG TB24 Take 1 tablet by mouth daily 4/27/22   MD WILTON PearceSIMPTA 20 MG/0.4ML [facility]  12/15/21   Historical Provider, MD   vitamin D 25 MCG (1000 UT) CAPS Take by mouth    Historical Provider, MD   MULTIPLE VITAMINS-MINERALS ER PO Take 1 tablet by mouth    Historical Provider, MD       ALLERGIES: Patient has no known allergies. REVIEW OF SYSTEM:   Review of Systems   Constitutional: Negative for appetite change, fatigue, fever and unexpected weight change. HENT: Negative for congestion, rhinorrhea and sore throat. Eyes: Negative. Negative for photophobia and visual disturbance. Respiratory: Negative for shortness of breath and wheezing. Cardiovascular: Negative for chest pain. Gastrointestinal: Negative for abdominal pain, nausea and vomiting. Endocrine: Negative. Negative for polydipsia, polyphagia and polyuria. Genitourinary: Negative for difficulty urinating, dysuria and pelvic pain. Musculoskeletal: Positive for arthralgias and gait problem. Negative for back pain. Skin: Negative. Negative for rash. Allergic/Immunologic: Negative. Neurological: Positive for weakness. Negative for dizziness and speech difficulty. Hematological: Negative. Psychiatric/Behavioral: Negative. Negative for behavioral problems and confusion. OBJECTIVE  PHYSICAL EXAM: /67   Pulse 90   Temp 98.8 °F (37.1 °C)   Resp 16   Ht 5' 1\" (1.549 m)   Wt 141 lb (64 kg)   LMP 06/01/2022   SpO2 100%   BMI 26.64 kg/m²     Physical Exam  Vitals and nursing note reviewed. Constitutional:       General: She is not in acute distress. Appearance: She is well-developed. She is not ill-appearing or toxic-appearing. HENT:      Head: Normocephalic and atraumatic. Nose: Nose normal.      Mouth/Throat:      Mouth: Mucous membranes are dry.    Eyes:      Pupils: Pupils are equal, round, and reactive to light. Cardiovascular:      Rate and Rhythm: Normal rate and regular rhythm. Pulses: Normal pulses. Heart sounds: Normal heart sounds. Pulmonary:      Effort: Pulmonary effort is normal. No respiratory distress. Breath sounds: Normal breath sounds. No wheezing or rales. Abdominal:      General: Bowel sounds are normal. There is no distension. Palpations: Abdomen is soft. There is no mass. Tenderness: There is no abdominal tenderness. There is no guarding or rebound. Hernia: No hernia is present. Musculoskeletal:         General: Normal range of motion. Cervical back: Normal range of motion. Right lower le+ Edema present. Left lower le+ Edema present. Skin:     General: Skin is warm and dry. Capillary Refill: Capillary refill takes less than 2 seconds. Neurological:      Mental Status: She is alert and oriented to person, place, and time. Psychiatric:         Mood and Affect: Mood normal.           DATA:     Diagnostic tests reviewed for today's visit:    Most recent labs and imaging results reviewed.      LABS:    Recent Results (from the past 24 hour(s))   Comprehensive Metabolic Panel    Collection Time: 22  4:30 PM   Result Value Ref Range    Sodium 141 135 - 144 mEq/L    Potassium 3.7 3.4 - 4.9 mEq/L    Chloride 102 95 - 107 mEq/L    CO2 26 20 - 31 mEq/L    Anion Gap 13 9 - 15 mEq/L    Glucose 115 (H) 70 - 99 mg/dL    BUN 28 (H) 6 - 20 mg/dL    CREATININE 0.66 0.50 - 0.90 mg/dL    GFR Non-African American >60.0 >60    GFR  >60.0 >60    Calcium 9.1 8.5 - 9.9 mg/dL    Total Protein 7.1 6.3 - 8.0 g/dL    Albumin 3.5 3.5 - 4.6 g/dL    Total Bilirubin 0.4 0.2 - 0.7 mg/dL    Alkaline Phosphatase 56 40 - 130 U/L    ALT 90 (H) 0 - 33 U/L     (H) 0 - 35 U/L    Globulin 3.6 (H) 2.3 - 3.5 g/dL   CBC with Auto Differential    Collection Time: 22  4:30 PM   Result Value Ref Range    WBC 14.9 (H) 4.8 - 10.8 K/uL    RBC 3.99 (L) 4.20 - 5.40 M/uL    Hemoglobin 10.9 (L) 12.0 - 16.0 g/dL    Hematocrit 34.1 (L) 37.0 - 47.0 %    MCV 85.5 82.0 - 100.0 fL    MCH 27.2 27.0 - 31.3 pg    MCHC 31.9 (L) 33.0 - 37.0 %    RDW 17.0 (H) 11.5 - 14.5 %    Platelets 667 711 - 695 K/uL    Neutrophils % 84.8 %    Lymphocytes % 5.1 %    Monocytes % 9.1 %    Eosinophils % 0.7 %    Basophils % 0.3 %    Neutrophils Absolute 12.6 (H) 1.4 - 6.5 K/uL    Lymphocytes Absolute 0.8 (L) 1.0 - 4.8 K/uL    Monocytes Absolute 1.3 (H) 0.2 - 0.8 K/uL    Eosinophils Absolute 0.1 0.0 - 0.7 K/uL    Basophils Absolute 0.0 0.0 - 0.2 K/uL   Magnesium    Collection Time: 06/18/22  4:30 PM   Result Value Ref Range    Magnesium 2.3 1.7 - 2.4 mg/dL   Troponin    Collection Time: 06/18/22  4:30 PM   Result Value Ref Range    Troponin <0.010 0.000 - 0.010 ng/mL   Urinalysis with Reflex to Culture    Collection Time: 06/18/22  5:13 PM    Specimen: Urine   Result Value Ref Range    Color, UA Yellow Straw/Yellow    Clarity, UA TURBID (A) Clear    Glucose, Ur Negative Negative mg/dL    Bilirubin Urine Negative Negative    Ketones, Urine 15 (A) Negative mg/dL    Specific Gravity, UA 1.040 1.005 - 1.030    Blood, Urine LARGE (A) Negative    pH, UA 6.0 5.0 - 9.0    Protein,  (A) Negative mg/dL    Urobilinogen, Urine 1.0 <2.0 E.U./dL    Nitrite, Urine POSITIVE (A) Negative    Leukocyte Esterase, Urine SMALL (A) Negative    Urine Reflex to Culture Yes    Microscopic Urinalysis    Collection Time: 06/18/22  5:13 PM   Result Value Ref Range    Hyaline Casts, UA 1-3 0 - 5 /LPF    RBC, UA 10-20 (A) 0 - 2 /HPF    Bacteria, UA MODERATE (A) Negative /HPF    WBC, UA  (A) 0 - 5 /HPF    Epithelial Cells, UA >100 (H) 0 - 5 /HPF       IMAGING:   No results found. VTE Prophylaxis: low molecular weight heparin -  start     ASSESSMENT AND PLAN    Principal Problem:  MS: Bilateral hip pain and weakness resulting in fall and difficulty ambulating.   XR femur, C-spine, and hip negative. Improved pain relief with 1 dose Solu-Medrol 1 g IV in ED. We will consult neuro. We will continue Solu-Medrol 1 g IV. We will get PT OT    Elevated LFTs: ALT 90, . No abdominal pain or tenderness. No history of liver disease. We will monitor with CMP at this time. We will get acute hepatitis panel, INR, Tylenol level, salicylate level, US abdomen, GTT, and LDH. If needed, we will consult GI    Leukocytosis: WBCs 14.9. Patient afebrile. Initial UA in ED showed  WBCs, positive nitrites but greater than 100 epithelial cells. Patient asymptomatic for UTI symptoms. Leukocytosis likely reactive to MS flare. We will continue treating possible UTI with IV rocephin. We will repeat UA, send culture based on new UA, we will monitor CBC daily.       Plan of care discussed with: patient    SIGNATURE: JACKI Agee CNP  DATE: June 18, 2022  TIME: 10:33 PM     Dr. Jenny Manning DO- supervising physician

## 2022-06-20 ENCOUNTER — APPOINTMENT (OUTPATIENT)
Dept: ULTRASOUND IMAGING | Age: 42
DRG: 043 | End: 2022-06-20
Payer: COMMERCIAL

## 2022-06-20 ENCOUNTER — APPOINTMENT (OUTPATIENT)
Dept: MRI IMAGING | Age: 42
DRG: 043 | End: 2022-06-20
Payer: COMMERCIAL

## 2022-06-20 PROBLEM — R73.9 HYPERGLYCEMIA: Status: ACTIVE | Noted: 2022-06-20

## 2022-06-20 PROBLEM — N30.00 ACUTE CYSTITIS: Status: ACTIVE | Noted: 2022-06-20

## 2022-06-20 PROBLEM — Z78.9 IMPAIRED MOBILITY AND ACTIVITIES OF DAILY LIVING: Status: ACTIVE | Noted: 2022-06-20

## 2022-06-20 PROBLEM — N31.9 NEUROGENIC BLADDER: Status: ACTIVE | Noted: 2022-06-20

## 2022-06-20 PROBLEM — Z74.09 IMPAIRED MOBILITY AND ACTIVITIES OF DAILY LIVING: Status: ACTIVE | Noted: 2022-06-20

## 2022-06-20 LAB
ANION GAP SERPL CALCULATED.3IONS-SCNC: 12 MEQ/L (ref 9–15)
BASOPHILS ABSOLUTE: 0 K/UL (ref 0–0.2)
BASOPHILS RELATIVE PERCENT: 0 %
BUN BLDV-MCNC: 20 MG/DL (ref 6–20)
CALCIUM SERPL-MCNC: 8.5 MG/DL (ref 8.5–9.9)
CHLORIDE BLD-SCNC: 107 MEQ/L (ref 95–107)
CO2: 24 MEQ/L (ref 20–31)
CREAT SERPL-MCNC: 0.59 MG/DL (ref 0.5–0.9)
EOSINOPHILS ABSOLUTE: 0 K/UL (ref 0–0.7)
EOSINOPHILS RELATIVE PERCENT: 0 %
GFR AFRICAN AMERICAN: >60
GFR NON-AFRICAN AMERICAN: >60
GLUCOSE BLD-MCNC: 196 MG/DL (ref 70–99)
HCT VFR BLD CALC: 30.6 % (ref 37–47)
HEMOGLOBIN: 9.7 G/DL (ref 12–16)
LYMPHOCYTES ABSOLUTE: 0.3 K/UL (ref 1–4.8)
LYMPHOCYTES RELATIVE PERCENT: 2.6 %
MCH RBC QN AUTO: 27.4 PG (ref 27–31.3)
MCHC RBC AUTO-ENTMCNC: 31.8 % (ref 33–37)
MCV RBC AUTO: 86.2 FL (ref 82–100)
MONOCYTES ABSOLUTE: 0.4 K/UL (ref 0.2–0.8)
MONOCYTES RELATIVE PERCENT: 3.2 %
NEUTROPHILS ABSOLUTE: 10.8 K/UL (ref 1.4–6.5)
NEUTROPHILS RELATIVE PERCENT: 94.2 %
ORGANISM: ABNORMAL
ORGANISM: ABNORMAL
PDW BLD-RTO: 16.3 % (ref 11.5–14.5)
PLATELET # BLD: 239 K/UL (ref 130–400)
POTASSIUM REFLEX MAGNESIUM: 4.3 MEQ/L (ref 3.4–4.9)
RBC # BLD: 3.55 M/UL (ref 4.2–5.4)
SODIUM BLD-SCNC: 143 MEQ/L (ref 135–144)
TOTAL CK: 2304 U/L (ref 0–170)
TOTAL CK: 2401 U/L (ref 0–170)
URINE CULTURE, ROUTINE: ABNORMAL
WBC # BLD: 11.5 K/UL (ref 4.8–10.8)

## 2022-06-20 PROCEDURE — 97162 PT EVAL MOD COMPLEX 30 MIN: CPT

## 2022-06-20 PROCEDURE — 2580000003 HC RX 258: Performed by: INTERNAL MEDICINE

## 2022-06-20 PROCEDURE — 2580000003 HC RX 258: Performed by: NURSE PRACTITIONER

## 2022-06-20 PROCEDURE — APPSS30 APP SPLIT SHARED TIME 16-30 MINUTES: Performed by: NURSE PRACTITIONER

## 2022-06-20 PROCEDURE — 6370000000 HC RX 637 (ALT 250 FOR IP): Performed by: NURSE PRACTITIONER

## 2022-06-20 PROCEDURE — 93970 EXTREMITY STUDY: CPT

## 2022-06-20 PROCEDURE — 72148 MRI LUMBAR SPINE W/O DYE: CPT

## 2022-06-20 PROCEDURE — 82550 ASSAY OF CK (CPK): CPT

## 2022-06-20 PROCEDURE — 85025 COMPLETE CBC W/AUTO DIFF WBC: CPT

## 2022-06-20 PROCEDURE — 99221 1ST HOSP IP/OBS SF/LOW 40: CPT | Performed by: PHYSICAL MEDICINE & REHABILITATION

## 2022-06-20 PROCEDURE — 99233 SBSQ HOSP IP/OBS HIGH 50: CPT | Performed by: PSYCHIATRY & NEUROLOGY

## 2022-06-20 PROCEDURE — 1210000000 HC MED SURG R&B

## 2022-06-20 PROCEDURE — 97166 OT EVAL MOD COMPLEX 45 MIN: CPT

## 2022-06-20 PROCEDURE — 6360000002 HC RX W HCPCS: Performed by: NURSE PRACTITIONER

## 2022-06-20 PROCEDURE — 80048 BASIC METABOLIC PNL TOTAL CA: CPT

## 2022-06-20 PROCEDURE — 36415 COLL VENOUS BLD VENIPUNCTURE: CPT

## 2022-06-20 PROCEDURE — 76705 ECHO EXAM OF ABDOMEN: CPT

## 2022-06-20 PROCEDURE — 6360000002 HC RX W HCPCS: Performed by: INTERNAL MEDICINE

## 2022-06-20 RX ORDER — SODIUM CHLORIDE 9 MG/ML
INJECTION, SOLUTION INTRAVENOUS CONTINUOUS
Status: DISCONTINUED | OUTPATIENT
Start: 2022-06-20 | End: 2022-06-24

## 2022-06-20 RX ADMIN — THERA TABS 1 TABLET: TAB at 11:40

## 2022-06-20 RX ADMIN — CEFTRIAXONE SODIUM 1000 MG: 1 INJECTION, POWDER, FOR SOLUTION INTRAMUSCULAR; INTRAVENOUS at 01:40

## 2022-06-20 RX ADMIN — SODIUM CHLORIDE: 9 INJECTION, SOLUTION INTRAVENOUS at 13:17

## 2022-06-20 RX ADMIN — ENOXAPARIN SODIUM 40 MG: 100 INJECTION SUBCUTANEOUS at 11:41

## 2022-06-20 RX ADMIN — Medication 1000 UNITS: at 11:40

## 2022-06-20 RX ADMIN — SODIUM CHLORIDE 1000 MG: 9 INJECTION, SOLUTION INTRAVENOUS at 22:10

## 2022-06-20 RX ADMIN — SODIUM CHLORIDE: 9 INJECTION, SOLUTION INTRAVENOUS at 22:05

## 2022-06-20 ASSESSMENT — ENCOUNTER SYMPTOMS
COUGH: 0
NAUSEA: 0
ABDOMINAL PAIN: 0
CHEST TIGHTNESS: 0
BACK PAIN: 0
WHEEZING: 0
SHORTNESS OF BREATH: 0
TROUBLE SWALLOWING: 0
BOWEL INCONTINENCE: 0
VOMITING: 0
COLOR CHANGE: 0

## 2022-06-20 ASSESSMENT — PAIN SCALES - GENERAL: PAINLEVEL_OUTOF10: 0

## 2022-06-20 NOTE — PROGRESS NOTES
Physical Therapy Med Surg Initial Assessment  Facility/Department: [facility]  Room: [place] [address]-       NAME: Chandu Andres  : 1980 (57 y.o.)  MRN: [id number]  CODE STATUS: Full Code    Date of Service: 2022    Patient Diagnosis(es): Neck pain [M54.2]  Hip pain [M25.559]  MS (multiple sclerosis) ([facility] [G35]  General weakness [R53.1]  Unable to ambulate [R26.2]  Fall, initial encounter [W19. XXXA]  Pain of right lower extremity [M79.604]  Urinary tract infection with hematuria, site unspecified [N39.0, R31.9]   Chief Complaint   Patient presents with    Hip Pain     Patient Active Problem List    Diagnosis Date Noted    Impaired mobility and activities of daily living 2022    Hyperglycemia 2022    Neurogenic bladder 2022    Acute cystitis 2022    MS (multiple sclerosis) ([facility] 2022    Urinary urgency 2022    Multiple sclerosis ([facility] 2021    Foot drop, left foot 2020    Acquired deformity of left foot 2020    Lumbar radiculopathy 2020    Pain in joint, lower leg 2020    Posterior tibial tendon dysfunction     Pseudophakia 10/29/2019    Ataxic gait 2019    Mixed hyperlipidemia 2015    Obesity, unspecified 2005        Past Medical History:   Diagnosis Date    Dislocated knee     Right knee    PTTD (posterior tibial tendon dysfunction)      Past Surgical History:   Procedure Laterality Date    CATARACT REMOVAL WITH IMPLANT Right     CATARACT REMOVAL WITH IMPLANT Left      SECTION  2013    EYE SURGERY      FOOT SURGERY Left        Chart Reviewed: Yes  Patient assessed for rehabilitation services?: Yes  Family / Caregiver Present: Yes (mother)    Restrictions:  Restrictions/Precautions: Fall Risk     SUBJECTIVE:   Subjective: \" I'm doing good\"    Pain  Pain: No pre/post pain to report       Prior Level of Function:  Social/Functional History  Lives With: Son (5 y.o)  Type of Home: Apartment  Home Layout: One level  Home Access: Stairs to enter with rails  Entrance Stairs - Number of Steps: 1  Bathroom Shower/Tub: Tub/Shower unit  Bathroom Equipment: Shower chair,Hand-held shower  Home Equipment: judd Oconnell,Wheelchair-manual  Has the patient had two or more falls in the past year or any fall with injury in the past year?: Yes (one - fell on mothers floor and couldn't get up)  ADL Assistance: Independent  Homemaking Assistance: Independent  Homemaking Responsibilities: Yes  Ambulation Assistance: Independent  Transfer Assistance: Independent  Active : No  Patient's  Info: mother    OBJECTIVE:   Vision  Vision: Impaired  Vision Exceptions: Wears glasses for reading  Hearing: Within functional limits    Cognition:  Overall Orientation Status: Within Normal Limits  Orientation Level: Oriented to place,Oriented to person,Oriented to situation  Follows Commands: Within Functional Limits  Overall Cognitive Status: WFL    Observation/Palpation  Posture: Fair  Observation: patient supine with HOB elevated; plesant and agreeable to evaluation  Edema: bilateral LE    ROM:  RLE AROM: WFL  RLE General AROM: patient able to perfom mobility half-way through ROM  LLE AROM : WFL  LLE General AROM: patient able to perfom mobility half-way through ROM  AROM: Generally decreased, functional  PROM: Generally decreased, functional    Strength:  Strength RLE  R Hip Flexion: 3/5  R Knee Flexion: 3/5  R Knee Extension: 3+/5  R Ankle Dorsiflexion: 3-/5  R Ankle Plantar flexion: 3-/5  Strength LLE  L Hip Flexion: 3/5  L Knee Flexion: 3/5  L Knee Extension: 3+/5  L Ankle Dorsiflexion: 3-/5  L Ankle Plantar Flexion: 3/5  Strength Other  Other: attempted marching in standing at [address] - patient demonstrated increased difficulty due to strength and endurance  Strength: Grossly decreased, non-functional    Neuro:  Balance  Posture: Fair  Sitting - Static: Good  Sitting - Dynamic: Good;-  Standing - Static: Fair  Standing - Dynamic: Poor  Comments: patient demonstrated decresed trunk control while donning shoes and socks                      Tone: Normal  Coordination: Generally decreased, functional    Sensation: Intact (intact to light touch)    Bed mobility  Supine to Sit: Stand by assistance  Sit to Supine: Stand by assistance  Scooting: Stand by assistance    Transfers  Sit to Stand: Contact guard assistance;Minimal Assistance  Stand to sit: Contact guard assistance  Comment: Patient required increased time to complete movement, needed verbal cuing on proper hand placement when transitioning from sit to stand with walker    Ambulation  Comments: not assess for safety concerns                   Activity Tolerance  Activity Tolerance: Patient tolerated treatment well;Patient limited by fatigue;Patient limited by endurance    Patient Education  Education Given To: Patient; Family  Education Provided: Role of Therapy;Plan of Care  Education Method: Verbal  Barriers to Learning: None  Education Outcome: Verbalized understanding       ASSESSMENT:   Body Structures, Functions, Activity Limitations Requiring Skilled Therapeutic Intervention: Decreased functional mobility ; Decreased ROM; Decreased body mechanics; Decreased strength;Decreased endurance;Decreased balance  Decision Making: Medium Complexity  History: high  Exam: med  Clinical Presentation: med    Therapy Prognosis: Good  Barriers to Learning: none         DISCHARGE RECOMMENDATIONS:  Discharge Recommendations: Continue to assess pending progress,Patient would benefit from continued therapy after discharge    Assessment: Patient presented with decreased strength and ROM as demonstrated by MMT. She was able to complete bed mobility and sit <> stand with CGA. She required increased time to complete tasks due to LE strength and balance deficits. Ambulation was not attempted at this time due to patient safety.  Therapy is indicated to increase stated deficits to return to PLOF to increase QOL. Requires PT Follow-Up: Yes      PLAN OF CARE:  Plan  Plan: 1 time a day 3-6 times a week  Current Treatment Recommendations: Strengthening,ROM,Balance training,Functional mobility training,Transfer training,Endurance training,Gait training,Stair training,Neuromuscular re-education,Home exercise program,Safety education & training,Patient/Caregiver education & training    Safety Devices  Type of Devices: All fall risk precautions in place,Call light within reach,Left in bed,Bed alarm in place  Restraints  Restraints Initially in Place: No    Goals:  Long Term Goals  Long term goal 1: patient will be able to complete bed mobility independently with LRD  Long term goal 2: Patient will be able to complete transfers independently with LRD  Long term goal 3: Patient will be able to march in place x1 min  Long term goal 4: Patient will be able to complete 1 step independently with LRD    AMPAC (6 CLICK) BASIC MOBILITY  AM-PAC Inpatient Mobility Raw Score : 15     Therapy Time:   Individual   Time In 1322   Time Out 1341   Minutes [address] [place] [address], [address] [place] [address], 06/20/22 at 2:19 PM         Definitions for assistance levels  Independent = pt does not require any physical supervision or assistance from another person for activity completion. Device may be needed.   Stand by assistance = pt requires verbal cues or instructions from another person, close to but not touching, to perform the activity  Minimal assistance= pt performs 75% or more of the activity; assistance is required to complete the activity  Moderate assistance= pt performs 50% of the activity; assistance is required to complete the activity  Maximal assistance = pt performs 25% of the activity; assistance is required to complete the activity  Dependent = pt requires total physical assistance to accomplish the task

## 2022-06-20 NOTE — PROGRESS NOTES
[facility] Neurology Daily Progress Note  Name: Alonzo Hamilton  Age: 35 y.o. Gender: female  CodeStatus: Full Code  Allergies: No Known Allergies    Chief Complaint:Hip Pain    Primary Care Provider: Vaishali Schmidt PA-C  InpatientTreatment Team: Treatment Team: Attending Provider: Sid Gant MD; Consulting Physician: Allison Mann MD; Patient Care Tech: Tien BRITO Magda; : Briana Traylor RN; Registered Nurse: Yaneth Contreras RN; Utilization Reviewer: Satya Alexander RN; Patient Care Tech: Wilmer Southdivyairina; Registered Nurse: Elias Lemus RN  Admission Date: 6/18/2022      HPI   Pt seen and examined for neuro follow up for MS exacerbation. Patient is currently alert and oriented x3, no acute distress, cooperative. Mother is at bedside. Patient came in for generalized weakness. She is nonambulatory at baseline. Has significant lower extremity weakness left greater than right with left foot drop. Patient has swelling of the left lower extremity. Negative for DVT. Afebrile. Legs better with steroids , back pain    Vitals:    06/20/22 0748   BP: (!) 181/71   Pulse: 90   Resp: 18   Temp: 97.5 °F (36.4 °C)   SpO2: 100%      Review of Systems   Constitutional: Negative for fatigue and fever. HENT: Negative for hearing loss and trouble swallowing. Eyes: Negative for visual disturbance. Respiratory: Negative for cough, chest tightness, shortness of breath and wheezing. Cardiovascular: Positive for leg swelling. Negative for chest pain and palpitations. Gastrointestinal: Negative for nausea and vomiting. Musculoskeletal: Positive for gait problem. Negative for neck pain and neck stiffness. Skin: Negative for color change and rash. Neurological: Positive for weakness. Negative for dizziness, tremors, seizures, syncope, facial asymmetry, speech difficulty, light-headedness, numbness and headaches.    Psychiatric/Behavioral: Negative for agitation, confusion and hallucinations. The patient is not nervous/anxious. Physical Exam  Vitals and nursing note reviewed. Constitutional:       General: She is not in acute distress. Appearance: She is not diaphoretic. HENT:      Head: Normocephalic. Eyes:      General: No visual field deficit. Extraocular Movements: Extraocular movements intact. Pupils: Pupils are equal, round, and reactive to light. Cardiovascular:      Rate and Rhythm: Normal rate and regular rhythm. Pulmonary:      Effort: Pulmonary effort is normal. No respiratory distress. Breath sounds: Normal breath sounds. Abdominal:      General: Bowel sounds are normal. There is no distension. Palpations: Abdomen is soft. Tenderness: There is no abdominal tenderness. Skin:     General: Skin is warm and dry. Neurological:      Mental Status: She is alert and oriented to person, place, and time. Cranial Nerves: No cranial nerve deficit, dysarthria or facial asymmetry. Motor: Weakness, atrophy and abnormal muscle tone present. No tremor, seizure activity or pronator drift. Coordination: Coordination normal. Finger-Nose-Finger Test normal.      Deep Tendon Reflexes: Reflexes abnormal.      Reflex Scores:       Patellar reflexes are 3+ on the right side and 3+ on the left side. Achilles reflexes are 3+ on the right side and 3+ on the left side.       As noted above         Medications:  Reviewed    Infusion Medications:    Scheduled Medications:    cefTRIAXone (ROCEPHIN) IV  1,000 mg IntraVENous Q24H    Vitamin D  1,000 Units Oral Daily    multivitamin  1 tablet Oral Daily    enoxaparin  40 mg SubCUTAneous Daily    methylPREDNISolone  1,000 mg IntraVENous Daily     PRN Meds:     Labs:   Recent Labs     06/18/22  1630 06/19/22  1102 06/20/22  0521   WBC 14.9* 10.5 11.5*   HGB 10.9* 9.8* 9.7*   HCT 34.1* 30.9* 30.6*    224 239     Recent Labs     06/18/22  1630 06/19/22  1102 06/20/22  0521    138 143 K 3.7 3.8 4.3    102 107   CO2 26 27 24   BUN 28* 24* 20   CREATININE 0.66 0.57 0.59   CALCIUM 9.1 8.5 8.5     Recent Labs     06/18/22  1630 06/19/22  1102   * 116*   ALT 90* 72*   BILIDIR  --  <0.2   BILITOT 0.4 <0.2   ALKPHOS 56 47     Recent Labs     06/19/22  1102   INR 1.2     Recent Labs     06/18/22  1630 06/19/22  1943   CKTOTAL  --  4,188*   TROPONINI <0.010  --        Urinalysis:   Lab Results   Component Value Date    NITRU Negative 06/19/2022    WBCUA  06/18/2022    BACTERIA MODERATE 06/18/2022    RBCUA 10-20 06/18/2022    BLOODU Negative 06/19/2022    SPECGRAV 1.038 06/19/2022    GLUCOSEU 500 06/19/2022       Radiology:   Most recent    EEG No valid procedures specified. MRI of Brain Results for orders placed during the hospital encounter of 01/20/22    MRI BRAIN W WO CONTRAST    Narrative  EXAMINATION: MRI BRAIN W WO CONTRAST    CLINICAL HISTORY: G35 MS (multiple sclerosis) ([facility] [address] ICD10    COMPARISONS: Brain MRI from October 16, 2020    TECHNIQUE:    Multiplanar multisequence images of the brain were obtained before and after IV administration of contrast. Diffusion perfusion imaging was obtained. BRAIN MRI FINDINGS:    There are no extra-axial collections. There is no evidence of hemorrhage. There are no areas of perfusion diffusion signal abnormality to suggest ischemia. The susceptibility images do not demonstrate evidence of hemosiderin deposition within the brain  parenchyma or the leptomeninges. There is preservation of the gray-white matter differentiation. There are marked areas of T2/FLAIR increased signal in the subcortical and periventricular white matter of both hemispheres with areas of confluence. There is involvement of corpus callosum  and there are areas of signal dropout on T1 imaging. There are no areas of abnormal enhancement after IV contrast administration.  There is prominence of the sulci and ventricles consistent with moderate global cerebral atrophy and chronic involutional changes out of proportion to the patient's age. The midline structures are intact, the corpus callosum is within normal limits. The region of the pineal gland and the sella turcica are unremarkable. There are no space-occupying lesions in the posterior fossa. The basilar cisterns are patent. The craniocervical junction is unremarkable. The visualized portions of the orbits are within normal limits, the globes are intact. The visualized portions of the paranasal sinuses are within normal limits. The calvarium and soft tissues are unremarkable. Impression  There are extensive areas of signal abnormality in the subcortical and periventricular white matter and involving the corpus callosum without enhancement to suggest active inflammation. The findings are similar to the previous study and may reflect  severe chronic microangiopathy, vasculitis, MS or other demyelinating process. Results for orders placed during the hospital encounter of 10/16/20    MRI BRAIN WO CONTRAST    Narrative  EXAMINATION: MRI BRAIN WO CONTRAST, MRI CERVICAL SPINE WO CONTRAST    CLINICAL HISTORY: R26.0 Ataxic gait ICD10    COMPARISONS: Brain CT from August 29, 2014    TECHNIQUE:    Multiplanar multisequence images of the brain were obtained without contrast. Diffusion perfusion imaging was obtained. FINDINGS:    There are no extra-axial collections. There is no evidence of hemorrhage. There are no areas of signal abnormality on perfusion diffusion imaging to suggest ischemia. The susceptibility images do not demonstrate evidence of hemosiderin deposition within  the brain parenchyma or the leptomeninges. There is preservation of the gray-white matter differentiation. There are extensive areas of T2/FLAIR signal abnormality in the subcortical and periventricular white matter in the confluent distribution especially surrounding the atria of both lateral  ventricles.  Some lesions are oriented perpendicular to the corpus callosum and there are other scattered discontiguous subcortical lesions. There are no white matter lesions in the posterior fossa. There is prominence of the sulci and ventricles  consistent with moderate global cerebral atrophy and chronic involutional changes. The midline structures are intact, the corpus callosum is within normal limits. The region of the pineal gland and the sella turcica are unremarkable. There are no space-occupying lesions in the posterior fossa. The basilar cisterns are patent. The craniocervical junction is unremarkable. The visualized portions of the orbits are within normal limits, the globes are intact. The visualized portions of the paranasal sinuses are within normal limits. The calvarium and soft tissues are unremarkable. Impression  1. There are no acute intracranial changes, there is no evidence of hemorrhage or acute ischemia. 2. There are extensive areas of T2/FLAIR signal abnormality in the periventricular and subcortical white matter with a confluent distribution. Some lesions are oriented perpendicular to the corpus callosum. The findings are nonspecific but may be  associated with a demyelinating process such as multiple sclerosis versus chronic microangiopathy, vasculitis or other demyelinating process. EXAMINATION: MRI BRAIN WO CONTRAST, MRI CERVICAL SPINE WO CONTRAST    CLINICAL HISTORY: R26.0 Ataxic gait ICD10    COMPARISONS: NONE AVAILABLE    TECHNIQUE:    Multiplanar multisequence MRI images of the cervical spine were obtained without contrast.    FINDINGS:        There is no acute fracture. There is preservation of the vertebral body heights. There is intervertebral disc desiccation and disc space narrowing at every level. The bone marrow signal is within normal limits. The cord is normal in course and caliber.  There are areas of signal abnormality noted within the cord to the right side at C3-4, also on the right side at C4, and on the left at C5-6. These may represent foci of demyelination. There is no prevertebral soft tissue swelling. Anterior soft tissues are unremarkable. The craniocervical junction is unremarkable. C2-3: There is a  indenting the anterior thecal sac but not abutting the cord with mild central canal narrowing. The facet joints are unremarkable. There is no narrowing of the neural foramina. C3-4:There is a less than 2 mm disc bulge flattening the anterior portion of the thecal sac without narrowing of the central canal.  The facet joints are unremarkable. There is no narrowing of the neural foramina. C4-5:There is a less than 2 mm disc bulge flattening the anterior portion of the thecal sac without narrowing of the central canal.  The facet joints are unremarkable. There is no narrowing of the neural foramina. C5-6:There is no disc herniation or central canal narrowing. The facet joints are unremarkable. There is no narrowing of the neural foramina. C6-7:There is no disc herniation or central canal narrowing. The facet joints are unremarkable. There is no narrowing of the neural foramina. C7-T1:There is no disc herniation or central canal narrowing. The facet joints are unremarkable. There is no narrowing of the neural foramina. IMPRESSION:  1. There is no acute fracture or subluxation. 2. The cord is normal in course and caliber. There are foci of signal abnormality most likely between C3 and C6 on both the right and left sides of the cord which may represent areas of demyelination. The findings may be secondary to edema and process  such as multiple sclerosis versus prior ischemia or other etiologies. 3. There is mild multilevel spondylosis including intervertebral disc space narrowing at every level and very degrees of disc bulges as described in greater detail in each level above.                             MRA of the Head and Neck: No results found for this or any previous visit. No results found for this or any previous visit. No results found for this or any previous visit. CT of the Head: No results found for this or any previous visit. No results found for this or any previous visit. No results found for this or any previous visit. Carotid duplex: No results found for this or any previous visit. No results found for this or any previous visit. No results found for this or any previous visit. Echo No results found for this or any previous visit. Assessment/Plan:    6/19/22:  Multiple sclerosis with exacerbation with added hip pain and lower extremity weakness. Given that she has this going on for a while an MRI of the lumbosacral spine is recommended. She has extensive white matter disease and recently was started on Maxene Boning by me and she has received about 4 doses which is monthly and a subcutaneous injection of B-cell line drug. Patient prior to this was on Aubagio. Her EDSS score remains around 4-5 and does fluctuate. During exacerbation she is worse. Her last exacerbation was in April and we have treated her with methylprednisone. I do not see any acute changes to consider an MRI at this time of the brain as the management for now is unlikely to change unless in the future if she does not respond to Maxene Boning     Patient may be transferred to rehabilitation with 3 days of methylprednisone for now. Urine examination is noted. We will arrange for other tests rule out arthritis as well. This consultation includes my consultation with emergency room. 6/20/22:  Multiple sclerosis with exacerbation. Continue IV methylprednisolone 1 g daily until 6/23/2022. Elevated CK level of 4188, repeat. Patient is status post fall and her and her mother state she was on the floor for a couple of days. Recommend hydrate with IV fluids and monitor trend. Will repeat CK level. TSH 0.253 with a normal free T4. CRP elevated at 143.5. MRI of the lumbar spine remains pending. Transaminitis, improving. Left upper quadrant ultrasound shows sludge within the gallbladder with no signs of cholelithiasis or bile duct dilation. Will discuss with Dr. Heron Grace if elevated LFTs could be secondary to her Kesimpta. LFTs were normal on 4/27/2022. Urinary tract infection, urine culture positive for only 10-50,000 gram-negative rods. Patient continues on IV ceftriaxone. Will place [facility] rehab consult and PT OT. I have personally performed a face to face diagnostic evaluation on this patient, reviewed all data and investigations, and am the sole provider of all clinical decisions on the neurological status of this patient. as noted above, legs better, [place] may be causing LFT issues, with  watch for now and consider qcrevus , MRI LS spine pending      Facundoender Grace MD, [address], American Board of Psychiatry & Neurology  Board Certified in Vascular Neurology  Board Certified in Neuromuscular Medicine  Certified in Neurorehabilitation            Collaborating physicians: Dr Heron Grace    Electronically signed by JACKI Miguel CNP on 6/20/2022 at 11:47 AM

## 2022-06-20 NOTE — CARE COORDINATION
This LSW met with patient and mom at bedside this am. Discharge plans discussed. Patient is anticipating discharge tomorrow, 6/21/2022. Patient will go to her mom's home for 1 week and then will return to her Apt. Patient is requesting [facility] upon discharge. I have given referral to Lauri Liang at [facility] today- patient's mom address and phone # given. MARIIW / CM to follow.   Electronically signed by MEL Newell, GINI on 6/20/22 at 11:43 AM EDT

## 2022-06-20 NOTE — PROGRESS NOTES
Hospitalist Progress Note      Date of Admission: 6/18/2022  Chief Complaint:    Chief Complaint   Patient presents with    Hip Pain     Subjective:  No new complaints. No nausea, vomiting, chest pain, or headache      Medications:    Infusion Medications    sodium chloride 125 mL/hr at 06/20/22 1317     Scheduled Medications    cefTRIAXone (ROCEPHIN) IV  1,000 mg IntraVENous Q24H    Vitamin D  1,000 Units Oral Daily    multivitamin  1 tablet Oral Daily    enoxaparin  40 mg SubCUTAneous Daily    methylPREDNISolone  1,000 mg IntraVENous Daily     PRN Meds:     Intake/Output Summary (Last 24 hours) at 6/20/2022 1450  Last data filed at 6/20/2022 0550  Gross per 24 hour   Intake 774 ml   Output 700 ml   Net 74 ml     Exam:  BP (!) 181/71   Pulse 90   Temp 97.5 °F (36.4 °C) (Oral)   Resp 18   Ht 5' 1\" (1.549 m)   Wt 141 lb (64 kg)   LMP 06/01/2022   SpO2 100%   BMI 26.64 kg/m²   Head: Normocephalic, atraumatic  Sclera clear  Neck JVD flat  Lungs: normal effort of breathing    Labs:   Recent Labs     06/18/22  1630 06/19/22  1102 06/20/22  0521   WBC 14.9* 10.5 11.5*   HGB 10.9* 9.8* 9.7*   HCT 34.1* 30.9* 30.6*    224 239     Recent Labs     06/18/22  1630 06/19/22  1102 06/20/22  0521    138 143   K 3.7 3.8 4.3    102 107   CO2 26 27 24   BUN 28* 24* 20   CREATININE 0.66 0.57 0.59   CALCIUM 9.1 8.5 8.5   * 116*  --    ALT 90* 72*  --    BILIDIR  --  <0.2  --    BILITOT 0.4 <0.2  --    ALKPHOS 56 47  --      Recent Labs     06/19/22  1102   INR 1.2     Recent Labs     06/18/22  1630 06/19/22  1943 06/20/22  [facility]  --  4,188* 2,304*   TROPONINI <0.010  --   --      Radiology:  US DUP LOWER EXTREMITIES BILATERAL VENOUS   Final Result   NO SONOGRAPHIC EVIDENCE OF DEEP VENOUS THROMBOSIS WITHIN THE BILATERAL LOWER EXTREMITIES. US ABDOMEN LIMITED Specify organ? LIVER   Final Result      NO SIGN OF CHOLELITHIASIS OR BILE DUCT DILATATION.       SLUDGE WITHIN THE GALLBLADDER. NO DEFINITE FOCAL LIVER ABNORMALITY. XR CERVICAL SPINE (4-5 VIEWS)   Final Result   NEGATIVE CERVICAL SPINE      XR HIP LEFT (2-3 VIEWS)   Final Result   NEGATIVE PELVIS AND LEFT HIP       XR FEMUR RIGHT (MIN 2 VIEWS)   Final Result   NEGATIVE RIGHT FEMUR      MRI LUMBAR SPINE WO CONTRAST    (Results Pending)     Assessment/Plan:    MS flare: Primarily being managed by neurology. Currently on high-dose steroids. PT/OT evaluation for disposition guidance. Recommending rehab versus home care depending on clinical course. Rhabdomyolysis secondary to patient being on floor. CK was in 4000's, currently down to 2000. Continue with IV fluids. Monitor CK. Immobile with mild leg edema, DVT ruled out by ultrasound. Possible UTI, gram-negative rods in urine. Antibiotic started on 6/19.  3 days therapy. Last dose 6/21.     35 minutes total care time, >1/2 in unit/floor time and care coordination     Trino Durant MD

## 2022-06-20 NOTE — PROGRESS NOTES
[facility] OCCUPATIONAL THERAPY EVALUATION - ACUTE     NAME: Sahra Gamboa  : 1980 (17 y.o.)  MRN: [id number]  CODE STATUS: Full Code  Room: P281/P690-29    Date of Service: 2022    Patient Diagnosis(es): Neck pain [M54.2]  Hip pain [M25.559]  MS (multiple sclerosis) ([facility] [address] [G35]  General weakness [R53.1]  Unable to ambulate [R26.2]  Fall, initial encounter [W19. XXXA]  Pain of right lower extremity [M79.604]  Urinary tract infection with hematuria, site unspecified [N39.0, R31.9]   Patient Active Problem List    Diagnosis Date Noted    Impaired mobility and activities of daily living 2022    Hyperglycemia 2022    Neurogenic bladder 2022    Acute cystitis 2022    MS (multiple sclerosis) ([facility] [address] 2022    Urinary urgency 2022    Multiple sclerosis ([facility] [address] 2021    Foot drop, left foot 2020    Acquired deformity of left foot 2020    Lumbar radiculopathy 2020    Pain in joint, lower leg 2020    Posterior tibial tendon dysfunction     Pseudophakia 10/29/2019    Ataxic gait 2019    Mixed hyperlipidemia 2015    Obesity, unspecified 2005        Past Medical History:   Diagnosis Date    Dislocated knee     Right knee    PTTD (posterior tibial tendon dysfunction)      Past Surgical History:   Procedure Laterality Date    CATARACT REMOVAL WITH IMPLANT Right     CATARACT REMOVAL WITH IMPLANT Left      SECTION  2013    EYE SURGERY      FOOT SURGERY Left         Restrictions  Restrictions/Precautions: Fall Risk              Safety Devices: Safety Devices  Type of Devices: All fall risk precautions in place;Call light within reach; Left in bed     Patient's date of birth confirmed: Yes    General:  Patient assessed for rehabilitation services?: Yes    Subjective          Pain at start of treatment: No    Pain at end of treatment: No    Location:   Nursing notified: No  RN:   Intervention: Repositioned    Prior Level of Function:  Social/Functional History  Lives With: Son (6 y.o)  Type of Home: Apartment  Home Layout: One level  Home Access: Stairs to enter with rails  Entrance Stairs - Number of Steps: 1  Bathroom Shower/Tub: Tub/Shower unit  Bathroom Equipment: Shower chair,Hand-held shower  Home Equipment: Saratha Dema, rolling,Wheelchair-manual  Has the patient had two or more falls in the past year or any fall with injury in the past year?: Yes (one - fell on mothers floor and couldn't get up)  ADL Assistance: Independent  Homemaking Assistance: Independent  Homemaking Responsibilities: Yes  Ambulation Assistance: Independent  Transfer Assistance: Independent  Active : No  Patient's  Info: mother    OBJECTIVE:     Orientation Status:  Orientation  Overall Orientation Status: Within Normal Limits  Orientation Level: Oriented to place;Oriented to person;Oriented to situation    Observation:  Observation/Palpation  Posture: Fair  Observation: flexed posture  Edema: bilateral LE    Cognition Status:  Cognition  Overall Cognitive Status: WFL    Perception Status:  Perception  Overall Perceptual Status: WFL    Vision and Hearing Status:  Vision  Vision Exceptions: Wears glasses for reading  Hearing  Hearing: Within functional limits   Vision - Basic Assessment  Prior Vision: Wears glasses only for reading  Visual History: No significant visual history  Patient Visual Report: No visual complaint reported. Visual Field Cut: No  Oculo Motor Control: WNL    GROSS ASSESSMENT AROM/PROM:  AROM: Within functional limits       ROM:   LUE AROM (degrees)  LUE AROM : WFL  Left Hand AROM (degrees)  Left Hand AROM: WFL  RUE AROM (degrees)  RUE AROM : WFL  Right Hand PROM (degrees)  Right Hand PROM: WFL    UE STRENGTH:  Strength:  Within functional limits    UE COORDINATION:  Coordination: Within functional limits    UE TONE:  Tone: Normal    UE SENSATION:  Sensation: Intact    Hand Dominance:  Hand Dominance  Hand Dominance: Right    ADL Status:  ADL  Feeding: Unable to assess(comment)  Grooming: Setup; Increased time to complete  UE Bathing: Setup; Increased time to complete  LE Bathing: Minimal assistance; Increased time to complete  UE Dressing: Setup; Increased time to complete  LE Dressing: Moderate assistance; Increased time to complete  Toileting: Unable to assess(comment)    Functional Mobility:  Patient ambulated NT due to Pt unable to functionally ambulate at ttime    Bed Mobility  Bed mobility  Supine to Sit: Stand by assistance  Sit to Supine: Stand by assistance  Scooting: Stand by assistance    Seated and Standing Balance:  Balance  Sitting: Intact  Standing:  (CGA)    Functional Endurance:  Activity Tolerance  Activity Tolerance: Treatment limited secondary to medical complications (free text); Patient limited by fatigue    D/C Recommendations:  OT D/C RECOMMENDATIONS  REQUIRES OT FOLLOW-UP: Yes    Equipment Recommendations:       OT Education:        OT Follow Up:    OT D/C RECOMMENDATIONS  REQUIRES OT FOLLOW-UP: Yes       Assessment/Discharge Disposition:     Performance deficits / Impairments: Decreased functional mobility ,Decreased balance,Decreased ADL status,Decreased endurance,Decreased strength,Decreased posture,Decreased high-level IADLs  Prognosis: Good  Discharge Recommendations: Continue to assess pending progress  Decision Making: Medium Complexity  History: 3 complexities  Exam: 7 deficits  Assistance / Modification:  Mod A    AMPAC (Six Click) Self care Score    How much help for putting on and taking off regular lower body clothing?: A Lot  How much help for Bathing?: A Little  How much help for Toileting?: None  How much help for putting on and taking off regular upper body clothing?: None  How much help for taking care of personal grooming?: None  How much help for eating meals?: None  AM-PAC Inpatient Daily Activity Raw Score: 21  AM-PAC Inpatient ADL T-Scale Score : 44.27  ADL Inpatient CMS 0-100% Score: 32.79    Therapy key for assistance levels -   Independent/Mod I = Pt. is able to perform task with no assistance but may require a device   Stand by assistance = Pt. does not perform task at an independent level but does not need physical assistance, requires verbal cues  Minimal, Moderate, Maximal Assistance = Pt. requires physical assistance (25%, 50%, 75% assist from helper) for task but is able to actively participate in task   Dependent = Pt. requires total assistance with task and is not able to actively participate with task completion     Plan:  Plan  Times per Week: 1-4x/wk  Current Treatment Recommendations: Strengthening,Balance training,Functional mobility training,Neuromuscular re-education,Self-Care / ADL,Endurance training,Safety education & training    Goals:   Patient will:    - Improve functional endurance to tolerate/complete 12-20 mins of ADL's  - Be indpednent in UB ADLs   - Be SBA in LB ADLs  - Be Supervision in ADL transfers without LOB  - Be supervision in toileting tasks  - Improve bilateral UE strength and endurance to Fair+ in order to participate in self-care activities as projected. Patient Goal: Patient goals :  To return to home when ready  FAir   Discussed and agreed upon: Yes Comments:       Therapy Time:   Individual   Time In 1322   Time Out 1341   Minutes 19          Eval: 19 minutes     Electronically signed by:    FRANCHESKA Martinez,   3/56/1677, 1:55 PM Electronically signed by FRANCHESKA Martinez on 7/38/90 at 1:56 PM EDT

## 2022-06-20 NOTE — CARE COORDINATION
Inpatient Rehab referral received. Met with patient and explained [facility] Acute Inpatient Rehab program and requirements, including 3 hours of intense therapy daily, anticipated length of stay and goal of discharge to home. All questions answered and patient verbalized she is not interested in inpatient acute rehab. [place] of choice provided and patient requests Home with Tamica Spencer. PM&R consult pending. Patient wants home w Tamica Spencer declined [facility] acute inpatient rehab.   Electronically signed by Raya Castellon RN on 6/20/22 at 4:57 PM EDT

## 2022-06-20 NOTE — PROGRESS NOTES
Assessment documented. Vital signs stable. Meds taken well. Denies any discomfort or pain. Patient stated feeling much better than previous night. Call light within reach. NPO status after 12 am for testing.     /64   Pulse 82   Temp 98.2 °F (36.8 °C) (Oral)   Resp 18   Ht 5' 1\" (1.549 m)   Wt 141 lb (64 kg)   LMP 06/01/2022   SpO2 100%   BMI 26.64 kg/m²

## 2022-06-21 LAB
ANION GAP SERPL CALCULATED.3IONS-SCNC: 10 MEQ/L (ref 9–15)
ANISOCYTOSIS: ABNORMAL
BASOPHILS ABSOLUTE: 0 K/UL (ref 0–0.2)
BASOPHILS RELATIVE PERCENT: 0 %
BUN BLDV-MCNC: 19 MG/DL (ref 6–20)
CALCIUM SERPL-MCNC: 8.1 MG/DL (ref 8.5–9.9)
CHLORIDE BLD-SCNC: 107 MEQ/L (ref 95–107)
CO2: 25 MEQ/L (ref 20–31)
CREAT SERPL-MCNC: 0.67 MG/DL (ref 0.5–0.9)
EOSINOPHILS ABSOLUTE: 0 K/UL (ref 0–0.7)
EOSINOPHILS RELATIVE PERCENT: 0.1 %
GFR AFRICAN AMERICAN: >60
GFR NON-AFRICAN AMERICAN: >60
GLUCOSE BLD-MCNC: 184 MG/DL (ref 70–99)
HCT VFR BLD CALC: 28.2 % (ref 37–47)
HEMOGLOBIN: 9.2 G/DL (ref 12–16)
LYMPHOCYTES ABSOLUTE: 0.1 K/UL (ref 1–4.8)
LYMPHOCYTES RELATIVE PERCENT: 1 %
MCH RBC QN AUTO: 27.6 PG (ref 27–31.3)
MCHC RBC AUTO-ENTMCNC: 32.5 % (ref 33–37)
MCV RBC AUTO: 84.9 FL (ref 82–100)
MONOCYTES ABSOLUTE: 0 K/UL (ref 0.2–0.8)
MONOCYTES RELATIVE PERCENT: 1.6 %
MYELOCYTE PERCENT: 1 %
NEUTROPHILS ABSOLUTE: 10.4 K/UL (ref 1.4–6.5)
NEUTROPHILS RELATIVE PERCENT: 98 %
PDW BLD-RTO: 16.5 % (ref 11.5–14.5)
PLATELET # BLD: 227 K/UL (ref 130–400)
PLATELET SLIDE REVIEW: ADEQUATE
POTASSIUM REFLEX MAGNESIUM: 4 MEQ/L (ref 3.4–4.9)
RBC # BLD: 3.32 M/UL (ref 4.2–5.4)
SODIUM BLD-SCNC: 142 MEQ/L (ref 135–144)
TOTAL CK: 1005 U/L (ref 0–170)
TOTAL CK: 1330 U/L (ref 0–170)
WBC # BLD: 10.5 K/UL (ref 4.8–10.8)

## 2022-06-21 PROCEDURE — 80048 BASIC METABOLIC PNL TOTAL CA: CPT

## 2022-06-21 PROCEDURE — 2580000003 HC RX 258: Performed by: NURSE PRACTITIONER

## 2022-06-21 PROCEDURE — 85025 COMPLETE CBC W/AUTO DIFF WBC: CPT

## 2022-06-21 PROCEDURE — 6370000000 HC RX 637 (ALT 250 FOR IP): Performed by: NURSE PRACTITIONER

## 2022-06-21 PROCEDURE — 6360000002 HC RX W HCPCS: Performed by: INTERNAL MEDICINE

## 2022-06-21 PROCEDURE — 2580000003 HC RX 258: Performed by: INTERNAL MEDICINE

## 2022-06-21 PROCEDURE — 36415 COLL VENOUS BLD VENIPUNCTURE: CPT

## 2022-06-21 PROCEDURE — 82550 ASSAY OF CK (CPK): CPT

## 2022-06-21 PROCEDURE — 99233 SBSQ HOSP IP/OBS HIGH 50: CPT | Performed by: PSYCHIATRY & NEUROLOGY

## 2022-06-21 PROCEDURE — 1210000000 HC MED SURG R&B

## 2022-06-21 PROCEDURE — 6360000002 HC RX W HCPCS: Performed by: NURSE PRACTITIONER

## 2022-06-21 PROCEDURE — 97535 SELF CARE MNGMENT TRAINING: CPT

## 2022-06-21 PROCEDURE — 99232 SBSQ HOSP IP/OBS MODERATE 35: CPT | Performed by: PHYSICAL MEDICINE & REHABILITATION

## 2022-06-21 PROCEDURE — APPSS15 APP SPLIT SHARED TIME 0-15 MINUTES: Performed by: NURSE PRACTITIONER

## 2022-06-21 RX ADMIN — SODIUM CHLORIDE: 9 INJECTION, SOLUTION INTRAVENOUS at 05:05

## 2022-06-21 RX ADMIN — Medication 1000 UNITS: at 07:52

## 2022-06-21 RX ADMIN — SODIUM CHLORIDE: 9 INJECTION, SOLUTION INTRAVENOUS at 13:11

## 2022-06-21 RX ADMIN — ENOXAPARIN SODIUM 40 MG: 100 INJECTION SUBCUTANEOUS at 07:52

## 2022-06-21 RX ADMIN — THERA TABS 1 TABLET: TAB at 07:52

## 2022-06-21 RX ADMIN — SODIUM CHLORIDE: 9 INJECTION, SOLUTION INTRAVENOUS at 21:30

## 2022-06-21 RX ADMIN — SODIUM CHLORIDE 1000 MG: 9 INJECTION, SOLUTION INTRAVENOUS at 21:33

## 2022-06-21 RX ADMIN — CEFTRIAXONE SODIUM 1000 MG: 1 INJECTION, POWDER, FOR SOLUTION INTRAMUSCULAR; INTRAVENOUS at 01:28

## 2022-06-21 ASSESSMENT — ENCOUNTER SYMPTOMS
SHORTNESS OF BREATH: 0
CHEST TIGHTNESS: 0
VOMITING: 0
WHEEZING: 0
NAUSEA: 0
COLOR CHANGE: 0
COUGH: 0
TROUBLE SWALLOWING: 0

## 2022-06-21 ASSESSMENT — PAIN SCALES - GENERAL
PAINLEVEL_OUTOF10: 0
PAINLEVEL_OUTOF10: 0

## 2022-06-21 NOTE — PROGRESS NOTES
Physical Therapy Med Surg Daily Treatment Note  [facility]/Department[facility]  [facility]       NAME: Adrian Nash  : 1980 (89 y.o.)  MRN: [id number]  CODE STATUS: Full Code    Date of Service: 2022    Patient Diagnosis(es): Neck pain [M54.2]  Hip pain [M25.559]  MS (multiple sclerosis) ([facility] [address] [G35]  General weakness [R53.1]  Unable to ambulate [R26.2]  Fall, initial encounter [W19. XXXA]  Pain of right lower extremity [M79.604]  Urinary tract infection with hematuria, site unspecified [N39.0, R31.9]   Chief Complaint   Patient presents with    Hip Pain     Patient Active Problem List    Diagnosis Date Noted    Impaired mobility and activities of daily living 2022    Hyperglycemia 2022    Neurogenic bladder 2022    Acute cystitis 2022    MS (multiple sclerosis) ([facility] [address] 2022    Urinary urgency 2022    Multiple sclerosis ([facility] [address] 2021    Foot drop, left foot 2020    Acquired deformity of left foot 2020    Lumbar radiculopathy 2020    Pain in joint, lower leg 2020    Posterior tibial tendon dysfunction     Pseudophakia 10/29/2019    Ataxic gait 2019    Mixed hyperlipidemia 2015    Obesity, unspecified 2005        Past Medical History:   Diagnosis Date    Dislocated knee     Right knee    PTTD (posterior tibial tendon dysfunction)      Past Surgical History:   Procedure Laterality Date    CATARACT REMOVAL WITH IMPLANT Right     CATARACT REMOVAL WITH IMPLANT Left      SECTION  2013    EYE SURGERY      FOOT SURGERY Left             Restrictions:fall risk       SUBJECTIVE:   Subjective: \"I'm doing okay\"    Pain  Pain: denies pain pre and post tx    OBJECTIVE:   Orientation  Overall Orientation Status: Within Functional Limits  Cognition  Overall Cognitive Status: WFL    Bed Mobility Training  Bed Mobility Training: Yes  Overall Level of Assistance: Stand-by assistance  Interventions: Verbal cues  Rolling: Stand-by assistance  Supine to Sit: Stand-by assistance  Scooting: Stand-by assistance  Duration: 10    Transfer Training  Transfer Training: Yes  Overall Level of Assistance: Minimum assistance  Interventions: Safety awareness training; Tactile cues; Weight shifting training/pressure relief  Stand Pivot Transfers: Minimum assistance  Duration: 8    Gait Training: No       Neuro: seated reaching in chair. diagonal. pt easily distracted with all tasks at this time. ASSESSMENT pt needed assistance for safe technique with pivot to chair. Pt unable to perform transfer without min assist as this time. Pt impulsive with transfer and needed direction to wait for therapist before getting up. Discharge Recommendations:  Continue to assess pending progress,Patient would benefit from continued therapy after discharge         Goals  Long Term Goals  Long term goal 1: patient will be able to complete bed mobility independently with LRD  Long term goal 2: Patient will be able to complete transfers independently with LRD  Long term goal 3: Patient will be able to march in place x1 min  Long term goal 4: Patient will be able to complete 1 step independently with LRD    PLAN    Plan: 1 time a day 3-6 times a week        [facility] (6 CLICK) BASIC MOBILITY  AM-[facility] Inpatient Mobility Raw Score : 14     Therapy Time   Individual   Time In  0900   Time Out 0923   Minutes 23   5 minutes neuro  18 minutes bed mob/transfers          [facility] LAUREN MCMILLAN PTA, 06/21/22 at 9:26 AM         Definitions for assistance levels  Independent = pt does not require any physical supervision or assistance from another person for activity completion. Device may be needed.   Stand by assistance = pt requires verbal cues or instructions from another person, close to but not touching, to perform the activity  Minimal assistance= pt performs 75% or more of the activity; assistance

## 2022-06-21 NOTE — CARE COORDINATION
[facility] Pre-Admission Screening Document      Patient Name: Tori Patton       MRN: [id number]    : 1980    Age: 35 y.o. Gender: female   Payor: Payor: Elder [place] / Plan: Mary Jo Seaman / Product Type: *No Product type* /   MSSP: No    Admitted from: [facility] Floor: 4W  Attending Care Provider: Yayo Spencer MD  Inpatient Rehab Referring Care Provider: JACKI García CNP   Primary Care Provider: Annette Dixon PA-C  Inpatient Treatment Team including Consults: Treatment Team: Attending Provider: Yayo Spencer MD; Utilization Reviewer: Melchor Perla, RN; Utilization Reviewer: Kay Buck, RN; : Guillermina Patel. DARIUS Shelley; : Rene Patiño, DARIUS; Registered Nurse: Manohar Wolf RN; : Marvetta Alpers, DARIUS; Registered Nurse: Lynn Pichardo RN    Reason for Hospitalization:   1. MS (multiple sclerosis) ([facility] [address]    2. Fall, initial encounter    3. General weakness    4. Unable to ambulate    5. Urinary tract infection with hematuria, site unspecified    6. Hip pain    7. Pain of right lower extremity    8. Neck pain      Chief Complaint   Patient presents with    Hip Pain     Isolation:No active isolations    Hospital Course:  Admit Date: 2022  4:09 PM  Inpatient Rehab Referral Date: 2022  Narrative of hospital course/history of present illness: 35 yr old female, PMH MS, presented to ED with right and left hip pain s/p fall at home after becoming dizzy. Neurology consulted: Multiple sclerosis with exacerbation, Elevated CK level of 4188. IVF, IV methylprednisolone 1 g daily until 2022. Plans for transition to [facility] on outpatient basis.     Medical & Surgical History/Current Comorbidities:  Past Medical History:   Diagnosis Date    Dislocated knee     Right knee    Lumbar radiculopathy 2020    MS (multiple sclerosis) ([facility] [address] 2022    PTTD (posterior tibial tendon dysfunction)      Past Surgical History:   Procedure Laterality Date    CATARACT REMOVAL WITH IMPLANT Right     CATARACT REMOVAL WITH IMPLANT Left      SECTION  2013    EYE SURGERY      FOOT SURGERY Left        Advanced Directives:  Advance Directives     Power of  Living Will ACP-Advance Directive ACP-Power of     Not on File Not on File Not on File Not on File          Labs/Infection Control:  Recent Labs     22  0530 22  0545 22  0628   WBC 12.1* 8.9 8.0   HGB 10.6* 10.0* 9.9*   HCT 32.9* 30.7* 31.1*    217 212   BUN 20 17 16   CREATININE 0.59 0.72 0.65   GLUCOSE 102* 111* 123*    142 138   K 4.0 4.2 4.2   CALCIUM 8.4* 8.5 8.8      Blood cultures:  No results for input(s): BC in the last 72 hours. Urinalysis/C&S:  No results for input(s): NITRITE, LABCAST, WBCUA, RBCUA, MUCUS, TRICHOMONAS, YEAST, BACTERIA, LEUKOCYTESUR, BLOODU, GLUCOSEU, KETUA, AMORPHOUS, LABURIN in the last 72 hours. Radiology:  XR CERVICAL SPINE (4-5 VIEWS)  Result Date: 2022  NEGATIVE CERVICAL SPINE    XR HIP LEFT (2-3 VIEWS)  Result Date: 2022  NEGATIVE PELVIS AND LEFT HIP     XR FEMUR RIGHT (MIN 2 VIEWS)  Result Date: 2022  NEGATIVE RIGHT FEMUR    MRI LUMBAR SPINE WO CONTRAST  Result Date: 2022  Degenerative facet changes of the lumbar spine visualized most prominent at L3-L4 and L5-S1 that demonstrate mild progression in comparison to the prior study    [address] organ? LIVER  Result Date: 2022  NO SIGN OF CHOLELITHIASIS OR BILE DUCT DILATATION. SLUDGE WITHIN THE GALLBLADDER. NO DEFINITE FOCAL LIVER ABNORMALITY. US DUP LOWER EXTREMITIES BILATERAL VENOUS  Result Date: 2022  NO SONOGRAPHIC EVIDENCE OF DEEP VENOUS THROMBOSIS WITHIN THE BILATERAL LOWER EXTREMITIES. Medications/IV's:  The patient is currently on Lovenox for DVT prophylaxis.      Scheduled:    Vitamin D, 1,000 Units, Oral, Daily    multivitamin, 1 tablet, Oral, Daily    enoxaparin, 40 mg, SubCUTAneous, Daily    PRN:      Allergies:  No Known Allergies      Most Recent Vitals, Height and Weight  BP (!) 115/58   Pulse (!) 110   Temp 98.8 °F (37.1 °C) (Oral)   Resp 16   Ht 5' 1\" (1.549 m)   Wt 189 lb (85.7 kg)   LMP 06/01/2022   SpO2 99%   BMI 35.71 kg/m²     Weight Bearing Restrictions: None       Current Diet Order: ADULT DIET;  Regular    Skin: WDL   Wound Care Documentation:n/a          Lungs:   Respiratory  Respiratory Pattern: Regular  Respiratory Depth: Normal  Respiratory Quality/Effort: Unlabored  Chest Assessment: Chest expansion symmetrical  L Breath Sounds: Clear  R Breath Sounds: Clear  Level of Activity/Mobility: Up with assist  Subcutaneous Air/Crepitus: None      Cognition and Behavior:  Language Preference (if other than English):      Alertness/Behavior  Neuro (WDL): Exceptions to WDL  Level of Consciousness: Alert (0)  History of Falling: Yes Cognition Comment: Anxiety can be limiting at times    Short Term Memory Deficits     History of Falling: Yes    Safety          Prior Level of Function and Living Arrangements:  Social/Functional History  Lives With: Son (5 y.o)  Type of Home: Apartment  Home Layout: One level  Home Access: Stairs to enter with rails  Entrance Stairs - Number of Steps: 1  Bathroom Shower/Tub: Tub/Shower unit  Bathroom Equipment: Shower chair,Hand-held shower  Home Equipment: Destin Janus, rolling,Wheelchair-manual  Has the patient had two or more falls in the past year or any fall with injury in the past year?: Yes (one - fell on mothers floor and couldn't get up)  ADL Assistance: Independent  Homemaking Assistance: Independent  Homemaking Responsibilities: Yes  Ambulation Assistance: Independent  Transfer Assistance: Independent  Active : No  Patient's  Info: mother  Living Arrangements: Parent  Support Systems: Parent,Children,Family Members  Type of Home Care Services: None  Dental Appliances: None  Vision - Corrective Lenses: Eyeglasses  Hearing Aid: None  Personal Equipment:   Dental Appliances: None  Vision - Corrective Lenses: Eyeglasses  Hearing Aid: None      CURRENT FUNCTIONAL LEVEL:  Physical Therapy  Bed mobility:  Bed mobility  Rolling to Left: Stand by assistance (06/27/22 0858)  Supine to Sit: Minimal assistance (06/27/22 0858)  Sit to Supine: Stand by assistance (06/24/22 1048)  Scooting: Stand by assistance (06/24/22 1048)  Bed Mobility Comments: HOB slightly elevated; heavy use of hand rails; vc's for hand placement and sequencing with LE's. (06/27/22 0858)  Bed Mobility Training  Bed Mobility Training: Yes (06/21/22 0925)  Overall Level of Assistance: Stand-by assistance (06/21/22 0925)  Interventions: Verbal cues (06/21/22 0925)  Rolling: Stand-by assistance (06/21/22 0925)  Supine to Sit: Stand-by assistance (06/21/22 0925)  Scooting: Stand-by assistance (06/21/22 0925)  Transfers:  Transfers  Sit to Stand: Contact guard assistance;Minimal Assistance (06/27/22 0900)  Stand to sit: Contact guard assistance (06/27/22 0900)  Comment: Patient required increased time to complete movements, needed verbal cuing on proper hand placement when transitioning from sit to stand with walker (06/27/22 0900)  Transfer Training  Transfer Training: Yes (06/21/22 0925)  Overall Level of Assistance: Minimum assistance (06/21/22 0925)  Interventions: Safety awareness training; Tactile cues; Weight shifting training/pressure relief (06/21/22 0925)  Stand Pivot Transfers: Minimum assistance (06/21/22 4641)  Gait:   Ambulation  Surface: level tile (06/27/22 0901)  Device: Rolling Walker (06/27/22 0901)  Assistance: Contact guard assistance;Minimal assistance (06/27/22 0901)  Quality of Gait: shortened shuffling steps, increased bracing on [employer]. downward gaze.;vc's for posture and step length.  decreased dorsiflexion on L (06/27/22 0901)  Gait Deviations: Slow Marlys;Decreased step length;Decreased step height;Shuffles (06/27/22 0901)  Distance: 3-4 steps to chair (06/27/22 0901)  Comments: not assess for safety concerns (06/20/22 1352)  Gait Training: No (06/21/22 0925)  Stairs:     W/C mobility:         Occupational Therapy  Hand Dominance: Right  ADL  Feeding: Unable to assess(comment) (06/20/22 1346)  Grooming: Setup (06/25/22 5887)  Grooming Skilled Clinical Factors: To wash face (06/25/22 0924)  UE Bathing: Supervision;Setup;Verbal cueing (06/25/22 2095)  UE Bathing Skilled Clinical Factors: VCs for initiation (06/25/22 0924)  LE Bathing: Dependent/Total (06/25/22 3587)  LE Bathing Skilled Clinical Factors: +2 assistance. Pt able to bend down in order to wash legs with SBA for safe sitting. +2 to wash posterior lillie area. 1 person for washing 1 person to maintain standing balance (06/25/22 0924)  UE Dressing: Unable to assess(comment) (06/25/22 0924)  UE Dressing Skilled Clinical Factors: Hospital gown only (06/25/22 4565)  LE Dressing: Moderate assistance; Increased time to complete (06/20/22 1346)  Toileting: Dependent/Total (06/25/22 1315)  Toileting Skilled Clinical Factors: Pt incontinent of feces (06/25/22 0924)  Toilet Transfers  Toilet - Technique:  Mandy Fuentes) (06/25/22 0917)  Equipment Used: Drop-arm commode (06/25/22 0917)  Toilet Transfer: Dependent/Total (06/25/22 1424)  Toilet Transfers Comments: Mandy Fuentes > Drop arm commode.  Pt on commode at end of session (06/25/22 0917)          Speech Language Pathology  N/a      Current Conditions Requiring Inpatient Rehabilitation  Bowel/Bladder Dysfunction: Yes  Intervention Required = Frequent toileting, Bowel program and Check post void residual  Risk for Medical/Clinical Complications = moderate  Skin Healing/Breakdown Risk: Yes  Intervention Required = Side to side turns  Risk for Medical/Clinical Complications = moderate  Nutrition/Hydration Deficiency: Yes  Intervention Required = Monitor I&Os and Check Labs  Risk for Medical/Clinical Complications = low  Medical Comorbidities: Yes  Intervention Required = DVT risk  Risk for Medical/Clinical Complications = moderate    Rehab/Skilled Needs:   3 hours of Intensive Acute Rehab therapy daily, 5 days/week for a total of 900 minutes  PT Treatment Time:  1.5 hrs/day  OT Treatment Time: 1.5 hrs/day  Rehabilitation Nursing   Case management/Social work    Cultural needs:   Values / Beliefs  Do You Have Any Ethnic, Cultural, Sacramental, or Spiritual Spiritism Needs You Would Like Us To Be Aware of While You Are in the Hospital : No    Funding needs:   Potential Assistance Purchasing Medications: No      Expected Level of Improvement with Rehab  Assist for ADL Supervision / [address] for Transfers Supervision / [address] for Gait Supervision / Standby Assist    Patient's willingness to participate: Yes  Patient's ability to tolerate proposed care: Yes  Patient/Family Goals of Rehab (in patient's/family's own words): To get strong and go home    Anticipated Discharge Plan:  Home with   [address] [place] [address] Franc De Gaulle, RN PT OT Aide to be determined      Barriers to Discharge:  Home entry accessibility  [place] accessibility  [facility] accessibility  Equipment needs  Caregiver availability  Resource availability      Rehab evaluation plan: Recommend Acute inpatient rehab  Rehabilitation Impairment Group Code: 3.1  Rehab Impairment Group: Neurologic: Multiple Sclerosis  Estimated Length of Stay (days): 14  Rehab Diagnosis: Impaired mobility and ADL's due to Exacerbation of MS  Reviewer's Signature: Electronically signed by Alondra Gee RN on 6/27/22 at 1:40 PM EDT    I have reviewed and concur with the above Preadmission Screening.    Rehab Admitting Doctor: Dr. Elvi Sanford DO

## 2022-06-21 NOTE — PROGRESS NOTES
[facility] Neurology Daily Progress Note  Name: Riley Rubalcava  Age: 35 y.o. Gender: female  CodeStatus: Full Code  Allergies: No Known Allergies    Chief Complaint:Hip Pain    Primary Care Provider: Deena Cantu PA-C  InpatientTreatment Team: Treatment Team: Attending Provider: Danilo Marr MD; Consulting Physician: Ni Julian MD; : Asif Lara, RN; Utilization Reviewer: Sayra Smith, RN; Registered Nurse: Clary Perkins, DARIUS; Registered Nurse: Alfie Macias RN; Patient Care Tech: [facility]Re[facility]  Admission Date: 6/18/2022         Pt seen and examined for neuro follow up for MS exacerbation. Patient is currently alert and oriented x3, no acute distress, cooperative. Mother is at bedside. Patient came in for generalized weakness. She is nonambulatory at baseline. Has significant lower extremity weakness left greater than right with left foot drop. Patient has swelling of the left lower extremity. Negative for DVT. Afebrile. CK levels improving. Leg weakness showing some improvement with steroids    As noted above, , pt better with steroids,     Vitals:    06/21/22 0717   BP: (!) 153/49   Pulse: 62   Resp: 16   Temp: 98.2 °F (36.8 °C)   SpO2: 99%      Review of Systems   Constitutional: Negative for fatigue and fever. HENT: Negative for hearing loss and trouble swallowing. Eyes: Negative for visual disturbance. Respiratory: Negative for cough, chest tightness, shortness of breath and wheezing. Cardiovascular: Positive for leg swelling. Negative for chest pain and palpitations. Gastrointestinal: Negative for nausea and vomiting. Musculoskeletal: Positive for gait problem. Negative for neck pain and neck stiffness. Skin: Negative for color change and rash. Neurological: Positive for weakness. Negative for dizziness, tremors, seizures, syncope, facial asymmetry, speech difficulty, light-headedness, numbness and headaches. Psychiatric/Behavioral: Negative for agitation, confusion and hallucinations. The patient is not nervous/anxious. Physical Exam  Vitals and nursing note reviewed. Constitutional:       General: She is not in acute distress. Appearance: She is not diaphoretic. HENT:      Head: Normocephalic. Eyes:      General: No visual field deficit. Extraocular Movements: Extraocular movements intact. Pupils: Pupils are equal, round, and reactive to light. Cardiovascular:      Rate and Rhythm: Normal rate and regular rhythm. Pulmonary:      Effort: Pulmonary effort is normal. No respiratory distress. Breath sounds: Normal breath sounds. Abdominal:      General: Bowel sounds are normal. There is no distension. Palpations: Abdomen is soft. Tenderness: There is no abdominal tenderness. Skin:     General: Skin is warm and dry. Neurological:      Mental Status: She is alert and oriented to person, place, and time. Cranial Nerves: No cranial nerve deficit, dysarthria or facial asymmetry. Motor: Weakness, atrophy and abnormal muscle tone present. No tremor, seizure activity or pronator drift. Coordination: Coordination normal. Finger-Nose-Finger Test normal.      Deep Tendon Reflexes: Reflexes abnormal.      Reflex Scores:       Patellar reflexes are 3+ on the right side and 3+ on the left side. Achilles reflexes are 3+ on the right side and 3+ on the left side.         Medications:  Reviewed    Infusion Medications:    sodium chloride 125 mL/hr at 06/21/22 1311     Scheduled Medications:    Vitamin D  1,000 Units Oral Daily    multivitamin  1 tablet Oral Daily    enoxaparin  40 mg SubCUTAneous Daily    methylPREDNISolone  1,000 mg IntraVENous Daily     PRN Meds:     Labs:   Recent Labs     06/19/22  1102 06/20/22  0521 06/21/22  0542   WBC 10.5 11.5* 10.5   HGB 9.8* 9.7* 9.2*   HCT 30.9* 30.6* 28.2*    239 227     Recent Labs     06/19/22  1102 06/20/22  0521 06/21/22  0542    143 142   K 3.8 4.3 4.0    107 107   CO2 27 24 25   BUN 24* 20 19   CREATININE 0.57 0.59 0.67   CALCIUM 8.5 8.5 8.1*     Recent Labs     06/18/22  1630 06/19/22  1102   * 116*   ALT 90* 72*   BILIDIR  --  <0.2   BILITOT 0.4 <0.2   ALKPHOS 56 47     Recent Labs     06/19/22  1102   INR 1.2     Recent Labs     06/18/22  1630 06/19/22  1943 06/20/22  1405 06/21/22  0045 06/21/22  1357   CKTOTAL  --    < > 2,401* 1,330* 1,005*   TROPONINI <0.010  --   --   --   --     < > = values in this interval not displayed. Urinalysis:   Lab Results   Component Value Date    NITRU Negative 06/19/2022    WBCUA  06/18/2022    BACTERIA MODERATE 06/18/2022    RBCUA 10-20 06/18/2022    BLOODU Negative 06/19/2022    SPECGRAV 1.038 06/19/2022    GLUCOSEU 500 06/19/2022       Radiology:   Most recent    EEG No valid procedures specified. MRI of Brain Results for orders placed during the hospital encounter of 01/20/22    MRI BRAIN W WO CONTRAST    Narrative  EXAMINATION: MRI BRAIN W WO CONTRAST    CLINICAL HISTORY: G35 MS (multiple sclerosis) ([address] ICD10    COMPARISONS: Brain MRI from October 16, 2020    TECHNIQUE:    Multiplanar multisequence images of the brain were obtained before and after IV administration of contrast. Diffusion perfusion imaging was obtained. BRAIN MRI FINDINGS:    There are no extra-axial collections. There is no evidence of hemorrhage. There are no areas of perfusion diffusion signal abnormality to suggest ischemia. The susceptibility images do not demonstrate evidence of hemosiderin deposition within the brain  parenchyma or the leptomeninges. There is preservation of the gray-white matter differentiation. There are marked areas of T2/FLAIR increased signal in the subcortical and periventricular white matter of both hemispheres with areas of confluence. There is involvement of corpus callosum  and there are areas of signal dropout on T1 imaging.   There are no areas of abnormal enhancement after IV contrast administration. There is prominence of the sulci and ventricles consistent with moderate global cerebral atrophy and chronic involutional changes out of proportion to the patient's age. The midline structures are intact, the corpus callosum is within normal limits. The region of the pineal gland and the sella turcica are unremarkable. There are no space-occupying lesions in the posterior fossa. The basilar cisterns are patent. The craniocervical junction is unremarkable. The visualized portions of the orbits are within normal limits, the globes are intact. The visualized portions of the paranasal sinuses are within normal limits. The calvarium and soft tissues are unremarkable. Impression  There are extensive areas of signal abnormality in the subcortical and periventricular white matter and involving the corpus callosum without enhancement to suggest active inflammation. The findings are similar to the previous study and may reflect  severe chronic microangiopathy, vasculitis, MS or other demyelinating process. Results for orders placed during the hospital encounter of 10/16/20    MRI BRAIN WO CONTRAST    Narrative  EXAMINATION: MRI BRAIN WO CONTRAST, MRI CERVICAL SPINE WO CONTRAST    CLINICAL HISTORY: R26.0 Ataxic gait ICD10    COMPARISONS: Brain CT from August 29, 2014    TECHNIQUE:    Multiplanar multisequence images of the brain were obtained without contrast. Diffusion perfusion imaging was obtained. FINDINGS:    There are no extra-axial collections. There is no evidence of hemorrhage. There are no areas of signal abnormality on perfusion diffusion imaging to suggest ischemia. The susceptibility images do not demonstrate evidence of hemosiderin deposition within  the brain parenchyma or the leptomeninges. There is preservation of the gray-white matter differentiation.  There are extensive areas of T2/FLAIR signal abnormality in the subcortical and periventricular white matter in the confluent distribution especially surrounding the atria of both lateral  ventricles. Some lesions are oriented perpendicular to the corpus callosum and there are other scattered discontiguous subcortical lesions. There are no white matter lesions in the posterior fossa. There is prominence of the sulci and ventricles  consistent with moderate global cerebral atrophy and chronic involutional changes. The midline structures are intact, the corpus callosum is within normal limits. The region of the pineal gland and the sella turcica are unremarkable. There are no space-occupying lesions in the posterior fossa. The basilar cisterns are patent. The craniocervical junction is unremarkable. The visualized portions of the orbits are within normal limits, the globes are intact. The visualized portions of the paranasal sinuses are within normal limits. The calvarium and soft tissues are unremarkable. Impression  1. There are no acute intracranial changes, there is no evidence of hemorrhage or acute ischemia. 2. There are extensive areas of T2/FLAIR signal abnormality in the periventricular and subcortical white matter with a confluent distribution. Some lesions are oriented perpendicular to the corpus callosum. The findings are nonspecific but may be  associated with a demyelinating process such as multiple sclerosis versus chronic microangiopathy, vasculitis or other demyelinating process. EXAMINATION: MRI BRAIN WO CONTRAST, MRI CERVICAL SPINE WO CONTRAST    CLINICAL HISTORY: R26.0 Ataxic gait ICD10    COMPARISONS: NONE AVAILABLE    TECHNIQUE:    Multiplanar multisequence MRI images of the cervical spine were obtained without contrast.    FINDINGS:        There is no acute fracture. There is preservation of the vertebral body heights. There is intervertebral disc desiccation and disc space narrowing at every level.  The bone marrow signal is within normal limits. The cord is normal in course and caliber. There are areas of signal abnormality noted within the cord to the right side at C3-4, also on the right side at C4, and on the left at C5-6. These may represent foci of demyelination. There is no prevertebral soft tissue swelling. Anterior soft tissues are unremarkable. The craniocervical junction is unremarkable. C2-3: There is a  indenting the anterior thecal sac but not abutting the cord with mild central canal narrowing. The facet joints are unremarkable. There is no narrowing of the neural foramina. C3-4:There is a less than 2 mm disc bulge flattening the anterior portion of the thecal sac without narrowing of the central canal.  The facet joints are unremarkable. There is no narrowing of the neural foramina. C4-5:There is a less than 2 mm disc bulge flattening the anterior portion of the thecal sac without narrowing of the central canal.  The facet joints are unremarkable. There is no narrowing of the neural foramina. C5-6:There is no disc herniation or central canal narrowing. The facet joints are unremarkable. There is no narrowing of the neural foramina. C6-7:There is no disc herniation or central canal narrowing. The facet joints are unremarkable. There is no narrowing of the neural foramina. C7-T1:There is no disc herniation or central canal narrowing. The facet joints are unremarkable. There is no narrowing of the neural foramina. IMPRESSION:  1. There is no acute fracture or subluxation. 2. The cord is normal in course and caliber. There are foci of signal abnormality most likely between C3 and C6 on both the right and left sides of the cord which may represent areas of demyelination. The findings may be secondary to edema and process  such as multiple sclerosis versus prior ischemia or other etiologies.   3. There is mild multilevel spondylosis including intervertebral disc space narrowing at every level and very degrees of disc bulges as described in greater detail in each level above. MRA of the Head and Neck: No results found for this or any previous visit. No results found for this or any previous visit. No results found for this or any previous visit. CT of the Head: No results found for this or any previous visit. No results found for this or any previous visit. No results found for this or any previous visit. Carotid duplex: No results found for this or any previous visit. No results found for this or any previous visit. No results found for this or any previous visit. Echo No results found for this or any previous visit. Assessment/Plan:    6/19/22:  Multiple sclerosis with exacerbation with added hip pain and lower extremity weakness. Given that she has this going on for a while an MRI of the lumbosacral spine is recommended. She has extensive white matter disease and recently was started on Orlean Sizer by me and she has received about 4 doses which is monthly and a subcutaneous injection of B-cell line drug. Patient prior to this was on Aubagio. Her EDSS score remains around 4-5 and does fluctuate. During exacerbation she is worse. Her last exacerbation was in April and we have treated her with methylprednisone. I do not see any acute changes to consider an MRI at this time of the brain as the management for now is unlikely to change unless in the future if she does not respond to Orlean Sizer     Patient may be transferred to rehabilitation with 3 days of methylprednisone for now. Urine examination is noted. We will arrange for other tests rule out arthritis as well. This consultation includes my consultation with emergency room. 6/20/22:  Multiple sclerosis with exacerbation. Continue IV methylprednisolone 1 g daily until 6/23/2022. Elevated CK level of 4188, repeat.   Patient is status post fall and her and her mother state she was on the floor for a couple of days. Recommend hydrate with IV fluids and monitor trend. Will repeat CK level. TSH 0.253 with a normal free T4. CRP elevated at 143.5. MRI of the lumbar spine remains pending. Transaminitis, improving. Left upper quadrant ultrasound shows sludge within the gallbladder with no signs of cholelithiasis or bile duct dilation. Will discuss with Dr. Qasim Aguayo if elevated LFTs could be secondary to her Kesimpta. LFTs were normal on 4/27/2022. Urinary tract infection, urine culture positive for only 10-50,000 gram-negative rods. Patient continues on IV ceftriaxone. Will place [facility] rehab consult and PT OT. I have personally performed a face to face diagnostic evaluation on this patient, reviewed all data and investigations, and am the sole provider of all clinical decisions on the neurological status of this patient. as noted above, legs better, Musa Canchola may be causing LFT issues, with  watch for now and consider qcrevus , MRI LS spine pending    6/21/22:   MS exacerbation, continue methylprednisolone as ordered. CK levels improving with IV fluids  Elevated LFTs could be secondary to kesimpta. Plans for transition to [facility] on outpatient basis. MRI of the lumbar spine shows degenerative changes without any significant canal stenosis. Plan is for patient to be discharged [facility] rehab. Okay from neurology standpoint. I have personally performed a face to face diagnostic evaluation on this patient, reviewed all data and investigations, and am the sole provider of all clinical decisions on the neurological status of this patient. pt feels better, wants home anot rehab,  pt instructed not to take fesimpta for now, as elevated LFT      Facundo Aguayo MD, Tye Diaz, American Board of Psychiatry & Neurology  Board Certified in Vascular Neurology  Board Certified in Neuromuscular Medicine  Certified in Neurorehabilitation       Collaborating physicians:  Romain    Electronically signed by JACKI Amaro CNP on 6/21/2022 at 3:49 PM

## 2022-06-21 NOTE — CARE COORDINATION
This LSW met with patient at bedside this am. Patient found resting up in chair. Patient and I discussed discharge plans. D/C PLAN : PATIENT TO BE DISCHARGED TO  MOM'S HOME WHEN MEDICALLY STABLE. PATIENT WILL STAY WITH MOM FOR 1 WEEK. [facility]   Electronically signed by MEL Shane, GINI on 6/21/22 at 12:01 PM EDT

## 2022-06-21 NOTE — PROGRESS NOTES
Physician Progress Note      Sanjay Blanco  CSN #:                  [id number]  :                       1980  ADMIT DATE:       2022 4:09 PM  [address] [place] DATE:  RESPONDING  PROVIDER #:        Sunshine Henderson MD          QUERY TEXT:    Pt admitted with MS exacerbation. Pt noted to have documented rhabdomyolysis   2/2 being on the floor. Pt was on the floor for a couple days following a   fall. If possible, please document in progress notes and discharge summary if   you are evaluating and/or treating any of the following: The medical record reflects the following:  Risk Factors: lying on the floor for couple days following a fall, MS flare  Clinical Indicators: On admission CK 4188  Treatment: IVF, labs    Per [email or web address]  Traumatic rhabdomyolysis cause examples: crush syndrome, prolonged   immobilization  Nontraumatic rhabdomyolysis cause examples:  marked exertion, hyperthermia,   metabolic myopathy, drugs or toxins, infections, electrolyte disorders. Thank you, Jt Mejía RN BSN [facility]  [phone]  Options provided:  -- Traumatic rhabdomyolysis  -- Nontraumatic rhabdomyolysis  -- Other - I will add my own diagnosis  -- Disagree - Not applicable / Not valid  -- Disagree - Clinically unable to determine / Unknown  -- Refer to Clinical Documentation Reviewer    PROVIDER RESPONSE TEXT:    This patient has traumatic rhabdomyolysis.     Query created by: Emili Salter on 2022 12:20 PM      Electronically signed by:  Sunshine Henderson MD 2022 12:25 PM

## 2022-06-21 NOTE — FLOWSHEET NOTE
Assessment completed. VS WNL. A&Ox4. PM meds given. Lung clear. Abd sounds active. Bruising at BLE, BUE, Hip, Abd, Thigh; Open blister/abrasion on right arm. +1 edema at BLE. Denies pain or further needs at the moment. Call light within reach. Bed at the lowest position.

## 2022-06-21 NOTE — PROGRESS NOTES
Hospitalist Progress Note      Date of Admission: 6/18/2022  Chief Complaint:    Chief Complaint   Patient presents with    Hip Pain     Subjective:  No new complaints. No nausea, vomiting, chest pain, or headache      Medications:    Infusion Medications    sodium chloride 125 mL/hr at 06/21/22 1311     Scheduled Medications    Vitamin D  1,000 Units Oral Daily    multivitamin  1 tablet Oral Daily    enoxaparin  40 mg SubCUTAneous Daily    methylPREDNISolone  1,000 mg IntraVENous Daily     PRN Meds:     Intake/Output Summary (Last 24 hours) at 6/21/2022 1444  Last data filed at 6/21/2022 1109  Gross per 24 hour   Intake 2013.95 ml   Output 1200 ml   Net 813.95 ml     Exam:  BP (!) 153/49   Pulse 62   Temp 98.2 °F (36.8 °C) (Oral)   Resp 16   Ht 5' 1\" (1.549 m)   Wt 141 lb (64 kg)   LMP 06/01/2022   SpO2 99%   BMI 26.64 kg/m²   Head: Normocephalic, atraumatic  Sclera clear  Neck JVD flat  Lungs: normal effort of breathing    Labs:   Recent Labs     06/19/22  1102 06/20/22  0521 06/21/22  0542   WBC 10.5 11.5* 10.5   HGB 9.8* 9.7* 9.2*   HCT 30.9* 30.6* 28.2*    239 227     Recent Labs     06/18/22  1630 06/18/22  1630 06/19/22  1102 06/20/22  0521 06/21/22  0542      < > 138 143 142   K 3.7  --  3.8 4.3 4.0      < > 102 107 107   CO2 26   < > 27 24 25   BUN 28*   < > 24* 20 19   CREATININE 0.66   < > 0.57 0.59 0.67   CALCIUM 9.1   < > 8.5 8.5 8.1*   *  --  116*  --   --    ALT 90*  --  72*  --   --    BILIDIR  --   --  <0.2  --   --    BILITOT 0.4  --  <0.2  --   --    ALKPHOS 56  --  47  --   --     < > = values in this interval not displayed. Recent Labs     06/19/22  1102   INR 1.2     Recent Labs     06/18/22  1630 06/19/22  1943 06/20/22  1225 06/20/22  1405 06/21/22  0045   CKTOTAL  --    < > 2,304* 2,401* 1,330*   TROPONINI <0.010  --   --   --   --     < > = values in this interval not displayed.      Radiology:  MRI LUMBAR SPINE WO CONTRAST   Final Result

## 2022-06-21 NOTE — PROGRESS NOTES
Subjective: The patient complains of severe acute on chronic progressive fatigue and lateral lower extremity weakness, cognitive deficits, neuropathic sensation lower extremity secondary to recent MS flareup partially relieved by rest, PT, OT and meds for multiple sclerosis and exacerbated by exertion and recent illness. I am concerned about neurogenic bowel and bladder and not having a bowel movement in the past few days she also has poor awareness of her deficits. I am concerned about patients medical complexities including:  Principal Problem:    MS (multiple sclerosis) ([address]  Active Problems:    Impaired mobility and activities of daily living    Hyperglycemia    Neurogenic bladder    Acute cystitis    Foot drop, left foot  Resolved Problems:    * No resolved hospital problems. *      .    Reviewed recent nursing note and discussed current status and planned care with acute care providers, \" VS WNL. A&Ox4. PM meds given. Lung clear. Abd sounds active. Bruising at BLE, BUE, Hip, Abd, Thigh; Open blister/abrasion on right arm. +1 edema at BLE. Denies pain or further needs at the moment. Call light within reach. Bed at the lowest position. \". She is now stating that she would like like to come to rehab. ROS x10: The patient also complains of severely impaired mobility and activities of daily living. Otherwise no new problems with vision, hearing, nose, mouth, throat, dermal, cardiovascular, GI, , pulmonary, musculoskeletal, psychiatric or neurological.        Vital signs:  BP (!) 153/49   Pulse 62   Temp 98.2 °F (36.8 °C) (Oral)   Resp 16   Ht 5' 1\" (1.549 m)   Wt 141 lb (64 kg)   LMP 06/01/2022   SpO2 99%   BMI 26.64 kg/m²   I/O:   PO/Intake:    fair PO intake, monitoring for dysphagia    Bowel/Bladder:   continent,    General:  Patient is well developed, adequately nourished, and    well kempt. HEENT:    PERRLA, hearing intact to loud voice, external inspection of ear and nose benign. Inspection of lips, tongue and gums benign  Musculoskeletal: No significant change in strength or tone. All joints stable. Inspection and palpation of digits and nails show no clubbing, cyanosis or inflammatory conditions. Neuro/Psychiatric: Affect: flat-  Alert and oriented to self and situation with min cues. No significant change in deep tendon reflexes or sensation  Lungs:  Diminished, CTA-B  . Respiration effort is normal at rest.   Heart:   S1 = S2,   RRR. Abdomen:  Soft, non-tender    Extremities:  Trace  lower extremity edema but no unusual tenderness. Skin:   BUE bruises dt blood draws-Bruising at BLE, BUE, Hip, Abd, Thigh; Open blister/abrasion on right arm. Rehabilitation:  Physical Therapy:   Bed mobility:  Bed mobility  Supine to Sit: Stand by assistance (06/20/22 1349)  Sit to Supine: Stand by assistance (06/20/22 1349)  Scooting: Stand by assistance (06/20/22 1349)  Bed Mobility Training  Bed Mobility Training: Yes (06/21/22 0925)  Overall Level of Assistance: Stand-by assistance (06/21/22 0925)  Interventions: Verbal cues (06/21/22 0925)  Rolling: Stand-by assistance (06/21/22 0925)  Supine to Sit: Stand-by assistance (06/21/22 0925)  Scooting: Stand-by assistance (06/21/22 0925)  Transfers:  Transfers  Sit to Stand: Contact guard assistance;Minimal Assistance (06/20/22 1349)  Stand to sit: Contact guard assistance (06/20/22 1349)  Comment: Patient required increased time to complete movement, needed verbal cuing on proper hand placement when transitioning from sit to stand with walker (06/20/22 1349)  Transfer Training  Transfer Training: Yes (06/21/22 0925)  Overall Level of Assistance: Minimum assistance (06/21/22 0925)  Interventions: Safety awareness training; Tactile cues; Weight shifting training/pressure relief (06/21/22 0925)  Stand Pivot Transfers: Minimum assistance (06/21/22 3225)  Gait:   Ambulation  Comments: not assess for safety concerns (06/20/22 7135)  Gait Training: No (06/21/22 0925)  Stairs:     W/C mobility:       Occupational Therapy:   Hand Dominance: Right  ADL  Feeding: Unable to assess(comment) (06/20/22 1346)  Grooming: Setup; Increased time to complete (06/20/22 1346)  UE Bathing: Setup; Increased time to complete (06/20/22 1346)  LE Bathing: Minimal assistance; Increased time to complete (06/20/22 1346)  UE Dressing: Setup; Increased time to complete (06/20/22 1346)  LE Dressing: Moderate assistance; Increased time to complete (06/20/22 1346)  Toileting: Unable to assess(comment) (06/20/22 1346)             Speech Therapy:            Diet/Swallow:                   COGNITION  OT:    SP:           Lab/X-ray studies reviewed, analyzed and discussed with patient and staff:   Recent Results (from the past 24 hour(s))   CK    Collection Time: 06/20/22 12:25 PM   Result Value Ref Range    Total CK 2,304 (H) 0 - 170 U/L   CK    Collection Time: 06/20/22  2:05 PM   Result Value Ref Range    Total CK 2,401 (H) 0 - 170 U/L   CK    Collection Time: 06/21/22 12:45 AM   Result Value Ref Range    Total CK 1,330 (H) 0 - 170 U/L   Basic Metabolic Panel w/ Reflex to MG    Collection Time: 06/21/22  5:42 AM   Result Value Ref Range    Sodium 142 135 - 144 mEq/L    Potassium reflex Magnesium 4.0 3.4 - 4.9 mEq/L    Chloride 107 95 - 107 mEq/L    CO2 25 20 - 31 mEq/L    Anion Gap 10 9 - 15 mEq/L    Glucose 184 (H) 70 - 99 mg/dL    BUN 19 6 - 20 mg/dL    CREATININE 0.67 0.50 - 0.90 mg/dL    GFR Non-African American >60.0 >60    GFR  >60.0 >60    Calcium 8.1 (L) 8.5 - 9.9 mg/dL   CBC with Auto Differential    Collection Time: 06/21/22  5:42 AM   Result Value Ref Range    WBC 10.5 4.8 - 10.8 K/uL    RBC 3.32 (L) 4.20 - 5.40 M/uL    Hemoglobin 9.2 (L) 12.0 - 16.0 g/dL    Hematocrit 28.2 (L) 37.0 - 47.0 %    MCV 84.9 82.0 - 100.0 fL    MCH 27.6 27.0 - 31.3 pg    MCHC 32.5 (L) 33.0 - 37.0 %    RDW 16.5 (H) 11.5 - 14.5 %    Platelets 096 632 - 415 K/uL    PLATELET SLIDE REVIEW Adequate     Neutrophils % 98.0 %    Lymphocytes % 1.0 %    Monocytes % 1.6 %    Eosinophils % 0.1 %    Basophils % 0.0 %    Neutrophils Absolute 10.4 (H) 1.4 - 6.5 K/uL    Lymphocytes Absolute 0.1 (L) 1.0 - 4.8 K/uL    Monocytes Absolute 0.0 (L) 0.2 - 0.8 K/uL    Eosinophils Absolute 0.0 0.0 - 0.7 K/uL    Basophils Absolute 0.0 0.0 - 0.2 K/uL    Myelocyte Percent 1 %    Anisocytosis 1+      Previous extensive, complex labs, notes and diagnostics reviewed and analyzed. ALLERGIES:    Allergies as of 06/18/2022    (No Known Allergies)      (please also verify by checking [facility])     Complex Physical Medicine & Rehab Issues Assess & Plan:   1. Severe abnormality of gait and mobility and impaired self-care and ADL's secondary to acute exacerbation of multiple sclerosis. Updated functional and medical status reassessed regarding patients ability to participate in therapies and patient found to be able to participate in:        acute intensive comprehensive inpatient rehabilitation program including PT/OT to improve balance, ambulation, ADLs, and to improve the P/AROM. It is my opinion that they will be able to tolerate 3 hours of therapy a day and benefit from it at an acute level. I again discussed acute rehab with the patient and verify that the patient is able and willing to participate in 3 hours of therapy a day. Rehab and Acute Care Case Management has also reinforced this expectation. Will continue to follow to attempt to get patient to the most efficient but most effective level of care will be in their best interest.  Continue to focus on energy conservation heart rate and blood pressure monitoring before during and after therapy endurance and consistency of function. 2. Bowel constipation   and Bladder dysfunction   overactive, neurogenic bladder:  frequent toileting, ambulate to bathroom with assistance, check post void residuals.   Check for C.difficile x1 if >2 loose stools in 24 hours, continue bowel & bladder program.  Monitor for UTI symptoms including lethargy and confusion    3. Moderate to severe pathic pain bilateral lower extremities and generalized OA pain: reassess pain every shift and prior to and after each therapy session, give prn Tylenol   and consider scheduled Tylenol, modalities prn in therapy, consider Lidoderm, K-pad prn.     4. Skin breakdown Bruising at BLE, BUE, Hip, Abd, Thigh; Open blister/abrasion on right arm. risk:  continue pressure relief program.  Daily skin exams and reports from nursing. 5. Severe fatigue due to immobility and nutritional deficits: monitoring for dysphagia   Add vitamin B12 vitamin D and CoQ10 titrate dosing and add protein supplementation with low carb content. 6. Complex discharge planning:   Discussed with care team-last 24 hour events noted. I will continue to follow along and reassess functional and medical status as we strive to improve patient's functional and medical outcomes progressing to the most efficient and lowest level of care. Complex Active General Medical Issues that complicate care:     1. Principal Problem:    MS (multiple sclerosis) ([facility])  Active Problems:    Impaired mobility and activities of daily living    Hyperglycemia    Neurogenic bladder    Acute cystitis    Foot drop, left foot  Resolved Problems:    * No resolved hospital problems. *          Events and functional changes in the past 24 hours reviewed improvements in functional status are encouraging       Focus of today's plan-   patient now states that she would like to come to acute rehabilitation and does not want to go to her mother's house. I will have  look into this again for her. She also would like  to help her get waiver services at home.       Josseline Frey D.O., PM&R     Attending    [address]

## 2022-06-21 NOTE — PROGRESS NOTES
2207 - Assessment completed this shift. A&Ox4  VSS   BP: 153/49   Pulse ox 99 % on room air. Lung sounds are clear. Pt denies having SOB, nausea, or pain. Abdomen is soft and rounded. No BM today. Charyl Palin in place and pt is voiding appt. Edema noted BLE. Pt was able to get up to the chair with therapy today: 1 assist with walker. Bruises noted BUE and BLE. Skin tear on right arm. Abrasion noted on left hip. WEAKNESS lower extremities due to MS flare up. Meds given per MAR. Pt denies any needs at this time. PLAN: Pt now wants to go to rehab rather than her mothers house.      MRI was negative  U.S - Negative for DVT   CK - trending down - 1,005      Electronically signed by Sunny Crowe RN on 6/21/2022 at 5:41 PM schizophrenia

## 2022-06-22 LAB
ANION GAP SERPL CALCULATED.3IONS-SCNC: 9 MEQ/L (ref 9–15)
BANDED NEUTROPHILS RELATIVE PERCENT: 2 % (ref 5–11)
BASOPHILS ABSOLUTE: 0 K/UL (ref 0–0.2)
BASOPHILS RELATIVE PERCENT: 0 %
BUN BLDV-MCNC: 17 MG/DL (ref 6–20)
CALCIUM SERPL-MCNC: 8 MG/DL (ref 8.5–9.9)
CHLORIDE BLD-SCNC: 109 MEQ/L (ref 95–107)
CO2: 26 MEQ/L (ref 20–31)
CREAT SERPL-MCNC: 0.59 MG/DL (ref 0.5–0.9)
DSDNA ANTIBODY TITER: NORMAL
EKG ATRIAL RATE: 88 BPM
EKG P AXIS: 36 DEGREES
EKG P-R INTERVAL: 152 MS
EKG Q-T INTERVAL: 380 MS
EKG QRS DURATION: 74 MS
EKG QTC CALCULATION (BAZETT): 459 MS
EKG R AXIS: 5 DEGREES
EKG T AXIS: 7 DEGREES
EKG VENTRICULAR RATE: 88 BPM
EOSINOPHILS ABSOLUTE: 0 K/UL (ref 0–0.7)
EOSINOPHILS RELATIVE PERCENT: 0.1 %
GFR AFRICAN AMERICAN: >60
GFR NON-AFRICAN AMERICAN: >60
GLUCOSE BLD-MCNC: 180 MG/DL (ref 70–99)
HCT VFR BLD CALC: 30.9 % (ref 37–47)
HEMOGLOBIN: 9.5 G/DL (ref 12–16)
LYMPHOCYTES ABSOLUTE: 0.4 K/UL (ref 1–4.8)
LYMPHOCYTES RELATIVE PERCENT: 4 %
MCH RBC QN AUTO: 26.7 PG (ref 27–31.3)
MCHC RBC AUTO-ENTMCNC: 30.9 % (ref 33–37)
MCV RBC AUTO: 86.4 FL (ref 82–100)
MONOCYTES ABSOLUTE: 0.1 K/UL (ref 0.2–0.8)
MONOCYTES RELATIVE PERCENT: 1 %
NEUTROPHILS ABSOLUTE: 10.5 K/UL (ref 1.4–6.5)
NEUTROPHILS RELATIVE PERCENT: 93 %
PDW BLD-RTO: 16.5 % (ref 11.5–14.5)
PLATELET # BLD: 237 K/UL (ref 130–400)
PLATELET SLIDE REVIEW: ADEQUATE
POTASSIUM REFLEX MAGNESIUM: 4.1 MEQ/L (ref 3.4–4.9)
RBC # BLD: 3.58 M/UL (ref 4.2–5.4)
RBC # BLD: NORMAL 10*6/UL
SODIUM BLD-SCNC: 144 MEQ/L (ref 135–144)
TOTAL CK: 723 U/L (ref 0–170)
WBC # BLD: 11.1 K/UL (ref 4.8–10.8)

## 2022-06-22 PROCEDURE — 36415 COLL VENOUS BLD VENIPUNCTURE: CPT

## 2022-06-22 PROCEDURE — 6360000002 HC RX W HCPCS: Performed by: INTERNAL MEDICINE

## 2022-06-22 PROCEDURE — 82550 ASSAY OF CK (CPK): CPT

## 2022-06-22 PROCEDURE — 85025 COMPLETE CBC W/AUTO DIFF WBC: CPT

## 2022-06-22 PROCEDURE — 2580000003 HC RX 258: Performed by: INTERNAL MEDICINE

## 2022-06-22 PROCEDURE — 6370000000 HC RX 637 (ALT 250 FOR IP): Performed by: NURSE PRACTITIONER

## 2022-06-22 PROCEDURE — 99233 SBSQ HOSP IP/OBS HIGH 50: CPT | Performed by: PSYCHIATRY & NEUROLOGY

## 2022-06-22 PROCEDURE — 97530 THERAPEUTIC ACTIVITIES: CPT | Performed by: OCCUPATIONAL THERAPIST

## 2022-06-22 PROCEDURE — 80048 BASIC METABOLIC PNL TOTAL CA: CPT

## 2022-06-22 PROCEDURE — APPSS15 APP SPLIT SHARED TIME 0-15 MINUTES: Performed by: NURSE PRACTITIONER

## 2022-06-22 PROCEDURE — 1210000000 HC MED SURG R&B

## 2022-06-22 PROCEDURE — 99232 SBSQ HOSP IP/OBS MODERATE 35: CPT | Performed by: PHYSICAL MEDICINE & REHABILITATION

## 2022-06-22 RX ADMIN — SODIUM CHLORIDE: 9 INJECTION, SOLUTION INTRAVENOUS at 20:53

## 2022-06-22 RX ADMIN — SODIUM CHLORIDE: 9 INJECTION, SOLUTION INTRAVENOUS at 12:51

## 2022-06-22 RX ADMIN — ENOXAPARIN SODIUM 40 MG: 100 INJECTION SUBCUTANEOUS at 11:40

## 2022-06-22 RX ADMIN — SODIUM CHLORIDE: 9 INJECTION, SOLUTION INTRAVENOUS at 05:08

## 2022-06-22 RX ADMIN — Medication 1000 UNITS: at 11:39

## 2022-06-22 RX ADMIN — THERA TABS 1 TABLET: TAB at 11:39

## 2022-06-22 RX ADMIN — SODIUM CHLORIDE 1000 MG: 9 INJECTION, SOLUTION INTRAVENOUS at 20:51

## 2022-06-22 ASSESSMENT — ENCOUNTER SYMPTOMS
COUGH: 0
TROUBLE SWALLOWING: 0
SHORTNESS OF BREATH: 0
WHEEZING: 0
CHEST TIGHTNESS: 0
NAUSEA: 0
COLOR CHANGE: 0
VOMITING: 0

## 2022-06-22 NOTE — CARE COORDINATION
This LSW spoke with patient and mom at bedside this am. Patient stated to me that she wants to go to [facility]. I have called [facility] / [facility] admissions and left VM. Awaiting a return call at this time. LSW / CM to follow.

## 2022-06-22 NOTE — PROGRESS NOTES
WNL    GROSS ASSESSMENT AROM/PROM:  AROM: Within functional limits       ROM:   LUE AROM (degrees)  LUE AROM : WFL  Left Hand AROM (degrees)  Left Hand AROM: WFL  RUE AROM (degrees)  RUE AROM : WFL  Right Hand PROM (degrees)  Right Hand PROM: WFL    UE STRENGTH:  Strength: Within functional limits    UE COORDINATION:  Coordination: Within functional limits    UE TONE:  Tone: Normal    UE SENSATION:  Sensation: Intact    Functional Mobility:  Patient declined to get out of bed secondary to fatigue. Transfers:    Patient declined to get out of bed secondary to fatigue. Bed Mobility  Bed mobility  Rolling to Left: Stand by assistance  Supine to Sit: Stand by assistance  Sit to Supine: Stand by assistance  Scooting: Stand by assistance    Seated and Standing Balance:    Patient declined to get out of bed secondary to fatigue. Functional Endurance:  Activity Tolerance  Activity Tolerance: Treatment limited secondary to medical complications (free text); Patient limited by fatigue    Treatment consisted of:     Therapeutic activities    Assessment/Discharge Disposition:          [employer]  How much help for putting on and taking off regular lower body clothing?: A Lot  How much help for Bathing?: A Little  How much help for Toileting?: None  How much help for putting on and taking off regular upper body clothing?: None  How much help for taking care of personal grooming?: None  How much help for eating meals?: None  AM-PAC Inpatient Daily Activity Raw Score: 21  AM-PAC Inpatient ADL T-Scale Score : 44.27  ADL Inpatient CMS 0-100% Score: 32.79  ADL Inpatient CMS G-Code Modifier : CJ    Plan:    Continue OT per POC    Patient Education:  Patient Education  Education Given To: Patient  Education Provided: Role of Therapy;Plan of Care  Education Method: Verbal  Barriers to Learning: None  Education Outcome: Verbalized understanding    Equipment recommendations:   Continue to assess    Goals/Plan of care addressed during this session:        Patient Goal: Patient goals : I plan to go to rehab. Waiting for the insurance to get back to us.     Improve Bishop with Functional Transfers    Therapy Time:   Individual Group Co-Treat   Time In 0157       Time Out 0213         Minutes 16                Therapeutic activities: 16 minutes    Electronically signed by:    Shahida Capone OT    6/22/2022, 2:17 PM

## 2022-06-22 NOTE — PROGRESS NOTES
PT A&OX4. Pt stated she had zero pain. VSS, assessment complete and required med's given. Pt denied any further needs and will continue to monitor.  Electronically signed by Holden Galvin RN on 6/22/2022 at 12:15 PM

## 2022-06-22 NOTE — PROGRESS NOTES
Hospitalist Progress Note      Date of Admission: 6/18/2022  Chief Complaint:    Chief Complaint   Patient presents with    Hip Pain     Subjective:  No new complaints. No nausea, vomiting, chest pain, or headache      Medications:    Infusion Medications    sodium chloride 125 mL/hr at 06/22/22 1251     Scheduled Medications    Vitamin D  1,000 Units Oral Daily    multivitamin  1 tablet Oral Daily    enoxaparin  40 mg SubCUTAneous Daily    methylPREDNISolone  1,000 mg IntraVENous Daily     PRN Meds:     Intake/Output Summary (Last 24 hours) at 6/22/2022 1924  Last data filed at 6/22/2022 4340  Gross per 24 hour   Intake 1632.58 ml   Output 900 ml   Net 732.58 ml     Exam:  BP (!) 154/55   Pulse 73   Temp 98.1 °F (36.7 °C) (Oral)   Resp 18   Ht 5' 1\" (1.549 m)   Wt 141 lb (64 kg)   LMP 06/01/2022   SpO2 100%   BMI 26.64 kg/m²   Head: Normocephalic, atraumatic  Sclera clear  Neck JVD flat  Lungs: normal effort of breathing    Labs:   Recent Labs     06/20/22  0521 06/21/22  0542 06/22/22  0700   WBC 11.5* 10.5 11.1*   HGB 9.7* 9.2* 9.5*   HCT 30.6* 28.2* 30.9*    227 237     Recent Labs     06/20/22  0521 06/21/22  0542 06/22/22  0700    142 144   K 4.3 4.0 4.1    107 109*   CO2 24 25 26   BUN 20 19 17   CREATININE 0.59 0.67 0.59   CALCIUM 8.5 8.1* 8.0*     No results for input(s): INR in the last 72 hours. Recent Labs     06/21/22  0045 06/21/22  1357 06/22/22  0049   CKTOTAL 1,330* 1,005* 723*     Radiology:  MRI LUMBAR SPINE WO CONTRAST   Final Result   Degenerative facet changes of the lumbar spine visualized most prominent at L3-L4 and L5-S1 that demonstrate mild progression in comparison to the prior study      US DUP LOWER EXTREMITIES BILATERAL VENOUS   Final Result   NO SONOGRAPHIC EVIDENCE OF DEEP VENOUS THROMBOSIS WITHIN THE BILATERAL LOWER EXTREMITIES. US ABDOMEN LIMITED Specify organ?  LIVER   Final Result      NO SIGN OF CHOLELITHIASIS OR BILE DUCT

## 2022-06-22 NOTE — PROGRESS NOTES
Physical Therapy Missed Treatment   Facility/Department: [facility] MED SURG T400/T592-86    NAME: Socrates Holley    : 1980 (35 y.o.)  MRN: [id number]    Account: [de-identified]  Gender: female    Chart reviewed, attempted PT at 200 Patient unavailable 2° to:      [x] Pt declined d/t to fatigue this date. Upon entry to patients room, patient awoke after multiple attempts. Patient reported fatigue and was trying to get some sleep. Additional attempts for patient to participate in physical therapy were declined and patient stated \" could you come back later or tomorrow? \"       Will attempt PT treatment again at earliest convenience.       Electronically signed by Silke Goel PTA on 22 at 3:21 PM EDT

## 2022-06-22 NOTE — PROGRESS NOTES
[facility] Neurology Daily Progress Note  Name: Leila Butler  Age: 35 y.o. Gender: female  CodeStatus: Full Code  Allergies: No Known Allergies    Chief Complaint:Hip Pain    Primary Care Provider: Jackie Acevedo PA-C  InpatientTreatment Team: Treatment Team: Attending Provider: Paula Muhammad MD; Consulting Physician: Eduardo Delgado MD; Utilization Reviewer: Deadra Councilman, RN; Patient Care Tech: Molina Strickland; Registered Nurse: Angelina Burgos RN; Respiratory Therapist (Day): Addison Rivera RCP; Registered Nurse: Dary Talavera RN  Admission Date: 6/18/2022         Pt seen and examined for neuro follow up for MS exacerbation. Patient is currently alert and oriented x3, no acute distress, cooperative. Mother is at bedside. Patient came in for generalized weakness. She is nonambulatory at baseline. Has significant lower extremity weakness left greater than right with left foot drop. Patient has swelling of the left lower extremity. Negative for DVT. Afebrile. CK levels improving. Leg weakness showing some improvement with steroids. Awaiting DC     As noted, walking better        Vitals:    06/21/22 2125   BP:    Pulse:    Resp: 18   Temp: 97.7 °F (36.5 °C)   SpO2:       Review of Systems   Constitutional: Negative for fatigue and fever. HENT: Negative for hearing loss and trouble swallowing. Eyes: Negative for visual disturbance. Respiratory: Negative for cough, chest tightness, shortness of breath and wheezing. Cardiovascular: Positive for leg swelling. Negative for chest pain and palpitations. Gastrointestinal: Negative for nausea and vomiting. Musculoskeletal: Positive for gait problem. Negative for neck pain and neck stiffness. Skin: Negative for color change and rash. Neurological: Positive for weakness. Negative for dizziness, tremors, seizures, syncope, facial asymmetry, speech difficulty, light-headedness, numbness and headaches.    Psychiatric/Behavioral: Negative for agitation, confusion and hallucinations. The patient is not nervous/anxious. Physical Exam  Vitals and nursing note reviewed. Constitutional:       General: She is not in acute distress. Appearance: She is not diaphoretic. HENT:      Head: Normocephalic. Eyes:      General: No visual field deficit. Extraocular Movements: Extraocular movements intact. Pupils: Pupils are equal, round, and reactive to light. Cardiovascular:      Rate and Rhythm: Normal rate and regular rhythm. Pulmonary:      Effort: Pulmonary effort is normal. No respiratory distress. Breath sounds: Normal breath sounds. Abdominal:      General: Bowel sounds are normal. There is no distension. Palpations: Abdomen is soft. Tenderness: There is no abdominal tenderness. Skin:     General: Skin is warm and dry. Neurological:      Mental Status: She is alert and oriented to person, place, and time. Cranial Nerves: No cranial nerve deficit, dysarthria or facial asymmetry. Motor: Weakness, atrophy and abnormal muscle tone present. No tremor, seizure activity or pronator drift. Coordination: Coordination normal. Finger-Nose-Finger Test normal.      Deep Tendon Reflexes: Reflexes abnormal.      Reflex Scores:       Patellar reflexes are 3+ on the right side and 3+ on the left side. Achilles reflexes are 3+ on the right side and 3+ on the left side.     As noted,     Medications:  Reviewed    Infusion Medications:    sodium chloride 125 mL/hr at 06/22/22 0509     Scheduled Medications:    Vitamin D  1,000 Units Oral Daily    multivitamin  1 tablet Oral Daily    enoxaparin  40 mg SubCUTAneous Daily    methylPREDNISolone  1,000 mg IntraVENous Daily     PRN Meds:     Labs:   Recent Labs     06/20/22  0521 06/21/22  0542 06/22/22  0700   WBC 11.5* 10.5 11.1*   HGB 9.7* 9.2* 9.5*   HCT 30.6* 28.2* 30.9*    227 237     Recent Labs     06/20/22  0521 06/21/22  0542 06/22/22  0700  142 144   K 4.3 4.0 4.1    107 109*   CO2 24 25 26   BUN 20 19 17   CREATININE 0.59 0.67 0.59   CALCIUM 8.5 8.1* 8.0*     No results for input(s): AST, ALT, BILIDIR, BILITOT, ALKPHOS in the last 72 hours. No results for input(s): INR in the last 72 hours. Recent Labs     06/21/22  0045 06/21/22  1357 06/22/22  0049   CKTOTAL 1,330* 1,005* 723*       Urinalysis:   Lab Results   Component Value Date    NITRU Negative 06/19/2022    WBCUA  06/18/2022    BACTERIA MODERATE 06/18/2022    RBCUA 10-20 06/18/2022    BLOODU Negative 06/19/2022    SPECGRAV 1.038 06/19/2022    GLUCOSEU 500 06/19/2022       Radiology:   Most recent    EEG No valid procedures specified. MRI of Brain Results for orders placed during the hospital encounter of 01/20/22    MRI BRAIN W WO CONTRAST    Narrative  EXAMINATION: MRI BRAIN W WO CONTRAST    CLINICAL HISTORY: G35 MS (multiple sclerosis) ([facility] [address] ICD10    COMPARISONS: Brain MRI from October 16, 2020    TECHNIQUE:    Multiplanar multisequence images of the brain were obtained before and after IV administration of contrast. Diffusion perfusion imaging was obtained. BRAIN MRI FINDINGS:    There are no extra-axial collections. There is no evidence of hemorrhage. There are no areas of perfusion diffusion signal abnormality to suggest ischemia. The susceptibility images do not demonstrate evidence of hemosiderin deposition within the brain  parenchyma or the leptomeninges. There is preservation of the gray-white matter differentiation. There are marked areas of T2/FLAIR increased signal in the subcortical and periventricular white matter of both hemispheres with areas of confluence. There is involvement of corpus callosum  and there are areas of signal dropout on T1 imaging. There are no areas of abnormal enhancement after IV contrast administration.  There is prominence of the sulci and ventricles consistent with moderate global cerebral atrophy and chronic involutional changes out of proportion to the patient's age. The midline structures are intact, the corpus callosum is within normal limits. The region of the pineal gland and the sella turcica are unremarkable. There are no space-occupying lesions in the posterior fossa. The basilar cisterns are patent. The craniocervical junction is unremarkable. The visualized portions of the orbits are within normal limits, the globes are intact. The visualized portions of the paranasal sinuses are within normal limits. The calvarium and soft tissues are unremarkable. Impression  There are extensive areas of signal abnormality in the subcortical and periventricular white matter and involving the corpus callosum without enhancement to suggest active inflammation. The findings are similar to the previous study and may reflect  severe chronic microangiopathy, vasculitis, MS or other demyelinating process. Results for orders placed during the hospital encounter of 10/16/20    MRI BRAIN WO CONTRAST    Narrative  EXAMINATION: MRI BRAIN WO CONTRAST, MRI CERVICAL SPINE WO CONTRAST    CLINICAL HISTORY: R26.0 Ataxic gait ICD10    COMPARISONS: Brain CT from August 29, 2014    TECHNIQUE:    Multiplanar multisequence images of the brain were obtained without contrast. Diffusion perfusion imaging was obtained. FINDINGS:    There are no extra-axial collections. There is no evidence of hemorrhage. There are no areas of signal abnormality on perfusion diffusion imaging to suggest ischemia. The susceptibility images do not demonstrate evidence of hemosiderin deposition within  the brain parenchyma or the leptomeninges. There is preservation of the gray-white matter differentiation. There are extensive areas of T2/FLAIR signal abnormality in the subcortical and periventricular white matter in the confluent distribution especially surrounding the atria of both lateral  ventricles.  Some lesions are oriented perpendicular to the corpus callosum and there are other scattered discontiguous subcortical lesions. There are no white matter lesions in the posterior fossa. There is prominence of the sulci and ventricles  consistent with moderate global cerebral atrophy and chronic involutional changes. The midline structures are intact, the corpus callosum is within normal limits. The region of the pineal gland and the sella turcica are unremarkable. There are no space-occupying lesions in the posterior fossa. The basilar cisterns are patent. The craniocervical junction is unremarkable. The visualized portions of the orbits are within normal limits, the globes are intact. The visualized portions of the paranasal sinuses are within normal limits. The calvarium and soft tissues are unremarkable. Impression  1. There are no acute intracranial changes, there is no evidence of hemorrhage or acute ischemia. 2. There are extensive areas of T2/FLAIR signal abnormality in the periventricular and subcortical white matter with a confluent distribution. Some lesions are oriented perpendicular to the corpus callosum. The findings are nonspecific but may be  associated with a demyelinating process such as multiple sclerosis versus chronic microangiopathy, vasculitis or other demyelinating process. EXAMINATION: MRI BRAIN WO CONTRAST, MRI CERVICAL SPINE WO CONTRAST    CLINICAL HISTORY: R26.0 Ataxic gait ICD10    COMPARISONS: NONE AVAILABLE    TECHNIQUE:    Multiplanar multisequence MRI images of the cervical spine were obtained without contrast.    FINDINGS:        There is no acute fracture. There is preservation of the vertebral body heights. There is intervertebral disc desiccation and disc space narrowing at every level. The bone marrow signal is within normal limits. The cord is normal in course and caliber.  There are areas of signal abnormality noted within the cord to the right side at C3-4, also on the right side at C4, and on the left at C5-6. These may represent foci of demyelination. There is no prevertebral soft tissue swelling. Anterior soft tissues are unremarkable. The craniocervical junction is unremarkable. C2-3: There is a  indenting the anterior thecal sac but not abutting the cord with mild central canal narrowing. The facet joints are unremarkable. There is no narrowing of the neural foramina. C3-4:There is a less than 2 mm disc bulge flattening the anterior portion of the thecal sac without narrowing of the central canal.  The facet joints are unremarkable. There is no narrowing of the neural foramina. C4-5:There is a less than 2 mm disc bulge flattening the anterior portion of the thecal sac without narrowing of the central canal.  The facet joints are unremarkable. There is no narrowing of the neural foramina. C5-6:There is no disc herniation or central canal narrowing. The facet joints are unremarkable. There is no narrowing of the neural foramina. C6-7:There is no disc herniation or central canal narrowing. The facet joints are unremarkable. There is no narrowing of the neural foramina. C7-T1:There is no disc herniation or central canal narrowing. The facet joints are unremarkable. There is no narrowing of the neural foramina. IMPRESSION:  1. There is no acute fracture or subluxation. 2. The cord is normal in course and caliber. There are foci of signal abnormality most likely between C3 and C6 on both the right and left sides of the cord which may represent areas of demyelination. The findings may be secondary to edema and process  such as multiple sclerosis versus prior ischemia or other etiologies. 3. There is mild multilevel spondylosis including intervertebral disc space narrowing at every level and very degrees of disc bulges as described in greater detail in each level above. MRA of the Head and Neck: No results found for this or any previous visit.   No results found for this or any previous visit. No results found for this or any previous visit. CT of the Head: No results found for this or any previous visit. No results found for this or any previous visit. No results found for this or any previous visit. Carotid duplex: No results found for this or any previous visit. No results found for this or any previous visit. No results found for this or any previous visit. Echo No results found for this or any previous visit. Assessment/Plan:    6/19/22:  Multiple sclerosis with exacerbation with added hip pain and lower extremity weakness. Given that she has this going on for a while an MRI of the lumbosacral spine is recommended. She has extensive white matter disease and recently was started on Mica Cross by me and she has received about 4 doses which is monthly and a subcutaneous injection of B-cell line drug. Patient prior to this was on Aubagio. Her EDSS score remains around 4-5 and does fluctuate. During exacerbation she is worse. Her last exacerbation was in April and we have treated her with methylprednisone. I do not see any acute changes to consider an MRI at this time of the brain as the management for now is unlikely to change unless in the future if she does not respond to Mica Cross     Patient may be transferred to rehabilitation with 3 days of methylprednisone for now. Urine examination is noted. We will arrange for other tests rule out arthritis as well. This consultation includes my consultation with emergency room. 6/20/22:  Multiple sclerosis with exacerbation. Continue IV methylprednisolone 1 g daily until 6/23/2022. Elevated CK level of 4188, repeat. Patient is status post fall and her and her mother state she was on the floor for a couple of days. Recommend hydrate with IV fluids and monitor trend. Will repeat CK level. TSH 0.253 with a normal free T4. CRP elevated at 143.5.   MRI of the lumbar spine remains pending. Transaminitis, improving. Left upper quadrant ultrasound shows sludge within the gallbladder with no signs of cholelithiasis or bile duct dilation. Will discuss with Dr. Matias Bullard if elevated LFTs could be secondary to her Kesimpta. LFTs were normal on 4/27/2022. Urinary tract infection, urine culture positive for only 10-50,000 gram-negative rods. Patient continues on IV ceftriaxone. Will place [facility] rehab consult and PT OT. I have personally performed a face to face diagnostic evaluation on this patient, reviewed all data and investigations, and am the sole provider of all clinical decisions on the neurological status of this patient. as noted above, legs better, Cathern Rater may be causing LFT issues, with  watch for now and consider qcrevus , MRI LS spine pending    6/21/22:   MS exacerbation, continue methylprednisolone as ordered. CK levels improving with IV fluids  Elevated LFTs could be secondary to kesimpta. Plans for transition to [facility] on outpatient basis. MRI of the lumbar spine shows degenerative changes without any significant canal stenosis. Plan is for patient to be discharged [facility] rehab. Okay from neurology standpoint. I have personally performed a face to face diagnostic evaluation on this patient, reviewed all data and investigations, and am the sole provider of all clinical decisions on the neurological status of this patient. pt feels better, wants home anot rehab,  pt instructed not to take fesimpta for now, as elevated LFT      6/22/22:  methylprednisolone to be completed today. Rehab precert pending  OK to DC from neuro standpoint    I have personally performed a face to face diagnostic evaluation on this patient, reviewed all data and investigations, and am the sole provider of all clinical decisions on the neurological status of this patient. as noted,walking better,  wants rehab  Will d/c kesimpta as abnl LFT,  may need ocrjulious       Facundo Bullard, MD, Ronny Watson, American Board of Psychiatry & Neurology  Board Certified in Vascular Neurology  Board Certified in Neuromuscular Medicine  Certified in Neurorehabilitation       Collaborating physicians: Dr Niall Delgadillo    Electronically signed by JACKI Nguyen CNP on 6/22/2022 at 11:36 AM

## 2022-06-22 NOTE — PROGRESS NOTES
Subjective: The patient complains of severe acute on chronic progressive fatigue and lateral lower extremity weakness, cognitive deficits, neuropathic sensation lower extremity secondary to recent MS flareup partially relieved by rest, PT, OT and meds for multiple sclerosis and exacerbated by exertion and recent illness. I am concerned about neurogenic bowel and bladder and not having a bowel movement in the past few days she also has poor awareness of her deficits. I am concerned about patients medical complexities including:  Principal Problem:    MS (multiple sclerosis) ([address]  Active Problems:    Impaired mobility and activities of daily living    Hyperglycemia    Neurogenic bladder    Acute cystitis    Foot drop, left foot  Resolved Problems:    * No resolved hospital problems. *      .    Reviewed recent nursing note and discussed current status and planned care with acute care providers, \"   A&Ox4  VSS   BP: 153/49   Pulse ox 99 % on room air. Lung sounds are clear. Pt denies having SOB, nausea, or pain. Abdomen is soft and rounded. No BM today. Sharlyne Lord in place and pt is voiding appt. Edema noted BLE. Pt was able to get up to the chair with therapy today: 1 assist with walker. Bruises noted BUE and BLE. Skin tear on right arm. Abrasion noted on left hip. WEAKNESS lower extremities due to MS flare up. Meds given per MAR. Pt denies any needs at this time. Anca Feliz PLAN: Pt now wants to go to rehab rather than her mothers house.    MRI was negative  U.S - Negative for DVT   CK - trending down - 1,005 \". She is now stating that she would like like to come to rehab. \"    ROS x10: The patient also complains of severely impaired mobility and activities of daily living.   Otherwise no new problems with vision, hearing, nose, mouth, throat, dermal, cardiovascular, GI, , pulmonary, musculoskeletal, psychiatric or neurological.        Vital signs:  /60   Pulse 89   Temp 97.7 °F (36.5 °C)   Resp 18   Ht 5' 1\" (1.549 m)   Wt 141 lb (64 kg)   LMP 06/01/2022   SpO2 100%   BMI 26.64 kg/m²   I/O:   PO/Intake:    fair PO intake, monitoring for dysphagia    Bowel/Bladder:   continent,    General:  Patient is well developed, adequately nourished, and    well kempt. HEENT:    PERRLA, hearing intact to loud voice, external inspection of ear and nose benign. Inspection of lips, tongue and gums benign  Musculoskeletal: No significant change in strength or tone. All joints stable. Inspection and palpation of digits and nails show no clubbing, cyanosis or inflammatory conditions. Neuro/Psychiatric: Affect: flat-  Alert and oriented to self and situation with min cues. No significant change in deep tendon reflexes or sensation  Lungs:  Diminished, CTA-B  . Respiration effort is normal at rest.   Heart:   S1 = S2,   RRR. Abdomen:  Soft, non-tender    Extremities:  Trace  lower extremity edema but no unusual tenderness. Skin:   BUE bruises dt blood draws-Bruising at BLE, BUE, Hip, Abd, Thigh; Open blister/abrasion on right arm.       Rehabilitation:  Physical Therapy:   Bed mobility:  Bed mobility  Supine to Sit: Stand by assistance (06/20/22 1349)  Sit to Supine: Stand by assistance (06/20/22 1349)  Scooting: Stand by assistance (06/20/22 1349)  Bed Mobility Training  Bed Mobility Training: Yes (06/21/22 0925)  Overall Level of Assistance: Stand-by assistance (06/21/22 0925)  Interventions: Verbal cues (06/21/22 0925)  Rolling: Stand-by assistance (06/21/22 0925)  Supine to Sit: Stand-by assistance (06/21/22 0925)  Scooting: Stand-by assistance (06/21/22 0925)  Transfers:  Transfers  Sit to Stand: Contact guard assistance;Minimal Assistance (06/20/22 1349)  Stand to sit: Contact guard assistance (06/20/22 1349)  Comment: Patient required increased time to complete movement, needed verbal cuing on proper hand placement when transitioning from sit to stand with walker (06/20/22 1349)  Transfer Training  Transfer Training: Yes (06/21/22 0925)  Overall Level of Assistance: Minimum assistance (06/21/22 0925)  Interventions: Safety awareness training; Tactile cues; Weight shifting training/pressure relief (06/21/22 0925)  Stand Pivot Transfers: Minimum assistance (06/21/22 5683)  Gait:   Ambulation  Comments: not assess for safety concerns (06/20/22 1352)  Gait Training: No (06/21/22 0925)  Stairs:     W/C mobility:       Occupational Therapy:   Hand Dominance: Right  ADL  Feeding: Unable to assess(comment) (06/20/22 1346)  Grooming: Setup; Increased time to complete (06/20/22 1346)  UE Bathing: Setup; Increased time to complete (06/20/22 1346)  LE Bathing: Minimal assistance; Increased time to complete (06/20/22 1346)  UE Dressing: Setup; Increased time to complete (06/20/22 1346)  LE Dressing: Moderate assistance; Increased time to complete (06/20/22 1346)  Toileting: Unable to assess(comment) (06/20/22 1346)             Speech Therapy:            Diet/Swallow:                   COGNITION  OT:    SP:           Lab/X-ray studies reviewed, analyzed and discussed with patient and staff:   Recent Results (from the past 24 hour(s))   CK    Collection Time: 06/21/22  1:57 PM   Result Value Ref Range    Total CK 1,005 (H) 0 - 170 U/L   CK    Collection Time: 06/22/22 12:49 AM   Result Value Ref Range    Total  (H) 0 - 170 U/L   Basic Metabolic Panel w/ Reflex to MG    Collection Time: 06/22/22  7:00 AM   Result Value Ref Range    Sodium 144 135 - 144 mEq/L    Potassium reflex Magnesium 4.1 3.4 - 4.9 mEq/L    Chloride 109 (H) 95 - 107 mEq/L    CO2 26 20 - 31 mEq/L    Anion Gap 9 9 - 15 mEq/L    Glucose 180 (H) 70 - 99 mg/dL    BUN 17 6 - 20 mg/dL    CREATININE 0.59 0.50 - 0.90 mg/dL    GFR Non-African American >60.0 >60    GFR  >60.0 >60    Calcium 8.0 (L) 8.5 - 9.9 mg/dL   CBC with Auto Differential    Collection Time: 06/22/22  7:00 AM   Result Value Ref Range    WBC 11.1 (H) 4.8 - 10.8 K/uL    RBC 3.58 (L) 4.20 - 5.40 M/uL    Hemoglobin 9.5 (L) 12.0 - 16.0 g/dL    Hematocrit 30.9 (L) 37.0 - 47.0 %    MCV 86.4 82.0 - 100.0 fL    MCH 26.7 (L) 27.0 - 31.3 pg    MCHC 30.9 (L) 33.0 - 37.0 %    RDW 16.5 (H) 11.5 - 14.5 %    Platelets 174 477 - 694 K/uL     Previous extensive, complex labs, notes and diagnostics reviewed and analyzed. ALLERGIES:    Allergies as of 06/18/2022    (No Known Allergies)      (please also verify by checking [facility])     Complex Physical Medicine & Rehab Issues Assess & Plan:   1. Severe abnormality of gait and mobility and impaired self-care and ADL's secondary to acute exacerbation of multiple sclerosis. Updated functional and medical status reassessed regarding patients ability to participate in therapies and patient found to be able to participate in:        acute intensive comprehensive inpatient rehabilitation program including PT/OT to improve balance, ambulation, ADLs, and to improve the P/AROM. It is my opinion that they will be able to tolerate 3 hours of therapy a day and benefit from it at an acute level. I again discussed acute rehab with the patient and verify that the patient is able and willing to participate in 3 hours of therapy a day. Rehab and Acute Care Case Management has also reinforced this expectation. Will continue to follow to attempt to get patient to the most efficient but most effective level of care will be in their best interest.  Continue to focus on energy conservation heart rate and blood pressure monitoring before during and after therapy endurance and consistency of function. 2. Bowel constipation   and Bladder dysfunction   overactive, neurogenic bladder:  frequent toileting, ambulate to bathroom with assistance, check post void residuals. Check for C.difficile x1 if >2 loose stools in 24 hours, continue bowel & bladder program.  Monitor for UTI symptoms including lethargy and confusion    3.  Moderate to severe pathic pain bilateral lower extremities and generalized OA pain: reassess pain every shift and prior to and after each therapy session, give prn Tylenol   and consider scheduled Tylenol, modalities prn in therapy, consider Lidoderm, K-pad prn.     4. Skin breakdown Bruising at BLE, BUE, Hip, Abd, Thigh; Open blister/abrasion on right arm. risk:  continue pressure relief program.  Daily skin exams and reports from nursing. 5. Severe fatigue due to immobility and nutritional deficits: monitoring for dysphagia   Add vitamin B12 vitamin D and CoQ10 titrate dosing and add protein supplementation with low carb content. 6. Complex discharge planning:   Discussed with care team-last 24 hour events noted. I will continue to follow along and reassess functional and medical status as we strive to improve patient's functional and medical outcomes progressing to the most efficient and lowest level of care. Complex Active General Medical Issues that complicate care:     1. Principal Problem:    MS (multiple sclerosis) (HCC)  Active Problems:    Impaired mobility and activities of daily living    Hyperglycemia    Neurogenic bladder    Acute cystitis    Foot drop, left foot  Resolved Problems:    * No resolved hospital problems. *          Events and functional changes in the past 24 hours reviewed improvements in functional status are encouraging       Focus of today's plan-   patient now states that she would like to come to acute rehabilitation and does not want to go to her mother's house. I will have  look into this again for her. She also would like  to help her get waiver services at home.       Wing Haines D.O., PM&R     Attending    [address]

## 2022-06-22 NOTE — FLOWSHEET NOTE
2130: Assessment completed. VS WNL. A&Ox4. PM meds given. Abd sounds active. Lungs clear. +1 edema at BLE. Brusing noted at BLE, BUE, Hip, Thigh, Abd; Open blister/brasion on right arm. Denies pain. Up x 1 assist/pivot from chair to bed; weakness in BLE. Call light within reach.  Bed in lowest position

## 2022-06-23 LAB
ANION GAP SERPL CALCULATED.3IONS-SCNC: 10 MEQ/L (ref 9–15)
BASOPHILS ABSOLUTE: 0 K/UL (ref 0–0.2)
BASOPHILS RELATIVE PERCENT: 0.1 %
BUN BLDV-MCNC: 23 MG/DL (ref 6–20)
CALCIUM SERPL-MCNC: 7.9 MG/DL (ref 8.5–9.9)
CHLORIDE BLD-SCNC: 109 MEQ/L (ref 95–107)
CO2: 23 MEQ/L (ref 20–31)
CREAT SERPL-MCNC: 0.67 MG/DL (ref 0.5–0.9)
EOSINOPHILS ABSOLUTE: 0 K/UL (ref 0–0.7)
EOSINOPHILS RELATIVE PERCENT: 0 %
GFR AFRICAN AMERICAN: >60
GFR NON-AFRICAN AMERICAN: >60
GLUCOSE BLD-MCNC: 197 MG/DL (ref 70–99)
HCT VFR BLD CALC: 30.4 % (ref 37–47)
HEMOGLOBIN: 9.7 G/DL (ref 12–16)
LYMPHOCYTES ABSOLUTE: 0.4 K/UL (ref 1–4.8)
LYMPHOCYTES RELATIVE PERCENT: 3.2 %
MCH RBC QN AUTO: 27.4 PG (ref 27–31.3)
MCHC RBC AUTO-ENTMCNC: 31.9 % (ref 33–37)
MCV RBC AUTO: 85.8 FL (ref 82–100)
MONOCYTES ABSOLUTE: 0.2 K/UL (ref 0.2–0.8)
MONOCYTES RELATIVE PERCENT: 1.6 %
NEUTROPHILS ABSOLUTE: 11.1 K/UL (ref 1.4–6.5)
NEUTROPHILS RELATIVE PERCENT: 95.1 %
PDW BLD-RTO: 16.5 % (ref 11.5–14.5)
PLATELET # BLD: 254 K/UL (ref 130–400)
POTASSIUM REFLEX MAGNESIUM: 4.3 MEQ/L (ref 3.4–4.9)
RBC # BLD: 3.55 M/UL (ref 4.2–5.4)
SODIUM BLD-SCNC: 142 MEQ/L (ref 135–144)
WBC # BLD: 11.7 K/UL (ref 4.8–10.8)

## 2022-06-23 PROCEDURE — 36415 COLL VENOUS BLD VENIPUNCTURE: CPT

## 2022-06-23 PROCEDURE — 97110 THERAPEUTIC EXERCISES: CPT

## 2022-06-23 PROCEDURE — 99231 SBSQ HOSP IP/OBS SF/LOW 25: CPT | Performed by: PHYSICAL MEDICINE & REHABILITATION

## 2022-06-23 PROCEDURE — 1210000000 HC MED SURG R&B

## 2022-06-23 PROCEDURE — 85025 COMPLETE CBC W/AUTO DIFF WBC: CPT

## 2022-06-23 PROCEDURE — 2580000003 HC RX 258: Performed by: INTERNAL MEDICINE

## 2022-06-23 PROCEDURE — 6370000000 HC RX 637 (ALT 250 FOR IP): Performed by: NURSE PRACTITIONER

## 2022-06-23 PROCEDURE — 6360000002 HC RX W HCPCS: Performed by: INTERNAL MEDICINE

## 2022-06-23 PROCEDURE — 80048 BASIC METABOLIC PNL TOTAL CA: CPT

## 2022-06-23 PROCEDURE — APPSS15 APP SPLIT SHARED TIME 0-15 MINUTES: Performed by: NURSE PRACTITIONER

## 2022-06-23 PROCEDURE — 99233 SBSQ HOSP IP/OBS HIGH 50: CPT | Performed by: PSYCHIATRY & NEUROLOGY

## 2022-06-23 RX ADMIN — SODIUM CHLORIDE: 9 INJECTION, SOLUTION INTRAVENOUS at 05:28

## 2022-06-23 RX ADMIN — ENOXAPARIN SODIUM 40 MG: 100 INJECTION SUBCUTANEOUS at 07:57

## 2022-06-23 RX ADMIN — THERA TABS 1 TABLET: TAB at 07:57

## 2022-06-23 RX ADMIN — Medication 1000 UNITS: at 07:57

## 2022-06-23 RX ADMIN — SODIUM CHLORIDE 125 ML/HR: 9 INJECTION, SOLUTION INTRAVENOUS at 14:04

## 2022-06-23 ASSESSMENT — ENCOUNTER SYMPTOMS
NAUSEA: 0
SHORTNESS OF BREATH: 0
VOMITING: 0
TROUBLE SWALLOWING: 0
COUGH: 0
WHEEZING: 0
COLOR CHANGE: 0
CHEST TIGHTNESS: 0

## 2022-06-23 ASSESSMENT — PAIN SCALES - GENERAL: PAINLEVEL_OUTOF10: 0

## 2022-06-23 NOTE — PROGRESS NOTES
Physical Therapy Med Surg Daily Treatment Note  Fac[facility]/Department[facility]  [facility]       NAME: Jack Quarles  : 1980 (87 y.o.)  MRN: [id number]  CODE STATUS: Full Code    Date of Service: 2022    Patient Diagnosis(es): Neck pain [M54.2]  Hip pain [M25.559]  MS (multiple sclerosis) ([facility] [G35]  General weakness [R53.1]  Unable to ambulate [R26.2]  Fall, initial encounter [W19. XXXA]  Pain of right lower extremity [M79.604]  Urinary tract infection with hematuria, site unspecified [N39.0, R31.9]   Chief Complaint   Patient presents with    Hip Pain     Patient Active Problem List    Diagnosis Date Noted    Impaired mobility and activities of daily living 2022    Hyperglycemia 2022    Neurogenic bladder 2022    Acute cystitis 2022    MS (multiple sclerosis) ([facility] 2022    Urinary urgency 2022    Multiple sclerosis ([facility] 2021    Foot drop, left foot 2020    Acquired deformity of left foot 2020    Lumbar radiculopathy 2020    Pain in joint, lower leg 2020    Posterior tibial tendon dysfunction     Pseudophakia 10/29/2019    Ataxic gait 2019    Mixed hyperlipidemia 2015    Obesity, unspecified 2005        Past Medical History:   Diagnosis Date    Dislocated knee     Right knee    Lumbar radiculopathy 2020    MS (multiple sclerosis) ([facility] 2022    PTTD (posterior tibial tendon dysfunction)      Past Surgical History:   Procedure Laterality Date    CATARACT REMOVAL WITH IMPLANT Right     CATARACT REMOVAL WITH IMPLANT Left      SECTION  2013    EYE SURGERY      FOOT SURGERY Left      Restrictions:  Restrictions/Precautions: Fall Risk (high mclaughlin score)    SUBJECTIVE:   Subjective: \"Im going to have therapy at [facility]. \"    Pain  Pain: denies pain pre and post tx    OBJECTIVE:   PT Exercises  A/AROM Exercises: AP, QS, GS, heel slide, hip abd, SLR, SAQ, bridge X 10 R/L- cues for technique and to avoid ER L LE for optimal strengthening; pt required increased freq and duration of rest due to decreased activity tolerance     Activity Tolerance  Activity Tolerance: Patient limited by fatigue;Patient limited by endurance (pt limited due to decreased motivation to participate)    Patient Education  Education Given To: Patient  Education Provided: Role of Therapy;Plan of Care  Education Provided Comments: PT educated pt at length in requirement of rehab including daily activity of 3 hrs per day due to pt initially declining participation with therapy session  Education Method: Verbal  Education Outcome: Continued education needed     ASSESSMENT   Assessment: Upon arrival to pt room, pt declining participation in therapy session, stating \"I am going to have therapy at 6 PM tonight. \"  PT educated pt that therapists are not here at 6 PM and most likely pt is only getting moved from the medical floor to the rehab floor this evening. Due to pt declined therapy yesterday, PT educated pt at length in freq and duration requirement for therapy on the rehab floor- pt stated understanding but requires continued education. Pt only agreeable to supine therex this date in bed for LE strength and activity tolerance. Pt declined all out of bed activity this date. Continued PT is required for safe d/c home.      Discharge Recommendations:  Continue to assess pending progress    Goals  Long Term Goals  Long term goal 1: patient will be able to complete bed mobility independently with LRD  Long term goal 2: Patient will be able to complete transfers independently with LRD  Long term goal 3: Patient will be able to march in place x1 min  Long term goal 4: Patient will be able to complete 1 step independently with LRD    PLAN    Plan: 1 time a day 3-6 times a week  Safety Devices  Type of Devices: Call light within reach,Bed alarm in place,Left in bed     [facility] (6 CLICK) BASIC MOBILITY  AM-PAC Inpatient Mobility Raw Score : 14     Therapy Time   Individual   Time In 1001   Time Out 1018   Minutes 17      therex: 17 min       Nora Swartz PT, 06/23/22 at 11:05 AM         Definitions for assistance levels  Independent = pt does not require any physical supervision or assistance from another person for activity completion. Device may be needed.   Stand by assistance = pt requires verbal cues or instructions from another person, close to but not touching, to perform the activity  Minimal assistance= pt performs 75% or more of the activity; assistance is required to complete the activity  Moderate assistance= pt performs 50% of the activity; assistance is required to complete the activity  Maximal assistance = pt performs 25% of the activity; assistance is required to complete the activity  Dependent = pt requires total physical assistance to accomplish the task

## 2022-06-23 NOTE — CARE COORDINATION
This LSW met with patient at bedside this afternoon. Patient found resting in chair. I informed her that pre-cert for admission to [facility] pending. LSW / CM to follow.

## 2022-06-23 NOTE — PROGRESS NOTES
Pt iv infiltrated, LUE cool and swollen to touch, infusion stopped and heat pack applied. IV D/C with cath tip intact. New iv placed in left hand. Vs stable and call light in reach. Continue to monitor.

## 2022-06-23 NOTE — PROGRESS NOTES
Hospitalist Progress Note      Date of Admission: 6/18/2022  Chief Complaint:    Chief Complaint   Patient presents with    Hip Pain     Subjective:  No new complaints. No nausea, vomiting, chest pain, or headache      Medications:    Infusion Medications    sodium chloride 125 mL/hr (06/23/22 1404)     Scheduled Medications    Vitamin D  1,000 Units Oral Daily    multivitamin  1 tablet Oral Daily    enoxaparin  40 mg SubCUTAneous Daily     PRN Meds:     Intake/Output Summary (Last 24 hours) at 6/23/2022 1504  Last data filed at 6/23/2022 1007  Gross per 24 hour   Intake --   Output 1400 ml   Net -1400 ml     Exam:  /60   Pulse 72   Temp 98.1 °F (36.7 °C)   Resp 18   Ht 5' 1\" (1.549 m)   Wt 141 lb (64 kg)   LMP 06/01/2022   SpO2 99%   BMI 26.64 kg/m²   Head: Normocephalic, atraumatic  Sclera clear  Neck JVD flat  Lungs: normal effort of breathing    Labs:   Recent Labs     06/21/22  0542 06/22/22  0700 06/23/22  0520   WBC 10.5 11.1* 11.7*   HGB 9.2* 9.5* 9.7*   HCT 28.2* 30.9* 30.4*    237 254     Recent Labs     06/21/22  0542 06/22/22  0700 06/23/22  0520    144 142   K 4.0 4.1 4.3    109* 109*   CO2 25 26 23   BUN 19 17 23*   CREATININE 0.67 0.59 0.67   CALCIUM 8.1* 8.0* 7.9*     No results for input(s): INR in the last 72 hours. Recent Labs     06/21/22  0045 06/21/22  1357 06/22/22  0049   CKTOTAL 1,330* 1,005* 723*     Radiology:  MRI LUMBAR SPINE WO CONTRAST   Final Result   Degenerative facet changes of the lumbar spine visualized most prominent at L3-L4 and L5-S1 that demonstrate mild progression in comparison to the prior study      US DUP LOWER EXTREMITIES BILATERAL VENOUS   Final Result   NO SONOGRAPHIC EVIDENCE OF DEEP VENOUS THROMBOSIS WITHIN THE BILATERAL LOWER EXTREMITIES. US ABDOMEN LIMITED Specify organ? LIVER   Final Result      NO SIGN OF CHOLELITHIASIS OR BILE DUCT DILATATION. SLUDGE WITHIN THE GALLBLADDER.       NO DEFINITE FOCAL LIVER ABNORMALITY. XR CERVICAL SPINE (4-5 VIEWS)   Final Result   NEGATIVE CERVICAL SPINE      XR HIP LEFT (2-3 VIEWS)   Final Result   NEGATIVE PELVIS AND LEFT HIP       XR FEMUR RIGHT (MIN 2 VIEWS)   Final Result   NEGATIVE RIGHT FEMUR        Assessment/Plan:    MS flare: Primarily being managed by neurology. Currently on high-dose steroids. PT/OT evaluation for disposition guidance. Recommending rehab versus home care depending on clinical course. Rhabdomyolysis secondary to patient being on floor. CK was in 4000's, currently down to 1330. Continue with IV fluids. Monitor CK. Immobile with mild leg edema, DVT ruled out by ultrasound. Possible UTI, gram-negative rods in urine. Antibiotic started on 6/19.  3 days therapy. Last dose 6/21.     DC plan: Awaiting placement    25 minutes total care time, >1/2 in unit/floor time and care coordination     Andrey Wilson MD

## 2022-06-23 NOTE — CARE COORDINATION
Called [facility]irina and they are still pending to process the precert they will call when the determination has been made for Auth#[id number]. Phone call ref #[id number].    Electronically signed by Frank Terrell RN on 6/23/22 at 11:31 AM EDT

## 2022-06-23 NOTE — PROGRESS NOTES
Subjective: The patient complains of severe acute on chronic progressive fatigue and lateral lower extremity weakness, cognitive deficits, neuropathic sensation lower extremity secondary to recent MS flareup partially relieved by rest, PT, OT and meds for multiple sclerosis and exacerbated by exertion and recent illness. I am concerned about neurogenic bowel and bladder and not having a bowel movement in the past few days she also has poor awareness of her deficits. He however is now committed to coming to acute rehab and she would be a good fit for our therapy program at an acute level. Continue to await care source approval.    I am concerned about patients medical complexities including:  Principal Problem:    MS (multiple sclerosis) ([facility] [place] [address]  Active Problems:    Impaired mobility and activities of daily living    Hyperglycemia    Neurogenic bladder    Acute cystitis    Foot drop, left foot  Resolved Problems:    * No resolved hospital problems. *      .    Reviewed recent nursing note and discussed current status and planned care with acute care providers, \"Spoke with patient yesterday and she has requested admission to [facility] inpatient acute rehab. Inpatient Rehab referral received previously and the patient declined admission. Met with patient and explained [facility] Acute Inpatient Rehab program again and requirements, including 3 hours of intense therapy daily, anticipated length of stay and goal of discharge to home. All questions answered and patient verbalized understanding. Freedom of choice provided and patient requests admit to [facility] if approved by insurance. PM&R consult completed. Requested precert from Lita Banuelos called to open case and clinicals were submitted reference #[id number]. \"    ROS x10: The patient also complains of severely impaired mobility and activities of daily living.   Otherwise no new problems with vision, hearing, nose, mouth, throat, dermal, cardiovascular, GI, , movement, needed verbal cuing on proper hand placement when transitioning from sit to stand with walker (06/20/22 1349)  Transfer Training  Transfer Training: Yes (06/21/22 0925)  Overall Level of Assistance: Minimum assistance (06/21/22 0925)  Interventions: Safety awareness training; Tactile cues; Weight shifting training/pressure relief (06/21/22 0925)  Stand Pivot Transfers: Minimum assistance (06/21/22 4340)  Gait:   Ambulation  Comments: not assess for safety concerns (06/20/22 1352)  Gait Training: No (06/21/22 0925)  Stairs:     W/C mobility:       Occupational Therapy:   Hand Dominance: Right  ADL  Feeding: Unable to assess(comment) (06/20/22 1346)  Grooming: Setup; Increased time to complete (06/20/22 1346)  UE Bathing: Setup; Increased time to complete (06/20/22 1346)  LE Bathing: Minimal assistance; Increased time to complete (06/20/22 1346)  UE Dressing: Setup; Increased time to complete (06/20/22 1346)  LE Dressing: Moderate assistance; Increased time to complete (06/20/22 1346)  Toileting: Unable to assess(comment) (06/20/22 1346)             Speech Therapy:            Diet/Swallow:                   COGNITION  OT:    SP:           Lab/X-ray studies reviewed, analyzed and discussed with patient and staff:   Recent Results (from the past 24 hour(s))   Basic Metabolic Panel w/ Reflex to MG    Collection Time: 06/23/22  5:20 AM   Result Value Ref Range    Sodium 142 135 - 144 mEq/L    Potassium reflex Magnesium 4.3 3.4 - 4.9 mEq/L    Chloride 109 (H) 95 - 107 mEq/L    CO2 23 20 - 31 mEq/L    Anion Gap 10 9 - 15 mEq/L    Glucose 197 (H) 70 - 99 mg/dL    BUN 23 (H) 6 - 20 mg/dL    CREATININE 0.67 0.50 - 0.90 mg/dL    GFR Non-African American >60.0 >60    GFR  >60.0 >60    Calcium 7.9 (L) 8.5 - 9.9 mg/dL   CBC with Auto Differential    Collection Time: 06/23/22  5:20 AM   Result Value Ref Range    WBC 11.7 (H) 4.8 - 10.8 K/uL    RBC 3.55 (L) 4.20 - 5.40 M/uL    Hemoglobin 9.7 (L) 12.0 - 16.0 g/dL Hematocrit 30.4 (L) 37.0 - 47.0 %    MCV 85.8 82.0 - 100.0 fL    MCH 27.4 27.0 - 31.3 pg    MCHC 31.9 (L) 33.0 - 37.0 %    RDW 16.5 (H) 11.5 - 14.5 %    Platelets 081 240 - 470 K/uL    Neutrophils % 95.1 %    Lymphocytes % 3.2 %    Monocytes % 1.6 %    Eosinophils % 0.0 %    Basophils % 0.1 %    Neutrophils Absolute 11.1 (H) 1.4 - 6.5 K/uL    Lymphocytes Absolute 0.4 (L) 1.0 - 4.8 K/uL    Monocytes Absolute 0.2 0.2 - 0.8 K/uL    Eosinophils Absolute 0.0 0.0 - 0.7 K/uL    Basophils Absolute 0.0 0.0 - 0.2 K/uL     Previous extensive, complex labs, notes and diagnostics reviewed and analyzed. ALLERGIES:    Allergies as of 06/18/2022    (No Known Allergies)      (please also verify by checking [facility])     Complex Physical Medicine & Rehab Issues Assess & Plan:   1. Severe abnormality of gait and mobility and impaired self-care and ADL's secondary to acute exacerbation of multiple sclerosis. Updated functional and medical status reassessed regarding patients ability to participate in therapies and patient found to be able to participate in:        acute intensive comprehensive inpatient rehabilitation program including PT/OT to improve balance, ambulation, ADLs, and to improve the P/AROM. It is my opinion that they will be able to tolerate 3 hours of therapy a day and benefit from it at an acute level. I again discussed acute rehab with the patient and verify that the patient is able and willing to participate in 3 hours of therapy a day. Rehab and Acute Care Case Management has also reinforced this expectation. Will continue to follow to attempt to get patient to the most efficient but most effective level of care will be in their best interest.  Continue to focus on energy conservation heart rate and blood pressure monitoring before during and after therapy endurance and consistency of function.       2. Bowel constipation   and Bladder dysfunction   overactive, neurogenic bladder:  frequent toileting, ambulate to bathroom with assistance, check post void residuals. Check for C.difficile x1 if >2 loose stools in 24 hours, continue bowel & bladder program.  Monitor for UTI symptoms including lethargy and confusion    3. Moderate to severe pathic pain bilateral lower extremities and generalized OA pain: reassess pain every shift and prior to and after each therapy session, give prn Tylenol   and consider scheduled Tylenol, modalities prn in therapy, consider Lidoderm, K-pad prn.     4. Skin breakdown Bruising at BLE, BUE, Hip, Abd, Thigh; Open blister/abrasion on right arm. risk:  continue pressure relief program.  Daily skin exams and reports from nursing. 5. Severe fatigue due to immobility and nutritional deficits: monitoring for dysphagia   Add vitamin B12 vitamin D and CoQ10 titrate dosing and add protein supplementation with low carb content. 6. Complex discharge planning:   Discussed with care team-last 24 hour events noted. I will continue to follow along and reassess functional and medical status as we strive to improve patient's functional and medical outcomes progressing to the most efficient and lowest level of care. Complex Active General Medical Issues that complicate care:     1. Principal Problem:    MS (multiple sclerosis) ([facility])  Active Problems:    Impaired mobility and activities of daily living    Hyperglycemia    Neurogenic bladder    Acute cystitis    Foot drop, left foot  Resolved Problems:    * No resolved hospital problems. *          Events and functional changes in the past 24 hours reviewed improvements in functional status are encouraging       Focus of today's plan-   patient now states that she would like to come to acute rehabilitation and does not want to go to her mother's house. I will have  look into this again for her. She also would like  to help her get waiver services at home.       Annette Heimlich, D.O., PM&R     Attending [address]

## 2022-06-23 NOTE — PROGRESS NOTES
[facility] Neurology Daily Progress Note  Name: Socrates Holley  Age: 35 y.o. Gender: female  CodeStatus: Full Code  Allergies: No Known Allergies    Chief Complaint:Hip Pain    Primary Care Provider: Paula Murphy PA-C  InpatientTreatment Team: Treatment Team: Attending Provider: Dain Camacho MD; Consulting Physician: Alfredo Moran MD; Utilization Reviewer: Luna Patricia RN; Registered Nurse: Osmani Gatica RN; Patient Care Tech: Camryn Lynn  Admission Date: 6/18/2022         Pt seen and examined for neuro follow up for MS exacerbation. Patient is currently alert and oriented x3, no acute distress, cooperative. Mother is at bedside. Patient came in for generalized weakness. She is nonambulatory at baseline. Has significant lower extremity weakness 2-3 out of 5 left greater than right with left foot drop. Afebrile. CK levels improving. Leg weakness showing some improvement with steroids. As noted above,  better with steroids     Vitals:    06/23/22 0618   BP: 137/60   Pulse: 72   Resp:    Temp: 98.1 °F (36.7 °C)   SpO2: 99%      Review of Systems   Constitutional: Negative for fatigue and fever. HENT: Negative for hearing loss and trouble swallowing. Eyes: Negative for visual disturbance. Respiratory: Negative for cough, chest tightness, shortness of breath and wheezing. Cardiovascular: Positive for leg swelling. Negative for chest pain and palpitations. Gastrointestinal: Negative for nausea and vomiting. Musculoskeletal: Positive for gait problem. Negative for neck pain and neck stiffness. Skin: Negative for color change and rash. Neurological: Positive for weakness. Negative for dizziness, tremors, seizures, syncope, facial asymmetry, speech difficulty, light-headedness, numbness and headaches. Psychiatric/Behavioral: Negative for agitation, confusion and hallucinations. The patient is not nervous/anxious. Physical Exam  Vitals and nursing note reviewed. Constitutional:       General: She is not in acute distress. Appearance: She is not diaphoretic. HENT:      Head: Normocephalic. Eyes:      General: No visual field deficit. Extraocular Movements: Extraocular movements intact. Pupils: Pupils are equal, round, and reactive to light. Cardiovascular:      Rate and Rhythm: Normal rate and regular rhythm. Pulmonary:      Effort: Pulmonary effort is normal. No respiratory distress. Breath sounds: Normal breath sounds. Abdominal:      General: Bowel sounds are normal. There is no distension. Palpations: Abdomen is soft. Tenderness: There is no abdominal tenderness. Skin:     General: Skin is warm and dry. Neurological:      Mental Status: She is alert and oriented to person, place, and time. Cranial Nerves: No cranial nerve deficit, dysarthria or facial asymmetry. Motor: Weakness, atrophy and abnormal muscle tone present. No tremor, seizure activity or pronator drift. Coordination: Coordination normal. Finger-Nose-Finger Test normal.      Deep Tendon Reflexes: Reflexes abnormal.      Reflex Scores:       Patellar reflexes are 3+ on the right side and 3+ on the left side. Achilles reflexes are 3+ on the right side and 3+ on the left side.         Medications:  Reviewed    Infusion Medications:    sodium chloride 125 mL/hr (06/23/22 1404)     Scheduled Medications:    Vitamin D  1,000 Units Oral Daily    multivitamin  1 tablet Oral Daily    enoxaparin  40 mg SubCUTAneous Daily     PRN Meds:     Labs:   Recent Labs     06/21/22  0542 06/22/22  0700 06/23/22  0520   WBC 10.5 11.1* 11.7*   HGB 9.2* 9.5* 9.7*   HCT 28.2* 30.9* 30.4*    237 254     Recent Labs     06/21/22  0542 06/22/22  0700 06/23/22  0520    144 142   K 4.0 4.1 4.3    109* 109*   CO2 25 26 23   BUN 19 17 23*   CREATININE 0.67 0.59 0.67   CALCIUM 8.1* 8.0* 7.9*     No results for input(s): AST, ALT, BILIDIR, BILITOT, ALKPHOS in unremarkable. There are no space-occupying lesions in the posterior fossa. The basilar cisterns are patent. The craniocervical junction is unremarkable. The visualized portions of the orbits are within normal limits, the globes are intact. The visualized portions of the paranasal sinuses are within normal limits. The calvarium and soft tissues are unremarkable. Impression  There are extensive areas of signal abnormality in the subcortical and periventricular white matter and involving the corpus callosum without enhancement to suggest active inflammation. The findings are similar to the previous study and may reflect  severe chronic microangiopathy, vasculitis, MS or other demyelinating process. Results for orders placed during the hospital encounter of 10/16/20    MRI BRAIN WO CONTRAST    Narrative  EXAMINATION: MRI BRAIN WO CONTRAST, MRI CERVICAL SPINE WO CONTRAST    CLINICAL HISTORY: R26.0 Ataxic gait ICD10    COMPARISONS: Brain CT from August 29, 2014    TECHNIQUE:    Multiplanar multisequence images of the brain were obtained without contrast. Diffusion perfusion imaging was obtained. FINDINGS:    There are no extra-axial collections. There is no evidence of hemorrhage. There are no areas of signal abnormality on perfusion diffusion imaging to suggest ischemia. The susceptibility images do not demonstrate evidence of hemosiderin deposition within  the brain parenchyma or the leptomeninges. There is preservation of the gray-white matter differentiation. There are extensive areas of T2/FLAIR signal abnormality in the subcortical and periventricular white matter in the confluent distribution especially surrounding the atria of both lateral  ventricles. Some lesions are oriented perpendicular to the corpus callosum and there are other scattered discontiguous subcortical lesions. There are no white matter lesions in the posterior fossa.  There is prominence of the sulci and ventricles  consistent with moderate global cerebral atrophy and chronic involutional changes. The midline structures are intact, the corpus callosum is within normal limits. The region of the pineal gland and the sella turcica are unremarkable. There are no space-occupying lesions in the posterior fossa. The basilar cisterns are patent. The craniocervical junction is unremarkable. The visualized portions of the orbits are within normal limits, the globes are intact. The visualized portions of the paranasal sinuses are within normal limits. The calvarium and soft tissues are unremarkable. Impression  1. There are no acute intracranial changes, there is no evidence of hemorrhage or acute ischemia. 2. There are extensive areas of T2/FLAIR signal abnormality in the periventricular and subcortical white matter with a confluent distribution. Some lesions are oriented perpendicular to the corpus callosum. The findings are nonspecific but may be  associated with a demyelinating process such as multiple sclerosis versus chronic microangiopathy, vasculitis or other demyelinating process. EXAMINATION: MRI BRAIN WO CONTRAST, MRI CERVICAL SPINE WO CONTRAST    CLINICAL HISTORY: R26.0 Ataxic gait ICD10    COMPARISONS: NONE AVAILABLE    TECHNIQUE:    Multiplanar multisequence MRI images of the cervical spine were obtained without contrast.    FINDINGS:        There is no acute fracture. There is preservation of the vertebral body heights. There is intervertebral disc desiccation and disc space narrowing at every level. The bone marrow signal is within normal limits. The cord is normal in course and caliber. There are areas of signal abnormality noted within the cord to the right side at C3-4, also on the right side at C4, and on the left at C5-6. These may represent foci of demyelination. There is no prevertebral soft tissue swelling. Anterior soft tissues are unremarkable.  The craniocervical junction is unremarkable. C2-3: There is a  indenting the anterior thecal sac but not abutting the cord with mild central canal narrowing. The facet joints are unremarkable. There is no narrowing of the neural foramina. C3-4:There is a less than 2 mm disc bulge flattening the anterior portion of the thecal sac without narrowing of the central canal.  The facet joints are unremarkable. There is no narrowing of the neural foramina. C4-5:There is a less than 2 mm disc bulge flattening the anterior portion of the thecal sac without narrowing of the central canal.  The facet joints are unremarkable. There is no narrowing of the neural foramina. C5-6:There is no disc herniation or central canal narrowing. The facet joints are unremarkable. There is no narrowing of the neural foramina. C6-7:There is no disc herniation or central canal narrowing. The facet joints are unremarkable. There is no narrowing of the neural foramina. C7-T1:There is no disc herniation or central canal narrowing. The facet joints are unremarkable. There is no narrowing of the neural foramina. IMPRESSION:  1. There is no acute fracture or subluxation. 2. The cord is normal in course and caliber. There are foci of signal abnormality most likely between C3 and C6 on both the right and left sides of the cord which may represent areas of demyelination. The findings may be secondary to edema and process  such as multiple sclerosis versus prior ischemia or other etiologies. 3. There is mild multilevel spondylosis including intervertebral disc space narrowing at every level and very degrees of disc bulges as described in greater detail in each level above. MRA of the Head and Neck: No results found for this or any previous visit. No results found for this or any previous visit. No results found for this or any previous visit.                             CT of the Head: No results found for this or any previous visit. No results found for this or any previous visit. No results found for this or any previous visit. Carotid duplex: No results found for this or any previous visit. No results found for this or any previous visit. No results found for this or any previous visit. Echo No results found for this or any previous visit. Assessment/Plan:    6/19/22:  Multiple sclerosis with exacerbation with added hip pain and lower extremity weakness. Given that she has this going on for a while an MRI of the lumbosacral spine is recommended. She has extensive white matter disease and recently was started on Gwenlyn Almyra by me and she has received about 4 doses which is monthly and a subcutaneous injection of B-cell line drug. Patient prior to this was on Aubagio. Her EDSS score remains around 4-5 and does fluctuate. During exacerbation she is worse. Her last exacerbation was in April and we have treated her with methylprednisone. I do not see any acute changes to consider an MRI at this time of the brain as the management for now is unlikely to change unless in the future if she does not respond to Gwenlyn Almyra     Patient may be transferred to rehabilitation with 3 days of methylprednisone for now. Urine examination is noted. We will arrange for other tests rule out arthritis as well. This consultation includes my consultation with emergency room. 6/20/22:  Multiple sclerosis with exacerbation. Continue IV methylprednisolone 1 g daily until 6/23/2022. Elevated CK level of 4188, repeat. Patient is status post fall and her and her mother state she was on the floor for a couple of days. Recommend hydrate with IV fluids and monitor trend. Will repeat CK level. TSH 0.253 with a normal free T4. CRP elevated at 143.5. MRI of the lumbar spine remains pending. Transaminitis, improving.   Left upper quadrant ultrasound shows sludge within the gallbladder with no signs of cholelithiasis or bile duct dilation. Will discuss with Dr. Denia Crawford if elevated LFTs could be secondary to her Kesimpta. LFTs were normal on 4/27/2022. Urinary tract infection, urine culture positive for only 10-50,000 gram-negative rods. Patient continues on IV ceftriaxone. Will place [facility] rehab consult and PT OT. I have personally performed a face to face diagnostic evaluation on this patient, reviewed all data and investigations, and am the sole provider of all clinical decisions on the neurological status of this patient. as noted above, legs better, Harvey Hernandez may be causing LFT issues, with  watch for now and consider qcrevus , MRI LS spine pending    6/21/22:   MS exacerbation, continue methylprednisolone as ordered. CK levels improving with IV fluids  Elevated LFTs could be secondary to kesimpta. Plans for transition to [facility] on outpatient basis. MRI of the lumbar spine shows degenerative changes without any significant canal stenosis. Plan is for patient to be discharged [facility] rehab. Okay from neurology standpoint. I have personally performed a face to face diagnostic evaluation on this patient, reviewed all data and investigations, and am the sole provider of all clinical decisions on the neurological status of this patient. pt feels better, wants home anot rehab,  pt instructed not to take fesimpta for now, as elevated LFT      6/22/22:  methylprednisolone to be completed today. Rehab precert pending  OK to DC from neuro standpoint    I have personally performed a face to face diagnostic evaluation on this patient, reviewed all data and investigations, and am the sole provider of all clinical decisions on the neurological status of this patient. as noted,walking better,  wants rehab  Will d/c kesimpta as abnl LFT,  may need ocrevus       6/23/2022:  Awaiting DC to [facility] rehab.     I have personally performed a face to face diagnostic evaluation on this patient, reviewed all data and investigations, and am the sole provider of all clinical decisions on the neurological status of this patient. better with steroids,  will need to change neuroimmunomodulater  Due to liver issues      Facundo Medrano MD, [address], American Board of Psychiatry & Neurology  Board Certified in Vascular Neurology  Board Certified in Neuromuscular Medicine  Certified in Neurorehabilitation     Collaborating physicians: Dr Quirino Medrano    Electronically signed by JACKI Perez CNP on 6/23/2022 at 3:09 PM

## 2022-06-24 LAB
ANION GAP SERPL CALCULATED.3IONS-SCNC: 12 MEQ/L (ref 9–15)
ANISOCYTOSIS: ABNORMAL
BASOPHILS ABSOLUTE: 0 K/UL (ref 0–0.2)
BASOPHILS RELATIVE PERCENT: 0.1 %
BUN BLDV-MCNC: 23 MG/DL (ref 6–20)
CALCIUM SERPL-MCNC: 8.1 MG/DL (ref 8.5–9.9)
CHLORIDE BLD-SCNC: 111 MEQ/L (ref 95–107)
CO2: 23 MEQ/L (ref 20–31)
CREAT SERPL-MCNC: 0.66 MG/DL (ref 0.5–0.9)
EOSINOPHILS ABSOLUTE: 0 K/UL (ref 0–0.7)
EOSINOPHILS RELATIVE PERCENT: 0.2 %
GFR AFRICAN AMERICAN: >60
GFR NON-AFRICAN AMERICAN: >60
GLUCOSE BLD-MCNC: 121 MG/DL (ref 70–99)
HCT VFR BLD CALC: 28.8 % (ref 37–47)
HEMOGLOBIN: 9.1 G/DL (ref 12–16)
LYMPHOCYTES ABSOLUTE: 1.6 K/UL (ref 1–4.8)
LYMPHOCYTES RELATIVE PERCENT: 11 %
MCH RBC QN AUTO: 27.6 PG (ref 27–31.3)
MCHC RBC AUTO-ENTMCNC: 31.7 % (ref 33–37)
MCV RBC AUTO: 87.1 FL (ref 82–100)
MONOCYTES ABSOLUTE: 0.3 K/UL (ref 0.2–0.8)
MONOCYTES RELATIVE PERCENT: 1.9 %
NEUTROPHILS ABSOLUTE: 12.4 K/UL (ref 1.4–6.5)
NEUTROPHILS RELATIVE PERCENT: 87 %
PDW BLD-RTO: 16.5 % (ref 11.5–14.5)
PLATELET # BLD: 225 K/UL (ref 130–400)
PLATELET SLIDE REVIEW: NORMAL
POTASSIUM REFLEX MAGNESIUM: 3.9 MEQ/L (ref 3.4–4.9)
RBC # BLD: 3.3 M/UL (ref 4.2–5.4)
SODIUM BLD-SCNC: 146 MEQ/L (ref 135–144)
WBC # BLD: 14.2 K/UL (ref 4.8–10.8)

## 2022-06-24 PROCEDURE — 36415 COLL VENOUS BLD VENIPUNCTURE: CPT

## 2022-06-24 PROCEDURE — 99232 SBSQ HOSP IP/OBS MODERATE 35: CPT | Performed by: PSYCHIATRY & NEUROLOGY

## 2022-06-24 PROCEDURE — 85025 COMPLETE CBC W/AUTO DIFF WBC: CPT

## 2022-06-24 PROCEDURE — 80048 BASIC METABOLIC PNL TOTAL CA: CPT

## 2022-06-24 PROCEDURE — 6360000002 HC RX W HCPCS: Performed by: INTERNAL MEDICINE

## 2022-06-24 PROCEDURE — APPSS15 APP SPLIT SHARED TIME 0-15 MINUTES: Performed by: NURSE PRACTITIONER

## 2022-06-24 PROCEDURE — 97110 THERAPEUTIC EXERCISES: CPT

## 2022-06-24 PROCEDURE — 1210000000 HC MED SURG R&B

## 2022-06-24 PROCEDURE — 6370000000 HC RX 637 (ALT 250 FOR IP): Performed by: NURSE PRACTITIONER

## 2022-06-24 RX ADMIN — Medication 1000 UNITS: at 07:41

## 2022-06-24 RX ADMIN — ENOXAPARIN SODIUM 40 MG: 100 INJECTION SUBCUTANEOUS at 07:41

## 2022-06-24 RX ADMIN — THERA TABS 1 TABLET: TAB at 07:41

## 2022-06-24 ASSESSMENT — ENCOUNTER SYMPTOMS
TROUBLE SWALLOWING: 0
COUGH: 0
VOMITING: 0
WHEEZING: 0
SHORTNESS OF BREATH: 0
NAUSEA: 0
CHEST TIGHTNESS: 0
COLOR CHANGE: 0

## 2022-06-24 ASSESSMENT — PAIN SCALES - GENERAL: PAINLEVEL_OUTOF10: 0

## 2022-06-24 NOTE — PROGRESS NOTES
Physical Therapy Med Surg Daily Treatment Note  Facility/Department: [facility]  [facility]: [id number]       NAME: Eli Muniz  : 1980 (73 y.o.)  MRN: [id number]  CODE STATUS: Full Code    Date of Service: 2022    Patient Diagnosis(es): Neck pain [M54.2]  Hip pain [M25.559]  MS (multiple sclerosis) ([facility] [address] [G35]  General weakness [R53.1]  Unable to ambulate [R26.2]  Fall, initial encounter [W19. XXXA]  Pain of right lower extremity [M79.604]  Urinary tract infection with hematuria, site unspecified [N39.0, R31.9]   Chief Complaint   Patient presents with    Hip Pain     Patient Active Problem List    Diagnosis Date Noted    Impaired mobility and activities of daily living 2022    Hyperglycemia 2022    Neurogenic bladder 2022    Acute cystitis 2022    MS (multiple sclerosis) ([facility] [address] 2022    Urinary urgency 2022    Multiple sclerosis ([facility] [address] 2021    Foot drop, left foot 2020    Acquired deformity of left foot 2020    Lumbar radiculopathy 2020    Pain in joint, lower leg 2020    Posterior tibial tendon dysfunction     Pseudophakia 10/29/2019    Ataxic gait 2019    Mixed hyperlipidemia 2015    Obesity, unspecified 2005        Past Medical History:   Diagnosis Date    Dislocated knee     Right knee    Lumbar radiculopathy 2020    MS (multiple sclerosis) ([facility] [address] 2022    PTTD (posterior tibial tendon dysfunction)      Past Surgical History:   Procedure Laterality Date    CATARACT REMOVAL WITH IMPLANT Right     CATARACT REMOVAL WITH IMPLANT Left      SECTION  2013    EYE SURGERY      FOOT SURGERY Left        Chart Reviewed: Yes    Restrictions:  Restrictions/Precautions: Fall Risk (high mclaughlin score)    SUBJECTIVE:   Subjective: \"Im fine today. \"    Pain   No pain noted pre or post.     OBJECTIVE:        Bed mobility  Rolling to Left: Stand by assistance  Supine to Sit: Stand by assistance  Sit to Supine: Stand by assistance  Scooting: Stand by assistance  Bed Mobility Comments: EOB sitting x 10 min    Transfers  Sit to Stand: Contact guard assistance;Minimal Assistance  Stand to sit: Contact guard assistance  Comment: Declined sit to stand this date. PT Exercises  A/AROM Exercises: AP, QS, GS, heel slide, hip abd, SLR, SAQ, bridge X 10 R/L          Activity Tolerance  Activity Tolerance: Patient limited by fatigue;Patient limited by endurance          ASSESSMENT   Assessment: Patient motivated for therapy this date but continued to decline performing STS transfers this date. Continued to perform LE strengthening at EOB and in supine this date with good participation and demo. Occ fatigue shown with rest breaks needed. Discharge Recommendations:  Continue to assess pending progress         Goals  Long Term Goals  Long term goal 1: patient will be able to complete bed mobility independently with LRD  Long term goal 2: Patient will be able to complete transfers independently with LRD  Long term goal 3: Patient will be able to march in place x1 min  Long term goal 4: Patient will be able to complete 1 step independently with LRD    PLAN    Plan: 1 time a day 3-6 times a week  Safety Devices  Type of Devices: Call light within reach,Bed alarm in place,Left in bed     [facility] (6 CLICK) [address] Mobility Raw Score : 14     Therapy Time   Individual   Time In 0843   Time Out 0904   Minutes 21     Timed Code Treatment Minutes: 21 Minutes      Bm: 5  TherEx: Tamiko Du [place] [address], PTA, 06/24/22 at 10:52 AM         Definitions for assistance levels  Independent = pt does not require any physical supervision or assistance from another person for activity completion. Device may be needed.   Stand by assistance = pt requires verbal cues or instructions from another person, close to but not touching, to perform the activity  Minimal assistance= pt performs 75% or more of the activity; assistance is required to complete the activity  Moderate assistance= pt performs 50% of the activity; assistance is required to complete the activity  Maximal assistance = pt performs 25% of the activity; assistance is required to complete the activity  Dependent = pt requires total physical assistance to accomplish the task

## 2022-06-24 NOTE — CARE COORDINATION
This LSW called and spoke with Kole Smith, admissions for [facility]- pre-cert for patient remains pending at this time. LSW / ADALID to follow.   Electronically signed by MEL Gustafson, MARIIW on 6/24/22 at 8:33 AM EDT

## 2022-06-24 NOTE — PROGRESS NOTES
[address] [place] [address] Neurology Daily Progress Note  Name: Johan Herrera  Age: 35 y.o. Gender: female  CodeStatus: Full Code  Allergies: No Known Allergies    Chief Complaint:Hip Pain    Primary Care Provider: Marcell Zayas PA-C  InpatientTreatment Team: Treatment Team: Attending Provider: Sam Manzo MD; Consulting Physician: Darian Caballero MD; Registered Nurse: Alan Acosta, RN; Registered Nurse: Margarita Adkins RN; Utilization Reviewer: Jeff Verma RN  Admission Date: 6/18/2022         Pt seen and examined for neuro follow up for MS exacerbation. Patient is currently alert and oriented x3, no acute distress, cooperative. Mother is at bedside. Patient came in for generalized weakness. She is nonambulatory at baseline. Has significant lower extremity weakness 2-3 out of 5 left greater than right with left foot drop. Afebrile. CK levels improving. Leg weakness showing some improvement with steroids. Awaiting transfer to [address] [place] [address] inpatient rehab. As noted, some better,     Vitals:    06/24/22 0858   BP:    Pulse: (!) 124   Resp:    Temp:    SpO2:       Review of Systems   Constitutional: Negative for fatigue and fever. HENT: Negative for hearing loss and trouble swallowing. Eyes: Negative for visual disturbance. Respiratory: Negative for cough, chest tightness, shortness of breath and wheezing. Cardiovascular: Positive for leg swelling. Negative for chest pain and palpitations. Gastrointestinal: Negative for nausea and vomiting. Musculoskeletal: Positive for gait problem. Negative for neck pain and neck stiffness. Skin: Negative for color change and rash. Neurological: Positive for weakness. Negative for dizziness, tremors, seizures, syncope, facial asymmetry, speech difficulty, light-headedness, numbness and headaches. Psychiatric/Behavioral: Negative for agitation, confusion and hallucinations. The patient is not nervous/anxious. Physical Exam  Vitals and nursing note reviewed. Constitutional:       General: She is not in acute distress. Appearance: She is not diaphoretic. HENT:      Head: Normocephalic. Eyes:      General: No visual field deficit. Extraocular Movements: Extraocular movements intact. Pupils: Pupils are equal, round, and reactive to light. Cardiovascular:      Rate and Rhythm: Normal rate and regular rhythm. Pulmonary:      Effort: Pulmonary effort is normal. No respiratory distress. Breath sounds: Normal breath sounds. Abdominal:      General: Bowel sounds are normal. There is no distension. Palpations: Abdomen is soft. Tenderness: There is no abdominal tenderness. Skin:     General: Skin is warm and dry. Neurological:      Mental Status: She is alert and oriented to person, place, and time. Cranial Nerves: No cranial nerve deficit, dysarthria or facial asymmetry. Motor: Weakness, atrophy and abnormal muscle tone present. No tremor, seizure activity or pronator drift. Coordination: Coordination normal. Finger-Nose-Finger Test normal.      Deep Tendon Reflexes: Reflexes abnormal.      Reflex Scores:       Patellar reflexes are 3+ on the right side and 3+ on the left side. Achilles reflexes are 3+ on the right side and 3+ on the left side.       As noted above,   Medications:  Reviewed    Infusion Medications:    sodium chloride 125 mL/hr at 06/24/22 5739     Scheduled Medications:    Vitamin D  1,000 Units Oral Daily    multivitamin  1 tablet Oral Daily    enoxaparin  40 mg SubCUTAneous Daily     PRN Meds:     Labs:   Recent Labs     06/22/22  0700 06/23/22  0520 06/24/22  0631   WBC 11.1* 11.7* 14.2*   HGB 9.5* 9.7* 9.1*   HCT 30.9* 30.4* 28.8*    254 225     Recent Labs     06/22/22  0700 06/23/22  0520 06/24/22  0631    142 146*   K 4.1 4.3 3.9   * 109* 111*   CO2 26 23 23   BUN 17 23* 23*   CREATININE 0.59 0.67 0.66   CALCIUM 8.0* 7.9* 8.1*     No results for input(s): AST, ALT, BILIDIR, BILITOT, ALKPHOS in the last 72 hours. No results for input(s): INR in the last 72 hours. Recent Labs     06/21/22  1357 06/22/22  0049   CKTOTAL 1,005* 723*       Urinalysis:   Lab Results   Component Value Date    NITRU Negative 06/19/2022    WBCUA  06/18/2022    BACTERIA MODERATE 06/18/2022    RBCUA 10-20 06/18/2022    BLOODU Negative 06/19/2022    SPECGRAV 1.038 06/19/2022    GLUCOSEU 500 06/19/2022       Radiology:   Most recent    EEG No valid procedures specified. MRI of Brain Results for orders placed during the hospital encounter of 01/20/22    MRI BRAIN W WO CONTRAST    Narrative  EXAMINATION: MRI BRAIN W WO CONTRAST    CLINICAL HISTORY: G35 MS (multiple sclerosis) ([address] ICD10    COMPARISONS: Brain MRI from October 16, 2020    TECHNIQUE:    Multiplanar multisequence images of the brain were obtained before and after IV administration of contrast. Diffusion perfusion imaging was obtained. BRAIN MRI FINDINGS:    There are no extra-axial collections. There is no evidence of hemorrhage. There are no areas of perfusion diffusion signal abnormality to suggest ischemia. The susceptibility images do not demonstrate evidence of hemosiderin deposition within the brain  parenchyma or the leptomeninges. There is preservation of the gray-white matter differentiation. There are marked areas of T2/FLAIR increased signal in the subcortical and periventricular white matter of both hemispheres with areas of confluence. There is involvement of corpus callosum  and there are areas of signal dropout on T1 imaging. There are no areas of abnormal enhancement after IV contrast administration. There is prominence of the sulci and ventricles consistent with moderate global cerebral atrophy and chronic involutional changes out of proportion to the patient's age. The midline structures are intact, the corpus callosum is within normal limits.   The region of the pineal gland and the sella turcica are unremarkable. There are no space-occupying lesions in the posterior fossa. The basilar cisterns are patent. The craniocervical junction is unremarkable. The visualized portions of the orbits are within normal limits, the globes are intact. The visualized portions of the paranasal sinuses are within normal limits. The calvarium and soft tissues are unremarkable. Impression  There are extensive areas of signal abnormality in the subcortical and periventricular white matter and involving the corpus callosum without enhancement to suggest active inflammation. The findings are similar to the previous study and may reflect  severe chronic microangiopathy, vasculitis, MS or other demyelinating process. Results for orders placed during the hospital encounter of 10/16/20    MRI BRAIN WO CONTRAST    Narrative  EXAMINATION: MRI BRAIN WO CONTRAST, MRI CERVICAL SPINE WO CONTRAST    CLINICAL HISTORY: R26.0 Ataxic gait ICD10    COMPARISONS: Brain CT from August 29, 2014    TECHNIQUE:    Multiplanar multisequence images of the brain were obtained without contrast. Diffusion perfusion imaging was obtained. FINDINGS:    There are no extra-axial collections. There is no evidence of hemorrhage. There are no areas of signal abnormality on perfusion diffusion imaging to suggest ischemia. The susceptibility images do not demonstrate evidence of hemosiderin deposition within  the brain parenchyma or the leptomeninges. There is preservation of the gray-white matter differentiation. There are extensive areas of T2/FLAIR signal abnormality in the subcortical and periventricular white matter in the confluent distribution especially surrounding the atria of both lateral  ventricles. Some lesions are oriented perpendicular to the corpus callosum and there are other scattered discontiguous subcortical lesions. There are no white matter lesions in the posterior fossa.  There is prominence of the sulci and ventricles  consistent with moderate global cerebral atrophy and chronic involutional changes. The midline structures are intact, the corpus callosum is within normal limits. The region of the pineal gland and the sella turcica are unremarkable. There are no space-occupying lesions in the posterior fossa. The basilar cisterns are patent. The craniocervical junction is unremarkable. The visualized portions of the orbits are within normal limits, the globes are intact. The visualized portions of the paranasal sinuses are within normal limits. The calvarium and soft tissues are unremarkable. Impression  1. There are no acute intracranial changes, there is no evidence of hemorrhage or acute ischemia. 2. There are extensive areas of T2/FLAIR signal abnormality in the periventricular and subcortical white matter with a confluent distribution. Some lesions are oriented perpendicular to the corpus callosum. The findings are nonspecific but may be  associated with a demyelinating process such as multiple sclerosis versus chronic microangiopathy, vasculitis or other demyelinating process. EXAMINATION: MRI BRAIN WO CONTRAST, MRI CERVICAL SPINE WO CONTRAST    CLINICAL HISTORY: R26.0 Ataxic gait ICD10    COMPARISONS: NONE AVAILABLE    TECHNIQUE:    Multiplanar multisequence MRI images of the cervical spine were obtained without contrast.    FINDINGS:        There is no acute fracture. There is preservation of the vertebral body heights. There is intervertebral disc desiccation and disc space narrowing at every level. The bone marrow signal is within normal limits. The cord is normal in course and caliber. There are areas of signal abnormality noted within the cord to the right side at C3-4, also on the right side at C4, and on the left at C5-6. These may represent foci of demyelination. There is no prevertebral soft tissue swelling. Anterior soft tissues are unremarkable.  The craniocervical junction is unremarkable. C2-3: There is a  indenting the anterior thecal sac but not abutting the cord with mild central canal narrowing. The facet joints are unremarkable. There is no narrowing of the neural foramina. C3-4:There is a less than 2 mm disc bulge flattening the anterior portion of the thecal sac without narrowing of the central canal.  The facet joints are unremarkable. There is no narrowing of the neural foramina. C4-5:There is a less than 2 mm disc bulge flattening the anterior portion of the thecal sac without narrowing of the central canal.  The facet joints are unremarkable. There is no narrowing of the neural foramina. C5-6:There is no disc herniation or central canal narrowing. The facet joints are unremarkable. There is no narrowing of the neural foramina. C6-7:There is no disc herniation or central canal narrowing. The facet joints are unremarkable. There is no narrowing of the neural foramina. C7-T1:There is no disc herniation or central canal narrowing. The facet joints are unremarkable. There is no narrowing of the neural foramina. IMPRESSION:  1. There is no acute fracture or subluxation. 2. The cord is normal in course and caliber. There are foci of signal abnormality most likely between C3 and C6 on both the right and left sides of the cord which may represent areas of demyelination. The findings may be secondary to edema and process  such as multiple sclerosis versus prior ischemia or other etiologies. 3. There is mild multilevel spondylosis including intervertebral disc space narrowing at every level and very degrees of disc bulges as described in greater detail in each level above. MRA of the Head and Neck: No results found for this or any previous visit. No results found for this or any previous visit. No results found for this or any previous visit.                             CT of the Head: No results found for this or any previous visit. No results found for this or any previous visit. No results found for this or any previous visit. Carotid duplex: No results found for this or any previous visit. No results found for this or any previous visit. No results found for this or any previous visit. Echo No results found for this or any previous visit. Assessment/Plan:    6/19/22:  Multiple sclerosis with exacerbation with added hip pain and lower extremity weakness. Given that she has this going on for a while an MRI of the lumbosacral spine is recommended. She has extensive white matter disease and recently was started on Harvie Richters by me and she has received about 4 doses which is monthly and a subcutaneous injection of B-cell line drug. Patient prior to this was on Aubagio. Her EDSS score remains around 4-5 and does fluctuate. During exacerbation she is worse. Her last exacerbation was in April and we have treated her with methylprednisone. I do not see any acute changes to consider an MRI at this time of the brain as the management for now is unlikely to change unless in the future if she does not respond to Harvie Richters     Patient may be transferred to rehabilitation with 3 days of methylprednisone for now. Urine examination is noted. We will arrange for other tests rule out arthritis as well. This consultation includes my consultation with emergency room. 6/20/22:  Multiple sclerosis with exacerbation. Continue IV methylprednisolone 1 g daily until 6/23/2022. Elevated CK level of 4188, repeat. Patient is status post fall and her and her mother state she was on the floor for a couple of days. Recommend hydrate with IV fluids and monitor trend. Will repeat CK level. TSH 0.253 with a normal free T4. CRP elevated at 143.5. MRI of the lumbar spine remains pending. Transaminitis, improving.   Left upper quadrant ultrasound shows sludge within the gallbladder with no signs of cholelithiasis or bile duct dilation. Will discuss with Dr. Alexy Perry if elevated LFTs could be secondary to her Kesimpta. LFTs were normal on 4/27/2022. Urinary tract infection, urine culture positive for only 10-50,000 gram-negative rods. Patient continues on IV ceftriaxone. [id number]. I have personally performed a face to face diagnostic evaluation on this patient, reviewed all data and investigations, and am the sole provider of all clinical decisions on the neurological status of this patient. as noted above, legs better, Orlean Sizer may be causing LFT issues, with  watch for now and consider qcrevus , MRI LS spine pending    6/21/22:   MS exacerbation, continue methylprednisolone as ordered. CK levels improving with IV fluids  Elevated LFTs could be secondary to kesimpta. Plans for transition to [facility] on outpatient basis. MRI of the lumbar spine shows degenerative changes without any significant canal stenosis. [id number] Okay from neurology standpoint. I have personally performed a face to face diagnostic evaluation on this patient, reviewed all data and investigations, and am the sole provider of all clinical decisions on the neurological status of this patient. pt feels better, wants home anot rehab,  pt instructed not to take fesimpta for now, as elevated LFT      6/22/22:  methylprednisolone to be completed today. Rehab precert pending  OK to DC from neuro standpoint    I have personally performed a face to face diagnostic evaluation on this patient, reviewed all data and investigations, and am the sole provider of all clinical decisions on the neurological status of this patient. as noted,walking better,  wants rehab  Will d/c kesimpta as abnl LFT,  may need ocrevus       6/23/2022:  [id number]   I have personally performed a face to face diagnostic evaluation on this patient, reviewed all data and investigations, and am the sole provider of all clinical decisions on the neurological status of this patient. better with steroids,  will need to change neuroimmunomodulater  Due to liver issues      6/24/22:   Awaiting DC to [facility] rehab  Will follow at a distance  I have personally performed a face to face diagnostic evaluation on this patient, reviewed all data and investigations, and am the sole provider of all clinical decisions on the neurological status of this patient. as noted, improved with steroids, await d/c planneing      Facundo SANTIAGO Lauren MD, [address] American Board of Psychiatry & Neurology  Board Certified in Vascular Neurology  Board Certified in Neuromuscular Medicine  Certified in Neurorehabilitation       Collaborating physicians: Dr Abdirashid Lauren    Electronically signed by JACKI Mendoza CNP on 6/24/2022 at 11:39 AM

## 2022-06-24 NOTE — CARE COORDINATION
Patient precert still pending decision per [facility]. Ref # [id number]  Electronically signed by Anup Varma RN on 6/24/22 at 8:43 AM EDT      Still pending precert.   Electronically signed by Anup Varma RN on 6/24/22 at 3:02 PM EDT

## 2022-06-24 NOTE — PROGRESS NOTES
Hospitalist Progress Note      Date of Admission: 6/18/2022  Chief Complaint:    Chief Complaint   Patient presents with    Hip Pain     Subjective:  No new complaints. No nausea, vomiting, chest pain, or headache      Medications:    Infusion Medications     Scheduled Medications    Vitamin D  1,000 Units Oral Daily    multivitamin  1 tablet Oral Daily    enoxaparin  40 mg SubCUTAneous Daily     PRN Meds:     Intake/Output Summary (Last 24 hours) at 6/24/2022 1549  Last data filed at 6/24/2022 1347  Gross per 24 hour   Intake 4315 ml   Output 1000 ml   Net 3315 ml     Exam:  BP (!) 141/68   Pulse (!) 113   Temp 98.2 °F (36.8 °C) (Oral)   Resp 16   Ht 5' 1\" (1.549 m)   Wt 141 lb (64 kg)   LMP 06/01/2022   SpO2 95%   BMI 26.64 kg/m²   Head: Normocephalic, atraumatic  Sclera clear  Neck JVD flat  Lungs: normal effort of breathing    Labs:   Recent Labs     06/22/22  0700 06/23/22  0520 06/24/22  0631   WBC 11.1* 11.7* 14.2*   HGB 9.5* 9.7* 9.1*   HCT 30.9* 30.4* 28.8*    254 225     Recent Labs     06/22/22  0700 06/23/22  0520 06/24/22  0631    142 146*   K 4.1 4.3 3.9   * 109* 111*   CO2 26 23 23   BUN 17 23* 23*   CREATININE 0.59 0.67 0.66   CALCIUM 8.0* 7.9* 8.1*     No results for input(s): INR in the last 72 hours. Recent Labs     06/22/22  0049   CKTOTAL 723*     Radiology:  MRI LUMBAR SPINE WO CONTRAST   Final Result   Degenerative facet changes of the lumbar spine visualized most prominent at L3-L4 and L5-S1 that demonstrate mild progression in comparison to the prior study      US DUP LOWER EXTREMITIES BILATERAL VENOUS   Final Result   NO SONOGRAPHIC EVIDENCE OF DEEP VENOUS THROMBOSIS WITHIN THE BILATERAL LOWER EXTREMITIES. US ABDOMEN LIMITED Specify organ? LIVER   Final Result      NO SIGN OF CHOLELITHIASIS OR BILE DUCT DILATATION. SLUDGE WITHIN THE GALLBLADDER. NO DEFINITE FOCAL LIVER ABNORMALITY.             XR CERVICAL SPINE (4-5 VIEWS)   Final Result NEGATIVE CERVICAL SPINE      XR HIP LEFT (2-3 VIEWS)   Final Result   NEGATIVE PELVIS AND LEFT HIP       XR FEMUR RIGHT (MIN 2 VIEWS)   Final Result   NEGATIVE RIGHT FEMUR        Assessment/Plan:    MS flare: Primarily being managed by neurology. Currently on high-dose steroids. PT/OT evaluation for disposition guidance. Recommending rehab versus home care depending on clinical course. Rhabdomyolysis secondary to patient being on floor. CK was in 4000's, currently down to 1330. Continue with IV fluids. Monitor CK. Immobile with mild leg edema, DVT ruled out by ultrasound. Possible UTI, gram-negative rods in urine. Antibiotic started on 6/19.  3 days therapy. Last dose 6/21.     DC plan: Awaiting placement    25 minutes total care time, >1/2 in unit/floor time and care coordination     Eusebio Crouch MD

## 2022-06-24 NOTE — FLOWSHEET NOTE
5412 - Medications given, assessment complete. Pt anticipating discharge to Rehab.     0900 - Pt ambulating and HR 120s-140s. Pt asymptomatic. 26 - Dr. Michelle Sumner states that normal saline infusion can be discontinued     Pt's call light within reach, bed alarm active. Pt was taken by transport down to main [facility] to see 5year old son during shift.

## 2022-06-25 LAB
ANION GAP SERPL CALCULATED.3IONS-SCNC: 4 MEQ/L (ref 9–15)
BASOPHILS ABSOLUTE: 0 K/UL (ref 0–0.2)
BASOPHILS RELATIVE PERCENT: 0.1 %
BUN BLDV-MCNC: 20 MG/DL (ref 6–20)
CALCIUM SERPL-MCNC: 8.4 MG/DL (ref 8.5–9.9)
CHLORIDE BLD-SCNC: 100 MEQ/L (ref 95–107)
CO2: 31 MEQ/L (ref 20–31)
CREAT SERPL-MCNC: 0.59 MG/DL (ref 0.5–0.9)
EOSINOPHILS ABSOLUTE: 0.2 K/UL (ref 0–0.7)
EOSINOPHILS RELATIVE PERCENT: 1.7 %
GFR AFRICAN AMERICAN: >60
GFR NON-AFRICAN AMERICAN: >60
GLUCOSE BLD-MCNC: 102 MG/DL (ref 70–99)
HCT VFR BLD CALC: 32.9 % (ref 37–47)
HEMOGLOBIN: 10.6 G/DL (ref 12–16)
LYMPHOCYTES ABSOLUTE: 1.1 K/UL (ref 1–4.8)
LYMPHOCYTES RELATIVE PERCENT: 9.5 %
MCH RBC QN AUTO: 27.5 PG (ref 27–31.3)
MCHC RBC AUTO-ENTMCNC: 32.1 % (ref 33–37)
MCV RBC AUTO: 85.5 FL (ref 82–100)
MONOCYTES ABSOLUTE: 0.9 K/UL (ref 0.2–0.8)
MONOCYTES RELATIVE PERCENT: 7.6 %
NEUTROPHILS ABSOLUTE: 9.8 K/UL (ref 1.4–6.5)
NEUTROPHILS RELATIVE PERCENT: 81.1 %
PDW BLD-RTO: 17.4 % (ref 11.5–14.5)
PLATELET # BLD: 257 K/UL (ref 130–400)
POTASSIUM REFLEX MAGNESIUM: 4 MEQ/L (ref 3.4–4.9)
RBC # BLD: 3.85 M/UL (ref 4.2–5.4)
SODIUM BLD-SCNC: 135 MEQ/L (ref 135–144)
WBC # BLD: 12.1 K/UL (ref 4.8–10.8)

## 2022-06-25 PROCEDURE — 85025 COMPLETE CBC W/AUTO DIFF WBC: CPT

## 2022-06-25 PROCEDURE — 97535 SELF CARE MNGMENT TRAINING: CPT

## 2022-06-25 PROCEDURE — 6370000000 HC RX 637 (ALT 250 FOR IP): Performed by: NURSE PRACTITIONER

## 2022-06-25 PROCEDURE — 36415 COLL VENOUS BLD VENIPUNCTURE: CPT

## 2022-06-25 PROCEDURE — 6360000002 HC RX W HCPCS: Performed by: INTERNAL MEDICINE

## 2022-06-25 PROCEDURE — 1210000000 HC MED SURG R&B

## 2022-06-25 PROCEDURE — 80048 BASIC METABOLIC PNL TOTAL CA: CPT

## 2022-06-25 RX ADMIN — ENOXAPARIN SODIUM 40 MG: 100 INJECTION SUBCUTANEOUS at 08:26

## 2022-06-25 RX ADMIN — THERA TABS 1 TABLET: TAB at 08:27

## 2022-06-25 RX ADMIN — Medication 1000 UNITS: at 08:26

## 2022-06-25 ASSESSMENT — PAIN SCALES - GENERAL: PAINLEVEL_OUTOF10: 0

## 2022-06-25 NOTE — PROGRESS NOTES
Nutrition Assessment     Type and Reason for Visit: RD Nutrition Re-Screen/LOS    Nutrition Recommendations/Plan:   1. Continue current plan of care     Malnutrition Assessment:  Malnutrition Status: No malnutrition    Nutrition Assessment:  Nutritional status is adeqaute at this time, pt is tolerating General diet with intakes > 75%, Denies any chewing or swallowing difficulties ( hx of MS), no re          Nutrition Related Findings:     Wound Type: None    Current Nutrition Therapies:    ADULT DIET;  Regular    Anthropometric Measures:  · Height: 5' 1\" (154.9 cm)  · Current Body Wt: 189 lb (85.7 kg) (6/25)   · BMI: 35.7    Nutrition Diagnosis:   No nutrition diagnosis at this time     Nutrition Interventions:   Food and/or Nutrient Delivery: Continue Current Diet  Nutrition Education/Counseling: Education not indicated  Coordination of Nutrition Care: No recommendation at this time       Goals:  Previous Goal Met: Goal(s) Achieved  Goals: PO intake 75% or greater,by next RD assessment       Nutrition Monitoring and Evaluation:   Behavioral-Environmental Outcomes: None Identified  Food/Nutrient Intake Outcomes: Food and Nutrient Intake  Physical Signs/Symptoms Outcomes: Meal Time Behavior,Weight    Discharge Planning:    No discharge needs at this time     Lyn Forbes RD, LD

## 2022-06-25 NOTE — PROGRESS NOTES
Pt A+OX4 has been drowsey all evening. No complaints of pain or SOB. Vs stable. Still awaiting acceptance to rehab. Bed in the lowest position and call light in reach. Will continue to monitor.

## 2022-06-25 NOTE — PROGRESS NOTES
Subjective: The patient complains of severe acute on chronic progressive fatigue and lateral lower extremity weakness, cognitive deficits, neuropathic sensation lower extremity secondary to recent MS flareup partially relieved by rest, PT, OT and meds for multiple sclerosis and exacerbated by exertion and recent illness. I am concerned about neurogenic bowel and bladder and not having a bowel movement in the past few days she also has poor awareness of her deficits. He however is now committed to coming to acute rehab and she would be a good fit for our therapy program at an acute level. Continue to await care source approval.    I am concerned about patients medical complexities including:  Principal Problem:    MS (multiple sclerosis) ([facility] [address]  Active Problems:    Impaired mobility and activities of daily living    Hyperglycemia    Neurogenic bladder    Acute cystitis    Foot drop, left foot  Resolved Problems:    * No resolved hospital problems. *      .    Reviewed recent nursing note and discussed current status and planned care with acute care providers, \"Spoke with patient yesterday and she has requested admission to [facility] acute rehab. Inpatient Rehab referral received previously and the patient declined admission. Met with patient and explained [facility] Acute [facility]patient Rehab program again and requirements, including 3 hours of intense therapy daily, anticipated length of stay and goal of discharge to home. All questions answered and patient verbalized understanding. Freedom of choice provided and patient requests admit to [facility] if approved by insurance. PM&R consult completed. Requested precert from Abbie Iglesias called to open case and clinicals were submitted reference #[id number]. \"  Admitted to hospital 6/18/2022.  Patient being admitted for MS exacerbation. Regina Nunes is a pleasant, alert and oriented x3, -American female, 77-year-old. Regina Nunes states that she had a fall due to weakness in her legs and pain in her hips bilaterally     Neurology-MS exacerbation, continue methylprednisolone as ordered. CK levels improving with IV fluids  Elevated LFTs could be secondary to kesimpta     ROS x10: The patient also complains of severely impaired mobility and activities of daily living. Otherwise no new problems with vision, hearing, nose, mouth, throat, dermal, cardiovascular, GI, , pulmonary, musculoskeletal, psychiatric or neurological.        Vital signs:  BP (!) 141/68   Pulse (!) 113   Temp 98.2 °F (36.8 °C) (Oral)   Resp 16   Ht 5' 1\" (1.549 m)   Wt 141 lb (64 kg)   LMP 06/01/2022   SpO2 95%   BMI 26.64 kg/m²   I/O:   PO/Intake:    fair PO intake, monitoring for dysphagia    Bowel/Bladder:   continent,    General:  Patient is well developed, adequately nourished, and    well kempt. HEENT:    PERRLA, hearing intact to loud voice, external inspection of ear and nose benign. Inspection of lips, tongue and gums benign  Musculoskeletal: No significant change in strength or tone. All joints stable. Inspection and palpation of digits and nails show no clubbing, cyanosis or inflammatory conditions. Neuro/Psychiatric: Affect: flat-  Alert and oriented to self and situation with min cues. No significant change in deep tendon reflexes or sensation  Lungs:  Diminished, CTA-B  . Respiration effort is normal at rest.   Heart:   S1 = S2,   RRR. Abdomen:  Soft, non-tender    Extremities:  Trace  lower extremity edema but no unusual tenderness. Skin:   BUE bruises dt blood draws-Bruising at BLE, BUE, Hip, Abd, Thigh; Open blister/abrasion on right arm.       Rehabilitation:  Physical Therapy:   Bed mobility:  Bed mobility  Rolling to Left: Stand by assistance (06/24/22 1048)  Supine to Sit: Stand by assistance (06/24/22 1048)  Sit to Supine: Stand by assistance (06/24/22 1048)  Scooting: Stand by assistance (06/24/22 1048)  Bed Mobility Comments: EOB sitting x 10 min (06/24/22 1048)  Bed Mobility Training  Bed Mobility Training: Yes (06/21/22 0925)  Overall Level of Assistance: Stand-by assistance (06/21/22 0925)  Interventions: Verbal cues (06/21/22 0925)  Rolling: Stand-by assistance (06/21/22 0925)  Supine to Sit: Stand-by assistance (06/21/22 0925)  Scooting: Stand-by assistance (06/21/22 0925)  Transfers:  Transfers  Sit to Stand: Contact guard assistance;Minimal Assistance (06/24/22 1049)  Stand to sit: Contact guard assistance (06/24/22 1049)  Comment: Declined sit to stand this date. (06/24/22 1049)  Transfer Training  Transfer Training: Yes (06/21/22 0925)  Overall Level of Assistance: Minimum assistance (06/21/22 0925)  Interventions: Safety awareness training; Tactile cues; Weight shifting training/pressure relief (06/21/22 0925)  Stand Pivot Transfers: Minimum assistance (06/21/22 1725)  Gait:   Ambulation  Comments: not assess for safety concerns (06/20/22 1352)  Gait Training: No (06/21/22 0925)  Stairs:     W/C mobility:       Occupational Therapy:   Hand Dominance: Right  ADL  Feeding: Unable to assess(comment) (06/20/22 1346)  Grooming: Setup; Increased time to complete (06/20/22 1346)  UE Bathing: Setup; Increased time to complete (06/20/22 1346)  LE Bathing: Minimal assistance; Increased time to complete (06/20/22 1346)  UE Dressing: Setup; Increased time to complete (06/20/22 1346)  LE Dressing: Moderate assistance; Increased time to complete (06/20/22 1346)  Toileting: Unable to assess(comment) (06/20/22 1346)             Speech Therapy:            Diet/Swallow:                   COGNITION  OT:    SP:           Lab/X-ray studies reviewed, analyzed and discussed with patient and staff:   Recent Results (from the past 24 hour(s))   Basic Metabolic Panel w/ Reflex to MG    Collection Time: 06/24/22  6:31 AM   Result Value Ref Range    Sodium 146 (H) 135 - 144 mEq/L    Potassium reflex Magnesium 3.9 3.4 - 4.9 mEq/L    Chloride 111 (H) 95 - 107 mEq/L    CO2 23 20 - 31 mEq/L    Anion Gap 12 9 - 15 mEq/L    Glucose 121 (H) 70 - 99 mg/dL    BUN 23 (H) 6 - 20 mg/dL    CREATININE 0.66 0.50 - 0.90 mg/dL    GFR Non-African American >60.0 >60    GFR  >60.0 >60    Calcium 8.1 (L) 8.5 - 9.9 mg/dL   CBC with Auto Differential    Collection Time: 06/24/22  6:31 AM   Result Value Ref Range    WBC 14.2 (H) 4.8 - 10.8 K/uL    RBC 3.30 (L) 4.20 - 5.40 M/uL    Hemoglobin 9.1 (L) 12.0 - 16.0 g/dL    Hematocrit 28.8 (L) 37.0 - 47.0 %    MCV 87.1 82.0 - 100.0 fL    MCH 27.6 27.0 - 31.3 pg    MCHC 31.7 (L) 33.0 - 37.0 %    RDW 16.5 (H) 11.5 - 14.5 %    Platelets 325 840 - 468 K/uL    PLATELET SLIDE REVIEW Normal     Neutrophils % 87.0 %    Lymphocytes % 11.0 %    Monocytes % 1.9 %    Eosinophils % 0.2 %    Basophils % 0.1 %    Neutrophils Absolute 12.4 (H) 1.4 - 6.5 K/uL    Lymphocytes Absolute 1.6 1.0 - 4.8 K/uL    Monocytes Absolute 0.3 0.2 - 0.8 K/uL    Eosinophils Absolute 0.0 0.0 - 0.7 K/uL    Basophils Absolute 0.0 0.0 - 0.2 K/uL    Anisocytosis 1+      Previous extensive, complex labs, notes and diagnostics reviewed and analyzed. ALLERGIES:    Allergies as of 06/18/2022    (No Known Allergies)      (please also verify by checking [facility])     Complex Physical Medicine & Rehab Issues Assess & Plan:   1. Severe abnormality of gait and mobility and impaired self-care and ADL's secondary to acute exacerbation of multiple sclerosis. Updated functional and medical status reassessed regarding patients ability to participate in therapies and patient found to be able to participate in:        acute intensive comprehensive inpatient rehabilitation program including PT/OT to improve balance, ambulation, ADLs, and to improve the P/AROM. It is my opinion that they will be able to tolerate 3 hours of therapy a day and benefit from it at an acute level.  I again discussed acute rehab with the patient and verify that the patient is able and willing to participate in 3 hours of therapy a day.  Rehab and Acute Care Case Management has also reinforced this expectation. Will continue to follow to attempt to get patient to the most efficient but most effective level of care will be in their best interest.  Continue to focus on energy conservation heart rate and blood pressure monitoring before during and after therapy endurance and consistency of function. 2. Bowel constipation   and Bladder dysfunction   overactive, neurogenic bladder:  frequent toileting, ambulate to bathroom with assistance, check post void residuals. Check for C.difficile x1 if >2 loose stools in 24 hours, continue bowel & bladder program.  Monitor for UTI symptoms including lethargy and confusion    3. Moderate to severe pathic pain bilateral lower extremities and generalized OA pain: reassess pain every shift and prior to and after each therapy session, give prn Tylenol   and consider scheduled Tylenol, modalities prn in therapy, consider Lidoderm, K-pad prn.     4. Skin breakdown Bruising at BLE, BUE, Hip, Abd, Thigh; Open blister/abrasion on right arm. risk:  continue pressure relief program.  Daily skin exams and reports from nursing. 5. Severe fatigue due to immobility and nutritional deficits: monitoring for dysphagia   Add vitamin B12 vitamin D and CoQ10 titrate dosing and add protein supplementation with low carb content. 6. Complex discharge planning:   Discussed with care team-last 24 hour events noted. I will continue to follow along and reassess functional and medical status as we strive to improve patient's functional and medical outcomes progressing to the most efficient and lowest level of care. Complex Active General Medical Issues that complicate care:     1.  Principal Problem:    MS (multiple sclerosis) ([facility])  Active Problems:    Impaired mobility and activities of daily living    Hyperglycemia    Neurogenic bladder    Acute cystitis    Foot drop, left foot  Resolved Problems:    * No resolved hospital problems.  *          Events and functional changes in the past 24 hours reviewed improvements in functional status are encouraging       Focus of today's plan-   continue all inpatient therapies, pre-CERT is pending  Tristen Ruiz D.O., PM&R     Attending    [facility] Brooke Aaron

## 2022-06-25 NOTE — PROGRESS NOTES
[facility] OCCUPATIONAL THERAPY MED SURG TREATMENT NOTE     Date: 2022  Patient Name: Riley Rubalcava        MRN: [id number]  Account: [de-identified]   : 1980  (35 y.o.)  [facility]    Chart Review:    Restrictions  Restrictions/Precautions  Restrictions/Precautions: Fall Risk     Safety:  Safety Devices  Type of Devices: Nurse notified;Call light within reach (Pt left on bedside commode. RN and PCA in room and aware)    Patient's birthday verified: Yes    Subjective:    Pain at start of treatment: No    Pain at end of treatment: No    Objective: Pt washed up while seated in bedside chair. Pt continuously incontinent of feces during bathing. Pt transferred onto bedside commode where she remained at end of session with call light within reach and RN and PCA aware. ADL Status:  Grooming: Setup  Grooming Skilled Clinical Factors: To wash face  UE Bathing: Supervision;Setup;Verbal cueing  UE Bathing Skilled Clinical Factors: VCs for initiation  LE Bathing: Dependent/Total  LE Bathing Skilled Clinical Factors: +2 assistance. Pt able to bend down in order to wash legs with SBA for safe sitting. +2 to wash posterior lillie area in standing. 1 person for washing 1 person to maintain standing balance  UE Dressing: Unable to assess  UE Dressing Skilled Clinical Factors: Hospital gown only  Toileting: Dependent/Total  Toileting Skilled Clinical Factors: Pt incontinent of feces    Bed Mobility  Supine to Sit: Moderate assistance  Bed Mobility Comments: HOB flat. Heavy use of bed rails. Verbal and visual cues for sequencing, problem solving, and safety    Transfers:  Stand Pivot Transfers: Dependent/Total;2 Person assistance (EOB > Bedside chair.  Pt has difficulty advancing left foot)  Sit to stand: Dependent/Total;2 Person assistance  Stand to sit: Dependent/Total;2 Person assistance  Transfer Comments: Pt initially Min/Mod for Sit to and from Stand transfers, however, as she fatigued she required increased assistance  Toilet Transfers  Toilet - Technique:  Ethan Alvarado)  Equipment Used: Drop-arm commode  Toilet Transfer: Dependent/Total  Toilet Transfers Comments: Ethan Genin > Drop arm commode. Pt on commode at end of session    Cognition Status:  Cognition  Overall Cognitive Status: WFL  Cognition Comment: Anxiety can be limiting at times    Therapy key for assistance levels -   Independent/Mod I = Pt. is able to perform task with no assistance but may require a device   Stand by assistance = Pt. does not perform task at an independent level but does not need physical assistance, requires verbal cues  Minimal, Moderate, Maximal Assistance = Pt. requires physical assistance (25%, 50%, 75% assist from helper) for task but is able to actively participate in task   Dependent = Pt. requires total assistance with task and is not able to actively participate with task completion    Functional Endurance:  Activity Tolerance  Activity Tolerance: Patient limited by fatigue  Activity Tolerance Comments: Decreased activity tolerance    Treatment consisted of:  ADL training    Assessment/Discharge Disposition: Pt limited by weakness in BLEs and overall decreased activity tolerance. Pt's follow through of safe transfer techniques limited by pt's anxiety. Pt would benefit from continued Occupational Therapy to increase strength, activity tolerance, Willacy with functional transfers, and Willacy with ADL/IADL completion.      [employer]  How much help for putting on and taking off regular lower body clothing?: Total  How much help for Bathing?: A Lot  How much help for Toileting?: Total  How much help for putting on and taking off regular upper body clothing?: A Little  How much help for taking care of personal grooming?: A Little  How much help for eating meals?: None  AM-[facility] Inpatient Daily Activity Raw Score: 14  AM-PAC Inpatient ADL T-Scale Score : 33.39  ADL Inpatient CMS 0-100% Score: 59.67  ADL Inpatient CMS G-Code Modifier : CK    Plan:  Continue OT per POC    Patient Education:  Patient Education  Education Given To: Patient  Education Provided: Role of Therapy;Plan of Care;Transfer Training  Education Method: Verbal;Demonstration  Barriers to Learning: Other (Anxiety)  Education Outcome: Verbalized understanding;Demonstrated understanding;Continued education needed      Goals/Plan of care addressed during this session:  Patient Goal: Patient goals : I plan to go to rehab. Waiting for the insurance to get back to us.     Improve Walsh with ADLs, Improve Walsh with Functional Transfers and Improve Cognition/Safety awareness    Therapy Time:   Individual Group Co-Treat   Time In 829       Time Out 0900         Minutes 31         ADL/IADL trainin minutes    Electronically signed by:    JAMAL Valdez    2022, 9:39 AM

## 2022-06-25 NOTE — PROGRESS NOTES
Subjective: The patient complains of severe acute on chronic progressive fatigue and lateral lower extremity weakness, cognitive deficits, neuropathic sensation lower extremity secondary to recent MS flareup partially relieved by rest, PT, OT and meds for multiple sclerosis and exacerbated by exertion and recent illness. I am concerned about neurogenic bowel and bladder and not having a bowel movement in the past few days she also has poor awareness of her deficits. He however is now committed to coming to acute rehab and she would be a good fit for our therapy program at an acute level. Continue to await care source approval.    I am concerned about patients medical complexities including:  Principal Problem:    MS (multiple sclerosis) ([facility] [address]  Active Problems:    Impaired mobility and activities of daily living    Hyperglycemia    Neurogenic bladder    Acute cystitis    Foot drop, left foot  Resolved Problems:    * No resolved hospital problems. *      .    Reviewed recent nursing note and discussed current status and planned care with acute care providers, \"Spoke with patient yesterday and she has requested admission to [facility] acute rehab. Inpatient Rehab referral received previously and the patient declined admission. Met with patient and explained [facility] Acute Inpatient Rehab program again and requirements, including 3 hours of intense therapy daily, anticipated length of stay and goal of discharge to home. All questions answered and patient verbalized understanding. Freedom of choice provided and patient requests admit to [facility] if approved by insurance. PM&R consult completed. Requested precert from [address] [place] [address] called to open case and clinicals were submitted reference #[id number]. \"  Admitted to hospital 6/18/2022.  Patient being admitted for MS exacerbation. Marely Pantoja is a pleasant, alert and oriented x3, -American female, 57-year-old. Marely Pantoja states that she had a fall due to weakness in her legs and pain in her hips bilaterally     Neurology-MS exacerbation, continue methylprednisolone as ordered. CK levels improving with IV fluids  Elevated LFTs could be secondary to kesimpta     ROS x10: The patient also complains of severely impaired mobility and activities of daily living. Otherwise no new problems with vision, hearing, nose, mouth, throat, dermal, cardiovascular, GI, , pulmonary, musculoskeletal, psychiatric or neurological.        Vital signs:  /84   Pulse 95   Temp 98.6 °F (37 °C) (Oral)   Resp 18   Ht 5' 1\" (1.549 m)   Wt 189 lb (85.7 kg)   LMP 06/01/2022   SpO2 98%   BMI 35.71 kg/m²   I/O:   PO/Intake:    fair PO intake, monitoring for dysphagia    Bowel/Bladder:   continent,    General:  Patient is well developed, adequately nourished, and    well kempt. HEENT:    PERRLA, hearing intact to loud voice, external inspection of ear and nose benign. Inspection of lips, tongue and gums benign  Musculoskeletal: No significant change in strength or tone. All joints stable. Inspection and palpation of digits and nails show no clubbing, cyanosis or inflammatory conditions. Neuro/Psychiatric: Affect: flat-  Alert and oriented to self and situation with min cues. No significant change in deep tendon reflexes or sensation  Lungs:  Diminished, CTA-B  . Respiration effort is normal at rest.   Heart:   S1 = S2,   RRR. Abdomen:  Soft, non-tender    Extremities:  Trace  lower extremity edema but no unusual tenderness. Skin:   BUE bruises dt blood draws-Bruising at BLE, BUE, Hip, Abd, Thigh; Open blister/abrasion on right arm.       Rehabilitation:  Physical Therapy:   Bed mobility:  Bed mobility  Rolling to Left: Stand by assistance (06/24/22 1048)  Supine to Sit: Stand by assistance (06/24/22 1048)  Sit to Supine: Stand by assistance (06/24/22 1048)  Scooting: Stand by assistance (06/24/22 1048)  Bed Mobility Comments: EOB sitting x 10 min (06/24/22 1048)  Bed Mobility Training  Bed Mobility Training: Yes (06/21/22 0925)  Overall Level of Assistance: Stand-by assistance (06/21/22 0925)  Interventions: Verbal cues (06/21/22 0925)  Rolling: Stand-by assistance (06/21/22 0925)  Supine to Sit: Stand-by assistance (06/21/22 0925)  Scooting: Stand-by assistance (06/21/22 0925)  Transfers:  Transfers  Sit to Stand: Contact guard assistance;Minimal Assistance (06/24/22 1049)  Stand to sit: Contact guard assistance (06/24/22 1049)  Comment: Declined sit to stand this date. (06/24/22 1049)  Transfer Training  Transfer Training: Yes (06/21/22 0925)  Overall Level of Assistance: Minimum assistance (06/21/22 0925)  Interventions: Safety awareness training; Tactile cues; Weight shifting training/pressure relief (06/21/22 0925)  Stand Pivot Transfers: Minimum assistance (06/21/22 1525)  Gait:   Ambulation  Comments: not assess for safety concerns (06/20/22 1352)  Gait Training: No (06/21/22 0925)  Stairs:     W/C mobility:       Occupational Therapy:   Hand Dominance: Right  ADL  Feeding: Unable to assess(comment) (06/20/22 1346)  Grooming: Setup (06/25/22 6709)  Grooming Skilled Clinical Factors: To wash face (06/25/22 0924)  UE Bathing: Supervision;Setup;Verbal cueing (06/25/22 1006)  UE Bathing Skilled Clinical Factors: VCs for initiation (06/25/22 0924)  LE Bathing: Dependent/Total (06/25/22 5158)  LE Bathing Skilled Clinical Factors: +2 assistance. Pt able to bend down in order to wash legs with SBA for safe sitting. +2 to wash posterior lillie area. 1 person for washing 1 person to maintain standing balance (06/25/22 0924)  UE Dressing: Unable to assess(comment) (06/25/22 0924)  UE Dressing Skilled Clinical Factors: Hospital gown only (06/25/22 4360)  LE Dressing: Moderate assistance; Increased time to complete (06/20/22 1346)  Toileting: Dependent/Total (06/25/22 6096)  Toileting Skilled Clinical Factors: Pt incontinent of feces (06/25/22 5206)  Toilet Transfers  Toilet - Technique:  Hilary Serrano) (06/25/22 0917)  Equipment Used: Drop-arm commode (06/25/22 0917)  Toilet Transfer: Dependent/Total (06/25/22 0917)  Toilet Transfers Comments: Hilary Serrano > Drop arm commode. Pt on commode at end of session (06/25/22 0917)          Speech Therapy:            Diet/Swallow:                   COGNITION  OT: Cognition Comment: Anxiety can be limiting at times  SP:           Lab/X-ray studies reviewed, analyzed and discussed with patient and staff:   Recent Results (from the past 24 hour(s))   Basic Metabolic Panel w/ Reflex to MG    Collection Time: 06/25/22  5:30 AM   Result Value Ref Range    Sodium 135 135 - 144 mEq/L    Potassium reflex Magnesium 4.0 3.4 - 4.9 mEq/L    Chloride 100 95 - 107 mEq/L    CO2 31 20 - 31 mEq/L    Anion Gap 4 (L) 9 - 15 mEq/L    Glucose 102 (H) 70 - 99 mg/dL    BUN 20 6 - 20 mg/dL    CREATININE 0.59 0.50 - 0.90 mg/dL    GFR Non-African American >60.0 >60    GFR  >60.0 >60    Calcium 8.4 (L) 8.5 - 9.9 mg/dL   CBC with Auto Differential    Collection Time: 06/25/22  5:30 AM   Result Value Ref Range    WBC 12.1 (H) 4.8 - 10.8 K/uL    RBC 3.85 (L) 4.20 - 5.40 M/uL    Hemoglobin 10.6 (L) 12.0 - 16.0 g/dL    Hematocrit 32.9 (L) 37.0 - 47.0 %    MCV 85.5 82.0 - 100.0 fL    MCH 27.5 27.0 - 31.3 pg    MCHC 32.1 (L) 33.0 - 37.0 %    RDW 17.4 (H) 11.5 - 14.5 %    Platelets 492 114 - 343 K/uL    Neutrophils % 81.1 %    Lymphocytes % 9.5 %    Monocytes % 7.6 %    Eosinophils % 1.7 %    Basophils % 0.1 %    Neutrophils Absolute 9.8 (H) 1.4 - 6.5 K/uL    Lymphocytes Absolute 1.1 1.0 - 4.8 K/uL    Monocytes Absolute 0.9 (H) 0.2 - 0.8 K/uL    Eosinophils Absolute 0.2 0.0 - 0.7 K/uL    Basophils Absolute 0.0 0.0 - 0.2 K/uL     Previous extensive, complex labs, notes and diagnostics reviewed and analyzed.      ALLERGIES:    Allergies as of 06/18/2022    (No Known Allergies)      (please also verify by checking [facility])     Complex Physical Medicine & Rehab Issues Assess & Plan:   1. Severe abnormality of gait and mobility and impaired self-care and ADL's secondary to acute exacerbation of multiple sclerosis. Updated functional and medical status reassessed regarding patients ability to participate in therapies and patient found to be able to participate in:        acute intensive comprehensive inpatient rehabilitation program including PT/OT to improve balance, ambulation, ADLs, and to improve the P/AROM. It is my opinion that they will be able to tolerate 3 hours of therapy a day and benefit from it at an acute level. I again discussed acute rehab with the patient and verify that the patient is able and willing to participate in 3 hours of therapy a day. Rehab and Acute Care Case Management has also reinforced this expectation. Will continue to follow to attempt to get patient to the most efficient but most effective level of care will be in their best interest.  Continue to focus on energy conservation heart rate and blood pressure monitoring before during and after therapy endurance and consistency of function. 2. Bowel constipation   and Bladder dysfunction   overactive, neurogenic bladder:  frequent toileting, ambulate to bathroom with assistance, check post void residuals. Check for C.difficile x1 if >2 loose stools in 24 hours, continue bowel & bladder program.  Monitor for UTI symptoms including lethargy and confusion    3. Moderate to severe pathic pain bilateral lower extremities and generalized OA pain: reassess pain every shift and prior to and after each therapy session, give prn Tylenol   and consider scheduled Tylenol, modalities prn in therapy, consider Lidoderm, K-pad prn.     4. Skin breakdown Bruising at BLE, BUE, Hip, Abd, Thigh; Open blister/abrasion on right arm. risk:  continue pressure relief program.  Daily skin exams and reports from nursing.     5. Severe fatigue due to immobility and nutritional deficits: monitoring for dysphagia   Add vitamin

## 2022-06-25 NOTE — CARE COORDINATION
This LSW met with patient at bedside this am. Patient is participating in OT. Patient awaiting pre-cert for transfer to [facility] at this time. MARIIW / ADALID to follow.   Electronically signed by MEL Odonnell LSW on 6/25/22 at 9:20 AM EDT

## 2022-06-26 LAB
ANION GAP SERPL CALCULATED.3IONS-SCNC: 6 MEQ/L (ref 9–15)
BASOPHILS ABSOLUTE: 0 K/UL (ref 0–0.2)
BASOPHILS RELATIVE PERCENT: 0 %
BUN BLDV-MCNC: 17 MG/DL (ref 6–20)
CALCIUM SERPL-MCNC: 8.5 MG/DL (ref 8.5–9.9)
CHLORIDE BLD-SCNC: 103 MEQ/L (ref 95–107)
CO2: 33 MEQ/L (ref 20–31)
CREAT SERPL-MCNC: 0.72 MG/DL (ref 0.5–0.9)
EOSINOPHILS ABSOLUTE: 0.3 K/UL (ref 0–0.7)
EOSINOPHILS RELATIVE PERCENT: 2.9 %
GFR AFRICAN AMERICAN: >60
GFR NON-AFRICAN AMERICAN: >60
GLUCOSE BLD-MCNC: 111 MG/DL (ref 70–99)
HCT VFR BLD CALC: 30.7 % (ref 37–47)
HEMOGLOBIN: 10 G/DL (ref 12–16)
LYMPHOCYTES ABSOLUTE: 1.1 K/UL (ref 1–4.8)
LYMPHOCYTES RELATIVE PERCENT: 12.5 %
MCH RBC QN AUTO: 28 PG (ref 27–31.3)
MCHC RBC AUTO-ENTMCNC: 32.5 % (ref 33–37)
MCV RBC AUTO: 86 FL (ref 82–100)
MONOCYTES ABSOLUTE: 0.8 K/UL (ref 0.2–0.8)
MONOCYTES RELATIVE PERCENT: 8.6 %
NEUTROPHILS ABSOLUTE: 6.8 K/UL (ref 1.4–6.5)
NEUTROPHILS RELATIVE PERCENT: 76 %
PDW BLD-RTO: 17.4 % (ref 11.5–14.5)
PLATELET # BLD: 217 K/UL (ref 130–400)
POTASSIUM REFLEX MAGNESIUM: 4.2 MEQ/L (ref 3.4–4.9)
RBC # BLD: 3.57 M/UL (ref 4.2–5.4)
SODIUM BLD-SCNC: 142 MEQ/L (ref 135–144)
WBC # BLD: 8.9 K/UL (ref 4.8–10.8)

## 2022-06-26 PROCEDURE — 85025 COMPLETE CBC W/AUTO DIFF WBC: CPT

## 2022-06-26 PROCEDURE — 6370000000 HC RX 637 (ALT 250 FOR IP): Performed by: NURSE PRACTITIONER

## 2022-06-26 PROCEDURE — 80048 BASIC METABOLIC PNL TOTAL CA: CPT

## 2022-06-26 PROCEDURE — 36415 COLL VENOUS BLD VENIPUNCTURE: CPT

## 2022-06-26 PROCEDURE — 6360000002 HC RX W HCPCS: Performed by: INTERNAL MEDICINE

## 2022-06-26 PROCEDURE — 1210000000 HC MED SURG R&B

## 2022-06-26 RX ADMIN — THERA TABS 1 TABLET: TAB at 07:48

## 2022-06-26 RX ADMIN — ENOXAPARIN SODIUM 40 MG: 100 INJECTION SUBCUTANEOUS at 07:47

## 2022-06-26 RX ADMIN — Medication 1000 UNITS: at 07:48

## 2022-06-26 NOTE — PROGRESS NOTES
Hospitalist Progress Note      Date of Admission: 6/18/2022  Chief Complaint:    Chief Complaint   Patient presents with    Hip Pain     Subjective:  No new complaints. No nausea, vomiting, chest pain, or headache      Medications:    Infusion Medications     Scheduled Medications    Vitamin D  1,000 Units Oral Daily    multivitamin  1 tablet Oral Daily    enoxaparin  40 mg SubCUTAneous Daily     PRN Meds:   No intake or output data in the 24 hours ending 06/26/22 1837  Exam:  BP (!) 128/106   Pulse 83   Temp 98.4 °F (36.9 °C) (Oral)   Resp 17   Ht 5' 1\" (1.549 m)   Wt 189 lb (85.7 kg)   LMP 06/01/2022   SpO2 100%   BMI 35.71 kg/m²   Head: Normocephalic, atraumatic  Sclera clear  Neck JVD flat  Lungs: normal effort of breathing    Labs:   Recent Labs     06/24/22  0631 06/25/22  0530 06/26/22  0545   WBC 14.2* 12.1* 8.9   HGB 9.1* 10.6* 10.0*   HCT 28.8* 32.9* 30.7*    257 217     Recent Labs     06/24/22  0631 06/25/22  0530 06/26/22  0545   * 135 142   K 3.9 4.0 4.2   * 100 103   CO2 23 31 33*   BUN 23* 20 17   CREATININE 0.66 0.59 0.72   CALCIUM 8.1* 8.4* 8.5     No results for input(s): INR in the last 72 hours. No results for input(s): Antonette Highman in the last 72 hours. Radiology:  MRI LUMBAR SPINE WO CONTRAST   Final Result   Degenerative facet changes of the lumbar spine visualized most prominent at L3-L4 and L5-S1 that demonstrate mild progression in comparison to the prior study      US DUP LOWER EXTREMITIES BILATERAL VENOUS   Final Result   NO SONOGRAPHIC EVIDENCE OF DEEP VENOUS THROMBOSIS WITHIN THE BILATERAL LOWER EXTREMITIES. US ABDOMEN LIMITED Specify organ? LIVER   Final Result      NO SIGN OF CHOLELITHIASIS OR BILE DUCT DILATATION. SLUDGE WITHIN THE GALLBLADDER. NO DEFINITE FOCAL LIVER ABNORMALITY.             XR CERVICAL SPINE (4-5 VIEWS)   Final Result   NEGATIVE CERVICAL SPINE      XR HIP LEFT (2-3 VIEWS)   Final Result   NEGATIVE PELVIS AND LEFT HIP       XR FEMUR RIGHT (MIN 2 VIEWS)   Final Result   NEGATIVE RIGHT FEMUR        Assessment/Plan:    MS flare: Primarily being managed by neurology. Currently on high-dose steroids. PT/OT evaluation for disposition guidance. Recommending rehab versus home care depending on clinical course. Rhabdomyolysis secondary to patient being on floor. CK was in 4000's, currently down to 1330. Continue with IV fluids. Monitor CK. Immobile with mild leg edema, DVT ruled out by ultrasound. Possible UTI, gram-negative rods in urine. Antibiotic started on 6/19.  3 days therapy. Last dose 6/21.     DC plan: Awaiting placement    25 minutes total care time, >1/2 in unit/floor time and care coordination     Gregoria Moore MD

## 2022-06-26 NOTE — PROGRESS NOTES
Hospitalist Progress Note      Date of Admission: 6/18/2022  Chief Complaint:    Chief Complaint   Patient presents with    Hip Pain     Subjective:  No new complaints. No nausea, vomiting, chest pain, or headache      Medications:    Infusion Medications     Scheduled Medications    Vitamin D  1,000 Units Oral Daily    multivitamin  1 tablet Oral Daily    enoxaparin  40 mg SubCUTAneous Daily     PRN Meds:   No intake or output data in the 24 hours ending 06/25/22 2121  Exam:  /62   Pulse (!) 103   Temp 98.8 °F (37.1 °C) (Oral)   Resp 18   Ht 5' 1\" (1.549 m)   Wt 189 lb (85.7 kg)   LMP 06/01/2022   SpO2 97%   BMI 35.71 kg/m²   Head: Normocephalic, atraumatic  Sclera clear  Neck JVD flat  Lungs: normal effort of breathing    Labs:   Recent Labs     06/23/22  0520 06/24/22  0631 06/25/22  0530   WBC 11.7* 14.2* 12.1*   HGB 9.7* 9.1* 10.6*   HCT 30.4* 28.8* 32.9*    225 257     Recent Labs     06/23/22  0520 06/24/22  0631 06/25/22  0530    146* 135   K 4.3 3.9 4.0   * 111* 100   CO2 23 23 31   BUN 23* 23* 20   CREATININE 0.67 0.66 0.59   CALCIUM 7.9* 8.1* 8.4*     No results for input(s): INR in the last 72 hours. No results for input(s): Khushi Mould in the last 72 hours. Radiology:  MRI LUMBAR SPINE WO CONTRAST   Final Result   Degenerative facet changes of the lumbar spine visualized most prominent at L3-L4 and L5-S1 that demonstrate mild progression in comparison to the prior study      US DUP LOWER EXTREMITIES BILATERAL VENOUS   Final Result   NO SONOGRAPHIC EVIDENCE OF DEEP VENOUS THROMBOSIS WITHIN THE BILATERAL LOWER EXTREMITIES. US ABDOMEN LIMITED Specify organ? LIVER   Final Result      NO SIGN OF CHOLELITHIASIS OR BILE DUCT DILATATION. SLUDGE WITHIN THE GALLBLADDER. NO DEFINITE FOCAL LIVER ABNORMALITY.             XR CERVICAL SPINE (4-5 VIEWS)   Final Result   NEGATIVE CERVICAL SPINE      XR HIP LEFT (2-3 VIEWS)   Final Result   NEGATIVE PELVIS AND LEFT HIP       XR FEMUR RIGHT (MIN 2 VIEWS)   Final Result   NEGATIVE RIGHT FEMUR        Assessment/Plan:    MS flare: Primarily being managed by neurology. Currently on high-dose steroids. PT/OT evaluation for disposition guidance. Recommending rehab versus home care depending on clinical course. Rhabdomyolysis secondary to patient being on floor. CK was in 4000's, currently down to 1330. Continue with IV fluids. Monitor CK. Immobile with mild leg edema, DVT ruled out by ultrasound. Possible UTI, gram-negative rods in urine. Antibiotic started on 6/19.  3 days therapy. Last dose 6/21.     DC plan: Awaiting placement    25 minutes total care time, >1/2 in unit/floor time and care coordination     Hieu Hanna MD

## 2022-06-26 NOTE — FLOWSHEET NOTE
Pt awake in bed and eating breakfast. Accepted meds without problem. VSS. Respirations even and nonlabored.

## 2022-06-26 NOTE — PROGRESS NOTES
6/26/22    From: HOME WITH MOM    Admit: MS EXACERBATION     PMH: MS    Anticipated Discharge Disposition:  [facility] PENDING ACCEPTANCE AND AUTH.    patient Mobility or PT/OT ordered: Yes    Consults: Neuro - OK TO D/C    Clinical: UTI  IV ABX COMPLETE  CK [id number]    Barriers to Discharge: IV Solu-Medrol COMPLETED  6/24: [facility]- PENDING  Serena Mcallister  6/26 CALLED MIGUEL RAMOS CM, [employer]OV[employer] STILL PENDING    Assessments: CMI DONE

## 2022-06-27 ENCOUNTER — HOSPITAL ENCOUNTER (INPATIENT)
Age: 42
LOS: 15 days | Discharge: HOME HEALTH | DRG: 043 | End: 2022-07-12
Attending: PHYSICAL MEDICINE & REHABILITATION | Admitting: PHYSICAL MEDICINE & REHABILITATION
Payer: COMMERCIAL

## 2022-06-27 VITALS
WEIGHT: 189 LBS | DIASTOLIC BLOOD PRESSURE: 70 MMHG | OXYGEN SATURATION: 99 % | HEIGHT: 61 IN | TEMPERATURE: 98.6 F | SYSTOLIC BLOOD PRESSURE: 118 MMHG | BODY MASS INDEX: 35.68 KG/M2 | HEART RATE: 89 BPM | RESPIRATION RATE: 16 BRPM

## 2022-06-27 LAB
ANION GAP SERPL CALCULATED.3IONS-SCNC: 6 MEQ/L (ref 9–15)
BASOPHILS ABSOLUTE: 0 K/UL (ref 0–0.2)
BASOPHILS RELATIVE PERCENT: 0.2 %
BUN BLDV-MCNC: 16 MG/DL (ref 6–20)
CALCIUM SERPL-MCNC: 8.8 MG/DL (ref 8.5–9.9)
CHLORIDE BLD-SCNC: 101 MEQ/L (ref 95–107)
CO2: 31 MEQ/L (ref 20–31)
CREAT SERPL-MCNC: 0.65 MG/DL (ref 0.5–0.9)
EOSINOPHILS ABSOLUTE: 0.2 K/UL (ref 0–0.7)
EOSINOPHILS RELATIVE PERCENT: 2.6 %
GFR AFRICAN AMERICAN: >60
GFR NON-AFRICAN AMERICAN: >60
GLUCOSE BLD-MCNC: 123 MG/DL (ref 70–99)
HCT VFR BLD CALC: 31.1 % (ref 37–47)
HEMOGLOBIN: 9.9 G/DL (ref 12–16)
LYMPHOCYTES ABSOLUTE: 1.1 K/UL (ref 1–4.8)
LYMPHOCYTES RELATIVE PERCENT: 13.5 %
MCH RBC QN AUTO: 27.6 PG (ref 27–31.3)
MCHC RBC AUTO-ENTMCNC: 31.8 % (ref 33–37)
MCV RBC AUTO: 86.6 FL (ref 82–100)
MONOCYTES ABSOLUTE: 0.8 K/UL (ref 0.2–0.8)
MONOCYTES RELATIVE PERCENT: 9.7 %
NEUTROPHILS ABSOLUTE: 5.9 K/UL (ref 1.4–6.5)
NEUTROPHILS RELATIVE PERCENT: 74 %
PDW BLD-RTO: 17.6 % (ref 11.5–14.5)
PLATELET # BLD: 212 K/UL (ref 130–400)
POTASSIUM REFLEX MAGNESIUM: 4.2 MEQ/L (ref 3.4–4.9)
RBC # BLD: 3.59 M/UL (ref 4.2–5.4)
SARS-COV-2, NAAT: NOT DETECTED
SODIUM BLD-SCNC: 138 MEQ/L (ref 135–144)
WBC # BLD: 8 K/UL (ref 4.8–10.8)

## 2022-06-27 PROCEDURE — 6360000002 HC RX W HCPCS: Performed by: INTERNAL MEDICINE

## 2022-06-27 PROCEDURE — 85025 COMPLETE CBC W/AUTO DIFF WBC: CPT

## 2022-06-27 PROCEDURE — 97535 SELF CARE MNGMENT TRAINING: CPT

## 2022-06-27 PROCEDURE — 1180000000 HC REHAB R&B

## 2022-06-27 PROCEDURE — 6370000000 HC RX 637 (ALT 250 FOR IP): Performed by: NURSE PRACTITIONER

## 2022-06-27 PROCEDURE — 80048 BASIC METABOLIC PNL TOTAL CA: CPT

## 2022-06-27 PROCEDURE — 97110 THERAPEUTIC EXERCISES: CPT

## 2022-06-27 PROCEDURE — 99231 SBSQ HOSP IP/OBS SF/LOW 25: CPT | Performed by: PHYSICAL MEDICINE & REHABILITATION

## 2022-06-27 PROCEDURE — 87635 SARS-COV-2 COVID-19 AMP PRB: CPT

## 2022-06-27 PROCEDURE — 36415 COLL VENOUS BLD VENIPUNCTURE: CPT

## 2022-06-27 RX ORDER — MULTIVITAMIN WITH IRON
1 TABLET ORAL DAILY
Status: DISCONTINUED | OUTPATIENT
Start: 2022-06-28 | End: 2022-07-07

## 2022-06-27 RX ORDER — VITAMIN B COMPLEX
1000 TABLET ORAL DAILY
Status: DISCONTINUED | OUTPATIENT
Start: 2022-06-28 | End: 2022-06-28

## 2022-06-27 RX ORDER — ENOXAPARIN SODIUM 100 MG/ML
40 INJECTION SUBCUTANEOUS DAILY
Status: DISCONTINUED | OUTPATIENT
Start: 2022-06-28 | End: 2022-07-07

## 2022-06-27 RX ORDER — MULTIVITAMIN WITH IRON
1 TABLET ORAL DAILY
Status: CANCELLED | OUTPATIENT
Start: 2022-06-27

## 2022-06-27 RX ORDER — ENOXAPARIN SODIUM 100 MG/ML
40 INJECTION SUBCUTANEOUS DAILY
Status: CANCELLED | OUTPATIENT
Start: 2022-06-27

## 2022-06-27 RX ORDER — VITAMIN B COMPLEX
1000 TABLET ORAL DAILY
Status: CANCELLED | OUTPATIENT
Start: 2022-06-27

## 2022-06-27 RX ADMIN — Medication 1000 UNITS: at 09:26

## 2022-06-27 RX ADMIN — ENOXAPARIN SODIUM 40 MG: 100 INJECTION SUBCUTANEOUS at 09:26

## 2022-06-27 RX ADMIN — THERA TABS 1 TABLET: TAB at 09:26

## 2022-06-27 ASSESSMENT — PAIN SCALES - GENERAL: PAINLEVEL_OUTOF10: 0

## 2022-06-27 NOTE — PROGRESS NOTES
Shift assessment complete medications given per MAR. Alert and oriented x's 4. Patient up and worked with therapy this A.M. Patient denies pain at this time. No needs known at this time. Patient up in chair and call light within reach. Will continue to monitor.

## 2022-06-27 NOTE — PROGRESS NOTES
weakness in her legs and pain in her hips bilaterally     Neurology-MS exacerbation, continue methylprednisolone as ordered. CK levels improving with IV fluids  Elevated LFTs could be secondary to kesimpta     ROS x10: The patient also complains of severely impaired mobility and activities of daily living. Otherwise no new problems with vision, hearing, nose, mouth, throat, dermal, cardiovascular, GI, , pulmonary, musculoskeletal, psychiatric or neurological.        Vital signs:  BP (!) 115/58   Pulse (!) 110   Temp 98.8 °F (37.1 °C) (Oral)   Resp 16   Ht 5' 1\" (1.549 m)   Wt 189 lb (85.7 kg)   LMP 06/01/2022   SpO2 99%   BMI 35.71 kg/m²   I/O:   PO/Intake:    fair PO intake, monitoring for dysphagia    Bowel/Bladder:   continent,    General:  Patient is well developed, adequately nourished, and    well kempt. HEENT:    PERRLA, hearing intact to loud voice, external inspection of ear and nose benign. Inspection of lips, tongue and gums benign  Musculoskeletal: No significant change in strength or tone. All joints stable. Inspection and palpation of digits and nails show no clubbing, cyanosis or inflammatory conditions. Neuro/Psychiatric: Affect: flat-  Alert and oriented to self and situation with min cues. No significant change in deep tendon reflexes or sensation  Lungs:  Diminished, CTA-B  . Respiration effort is normal at rest.   Heart:   S1 = S2,   RRR. Abdomen:  Soft, non-tender    Extremities:  Trace  lower extremity edema but no unusual tenderness. Skin:   BUE bruises dt blood draws-Bruising at BLE, BUE, Hip, Abd, Thigh; Open blister/abrasion on right arm.       Rehabilitation:  Physical Therapy:   Bed mobility:  Bed mobility  Rolling to Left: Stand by assistance (06/24/22 1048)  Supine to Sit: Stand by assistance (06/24/22 1048)  Sit to Supine: Stand by assistance (06/24/22 1048)  Scooting: Stand by assistance (06/24/22 1048)  Bed Mobility Comments: EOB sitting x 10 min (06/24/22 1048)  Bed Mobility Training  Bed Mobility Training: Yes (06/21/22 0925)  Overall Level of Assistance: Stand-by assistance (06/21/22 0925)  Interventions: Verbal cues (06/21/22 0925)  Rolling: Stand-by assistance (06/21/22 0925)  Supine to Sit: Stand-by assistance (06/21/22 0925)  Scooting: Stand-by assistance (06/21/22 0925)  Transfers:  Transfers  Sit to Stand: Contact guard assistance;Minimal Assistance (06/24/22 1049)  Stand to sit: Contact guard assistance (06/24/22 1049)  Comment: Declined sit to stand this date. (06/24/22 1049)  Transfer Training  Transfer Training: Yes (06/21/22 0925)  Overall Level of Assistance: Minimum assistance (06/21/22 0925)  Interventions: Safety awareness training; Tactile cues; Weight shifting training/pressure relief (06/21/22 0925)  Stand Pivot Transfers: Minimum assistance (06/21/22 8484)  Gait:   Ambulation  Comments: not assess for safety concerns (06/20/22 1352)  Gait Training: No (06/21/22 0925)  Stairs:     W/C mobility:       Occupational Therapy:   Hand Dominance: Right  ADL  Feeding: Unable to assess(comment) (06/20/22 1346)  Grooming: Setup (06/25/22 8626)  Grooming Skilled Clinical Factors: To wash face (06/25/22 0924)  UE Bathing: Supervision;Setup;Verbal cueing (06/25/22 2160)  UE Bathing Skilled Clinical Factors: VCs for initiation (06/25/22 0924)  LE Bathing: Dependent/Total (06/25/22 0887)  LE Bathing Skilled Clinical Factors: +2 assistance. Pt able to bend down in order to wash legs with SBA for safe sitting. +2 to wash posterior lillie area. 1 person for washing 1 person to maintain standing balance (06/25/22 0924)  UE Dressing: Unable to assess(comment) (06/25/22 0924)  UE Dressing Skilled Clinical Factors: Hospital gown only (06/25/22 2055)  LE Dressing: Moderate assistance; Increased time to complete (06/20/22 1346)  Toileting: Dependent/Total (06/25/22 1342)  Toileting Skilled Clinical Factors: Pt incontinent of feces (06/25/22 9453)  Toilet Transfers  Toilet - Technique:  (Mandy Fuentes) (06/25/22 0917)  Equipment Used: Drop-arm commode (06/25/22 0917)  Toilet Transfer: Dependent/Total (06/25/22 0917)  Toilet Transfers Comments: Mandy Fuentes > Drop arm commode. Pt on commode at end of session (06/25/22 0917)          Speech Therapy:            Diet/Swallow:                   COGNITION  OT: Cognition Comment: Anxiety can be limiting at times  SP:           Lab/X-ray studies reviewed, analyzed and discussed with patient and staff:   Recent Results (from the past 24 hour(s))   Basic Metabolic Panel w/ Reflex to MG    Collection Time: 06/26/22  5:45 AM   Result Value Ref Range    Sodium 142 135 - 144 mEq/L    Potassium reflex Magnesium 4.2 3.4 - 4.9 mEq/L    Chloride 103 95 - 107 mEq/L    CO2 33 (H) 20 - 31 mEq/L    Anion Gap 6 (L) 9 - 15 mEq/L    Glucose 111 (H) 70 - 99 mg/dL    BUN 17 6 - 20 mg/dL    CREATININE 0.72 0.50 - 0.90 mg/dL    GFR Non-African American >60.0 >60    GFR  >60.0 >60    Calcium 8.5 8.5 - 9.9 mg/dL   CBC with Auto Differential    Collection Time: 06/26/22  5:45 AM   Result Value Ref Range    WBC 8.9 4.8 - 10.8 K/uL    RBC 3.57 (L) 4.20 - 5.40 M/uL    Hemoglobin 10.0 (L) 12.0 - 16.0 g/dL    Hematocrit 30.7 (L) 37.0 - 47.0 %    MCV 86.0 82.0 - 100.0 fL    MCH 28.0 27.0 - 31.3 pg    MCHC 32.5 (L) 33.0 - 37.0 %    RDW 17.4 (H) 11.5 - 14.5 %    Platelets 054 870 - 564 K/uL    Neutrophils % 76.0 %    Lymphocytes % 12.5 %    Monocytes % 8.6 %    Eosinophils % 2.9 %    Basophils % 0.0 %    Neutrophils Absolute 6.8 (H) 1.4 - 6.5 K/uL    Lymphocytes Absolute 1.1 1.0 - 4.8 K/uL    Monocytes Absolute 0.8 0.2 - 0.8 K/uL    Eosinophils Absolute 0.3 0.0 - 0.7 K/uL    Basophils Absolute 0.0 0.0 - 0.2 K/uL     Previous extensive, complex labs, notes and diagnostics reviewed and analyzed.      ALLERGIES:    Allergies as of 06/18/2022    (No Known Allergies)      (please also verify by checking [facility])     Complex Physical Medicine & Rehab Issues Assess & Plan:   1. Severe abnormality of gait and mobility and impaired self-care and ADL's secondary to acute exacerbation of multiple sclerosis. Updated functional and medical status reassessed regarding patients ability to participate in therapies and patient found to be able to participate in:        acute intensive comprehensive inpatient rehabilitation program including PT/OT to improve balance, ambulation, ADLs, and to improve the P/AROM. It is my opinion that they will be able to tolerate 3 hours of therapy a day and benefit from it at an acute level. I again discussed acute rehab with the patient and verify that the patient is able and willing to participate in 3 hours of therapy a day. Rehab and Acute Care Case Management has also reinforced this expectation. Will continue to follow to attempt to get patient to the most efficient but most effective level of care will be in their best interest.  Continue to focus on energy conservation heart rate and blood pressure monitoring before during and after therapy endurance and consistency of function. 2. Bowel constipation   and Bladder dysfunction   overactive, neurogenic bladder:  frequent toileting, ambulate to bathroom with assistance, check post void residuals. Check for C.difficile x1 if >2 loose stools in 24 hours, continue bowel & bladder program.  Monitor for UTI symptoms including lethargy and confusion    3. Moderate to severe pathic pain bilateral lower extremities and generalized OA pain: reassess pain every shift and prior to and after each therapy session, give prn Tylenol   and consider scheduled Tylenol, modalities prn in therapy, consider Lidoderm, K-pad prn.     4. Skin breakdown Bruising at BLE, BUE, Hip, Abd, Thigh; Open blister/abrasion on right arm. risk:  continue pressure relief program.  Daily skin exams and reports from nursing.     5. Severe fatigue due to immobility and nutritional deficits: monitoring for dysphagia   Add vitamin B12 vitamin D and CoQ10 titrate dosing and add protein supplementation with low carb content. 6. Complex discharge planning:   Discussed with care team-last 24 hour events noted. I will continue to follow along and reassess functional and medical status as we strive to improve patient's functional and medical outcomes progressing to the most efficient and lowest level of care. Complex Active General Medical Issues that complicate care:     1. Principal Problem:    MS (multiple sclerosis) (HCC)  Active Problems:    Impaired mobility and activities of daily living    Hyperglycemia    Neurogenic bladder    Acute cystitis    Foot drop, left foot  Resolved Problems:    * No resolved hospital problems.  *          Events and functional changes in the past 24 hours reviewed improvements in functional status are encouraging       Focus of today's plan-   continue all inpatient therapies, pre-CERT is pending  Steady functional improvements noted   VALERIE Yepez MD.MEGHANA., PM&R     Attending    [address]

## 2022-06-27 NOTE — DISCHARGE SUMMARY
Discharge Summary    Date: 6/27/2022  Patient Name: Tam Salomon YOB: 1980 Age: 35 y.o. Admit Date: 6/18/2022  Discharge Date: 6/27/2022  Discharge Condition: [address]    Admission Diagnosis  Neck pain (M54.2); Hip pain (M25.559);MS (multiple sclerosis) ([address] (G35); General weakness (R53.1); Unable to ambulate (R26.2); Fall, initial encounter [facility]); Pain of right lower extremity (M79.604); Urinary tract infection with hematuria, site unspecified (N39.0, R31.9)     Discharge Diagnosis  Principal Problem: MS (multiple sclerosis) (HCC)Active Problems: Impaired mobility and activities of daily living Hyperglycemia Neurogenic bladder Acute cystitis Foot drop, left footResolved Problems: * No resolved hospital problems. [facility] Stay  Narrative of Hospital Course:  Patient called MS exacerbation, received IV steroids. Evaluate by neurology and cleared for discharge. Patient is immobile and has leg swelling secondary to medical condition. Ultrasound was negative for DVT. Patient also treated for rhabdo. Consultants:  IP CONSULT TO NEUROLOGYIP CONSULT TO REHAB/TCU ADMISSION COORDINATORIP CONSULT TO NEUROLOGYIP CONSULT TO REHAB/TCU ADMISSION COORDINATOR    Surgeries/procedures Performed:       Treatments:            Discharge Plan/Disposition:  Home    Hospital/Incidental Findings Requiring Follow Up:    Patient Instructions:    Diet:    Activity:  For number of days (if applicable): Other Instructions:    Provider Follow-Up:   No follow-ups on file. Significant Diagnostic Studies:    Recent Labs:  Admission on 06/18/2022No results displayed because visit has over 200 results. ------------    Radiology last 7 days:  MRI LUMBAR SPINE WO CONTRASTResult Date: 6/20/2022Degenerative facet changes of the lumbar spine visualized most prominent at L3-L4 and L5-S1 that demonstrate mild progression in comparison to the prior study     [facility]    Discharge Medications    Current Discharge Medication List    Current Discharge Medication List    Current Discharge Medication ListCONTINUE these medications which have NOT CHANGEDmirabegron (MYRBETRIQ) 25 MG [id number] 1 tablet by mouth dailyQty: 30 tablet Refills: 3KESIMPTA 20 MG/0.4ML SOAJvitamin D 25 MCG (1000 UT) CAPSTake by mouthMULTIPLE VITAMINS-MINERALS ER POTake 1 tablet by mouth    Current Discharge Medication List    Time Spent on Discharge:E] minutes were spent in patient examination, evaluation, counseling as well as medication reconciliation, prescriptions for required medications, discharge plan, and follow up.     Electronically signed by Angelica Reddy MD on 6/27/22 at 10:50 AM EDT   overtime on dc summary was 45 min

## 2022-06-27 NOTE — PROGRESS NOTES
Subjective: The patient complains of severe acute on chronic progressive fatigue and lateral lower extremity weakness, cognitive deficits, neuropathic sensation lower extremity secondary to recent MS flareup partially relieved by rest, PT, OT and meds for multiple sclerosis and exacerbated by exertion and recent illness. I am concerned about neurogenic bowel and bladder and not having a bowel movement in the past few days she also has poor awareness of her deficits. He however is now committed to coming to acute rehab and she would be a good fit for our therapy program at an acute level. Continue to await care source approval-patient still is requiring acute level rehabilitation assistance 1 week continue to await care source approval for her multiple sclerosis acute rehab    I am concerned about patients medical complexities including:  Principal Problem:    MS (multiple sclerosis) ([address]  Active Problems:    Impaired mobility and activities of daily living    Hyperglycemia    Neurogenic bladder    Acute cystitis    Foot drop, left foot  Resolved Problems:    * No resolved hospital problems. *      .    Reviewed recent nursing note and discussed current status and planned care with acute care providers, \"Spoke with patient yesterday and she has requested admission to [facility] inpatient acute rehab. Inpatient Rehab referral received previously and the patient declined admission. Met with patient and explained [address] [place] [address] Inpatient Rehab program again and requirements, including 3 hours of intense therapy daily, anticipated length of stay and goal of discharge to home. All questions answered and patient verbalized understanding. Freedom of choice provided and patient requests admit to [facility] if approved by insurance. PM&R consult completed. Requested precert from [address] [place] [address] called to open case and clinicals were submitted reference #[id number]. \"    ROS x10:   The patient also complains of severely impaired mobility and activities of daily living. Otherwise no new problems with vision, hearing, nose, mouth, throat, dermal, cardiovascular, GI, , pulmonary, musculoskeletal, psychiatric or neurological.        Vital signs:  BP (!) 115/58   Pulse (!) 110   Temp 98.8 °F (37.1 °C) (Oral)   Resp 16   Ht 5' 1\" (1.549 m)   Wt 189 lb (85.7 kg)   LMP 06/01/2022   SpO2 99%   BMI 35.71 kg/m²   I/O:   PO/Intake:    fair PO intake, monitoring for dysphagia    Bowel/Bladder:   continent,    General:  Patient is well developed, adequately nourished, and    well kempt. HEENT:    PERRLA, hearing intact to loud voice, external inspection of ear and nose benign. Inspection of lips, tongue and gums benign  Musculoskeletal: No significant change in strength or tone. All joints stable. Inspection and palpation of digits and nails show no clubbing, cyanosis or inflammatory conditions. Neuro/Psychiatric: Affect: flat-  Alert and oriented to self and situation with min cues. No significant change in deep tendon reflexes or sensation  Lungs:  Diminished, CTA-B  . Respiration effort is normal at rest.   Heart:   S1 = S2,   RRR. Abdomen:  Soft, non-tender    Extremities:  Trace  lower extremity edema but no unusual tenderness. Skin:   BUE bruises dt blood draws-Bruising at BLE, BUE, Hip, Abd, Thigh; Open blister/abrasion on right arm.       Rehabilitation:  Physical Therapy:   Bed mobility:  Bed mobility  Rolling to Left: Stand by assistance (06/27/22 0858)  Supine to Sit: Minimal assistance (06/27/22 0858)  Sit to Supine: Stand by assistance (06/24/22 1048)  Scooting: Stand by assistance (06/24/22 1048)  Bed Mobility Comments: HOB slightly elevated; heavy use of hand rails; vc's for hand placement and sequencing with LE's. (06/27/22 0858)  Bed Mobility Training  Bed Mobility Training: Yes (06/21/22 0925)  Overall Level of Assistance: Stand-by assistance (06/21/22 0925)  Interventions: Verbal cues (06/21/22 8155)  Rolling: Stand-by assistance (06/21/22 0925)  Supine to Sit: Stand-by assistance (06/21/22 0925)  Scooting: Stand-by assistance (06/21/22 0925)  Transfers:  Transfers  Sit to Stand: Contact guard assistance;Minimal Assistance (06/27/22 0900)  Stand to sit: Contact guard assistance (06/27/22 0900)  Comment: Patient required increased time to complete movements, needed verbal cuing on proper hand placement when transitioning from sit to stand with walker (06/27/22 0900)  Transfer Training  Transfer Training: Yes (06/21/22 0925)  Overall Level of Assistance: Minimum assistance (06/21/22 0925)  Interventions: Safety awareness training; Tactile cues; Weight shifting training/pressure relief (06/21/22 0925)  Stand Pivot Transfers: Minimum assistance (06/21/22 0358)  Gait:   Ambulation  Surface: level tile (06/27/22 0901)  Device: Rolling Walker (06/27/22 0901)  Assistance: Contact guard assistance;Minimal assistance (06/27/22 0901)  Quality of Gait: shortened shuffling steps, increased bracing on [employer]. downward gaze.;vc's for posture and step length. decreased dorsiflexion on L (06/27/22 0901)  Gait Deviations: Slow Marlys;Decreased step length;Decreased step height;Shuffles (06/27/22 0901)  Distance: 3-4 steps to chair (06/27/22 0901)  Comments: not assess for safety concerns (06/20/22 1352)  Gait Training: No (06/21/22 0925)  Stairs:     W/C mobility:       Occupational Therapy:   Hand Dominance: Right  ADL  Feeding: Unable to assess(comment) (06/20/22 1346)  Grooming: Setup (06/25/22 5886)  Grooming Skilled Clinical Factors: To wash face (06/25/22 0924)  UE Bathing: Supervision;Setup;Verbal cueing (06/25/22 9675)  UE Bathing Skilled Clinical Factors: VCs for initiation (06/25/22 0924)  LE Bathing: Dependent/Total (06/25/22 8811)  LE Bathing Skilled Clinical Factors: +2 assistance. Pt able to bend down in order to wash legs with SBA for safe sitting. +2 to wash posterior lillie area.  1 person for washing 1 person to maintain standing balance (06/25/22 0924)  UE Dressing: Unable to assess(comment) (06/25/22 0924)  UE Dressing Skilled Clinical Factors: Hospital gown only (06/25/22 8772)  LE Dressing: Moderate assistance; Increased time to complete (06/20/22 1346)  Toileting: Dependent/Total (06/25/22 9825)  Toileting Skilled Clinical Factors: Pt incontinent of feces (06/25/22 0924)  Toilet Transfers  Toilet - Technique:  Blaise Barnett) (06/25/22 0917)  Equipment Used: Drop-arm commode (06/25/22 0917)  Toilet Transfer: Dependent/Total (06/25/22 0212)  Toilet Transfers Comments: Blaise Barnett > Drop arm commode.  Pt on commode at end of session (06/25/22 0917)          Speech Therapy:            Diet/Swallow:                   COGNITION  OT: Cognition Comment: Anxiety can be limiting at times  SP:           Lab/X-ray studies reviewed, analyzed and discussed with patient and staff:   Recent Results (from the past 24 hour(s))   Basic Metabolic Panel w/ Reflex to MG    Collection Time: 06/27/22  6:28 AM   Result Value Ref Range    Sodium 138 135 - 144 mEq/L    Potassium reflex Magnesium 4.2 3.4 - 4.9 mEq/L    Chloride 101 95 - 107 mEq/L    CO2 31 20 - 31 mEq/L    Anion Gap 6 (L) 9 - 15 mEq/L    Glucose 123 (H) 70 - 99 mg/dL    BUN 16 6 - 20 mg/dL    CREATININE 0.65 0.50 - 0.90 mg/dL    GFR Non-African American >60.0 >60    GFR  >60.0 >60    Calcium 8.8 8.5 - 9.9 mg/dL   CBC with Auto Differential    Collection Time: 06/27/22  6:28 AM   Result Value Ref Range    WBC 8.0 4.8 - 10.8 K/uL    RBC 3.59 (L) 4.20 - 5.40 M/uL    Hemoglobin 9.9 (L) 12.0 - 16.0 g/dL    Hematocrit 31.1 (L) 37.0 - 47.0 %    MCV 86.6 82.0 - 100.0 fL    MCH 27.6 27.0 - 31.3 pg    MCHC 31.8 (L) 33.0 - 37.0 %    RDW 17.6 (H) 11.5 - 14.5 %    Platelets 962 978 - 821 K/uL    Neutrophils % 74.0 %    Lymphocytes % 13.5 %    Monocytes % 9.7 %    Eosinophils % 2.6 %    Basophils % 0.2 %    Neutrophils Absolute 5.9 1.4 - 6.5 K/uL    Lymphocytes Absolute 1.1 1.0 - 4.8 K/uL    Monocytes Absolute 0.8 0.2 - 0.8 K/uL    Eosinophils Absolute 0.2 0.0 - 0.7 K/uL    Basophils Absolute 0.0 0.0 - 0.2 K/uL     Previous extensive, complex labs, notes and diagnostics reviewed and analyzed. ALLERGIES:    Allergies as of 06/18/2022    (No Known Allergies)      (please also verify by checking [facility])     Complex Physical Medicine & Rehab Issues Assess & Plan:   1. Severe abnormality of gait and mobility and impaired self-care and ADL's secondary to acute exacerbation of multiple sclerosis. Updated functional and medical status reassessed regarding patients ability to participate in therapies and patient found to be able to participate in:        acute intensive comprehensive inpatient rehabilitation program including PT/OT to improve balance, ambulation, ADLs, and to improve the P/AROM. It is my opinion that they will be able to tolerate 3 hours of therapy a day and benefit from it at an acute level. I again discussed acute rehab with the patient and verify that the patient is able and willing to participate in 3 hours of therapy a day. Rehab and Acute Care Case Management has also reinforced this expectation. Will continue to follow to attempt to get patient to the most efficient but most effective level of care will be in their best interest.  Continue to focus on energy conservation heart rate and blood pressure monitoring before during and after therapy endurance and consistency of function. 2. Bowel constipation   and Bladder dysfunction   overactive, neurogenic bladder:  frequent toileting, ambulate to bathroom with assistance, check post void residuals. Check for C.difficile x1 if >2 loose stools in 24 hours, continue bowel & bladder program.  Monitor for UTI symptoms including lethargy and confusion    3.  Moderate to severe pathic pain bilateral lower extremities and generalized OA pain: reassess pain every shift and prior to and after each therapy session, give prn Tylenol   and consider scheduled Tylenol, modalities prn in therapy, consider Lidoderm, K-pad prn.     4. Skin breakdown Bruising at BLE, BUE, Hip, Abd, Thigh; Open blister/abrasion on right arm. risk:  continue pressure relief program.  Daily skin exams and reports from nursing. 5. Severe fatigue due to immobility and nutritional deficits: monitoring for dysphagia   Add vitamin B12 vitamin D and CoQ10 titrate dosing and add protein supplementation with low carb content. 6. Complex discharge planning:   Discussed with care team-last 24 hour events noted. I will continue to follow along and reassess functional and medical status as we strive to improve patient's functional and medical outcomes progressing to the most efficient and lowest level of care. Complex Active General Medical Issues that complicate care:     1. Principal Problem:    MS (multiple sclerosis) ([facility])  Active Problems:    Impaired mobility and activities of daily living    Hyperglycemia    Neurogenic bladder    Acute cystitis    Foot drop, left foot  Resolved Problems:    * No resolved hospital problems. *          Events and functional changes in the past 24 hours reviewed improvements in functional status are encouraging       Focus of today's plan-   patient now states that she would like to come to acute rehabilitation and does not want to go to her mother's house. I will have  look into this again for her. She also would like  to help her get waiver services at home.       Mary Salguero D.O., PM&R     Attending    [facility]

## 2022-06-27 NOTE — PROGRESS NOTES
Report called to Steven Espinal on rehab. Pts belongings gathered. Pt had a md BM. Transport placed for pt to go to [facility].  Electronically signed by Manohar Wolf RN on 6/27/2022 at 5:35 PM

## 2022-06-27 NOTE — PROGRESS NOTES
Assessment complete. VSS. Patient is alert and oriented. Denies any SOB, N/V, dizziness or pain. Snack provided. Denies any other needs and call light is within reach.   Electronically signed by Jenna Ross RN on 6/26/2022 at 7:35 PM

## 2022-06-27 NOTE — DISCHARGE INSTR - COC
Continuity of Care Form    Patient Name: Cody Montgomery   :  1980  MRN:  [id number]    [facility] date:  2022  Discharge date:  ***    Code Status Order: Full Code   Advance Directives:      Admitting Physician:  Matias Jimenez DO  PCP: Sabi Cheatham PA-C    Discharging Nurse: [facility] Unit/Room#: A534/O465-21  Discharging Unit Phone Number: ***    Emergency Contact:   Extended Emergency Contact Information  Primary Emergency Contact: Wilfrido Garrison [address] Phone: [phone]  Work Phone: [phone]  Mobile Phone: [phone]  Relation: Parent    Past Surgical History:  Past Surgical History:   Procedure Laterality Date    CATARACT REMOVAL WITH IMPLANT Right     CATARACT REMOVAL WITH IMPLANT Left      SECTION  2013    EYE SURGERY      FOOT SURGERY Left        Immunization History:   Immunization History   Administered Date(s) Administered    COVID-19, [place], Primary or Immunocompromised, PF, 100mcg/0.5mL 2021, 2021    Tdap (Boostrix, Adacel) 1984, 2015       Active Problems:  Patient Active Problem List   Diagnosis Code    Mixed hyperlipidemia E78.2    Posterior tibial tendon dysfunction M76.829    Ataxic gait R26.0    Obesity, unspecified E66.9    Pain in joint, lower leg M25.569    Pseudophakia Z96.1    Foot drop, left foot M21.372    Acquired deformity of left foot M21.962    Lumbar radiculopathy M54.16    Multiple sclerosis ([address] G35    Urinary urgency R39.15    MS (multiple sclerosis) ([address] G35    Impaired mobility and activities of daily living Z74.09, Z78.9    Hyperglycemia R73.9    Neurogenic bladder N31.9    Acute cystitis N30.00       Isolation/Infection:   Isolation            No Isolation          Patient Infection Status       None to display            Nurse Assessment:  Last Vital Signs: BP (!) 115/58   Pulse (!) 110   Temp 98.8 °F (37.1 °C) (Oral)   Resp 16   Ht 5' 1\" (1.549 m)   Wt 189 lb (85.7 kg)   LMP 2022   SpO2 99%   BMI 35.71 kg/m²     Last documented pain score (0-10 scale): Pain Level: 0  Last Weight:   Wt Readings from Last 1 Encounters:   22 189 lb (85.7 kg)     Mental Status:  {IP PT MENTAL STATUS:}    IV Access:  { JALYN IV ACCESS:694034887}    Nursing Mobility/ADLs:  Walking   {CHP DME PIPE:047653093}  Transfer  {CHP DME YJZK:653914003}  Bathing  {CHP DME KTVX:963243770}  Dressing  {CHP DME TINQ:525684034}  Toileting  {CHP DME GUQH:511767419}  Feeding  {CHP DME EXOH:201052911}  Med Admin  {CHP DME XLZL:495785900}  Med Delivery   { JALYN MED Delivery:240380417}    Wound Care Documentation and Therapy:        Elimination:  Continence:    Bowel: {YES / PU:59727}  Bladder: {YES / EC:26993}  Urinary Catheter: {Urinary Catheter:140334002}   Colostomy/Ileostomy/Ileal Conduit: {YES / XR:01793}       Date of Last BM: ***    Intake/Output Summary (Last 24 hours) at 2022 1700  Last data filed at 2022 2106  Gross per 24 hour   Intake --   Output 600 ml   Net -600 ml     I/O last 3 completed shifts:  In: -   Out: 600 [Urine:600]    Safety Concerns:     [address] Safety Concerns:291241037}    Impairments/Disabilities:      [address] Impairments/Disabilities:020345588}    Nutrition Therapy:  Current Nutrition Therapy:   [address] Diet List:973465561}    Routes of Feeding: {CHP DME Other Feedings:923409853}  Liquids: {Slp liquid thickness:62980}  Daily Fluid Restriction: {CHP DME Yes amt example:303931942}  Last Modified Barium Swallow with Video (Video Swallowing Test): {Done Not Done MOCM:674181282}    Treatments at the Time of Hospital Discharge:   Respiratory Treatments: ***  Oxygen Therapy:  {Therapy; copd oxygen:90432}  Ventilator:    { CC Vent FTEP:327715390}    Rehab Therapies: {THERAPEUTIC INTERVENTION:0056917411}  Weight Bearing Status/Restrictions: [address] ALY Weight Bearin}  Other Medical Equipment (for information only, NOT a DME order):  {EQUIPMENT:277277304}  Other Treatments: ***    Patient's personal belongings (please select all that are sent with patient):  {CHP DME Belongings:238600395}    RN SIGNATURE:  {Esignature:119167196}    CASE MANAGEMENT/SOCIAL WORK SECTION    Inpatient Status Date: ***    Readmission Risk Assessment Score:  Readmission Risk              Risk of Unplanned Readmission:  9           Discharging to Facility/ Agency   Name:   Address:  Phone:  Fax:    Dialysis Facility (if applicable)   Name:  Address:  Dialysis Schedule:  Phone:  Fax:    / signature: {Esignature:214011877}    PHYSICIAN SECTION    Prognosis: {Prognosis:3219914001}    Condition at Discharge: [address] [place] [address] Patient Condition:421297545}    Rehab Potential (if transferring to Rehab): {Prognosis:0277157809}    Recommended Labs or Other Treatments After Discharge: ***    Physician Certification: I certify the above information and transfer of Sharmila Son  is necessary for the continuing treatment of the diagnosis listed and that she requires {Admit to Appropriate Level of Care:75245} for {GREATER/LESS:423635319} 30 days.      Update Admission H&P: {CHP DME Changes in [facility]:018068565}    PHYSICIAN SIGNATURE:  {Esignature:862347971}

## 2022-06-27 NOTE — PROGRESS NOTES
35year old female admitted to [facility] with dx of impaired mobility secondary to MS. Pt oriented to the unit. Pt. Noted with frequent falls. Pt. Was attempted to stand with to transfer to the bed from wheel chair. Pt. [place] too weak and sat her self on the floor. Assessment complete. Denies pain or discomfort   Mother Cheri Juvenal was called and updated.  Electronically signed by John Reddy RN on 6/27/2022 at 6:30 PM

## 2022-06-27 NOTE — PROGRESS NOTES
Physical Therapy Med Surg Daily Treatment Note  Facility/Department: [facility]  Room: [place] [address]       NAME: Jason Gardner  : 1980 (67 y.o.)  MRN: [id number]  CODE STATUS: Full Code    Date of Service: 2022    Patient Diagnosis(es): Neck pain [M54.2]  Hip pain [M25.559]  MS (multiple sclerosis) ([facility] [G35]  General weakness [R53.1]  Unable to ambulate [R26.2]  Fall, initial encounter [W19. XXXA]  Pain of right lower extremity [M79.604]  Urinary tract infection with hematuria, site unspecified [N39.0, R31.9]   Chief Complaint   Patient presents with    Hip Pain     Patient Active Problem List    Diagnosis Date Noted    Impaired mobility and activities of daily living 2022    Hyperglycemia 2022    Neurogenic bladder 2022    Acute cystitis 2022    MS (multiple sclerosis) ([facility] 2022    Urinary urgency 2022    Multiple sclerosis ([facility] 2021    Foot drop, left foot 2020    Acquired deformity of left foot 2020    Lumbar radiculopathy 2020    Pain in joint, lower leg 2020    Posterior tibial tendon dysfunction     Pseudophakia 10/29/2019    Ataxic gait 2019    Mixed hyperlipidemia 2015    Obesity, unspecified 2005        Past Medical History:   Diagnosis Date    Dislocated knee     Right knee    Lumbar radiculopathy 2020    MS (multiple sclerosis) ([facility] 2022    PTTD (posterior tibial tendon dysfunction)      Past Surgical History:   Procedure Laterality Date    CATARACT REMOVAL WITH IMPLANT Right     CATARACT REMOVAL WITH IMPLANT Left      SECTION  2013    EYE SURGERY      FOOT SURGERY Left             Restrictions:  Restrictions/Precautions: Fall Risk    SUBJECTIVE:   Subjective: I want to get better so I can go home.     Pain  Pain: denies pain pre and post tx      OBJECTIVE:   Orientation  Overall Orientation Status: Within Functional Limits  Cognition  Overall Cognitive Status: [facility]  Cognition Comment: Anxiety can be limiting at times; no anxiety noted on this date. Bed mobility  Rolling to Left: Stand by assistance  Supine to Sit: Minimal assistance  Bed Mobility Comments: HOB slightly elevated; heavy use of hand rails; vc's for hand placement and sequencing with LE's. Transfers  Sit to Stand: Contact guard assistance;Minimal Assistance  Stand to sit: Contact guard assistance  Comment: Patient required increased time to complete movements, needed verbal cuing on proper hand placement when transitioning from sit to stand with walker    Ambulation  Surface: level tile  Device: Rolling Walker  Assistance: Contact guard assistance;Minimal assistance  Quality of Gait: shortened shuffling steps, increased bracing on [employer]. downward gaze.;vc's for posture and step length. decreased dorsiflexion on L  Gait Deviations: Slow Marlys;Decreased step length;Decreased step height;Shuffles  Distance: 3-4 steps to chair                   PT Exercises  A/AROM Exercises: AP, LAQ, Marching X 10 R/L  Static Standing Balance Exercises: standing with focus on posture and NISHA - 1'15\"          Activity Tolerance  Activity Tolerance: Patient tolerated treatment well  Activity Tolerance Comments: Increased HR with activity. 150's reported from monitor room. ASSESSMENT   Assessment: Pt motivated to get into chair. Pt stood x3 and took several small step to chair. Cuing for transfers and step length required.      Discharge Recommendations:  Continue to assess pending progress         Goals  Long Term Goals  Long term goal 1: patient will be able to complete bed mobility independently with LRD  Long term goal 2: Patient will be able to complete transfers independently with LRD  Long term goal 3: Patient will be able to march in place x1 min  Long term goal 4: Patient will be able to complete 1 step independently with LRD    PLAN    Plan: 1 time a day 3-6 times a week  Safety Devices  Type of Devices: All fall risk precautions in place,Call light within reach,Chair alarm in place,Left in chair     AMPA (6 CLICK) Bhavani Wills 28 Inpatient Mobility Raw Score : 14      Therapy Time   Individual   Time In 0828   Time Out 0856   Minutes 28      bm/Trsf - 15 mins  Therex - 13 mins       Alexandrea Stevens PTA, 06/27/22 at 9:07 AM         Definitions for assistance levels  Independent = pt does not require any physical supervision or assistance from another person for activity completion. Device may be needed.   Stand by assistance = pt requires verbal cues or instructions from another person, close to but not touching, to perform the activity  Minimal assistance= pt performs 75% or more of the activity; assistance is required to complete the activity  Moderate assistance= pt performs 50% of the activity; assistance is required to complete the activity  Maximal assistance = pt performs 25% of the activity; assistance is required to complete the activity  Dependent = pt requires total physical assistance to accomplish the task

## 2022-06-27 NOTE — CARE COORDINATION
This LSW called and left  for admissions for [facility] this am. Awaiting a return call to inquire if pre-cert has been obtained for patient to transfer to [facility]. Patient aware. LSW  / CM to follow. Electronically signed by MEL Newell LSW on 6/27/22 at 10:26 AM EDT  This LSW was notified by Gisele López RN that pre-cert has been obtained and patient will be transferred to [facility]. Patient will go to room 255. Patient aware.   Electronically signed by MEL Newell LSW on 6/27/22 at 3:29 PM EDT

## 2022-06-28 ENCOUNTER — APPOINTMENT (OUTPATIENT)
Dept: ULTRASOUND IMAGING | Age: 42
DRG: 043 | End: 2022-06-28
Attending: PHYSICAL MEDICINE & REHABILITATION
Payer: COMMERCIAL

## 2022-06-28 PROBLEM — G35 MULTIPLE SCLEROSIS (HCC): Status: RESOLVED | Noted: 2021-01-06 | Resolved: 2022-06-28

## 2022-06-28 PROBLEM — M62.82 RHABDOMYOLYSIS: Status: ACTIVE | Noted: 2022-06-28

## 2022-06-28 LAB
ANION GAP SERPL CALCULATED.3IONS-SCNC: 11 MEQ/L (ref 9–15)
BASOPHILS ABSOLUTE: 0 K/UL (ref 0–0.2)
BASOPHILS RELATIVE PERCENT: 0.2 %
BUN BLDV-MCNC: 15 MG/DL (ref 6–20)
CALCIUM SERPL-MCNC: 8.6 MG/DL (ref 8.5–9.9)
CHLORIDE BLD-SCNC: 99 MEQ/L (ref 95–107)
CO2: 28 MEQ/L (ref 20–31)
CREAT SERPL-MCNC: 0.6 MG/DL (ref 0.5–0.9)
EOSINOPHILS ABSOLUTE: 0.2 K/UL (ref 0–0.7)
EOSINOPHILS RELATIVE PERCENT: 1.4 %
GFR AFRICAN AMERICAN: >60
GFR NON-AFRICAN AMERICAN: >60
GLUCOSE BLD-MCNC: 115 MG/DL (ref 70–99)
HCT VFR BLD CALC: 31.8 % (ref 37–47)
HEMOGLOBIN: 10 G/DL (ref 12–16)
LYMPHOCYTES ABSOLUTE: 0.9 K/UL (ref 1–4.8)
LYMPHOCYTES RELATIVE PERCENT: 8.1 %
MCH RBC QN AUTO: 27.2 PG (ref 27–31.3)
MCHC RBC AUTO-ENTMCNC: 31.5 % (ref 33–37)
MCV RBC AUTO: 86.2 FL (ref 82–100)
MONOCYTES ABSOLUTE: 0.7 K/UL (ref 0.2–0.8)
MONOCYTES RELATIVE PERCENT: 6.5 %
NEUTROPHILS ABSOLUTE: 9.3 K/UL (ref 1.4–6.5)
NEUTROPHILS RELATIVE PERCENT: 83.8 %
PDW BLD-RTO: 17.6 % (ref 11.5–14.5)
PLATELET # BLD: 212 K/UL (ref 130–400)
POTASSIUM REFLEX MAGNESIUM: 4.1 MEQ/L (ref 3.4–4.9)
RBC # BLD: 3.68 M/UL (ref 4.2–5.4)
SODIUM BLD-SCNC: 138 MEQ/L (ref 135–144)
WBC # BLD: 11.2 K/UL (ref 4.8–10.8)

## 2022-06-28 PROCEDURE — 97129 THER IVNTJ 1ST 15 MIN: CPT

## 2022-06-28 PROCEDURE — 36415 COLL VENOUS BLD VENIPUNCTURE: CPT

## 2022-06-28 PROCEDURE — 6370000000 HC RX 637 (ALT 250 FOR IP): Performed by: PHYSICAL MEDICINE & REHABILITATION

## 2022-06-28 PROCEDURE — 97110 THERAPEUTIC EXERCISES: CPT

## 2022-06-28 PROCEDURE — 96125 COGNITIVE TEST BY HC PRO: CPT

## 2022-06-28 PROCEDURE — 6360000002 HC RX W HCPCS: Performed by: INTERNAL MEDICINE

## 2022-06-28 PROCEDURE — 99223 1ST HOSP IP/OBS HIGH 75: CPT | Performed by: PHYSICAL MEDICINE & REHABILITATION

## 2022-06-28 PROCEDURE — 97112 NEUROMUSCULAR REEDUCATION: CPT

## 2022-06-28 PROCEDURE — 93005 ELECTROCARDIOGRAM TRACING: CPT | Performed by: NURSE PRACTITIONER

## 2022-06-28 PROCEDURE — 93970 EXTREMITY STUDY: CPT

## 2022-06-28 PROCEDURE — 80048 BASIC METABOLIC PNL TOTAL CA: CPT

## 2022-06-28 PROCEDURE — 2700000000 HC OXYGEN THERAPY PER DAY

## 2022-06-28 PROCEDURE — 6360000002 HC RX W HCPCS: Performed by: PHYSICAL MEDICINE & REHABILITATION

## 2022-06-28 PROCEDURE — 6370000000 HC RX 637 (ALT 250 FOR IP): Performed by: INTERNAL MEDICINE

## 2022-06-28 PROCEDURE — 97166 OT EVAL MOD COMPLEX 45 MIN: CPT

## 2022-06-28 PROCEDURE — 92610 EVALUATE SWALLOWING FUNCTION: CPT

## 2022-06-28 PROCEDURE — 97162 PT EVAL MOD COMPLEX 30 MIN: CPT

## 2022-06-28 PROCEDURE — 85025 COMPLETE CBC W/AUTO DIFF WBC: CPT

## 2022-06-28 PROCEDURE — 1180000000 HC REHAB R&B

## 2022-06-28 PROCEDURE — 97535 SELF CARE MNGMENT TRAINING: CPT

## 2022-06-28 RX ORDER — CYANOCOBALAMIN 1000 UG/ML
1000 INJECTION INTRAMUSCULAR; SUBCUTANEOUS WEEKLY
Status: DISCONTINUED | OUTPATIENT
Start: 2022-06-28 | End: 2022-07-12 | Stop reason: HOSPADM

## 2022-06-28 RX ORDER — BISACODYL 10 MG
10 SUPPOSITORY, RECTAL RECTAL DAILY PRN
Status: DISCONTINUED | OUTPATIENT
Start: 2022-06-28 | End: 2022-07-12 | Stop reason: HOSPADM

## 2022-06-28 RX ORDER — LIDOCAINE 4 G/G
3 PATCH TOPICAL DAILY
Status: DISCONTINUED | OUTPATIENT
Start: 2022-06-28 | End: 2022-07-12 | Stop reason: HOSPADM

## 2022-06-28 RX ORDER — VITAMIN B COMPLEX
2000 TABLET ORAL
Status: DISCONTINUED | OUTPATIENT
Start: 2022-06-28 | End: 2022-07-12 | Stop reason: HOSPADM

## 2022-06-28 RX ORDER — ACETAMINOPHEN 325 MG/1
650 TABLET ORAL EVERY 4 HOURS PRN
Status: DISCONTINUED | OUTPATIENT
Start: 2022-06-28 | End: 2022-07-12 | Stop reason: HOSPADM

## 2022-06-28 RX ORDER — UBIDECARENONE 100 MG
100 CAPSULE ORAL DAILY
Status: DISCONTINUED | OUTPATIENT
Start: 2022-06-28 | End: 2022-07-12 | Stop reason: HOSPADM

## 2022-06-28 RX ORDER — SODIUM PHOSPHATE, DIBASIC AND SODIUM PHOSPHATE, MONOBASIC 7; 19 G/133ML; G/133ML
1 ENEMA RECTAL DAILY PRN
Status: DISCONTINUED | OUTPATIENT
Start: 2022-06-28 | End: 2022-07-12 | Stop reason: HOSPADM

## 2022-06-28 RX ADMIN — Medication 100 MG: at 10:01

## 2022-06-28 RX ADMIN — ENOXAPARIN SODIUM 40 MG: 100 INJECTION SUBCUTANEOUS at 10:01

## 2022-06-28 RX ADMIN — Medication 2000 UNITS: at 17:31

## 2022-06-28 RX ADMIN — CYANOCOBALAMIN 1000 MCG: 1000 INJECTION, SOLUTION INTRAMUSCULAR; SUBCUTANEOUS at 10:02

## 2022-06-28 RX ADMIN — THERA TABS 1 TABLET: TAB at 10:01

## 2022-06-28 ASSESSMENT — ENCOUNTER SYMPTOMS
EYE REDNESS: 0
CONSTIPATION: 1
STRIDOR: 0
DIARRHEA: 0
WHEEZING: 0
NAUSEA: 0
COUGH: 0
VISUAL CHANGE: 1
ABDOMINAL PAIN: 0
SHORTNESS OF BREATH: 1
BACK PAIN: 1
VOMITING: 0
SORE THROAT: 0
EYE PAIN: 0
PHOTOPHOBIA: 0
BLOOD IN STOOL: 0

## 2022-06-28 ASSESSMENT — PAIN SCALES - GENERAL: PAINLEVEL_OUTOF10: 0

## 2022-06-28 NOTE — PROGRESS NOTES
[facility]   Facility/Department: [facility]  Speech Language Pathology  Initial Speech/Language/Cognitive Assessment    NAME:Vipin Hobbs  : 1980 (35 y.o.)   [x]   confirmed    MRN: [id number]  ROOM: [place] [address]  ADMISSION DATE: 2022  PATIENT DIAGNOSIS(ES): Impaired mobility [Z74.09]  Impaired mobility and activities of daily living [Z74.09, Z78.9]  No chief complaint on file. Patient Active Problem List    Diagnosis Date Noted    Rhabdomyolysis 2022    Impaired mobility and activities of daily living dt exac of MS 2022    Hyperglycemia 2022    Neurogenic bladder 2022    Acute cystitis 2022    MS (multiple sclerosis) ([facility] 2022    Urinary urgency 2022    Foot drop, left foot 2020    Acquired deformity of left foot 2020    Lumbar radiculopathy 2020    Pain in joint, lower leg 2020    Posterior tibial tendon dysfunction     Pseudophakia 10/29/2019    Ataxic gait 2019    Mixed hyperlipidemia 2015    Obesity, unspecified 2005     Past Medical History:   Diagnosis Date    Dislocated knee 1990    Right knee    Lumbar radiculopathy 2020    MS (multiple sclerosis) ([facility] 2022    PTTD (posterior tibial tendon dysfunction)      Past Surgical History:   Procedure Laterality Date    CATARACT REMOVAL WITH IMPLANT Right     CATARACT REMOVAL WITH IMPLANT Left      SECTION  2013    EYE SURGERY      FOOT SURGERY Left          Date of Onset: 2022  Rehab Diagnosis: Impaired mobility and activities of daily living dt exac of MS    Date of Evaluation: 2022   Evaluating Therapist: June Lauren SLP        Diagnosis: Cognitive-linguistic quick test was completed with the patient noting moderate cognitive deficits in the areas of attention, memory, executive function, and visiospatial skills.  Moderate deficits were noted on the clock drawing portion of the eval. Patient reported to the  that she was in learning disability classes in high school. Patient denies cognitive deficits and reported she had no difficulty with memory or problem solving skills PTA. Patient appeared to rush through the test, which negatively impacted her score. Reduced insight into deficits was noted. Patient is agreeable to participate in [address] [place] [address] Dr [facility]. Requires SLP Intervention: Yes     D/C Recommendations: To be determined    General  Previous level of function and limitations: Per patient she participated in learning disability classes in high school       Vision and Hearing  Vision  Vision: Impaired  Vision Exceptions: Wears glasses for reading  Hearing  Hearing: Within functional limits     Oral/Motor  Oral Motor   Labial: No impairment  Dentition: Natural;Full  Oral Hygiene: Moist;Clean  Lingual: No impairment    Motor Speech  Motor Speech  Apraxic Characteristics: None  Dysarthric Characteristics: None  Intelligibility: No impairment  Overall Impairment Severity: None    Comprehension  Auditory Comprehension  Comprehension: Within Functional Limits (Patient answered simple yes/no questions, basic questions and participated in conversation with good comprehension. no repetition was required)  Basic Questions: WFL  Conversation: WFL    Expression  Expression  Primary Mode of Expression: Verbal  Verbal Expression  Verbal Expression: Within functional limits  Initiation: WFL  Divergent: WFL (patient's language skills scored WFL on the CLQT (standard score 29))  Conversation: WFL (No word finding deficits were noted during conversation)  Interfering Components: Attention; Impaired thought organization  Written Expression  Dominant Hand: Right    Cognition  Attention  Attention: Exceptions to WFL (Moderate deficits noted on CLQT. (score 120))  Divided Attention: Moderate  Sustained Attention: Moderate  Memory  Memory: Exceptions to [facility]  Short-term Memory:  Moderate (Moderate deficits noted on the CLQT (score 132))  Problem Solving  Problem Solving: Exceptions to [facility]  Executive Function Skills: Severe (Severe executive function deficits were noted on the CLQT. ST suspicious of baseline deficits secondary to history of learning disability)  Abstract Reasoning  Abstract Reasoning: Exceptions to [facility]  Divergent Thinking: WFL    Additional Assessments    Objective:  CLQT (Cognitive Linguistic Quick Test) was administered which assesses the areas listed below. Results were as follows:    Domain Score Severity   Attention 120 Moderate   Memory 132 moderate   Executive Function 12 severe   Language 29 WNL   Visuospatial Skills 45 Moderate   Clock Drawing Severity 8 Moderate      Composite Severity Ratin.2 Moderate deficits    Recommendations  Requires SLP Intervention: Yes  D/C Recommendations: No follow up therapy recommended post discharge  Patient Education: Patient was educated on cog eval results  Patient Education Response: Verbalizes understanding  Duration of Treatment: 2-3 weeks  Frequency of Treatment: 2-3x/week for LOS or until all goals are met    Prognosis  Speech Therapy Prognosis  Prognosis: Fair  Prognosis Considerations: Severity of Impairments;Previous Level of Function    Education  Individuals consulted  Consulted and agree with results and recommendations: Patient;RN  RN Name: Adam Mustafa    Treatment/Goals  Short-term Goals  Timeframe for Short-term Goals: 1-2 weeks  Goal 1: To increase safety awareness and judgment for safe completion of ADLs secondary to pt's cognitive deficits,  pt will complete mid to high level problem solving tasks related to ADLs (e.g. medication management, ADL safety) with 90% accuracy and superised assist.  Goal 2: To increase safety awareness and judgment for safe completion of ADLs secondary to pt's cognitive deficits, pt will sequence common activities of daily living with (verbal/written) steps with 90% accuracy and supervised assist.  Goal 3:  To decrease pt's cognitive deficits through the use of compensatory strategies, the pt will be educated on 3 different memory strategies and verbalize how he/she might use them at home in 3 ways with supervised assist.  Goal 4: To address pt's cognitive deficits and promote recall of personal and medical information, pt will answer questions addressing (remote, recent, delayed) recall with 90% accuracy and min cues. Goal 5: To decrease cognitive deficits and improve attention to tasks for safe completion of ADLs, pt will complete structured tasks addressing (sustained, selective, alternating, divided) attention with 90% accuracy and supervised assist.  Long-term Goals  Timeframe for Long-term Goals: 2-3 weeks  Goal 1: Pt will improve his Cognition from mod assist to supervised to mod I level for adequate functional recall and safety awareness for  (home, community). Patient's goals: Patient agreeable with ST POC    Safety Devices  Safety Devices  Safety Devices in place: Yes  Type of devices: Call light within reach; Bed alarm in place    Pain Assessment  Pain Assessment: Patient does not c/o pain    Pain Re-assessment  Pain Reassessment: Patient does not c/o pain    Speech Therapy Level of Assistance Scale:    AUDITORY COMPREHENSION  Rating: Modified Independent    VERBAL EXPRESSION  Rating: Modified Independent    MOTOR SPEECH  Rating: Independent    PROBLEM SOLVING  Rating: Moderate Assistance    MEMORY  Rating:  Moderate Assistance      Therapy Time  SLP Individual Minutes  Time In: 3363  Time Out: 6348  Minutes: 33                 Signature: Electronically signed by FIDELIA Lopez on 6/28/2022 at 3:55 PM

## 2022-06-28 NOTE — PROGRESS NOTES
Facility/Department: [facility]  Rehabilitation Initial Assessment: Physical Therapy  Room: R255/R255-01    NAME: Eugene Isabel  : 1980  MRN: [id number]    Date of Service: 2022    Rehab Diagnosis(es): Impaired mobility and activities of daily living dt exac of MS  Patient Active Problem List    Diagnosis Date Noted    Rhabdomyolysis 2022    Impaired mobility and activities of daily living dt exac of MS 2022    Hyperglycemia 2022    Neurogenic bladder 2022    Acute cystitis 2022    MS (multiple sclerosis) ([facility] [address] 2022    Urinary urgency 2022    Foot drop, left foot 2020    Acquired deformity of left foot 2020    Lumbar radiculopathy 2020    Pain in joint, lower leg 2020    Posterior tibial tendon dysfunction     Pseudophakia 10/29/2019    Ataxic gait 2019    Mixed hyperlipidemia 2015    Obesity, unspecified 2005       Past Medical History:   Diagnosis Date    Dislocated knee 1990    Right knee    Lumbar radiculopathy 2020    MS (multiple sclerosis) ([facility] [address] 2022    PTTD (posterior tibial tendon dysfunction)      Past Surgical History:   Procedure Laterality Date    CATARACT REMOVAL WITH IMPLANT Right     CATARACT REMOVAL WITH IMPLANT Left      SECTION  2013    EYE SURGERY      FOOT SURGERY Left        Chart Reviewed: Yes  Patient assessed for rehabilitation services?: Yes  Family / Caregiver Present: No  Diagnosis: Impaired mobility and activities of daily living dt exac of MS  General Comment  Comments: Pt resting in chair - agreeable to PT evaluation    Restrictions:  Restrictions/Precautions: Fall Risk     SUBJECTIVE: Subjective: \"This is my first time here. \"    Pain  Pain: Denies pre and post session pain.     Prior Level of Function:  Social/Functional History  Lives With: Son (7 y/o)  Type of Home: Apartment  Home Layout: One level  Home Access: Stairs to enter without rails  Entrance Stairs - Number of Steps: 1 (2\" threshold)  Bathroom Shower/Tub: Tub/Shower unit  Bathroom Toilet: Standard  Bathroom Equipment: Shower chair,Hand-held shower  Bathroom Accessibility: Accessible  Home Equipment: Destin Janus, rolling,Wheelchair-manual,Reacher,Long-handled shoehorn  Receives Help From: Family  ADL Assistance: Independent (later reports that son helps put on socks when needed)  Homemaking Assistance: Independent  Homemaking Responsibilities: Yes  Ambulation Assistance: Independent [employer])  Transfer Assistance: Independent  Active : No  Patient's  Info: mother  Occupation: On disability  Type of Occupation: [employer]: watch tv, bowling with son    OBJECTIVE:   Vision/Hearing:  Vision  Vision Exceptions: Wears glasses for reading  Hearing: Within functional limits    Cognition/Observation:  Follows Commands: Within Functional Limits    Observation/Palpation  Observation: No acute distress noted. Pleasant and motivated. ROM:  RLE PROM: WFL  RLE General PROM: tightness in hamstrings (lacking 40degrees during supine 90/90 test); Mild limitations in hip IR  LLE PROM: WFL  LLE General PROM: tightness in hamstrings (lacking 40degrees during supine 90/90 test); Mild limitations in hip IR    Strength:  Strength RLE  R Hip Flexion: 2/5  R Knee Flexion: 3/5  R Knee Extension: 3/5  R Ankle Dorsiflexion: 3/5  R Ankle Plantar flexion: 3/5  Strength LLE  L Hip Flexion: 2/5  L Knee Flexion: 3/5  L Knee Extension: 3/5  L Ankle Dorsiflexion: 3/5  L Ankle Plantar Flexion: 3/5  Strength Other  Other: Significant trunk weakness noted during functional mobility (2/5)    Neuro:  Balance  Sitting - Static: Good  Sitting - Dynamic: Fair;-  Standing - Static: Fair  Standing - Dynamic: Poor  Motor Control  Gross Motor?:  (Generally low tone.  Absent patellar reflex L LE; Normal patellar reflex R LE.)                      Bed mobility  Bridging: Minimal assistance  Rolling to Left: Minimal assistance  Rolling to Right: Minimal assistance  Supine to Sit: Minimal assistance  Sit to Supine: Moderate assistance  Scooting: Minimal assistance (latearlly in supine)  Bed Mobility Comments: Hand over hand assist to complete all transitions. Instructed in logroll technique first with visual demonstration and cueing throughout. Instruction provided on segmental scooting. Bedrails not utilized. Transfers  Sit to Stand: Contact guard assistance  Stand to sit: Contact guard assistance  Bed to Chair: Contact guard assistance  Lateral Transfers: Minimal Assistance;Contact guard assistance (Instruction provided on scooting laterally at EOB emphasizing forward weight shift)  Comment: Slow to complete. Heavy reliance on UEs for power. Verbal cues for sequencing and ergonomics. Ambulation  Surface: level tile  Device: Rolling Walker  Other Apparatus: Wheelchair follow  Assistance: Contact guard assistance;Minimal assistance  Quality of Gait: Steadying throughout. increased reliance on [employer] for support with FF posture. Step to pattern with L LE lagging.   Distance: 8ft  Comments: Limited by fatigue    Stairs/Curb  Stairs?: No              Activity Tolerance  Activity Tolerance: Patient tolerated treatment well;Patient limited by fatigue         Quality Indicators (IRF-ZAHRAA):  Rolling L and R: Partial/Moderate Assistance (helper does <50%) - 3  Sit>Supine: Partial/Moderate Assistance (helper does <50%) - 3  Supine>Sit: Partial/Moderate Assistance (helper does <50%) - 3  Sit>Stand: Supervision or Touching Assistance - 4  Chair/Bed>Chair Transfer: Supervision or Touching Assistance - 4  Car Transfers: Not attempted due to Environmental Limitations (i.e. weather, equipment unavailable) - 10  Walk 10 ft: Not attempted due to Medical Condition or Safety Concerns (I.e. unsafe or physician orders) - 88  Walk 50 ft with two 90 degree turns: Not attempted due to Medical Condition or Safety Concerns (I.e. unsafe or physician orders) - 80  Walk 150 ft in [address]: Not attempted due to Medical Condition or Safety Concerns (I.e. unsafe or physician orders) - 80  Walking 10 ft on Unlevel Surface: Not attempted due to Medical Condition or Safety Concerns (I.e. unsafe or physician orders) - 80  Picking up Objects from Standing Position: Not attempted due to Medical Condition or Safety Concerns (I.e. unsafe or physician orders) - 80  Stairs: No Not attempted due to medical condition or safety concerns (i.e. unsafe or physician order) - 88  WC Mobility: No Not Applicable (pt did not complete item prior to admission) - 9    ASSESSMENT:  Body Structures, Functions, Activity Limitations Requiring Skilled Therapeutic Intervention: Decreased functional mobility ; Decreased ROM; Decreased body mechanics; Decreased strength;Decreased endurance;Decreased balance;Decreased ADL status  Decision Making: Medium Complexity  History: High  Exam: Med  Clinical Presentation: Med    Therapy Prognosis: Good  Barriers to Learning: none           CLINICAL IMPRESSION: Pt presents with generalized weakness and impaired motor control which has negatively impacted her functional mobility and increased her risk for falls. Continued PT indicated to progress mobility and facilitate DC at highest level of indep and safety. Special focus to core strengthening. PLAN OF CARE:  Frequency: 1-2 treatment sessions per day, 5-7 days per week  Plan Comment: Cont.  POC  Current Treatment Recommendations: Strengthening,ROM,Balance training,Functional mobility training,Transfer training,Endurance training,Gait training,Stair training,Neuromuscular re-education,Home exercise program,Safety education & training,Patient/Caregiver education & training,Equipment evaluation, education, & procurement    Patient's Goal:  Get home    GOALS:  Patient goals : Get home  [address] term goal 1: Pt to complete bed mobility with indep  Long term goal 2: Pt to complete functional

## 2022-06-28 NOTE — PROGRESS NOTES
Admission completeed. Cnsents signed. Pt is A&ox4. Pleasant and cooperative. Call light in hand bedside table in reach. Denies pain or any other needs at this time.

## 2022-06-28 NOTE — PROGRESS NOTES
Physical Therapy Rehab Treatment Note  Facility/Department: [facility]  [facility]       NAME: Isabela Burger  : 1980 (26 y.o.)  MRN: [id number]  CODE STATUS: Full Code    Date of Service: 2022       Restrictions:  Restrictions/Precautions: Fall Risk       SUBJECTIVE:   Subjective: \"Lunch was good. \"    Pain  Pain: Denies pre and post session pain. OBJECTIVE:     Bed Mobility  Additional Factors: Head of bed flat; With handrails  Roll Left  Assistance Level: Stand by assist  Skilled Clinical Factors: with HR  Roll Right  Assistance Level: Stand by assist  Skilled Clinical Factors: with HR  Sit to Supine  Assistance Level: Minimal assistance  Skilled Clinical Factors: assistance with BLE  Scooting  Assistance Level: Minimal assistance  Skilled Clinical Factors: scooting EOB to HOB, requires min assistance for lateral shift and anterior lean for lifting buttocks, assistance with technique to scoot to [facility] while flat in bed    Sit to Stand  Bed To/From Chair  Technique: Sit pivot  Assistance Level: Minimal assistance  Skilled Clinical Factors: multiple cues for hand placement and sequencing, fair follow through, vc/tc's  Stand Pivot  Assistance Level: Contact guard assist  Skilled Clinical Factors: poor approach to chair, multiple cues for sequencing BLE and manuevering ww    PT Exercises  A/AROM Exercises: Supine: AP, QS, GS x20 ea, heelslides, hip abd, hip add pillow squeeze x10 reps x 2 sets improved carryover from AM tx with technique , hooklying marching, bridges x10 ea       ASSESSMENT/PROGRESS TOWARDS GOALS:   Assessment  Assessment: Pt bedside this session secondary to elevated HR with AM therapy session. Multiple cues required for SPT from w/c > bed for improving overall technique and safety. Improved technique with supine ther ex this session.     Goals:  Long Term Goals  Long term goal 1: Pt to complete bed mobility with indep  Long term goal 2: Pt to complete functional transfers bed/chair/car with indep  Long term goal 3: Pt to ambulate 50-150ft with LRD and indep  Long term goal 4: Pt to tolerate 5min standing activities indep  Long term goal 5: Pt to manage 2\" curb step indep  Additional Goals?: Yes  Long term goal 6: Pt to complete HEP with indep    PLAN OF CARE/Safety:   Plan Comment: Cont.  POC      Therapy Time:   Individual   Time In 1330   Time Out 1400   Minutes 30     Minutes:  Transfer/Bed mobility training: 15  Gait trainin  Neuro re education: 0  Therapeutic ex: [address] PTA, 22 at 3:33 PM

## 2022-06-28 NOTE — PLAN OF CARE
Problem: Discharge Planning  Goal: Discharge to home or other facility with appropriate resources  6/28/2022 1949 by Len Seip, RN  Outcome: Progressing  6/28/2022 1709 by Alcon Cuadra RN  Outcome: Progressing  6/28/2022 1702 by Alcon Cuadra RN  Outcome: Progressing  Flowsheets  Taken 6/28/2022 1648 by Alcon Cuadra RN  Discharge to home or other facility with appropriate resources: Identify discharge learning needs (meds, wound care, etc)  Taken 6/28/2022 0303 by Len Seip, RN  Discharge to home or other facility with appropriate resources: Identify barriers to discharge with patient and caregiver     Problem: ABCDS Injury Assessment  Goal: Absence of physical injury  6/28/2022 1949 by Len Seip, RN  Outcome: Progressing  Flowsheets (Taken 6/28/2022 1721 by Alcon Cuadra RN)  Absence of Physical Injury: Implement safety measures based on patient assessment  6/28/2022 1709 by Alcon Cuadra RN  Outcome: Progressing  6/28/2022 1702 by Alcon Cuadra RN  Outcome: Progressing     Problem: Safety - Adult  Goal: Free from fall injury  6/28/2022 1949 by Len Seip, RN  Outcome: Progressing  6/28/2022 1709 by Alcon Cuadra RN  Outcome: Progressing  6/28/2022 1702 by Alcon Cuadra RN  Outcome: Progressing     Problem: Physical Therapy - Adult  Goal: By Discharge: Performs mobility at highest level of function for planned discharge setting. See evaluation for individualized goals. 6/28/2022 1204 by Hunter Sheriff PT  Outcome: Progressing     Problem: SLP Adult - Impaired Communication  Goal: By Discharge: Demonstrates communication skills at highest level of function for planned discharge setting. See evaluation for individualized goals. 6/28/2022 1527 by FIDELIA Toribio  Outcome: Progressing     Problem: Skin/Tissue Integrity  Goal: Absence of new skin breakdown  Description: 1. Monitor for areas of redness and/or skin breakdown  2.   Assess vascular access sites hourly  3. Every 4-6 hours minimum:  Change oxygen saturation probe site  4. Every 4-6 hours:  If on nasal continuous positive airway pressure, respiratory therapy assess nares and determine need for appliance change or resting period.   Outcome: Progressing

## 2022-06-28 NOTE — CONSULTS
Hospitalist Consult/Progress Note  6/28/2022 11:44 AM    Assessment and Plan:   1. Generalized weakness, Gait instability and Decreased Functional Status secondary to MS exacerbation: Followed by neurology. Received IV steroids. Had fall without injury overnight. Fall precautions. PT OT to evaluate. Maximize nutrition status. Assessing if needs DME at home. SW on board. 2. Rhabdomyolysis: Secondary to being found on floor. CK was in the 4000s. Trending down <1000. Received IVF. 3. LE edema: US negative for DVT. In the setting of immobility. 4. UTI: Gram negative rods in urine. Received 3 days of antibiotics. 5. Normocytic anemia: H/H stable. No overt bleeding. Follow trend. 6. Bowel Regimen and GI PPx: stool softners PRN ordered with hold parameters for loose stools or diarrhea. On antiacid  7. Diet: ADULT DIET; Regular  8. Advance Directive: Full Code   9. Nutrition status: Supplemental Vitamins ordered. Dietitian assessment  10. Vaccinations: Immunization records reviewed. If has not received appropriate vaccinations, will order to be given prior to discharge. 11. DVT prophylaxis: Chemical prophylaxis SQ enoxaparin  12. Discharge planning: SW on board. 13. High Risk Readmission Screening Tool Score Noted. Additionally, the following hospital problems were addressed:  Principal Problem:    Impaired mobility and activities of daily living dt exac of MS  Active Problems:    Urinary urgency    MS (multiple sclerosis) (HCC)    Hyperglycemia    Neurogenic bladder    Rhabdomyolysis    Posterior tibial tendon dysfunction    Ataxic gait    Pain in joint, lower leg    Foot drop, left foot    Acquired deformity of left foot    Lumbar radiculopathy  Resolved Problems:    * No resolved hospital problems. *      ** Total time spent reviewing medical records, evaluating patient, speaking with RN's and consultants where I was focused exclusively on this patient: 60 minutes.    This time is excluding time spent performing procedures or significant events occurring earlier or later in the day requiring my attention and focus. Subjective:   Admit Date: 6/27/2022  PCP: Gary Crooks PA-C  35 y.o. female with PMH of MS, anemia, HPL and obesity transferred to rehab for further management after inpatient admission for MS exacerbation. Received IV steroids and followed by neurology. Once medically optimized she is transferred for further therapies. Had a fall overnight without injury. No acute complaints. Participating in PT. Afebrile. Denies chest pain, SOB, N/V, fevers or chills. VSS. Labs reviewed. Objective:     Vitals:    06/27/22 1845 06/27/22 2137 06/28/22 0737   BP: 117/70  112/60   Pulse: (!) 104  (!) 112   Resp: 18  19   Temp: 97.7 °F (36.5 °C)  98.8 °F (37.1 °C)   TempSrc: Oral  Oral   SpO2: 100%  99%   Weight:  179 lb (81.2 kg) 176 lb 1.6 oz (79.9 kg)   Height:  5' 1\" (1.549 m) 5' 1\" (1.549 m)     General appearance: No acute distress,  No conversational dyspnea noted. Dentition intact. Answers questions appropriately  Neurological: Alert, awake, and oriented x3 . Motor and sensory grossly intact. No focal deficits. GCS of 15. Lungs: CTAB, no exp wheezes, No rales No retractions; No use of accessory muscles  Heart:  S1, S2 normal, tachy but regular  Abdomen: (+) BS, soft, non-tender; non distended no guarding or rigidity. Extremities:  no cyanosis, edema bilat lower exts, no calf tenderness bilaterally. Dry skin noted       Medications:      Vitamin D  2,000 Units Oral Dinner    cyanocobalamin  1,000 mcg IntraMUSCular Weekly    coenzyme Q10  100 mg Oral Daily    lidocaine  3 patch TransDERmal Daily    enoxaparin  40 mg SubCUTAneous Daily    multivitamin  1 tablet Oral Daily       LABS Reviewed    IMAGING Reviewed    JACKI Ivy Ala, NP  Rounding Hospitalist    Additional work up or/and treatment plan may be added today or then after based on clinical progression.  I am managing a portion of pt care. Some medical issues are handled by other specialists and Primary Rehabilitation provider. Additional work up and treatment should be done in out pt setting by pt PCP and other out pt providers.

## 2022-06-28 NOTE — PROGRESS NOTES
Facility/Department: [facility] Initial Assessment: Occupational Therapy  Room: R255/R255-01    NAME: Isabela Burger  : 1980  MRN: [id number]    Date of Service: 2022    Rehab Diagnosis(es):    Patient Active Problem List    Diagnosis Date Noted    Rhabdomyolysis 2022    Impaired mobility and activities of daily living dt exac of MS 2022    Hyperglycemia 2022    Neurogenic bladder 2022    Acute cystitis 2022    [facility]multiple sclerosis) ([facility] [address] 2022    Urinary urgency 2022    Foot drop, left foot 2020    Acquired deformity of left foot 2020    Lumbar radiculopathy 2020    Pain in joint, lower leg 2020    Posterior tibial tendon dysfunction     Pseudophakia 10/29/2019    Ataxic gait 2019    Mixed hyperlipidemia 2015    Obesity, unspecified 2005       Past Medical History:   Diagnosis Date    Dislocated knee 1990    Right knee    Lumbar radiculopathy 2020    MS (multiple sclerosis) ([facility] [address] 2022    PTTD (posterior tibial tendon dysfunction)      Past Surgical History:   Procedure Laterality Date    CATARACT REMOVAL WITH IMPLANT Right     CATARACT REMOVAL WITH IMPLANT Left      SECTION  2013    EYE SURGERY      FOOT SURGERY Left        Restrictions:  Restrictions/Precautions  Restrictions/Precautions: Fall Risk    Subjective:  General  Chart Reviewed: Yes  Patient assessed for rehabilitation services?: Yes  Family / Caregiver Present: No  Referring Practitioner: Dr. Cata Pike  Diagnosis: Impaired mobility and ADLs D/T Exacerabation of MS  Subjective: \"I fell yesterday here.  I am scared to fall again\"    Patient's date of birth confirmed: Yes     Pain at start of treatment: No    Pain at end of treatment: No        Social Functional:  Social/Functional History  Lives With: Son (4 y/o)  Type of Home: Apartment  Home Layout: One level  Home Access: Stairs to enter with rails  Entrance Stairs - Number of Steps: 1  Bathroom Shower/Tub: Tub/Shower unit  Bathroom Toilet: Standard  Bathroom Equipment: Shower chair;Hand-held shower  Bathroom Accessibility: Accessible  Home Equipment: Frederich Gu, rolling; Wheelchair-manual;Reacher;Long-handled shoehorn  Has the patient had two or more falls in the past year or any fall with injury in the past year?: Yes (2- Pt able to get up x1)  Receives Help From: Family  ADL Assistance: Independent (later reports that son helps put on socks when needed)  Homemaking Assistance: Independent  Homemaking Responsibilities: Yes  Ambulation Assistance: Independent [place])  Transfer Assistance: Independent  Active : No  Patient's  Info: mother  Occupation: On disability  Type of Occupation: [employer] 53: watch tv, bowling    Objective:    Self Care Status:  ADL  Feeding: Modified independent   Grooming: Setup  UE Bathing: Stand by assistance; Increased time to complete;Verbal cueing  LE Bathing: Dependent/Total  LE Bathing Skilled Clinical Factors: +2 assistance. Pt able to bend down in order to wash legs with SBA for safe sitting. +2 to wash posterior lillie area. 1 person for washing 1 person to maintain standing balance  UE Dressing: Minimal assistance  LE Dressing: Maximum assistance; Increased time to complete  Toileting: Dependent/Total  Toileting Skilled Clinical Factors: Pt incontinent of feces  Additional Comments: Pt displaying lack of endurance and fatigue throughout session, requiring increased assistance throughout progression of evaluation  Toilet Transfers  Toilet - Technique: Stand pivot  Equipment Used: Grab bars  Toilet Transfer: Dependent/Total  Toilet Transfers Comments: Catherine end of session, initially Min A for transfers  Shower Transfers  Shower - Transfer Type: To and From  Shower - Transfer To: Shower seat with back  Shower - Technique: Stand pivot  Shower Transfers:  Moderate assistance  Shower Transfers Comments: Pt with decreased endurance and fatigued post shower, requiring more assistance      Functional Mobility:  Balance  Sitting Balance: Supervision  Standing Balance: Contact guard assistance  Functional Mobility  Functional - Mobility Device: Wheelchair  Activity: To/from bathroom  Assist Level: Stand by assistance    Bed mobility and transfers:  Bed mobility  Supine to Sit: Minimal assistance  Sit to Supine: Unable to assess  Bed Mobility Comments: HOB slightly elevated; heavy use of hand rails; vc's for hand placement and sequencing with LE's. Transfers  Sit to stand: Maximum assistance;2 Person assistance  Stand to sit: Maximum assistance;2 Person assistance  Transfer Comments: Pt initially Min A for Sit to and from Stand transfers, hwoever, as she fatigued she required increased assistance      Observation: Pt initially began session with Min A to complete transfers and undressing. As session progressed, pt's endurance declined, requiring increased assistance (Max/Dep) for transfers and toileting. Observation/Palpation  Posture: Fair  Observation: Pt pleasant and cooperative, agreeable to OT eval. No visual distress, flexed posture    Orientation and Cognition:  Orientation  Overall Orientation Status: Within Functional Limits  Orientation Level: Oriented to place;Oriented to person;Oriented to situation  Cognition  Overall Cognitive Status: WFL  Cognition Comment: Anxiety can be limiting at times; no anxiety noted on this date. The patient completed the [address] [place] [address] (UMS) Examination on this date, which is a useful screening tool for detecting mild cognitive impairment and signs of dementia.   Range of impairments based on score are indicated in the chart below:      High school education  Scoring Less than High School Education   27-30 Normal 25-30   21-26 Mild Neurocognitive disorder 20-24   1-20 Dementia 1-19     Patient's level of education: High School  Patient scored 15 on this date, which indicates a cognitive impairment. Pt's current cognitive status is:  Comprehension: Supervision  Expression: Independent  Social Interaction: Independent  Problem Solving: Supervision  Memory: Independent    Vision and Hearing:  Vision  Vision: Impaired  Vision Exceptions: Wears glasses for reading  Hearing  Hearing: Within functional limits  Vision - Basic Assessment  Prior Vision: Wears glasses only for reading  Visual History: No significant visual history  Patient Visual Report: No visual complaint reported. Visual Field Cut: No  Oculo Motor Control: WNL  Perception  Overall Perceptual Status: WFL    Range of Motion:  LUE AROM (degrees)  LUE AROM : WFL  Left Hand AROM (degrees)  Left Hand AROM: WFL  RUE AROM (degrees)  RUE AROM : WFL  Right Hand AROM (degrees)  Right Hand AROM: WFL      Strength:  LUE Strength  L Hand General: 4/5  RUE Strength  R Hand General: 4/5    Quality of Movement: Tone RUE  RUE Tone: Normotonic  Tone LUE  LUE Tone: Normotonic  Coordination  Movements Are Fluid And Coordinated: No  Coordination and Movement Description: Decreased accuracy; Decreased speed    Sensation:  Sensation  Overall Sensation Status: WFL    Hand Dominance  Hand Dominance: Right    Safety:  Safety Devices  Type of Devices: All fall risk precautions in place;Call light within reach; Chair alarm in place; Left in chair    Assessment:  Activity Tolerance  Activity Tolerance: Patient limited by fatigue  Activity Tolerance Comments: Decreased activity tolerance    Assessment  Performance deficits / Impairments: Decreased functional mobility ; Decreased balance;Decreased ADL status; Decreased endurance;Decreased strength;Decreased posture;Decreased high-level IADLs;Decreased coordination;Decreased fine motor control  Assessment: 36 y/o female with decreased endurance to complete daily activities and decreased independence  Treatment Diagnosis: impaired mobility and ADLs d/t Exacerbation of MS  History: mult comorb  Exam: 9 performance deficits  Assistance / Modification: Max a  Discharge Recommendations: Continue to assess pending progress  Plan  REQUIRES OT FOLLOW-UP: Yes    Equipment needed:  OT Equipment Recommendations  Other: Continue to assess    OT Education:  Education Given To: Patient  Education Provided: Role of [facility] Training  Education Method: Demonstration,Verbal  Barriers to Learning: None  Education Outcome: Verbalized understanding,Demonstrated understanding,Continued education needed      Plan:  Plan  Times per Week: 5-7x/week  Plan Weeks: 2 weeks  Current Treatment Recommendations: Strengthening;Balance training;Functional mobility training;Neuromuscular re-education;Self-Care / ADL;Endurance training; Safety education & training;Home management training  Plan Comment: continue with POC    Goals:  Patient goals : \"My dad can't take care of me now, so I need to get better. He has congestive heart failure. \"    Time Frame for Long term goals : Within 2 weeks, Pt to demonstrate progress in the following areas listed below to achieve specific LTG's as stated in the initial evaluation. Long Term Goal 1: Increase independence in ADLs  Long Term Goal 2: Increaese endurance to complete clothes management  Long Term Goal 3: Increase static/dynamic balance  Long Term Goal 4: Increase independence to perform kitchen mobility    - Patient will improve static and dynamic standing balance to complete pants management at SBA level  - Patient will improve B UE Function (AROM, strength, motor control, tone normalization) to complete ADLs as projected. - Patient will improve functional endurance to tolerate/complete 45 minutes of ADLs. - Patient will improve B UE strength and endurance to Mod I in order to participate in self-care activities as projected.   - Patient will perform kitchen mobility at device level without episodes of LOB and good safety awareness   - Patient will perform basic room mobility at supervision level. - Patient and/or caregiver will demonstrate understanding of energy conservation techniques with occasional verbal/tactile cues. - Comprehension: Mod I  Expression: Independent  Social Interaction: Independent  Problem Solving:  Mod I  Memory: Independent     Therapy Time:   Individual   Time In 0823   Time Out 0948   Minutes 85            Cognitive Retraining: 15 minutes  Eval: 70 minutes     Electronically signed by:    FRANCHESKA Lovett,   6/28/2022, 11:13 AM

## 2022-06-28 NOTE — PROGRESS NOTES
[facility]   [facility]  Speech Language Pathology  Clinical Bedside Swallow Evaluation    NAME:Vipin Gutierres  : 1980 (35 y.o.)   [x]   confirmed    MRN: [id number]  ROOM: [place] [address]  ADMISSION DATE: 2022  PATIENT DIAGNOSIS(ES): Impaired mobility [Z74.09]  Impaired mobility and activities of daily living [Z74.09, Z78.9]  No chief complaint on file. Patient Active Problem List    Diagnosis Date Noted    Rhabdomyolysis 2022    Impaired mobility and activities of daily living dt exac of MS 2022    Hyperglycemia 2022    Neurogenic bladder 2022    Acute cystitis 2022    MS (multiple sclerosis) ([facility] [address] 2022    Urinary urgency 2022    Foot drop, left foot 2020    Acquired deformity of left foot 2020    Lumbar radiculopathy 2020    Pain in joint, lower leg 2020    Posterior tibial tendon dysfunction     Pseudophakia 10/29/2019    Ataxic gait 2019    Mixed hyperlipidemia 2015    Obesity, unspecified 2005     Past Medical History:   Diagnosis Date    Dislocated knee 1990    Right knee    Lumbar radiculopathy 2020    MS (multiple sclerosis) ([facility] [address] 2022    PTTD (posterior tibial tendon dysfunction)      Past Surgical History:   Procedure Laterality Date    CATARACT REMOVAL WITH IMPLANT Right     CATARACT REMOVAL WITH IMPLANT Left      SECTION  2013    EYE SURGERY      FOOT SURGERY Left      No Known Allergies    Date of Onset: 2022  Rehab Diagnosis: Impaired mobility and activities of daily living dt exac of MS    Date of Evaluation: 2022   Evaluating Therapist: Ardyce Cheadle, SLP    Dysphagia Diagnosis  Dysphagia Diagnosis: Swallow function appears [facility]  Dysphagia Impression : Oral phase of the swallow mechanism was [facility] characterized by adequate labial and lingual strength and ROM, adequate mastication of solids and no oral residue.  No overt s/s of aspiration was noted with all po trials including with 3oz consecutive sips of thin liquids. Patient's vocal quality remained clear and hyolaryngeal elevation was noted . Recommended Diet     Diet Solids Recommendation: Regular  Liquid Consistency Recommendation: Thin  Recommended Form of Meds: PO  Compensatory Swallowing Strategies : Upright as possible for all oral intake      Reason for Referral  Sherri Thorpe was referred for a bedside swallow evaluation to assess the efficiency of her swallow function, identify signs and symptoms of aspiration, identify risk factors, and make recommendations regarding safe dietary consistencies, effective compensatory strategies, and safe eating environment. General  Chart Reviewed: Yes  Subjective  Subjective: Patient denies swallowing deficits. Behavior/Cognition: Alert; Cooperative;Pleasant mood  Respiratory Status: Room air  O2 Device: None (Room air)  Communication Observation: Functional  Follows Directions: Simple  Dentition: Adequate  Patient Positioning: Upright in bed  Baseline Vocal Quality: Normal  Volitional Cough: Strong  Prior Dysphagia History: None  Consistencies Administered: Regular;Pureed; Thin - cup    Vision and Hearing  Vision  Vision: Impaired  Vision Exceptions: Wears glasses for reading  Hearing  Hearing: Within functional limits    Current Diet level  Current Diet : Regular  Current Liquid Diet : Thin    Oral Motor  Labial: No impairment  Dentition: Natural;Full  Oral Hygiene: Moist;Clean  Lingual: No impairment    Oral/Pharyngeal Phase  Oral Phase - Comment: oral phase of the swallow mechanism was [facility]  Pharyngeal Phase: Suspect pharyngeal phase of the swallow mechanism is WFL    PO Trials  Neuromuscular Estim Used: No  Assessment Method(s): Observation;Palpation  Patient Position: upright in the bed  Vocal Quality: No Impairment  Consistency Presented: Regular  How Presented: Self-fed/presented  How much presented: 3  Bolus Acceptance: No impairment  Bolus Formation/Control: No impairment  Propulsion: No impairment  Oral Residue: None  Initiation of Swallow: No impairment  Laryngeal Elevation: Functional  Aspiration Signs/Symptoms: None  Pharyngeal Phase Characteristics: No impairment, issues, or problems  Effective Modifications: None  Cues for Modifications: None  Additional PO Trials: 2      PO Trials 2  Consistency Presented: Pureed  How Presented: Self-fed/presented  How much presented: 3  Bolus Acceptance: No impairment  Bolus Formation/Control: No impairment  Propulsion: No impairment  Oral Residue: None  Initiation of Swallow: No impairment  Aspiration Signs/Symptoms: None  Pharyngeal Phase Characteristics: No impairment, issues, or problems  Effective Modifications: None  Cues for Modifications: None      PO Trials 3  Consistency Presented: Thin  How Presented: Self-fed/presented;Cup/sip  How much presented:  (3 oz consecutive sips. 3 single sips)  Bolus Acceptance: No impairment  Bolus Formation/Control: No impairment  Propulsion: No impairment  Oral Residue: None  Initiation of Swallow: No impairment  Aspiration Signs/Symptoms: None  Pharyngeal Phase Characteristics: No impairment, issues, or problems  Effective Modifications: None      Dysphagia Diagnosis  Dysphagia Diagnosis: Swallow function appears WFL  Dysphagia Impression : Oral phase of the swallow mechanism was [facility] characterized by adequate labial and lingual strength and ROM, adequate mastication of solids and no oral residue. No overt s/s of aspiration was noted with all po trials including with 3oz consecutive sips of thin liquids. Patient's vocal quality remained clear and hyolaryngeal elevation was noted .   Dysphagia Outcome Severity Scale: Level 7: Normal in all situations     Recommendations  Requires SLP Intervention: Yes  D/C Recommendations: No follow up therapy recommended post discharge  Diet Solids Recommendation: Regular  Liquid Consistency Recommendation: Thin  Compensatory Swallowing Strategies : Upright as possible for all oral intake  Recommended Form of Meds: PO  Duration of Treatment: No f/u for dysphagia  Frequency of Treatment: No f/u for dysphagia    Prognosis  Speech Therapy Prognosis  Prognosis: Good    Education  Individuals consulted  Consulted and agree with results and recommendations: Patient;RN  RN Name: Milton Jamison [id number]    [x]  Milton Najera41 RN notified       Treatment/Goals  Long-term Goals  Timeframe for Long-term Goals: N/A    Safety Devices  Safety Devices  Safety Devices in place: Yes  Type of devices: Call light within reach; Bed alarm in place    Pain Assessment  Pain Assessment: Patient does not c/o pain    Pain Re-assessment  Pain Reassessment: Patient does not c/o pain         DYSPHAGIA OUTCOMES SEVERITY SCALE:    SWALLOWING  Ratin    Therapy Time  SLP Individual Minutes  Time In: 1430  Time Out: 1200 [place]  Minutes: 12              Signature: Electronically signed by FIDELIA Blank on 2022 at 3:34 PM

## 2022-06-28 NOTE — PROGRESS NOTES
Physical Therapy Rehab Treatment Note  [facility]Department: [facility]  [facility]       NAME: Dominik Salazar  : 1980 (1 y.o.)  MRN: [id number]  CODE STATUS: Full Code    Date of Service: 2022       Restrictions:  Restrictions/Precautions: Fall Risk       SUBJECTIVE:   Subjective: \"I'm alright. \"    Pain  Pain: denies pain pre and post tx      OBJECTIVE:     Bed Mobility  Additional Factors: Head of bed flat; Without handrails (mat table)  Roll Left  Assistance Level: Minimal assistance  Sit to Supine  Assistance Level: Minimal assistance  Skilled Clinical Factors: assistance with BLE  Supine to Sit  Assistance Level: Contact guard assist  Scooting  Assistance Level: Minimal assistance  Skilled Clinical Factors: scooting EOB to HOB, requires min assistance for lateral shift and anterior lean for lifting buttocks    Sit to Stand  Assistance Level: Contact guard assist  Skilled Clinical Factors: vc's for hand placement  Stand to Sit  Assistance Level: Contact guard assist  Skilled Clinical Factors: vc's for hand placement  Bed To/From Chair  Technique: Sit pivot  Assistance Level: Minimal assistance  Skilled Clinical Factors: multiple cues for hand placement and sequencing, fair follow through, vc/tc's  Stand Pivot  Assistance Level: Contact guard assist  Skilled Clinical Factors: poor approach to chair, multiple cues for sequencing BLE and manuevering ww    Ambulation  Surface: Carpet  Device: Rolling walker  Distance: 8'  Assistance Level: Contact guard assist  Gait Deviations: Decreased step length bilateral;Slow leonardo;Decreased heel strike right;Decreased heel strike left; Wide base of support  Skilled Clinical Factors: lateral sway, kaz toe out    PT Exercises  A/AROM Exercises: Supine: Heelslides, hip abd, hooklying marching, hooklying hip add pillow squeeze (trialed 2\" hold, pt unable to hold), bridges x10 ea Pt requires minimal assistance for stability and proper form, seated EOB marches with abdominal iso  Dynamic Sitting Balance Exercises: fwd reaching OOBOS in all planes       ASSESSMENT/PROGRESS TOWARDS GOALS:   Assessment  Assessment: Pt requires multiple cues for technique and sequencing throughout bed mobility and trsfs for improved body mechanics and safety. Pt unable to maintain good technique during ther ex d/t decreased strength, stability assistance provided throughout. Poor carryover of cues for safety with hand placement during trsfs. Goals:  Long Term Goals  Long term goal 1: Pt to complete bed mobility with indep  Long term goal 2: Pt to complete functional transfers bed/chair/car with indep  Long term goal 3: Pt to ambulate 50-150ft with LRD and indep  Long term goal 4: Pt to tolerate 5min standing activities indep  Long term goal 5: Pt to manage 2\" curb step indep  Additional Goals?: Yes  Long term goal 6: Pt to complete HEP with indep    PLAN OF CARE/Safety:   Plan Comment: Cont.  POC      Therapy Time:   Individual   Time In 1130   Time Out 1200   Minutes 30     Minutes:  Transfer/Bed mobility trainin  Gait trainin  Neuro re education: 0  Therapeutic ex: [address], PTA, 22 at 12:07 PM

## 2022-06-28 NOTE — PROGRESS NOTES
Called the monitor room to validate the heart rate. Pt. Is running sinus tack at 111.pt. is in bed eating breakfast.   Pt. Noted with heart rate at 146 per telemetry strip  pt. was in physical therapy . Walked 8 ft. Pt. Denies discomfort returned to her room eating lunch. Updated the physician. EKG and a full set of vitals ordered. Electronically signed by Josafat Ogden RN on 6/28/2022 at 12:11 PM  EKG and vitals on the chart. Will consult cardiology. Pt. Will have in room therapy until evaluated by cardiology. Orders received by cardiology. Pt. Will have bilateral lower extremity rule out DVT. 1750 pt. Off unit for ultra sound bilateral lower extremities. Pt. Returned to unit. Depends changed. Pt. incontinent of bowel and bladder. Pt. Educated to call for assistance in getting cleaned up.  Pt. Stated she didn't realize she had a bowel movement

## 2022-06-28 NOTE — PROGRESS NOTES
Occupational Therapy  OCCUPATIONAL THERAPY  INPATIENT REHAB TREATMENT NOTE  [facility]      NAME: Leigh Ann Medley  : 1980 (35 y.o.)  MRN: [id number]  CODE STATUS: Full Code  Room: R255/R255-01    Date of Service: 2022    Referring Physician: Dr. Margy Kay  Rehab Diagnosis: Impaired mobility and ADLs D/T Exacerabation of MS    Restrictions  Restrictions/Precautions  Restrictions/Precautions: Fall Risk              Patient's date of birth confirmed: Yes    SAFETY:  Safety Devices  Safety Devices in place: Yes  Type of devices: All fall risk precautions in place    SUBJECTIVE:  Subjective: \" I have a son that is 5years old. \"    Pain at start of treatment: No    Pain at end of treatment: No    Location:   Nursing notified: Not Applicable  RN:   Intervention: None    COGNITION:  Orientation  Overall Orientation Status: Within Functional Limits  Orientation Level: Oriented to place;Oriented to person;Disoriented to situation  Cognition  Overall Cognitive Status: WFL          OBJECTIVE:     Pt seen bedside this day per nursing orders d/t pt heart rate being high around 146. Pt provided with light activity this day of nut/bolt task. Pt completed task with increased time but without difficulty with Sup. Pt completed task with fine/gross motor skills of BUE mainly using the 1-3 digits of both hands with pincer  of 1st/2nd fingers to hold onto pieces to screw together. Pt completed task to increase fine/gross motor skills to continue improving functional task performance skills. ASSESSMENT:  Activity Tolerance: Patient tolerated treatment well      PLAN OF CARE:  Strengthening,Balance training,Functional mobility training,Neuromuscular re-education,Self-Care / Akshat Fall training,Safety education & training,Home management training  continue with POC    Patient goals : \"My dad can't take care of me now, so I need to get better. He has congestive heart failure. \"  Time Frame for Long term goals : Within 2 weeks, Pt to demonstrate progress in the following areas listed below to achieve specific LTG's as stated in the initial evaluation. Long Term Goal 1: Increase independence in ADLs  Long Term Goal 2: Increaese endurance to complete clothes management  Long Term Goal 3: Increase static/dynamic balance  Long Term Goal 4: Increase independence to perform kitchen mobility        Therapy Time:   Individual Group Co-Treat   Time In 1535       Time Out 1605         Minutes 30                   Neuromuscular reeducation: 30 minutes     Electronically signed by:     JAMAL Triplett,   6/28/2022, 4:05 PM

## 2022-06-28 NOTE — H&P
HISTORY & PHYSICAL       DATE OF ADMISSION:  6/27/2022    DATE OF SERVICE:  6/28/22    Subjective:    Jack Quarles, 35 y.o. female presents today with:     CHIEF COMPLAINT:  Patient initially presented through the emergency room with severe hip pain status post a fall at home. She was diagnosed with an exacerbation of her multiple sclerosis and rhabdomyolysis secondary to elevated CK levels. Fortunately her x-rays of her hips were unremarkable. She was prescribed IV fluids and IV methylprednisolone. She is hoping to transition to [facility] therapy as an outpatient for her MS. Patient has had difficulty with endurance, balance, gait ataxia, generalized weakness and numbness. Medically complicated by tachycardia especially with activity. She will need telemetry monitoring while on the unit and to be followed by cardiology and or medical.     Neurologic Problem  The patient's primary symptoms include an altered mental status, clumsiness, a loss of balance, memory loss, slurred speech, a visual change and weakness. The patient's pertinent negatives include no focal sensory loss or focal weakness. This is a recurrent problem. The current episode started in the past 7 days. Associated symptoms include back pain, dizziness, fatigue and shortness of breath. Pertinent negatives include no abdominal pain, chest pain, diaphoresis, fever, headaches, nausea, neck pain, palpitations or vomiting. Past treatments include position change, medication, walking and acetaminophen. The treatment provided mild relief. Her past medical history is significant for mood changes. There is no history of a bleeding disorder, a clotting disorder, a CVA, dementia, head trauma, liver disease or seizures. Fatigue  This is a recurrent problem. The current episode started more than 1 year ago. The problem occurs constantly. The problem has been gradually worsening.  Associated symptoms include arthralgias, fatigue, myalgias, numbness, a visual change and weakness. Pertinent negatives include no abdominal pain, chest pain, chills, congestion, coughing, diaphoresis, fever, headaches, nausea, neck pain, rash, sore throat or vomiting. The symptoms are aggravated by walking. She has tried heat, acetaminophen, immobilization, oral narcotics, position changes, sleep and rest for the symptoms. The treatment provided mild relief. Hip Pain   The incident occurred more than 1 week ago. The incident occurred at the gym. The injury mechanism was a fall. The pain is present in the left foot and right knee. The pain is at a severity of 9/10. The pain has been fluctuating since onset. Associated symptoms include numbness. She reports no foreign bodies present. The treatment provided mild relief. I reviewed recent nursing note, \"Overseeing care of patient with orienting RN. Agree with documented assessment and charting. Patient denied pain. VSS. LBM 6/26. Per report patient is incontinent of bowel and bladder- at home too. No distress noted. Call light within reach and bed alarm activated. \". .. \" Called the monitor room to validate the heart rate. Pt. Is running sinus tack at 111.pt. is in bed eating breakfast.\", and \"36 [facility] dx of impaired mobility secondary to MS. Pt oriented to the unit. Pt. Noted with frequent falls. Pt. Was attempted to stand with to transfer to the bed from wheel chair. Pt. [place] too weak and sat her self on the floor. Assessment complete. Denies pain or discomfort   Mother Marysol Mijares was called and updated\". The patient has stabilized medically and is able to participate at acute level rehab but is too medically complex for SNF due to need for therapy at the acute level with at least 15 hours a week of PT OT and cognitive and recreational therapy at an acute level with daily medical monitoring.          Imaging:  Imaging and other studies reviewed and discussed with patient and staff    XR CERVICAL SPINE  : 6/19/2022  Vertebral bodies normal in height and alignment. No prevertebral soft tissue swelling. No fracture, dislocation, bone lesion. NEGATIVE CERVICAL SPINE      XR HIP LEFT   6/19/2022   Bilateral tubal ligation clips identified within pelvic inlet. No fracture, dislocation, bone lesion. NEGATIVE PELVIS AND LEFT HIP       XR FEMUR RIGHT  6/19/2022   No fracture, dislocation, bone lesion. NEGATIVE RIGHT FEMUR        MRI LUMBAR SPINE   6/20/2022  There is unremarkable alignment of the columns of the lumbar spine visualized, no evidence of spondylolisthesis. The contours of the lumbar vertebral bodies are unremarkable, no evidence of compression deformity is seen. The STIR sequence demonstrates unremarkable signal intensity of the bone marrow , no evidence of edema or infiltrative process. Disc desiccation and slightly decreased intervertebral disc height visualized at L3-4. The cord terminates at the level of the T12-L1 intervertebral disc space, unremarkable signal intensity of internal cord, the terminal nerve fibers demonstrate unremarkable contour with no evidence of thickening or clumping. Bilateral renal cysts are seen. L1-L2 demonstrates mild degenerative changes in the intervertebral disc, right foraminal disc herniation is seen. No significant narrowing of the spinal canal is visualized, no significant narrowing of the right neuroforamina lower left neural foramina seen on this level. L2-L3 demonstrates unremarkable intervertebral disc and posterior elements, no evidence of significant narrowing of the spinal canal or neural foramina is visualized at this level. L3-L4 circumferential disc bulge with hypertrophic changes in the facet joints and ligamentum flavum, no significant narrowing of the spinal canal is visualized, mild narrowing of bilateral neural foramina is visualized at this level.  L4-L5 mild degenerative disc changes and hypertrophic changes in the facet joints visualize, no significant narrowing of the spinal canal or bilateral neural foramina is visualized at this level. L5-S1 demonstrates unremarkable intervertebral disc and posterior elements, no evidence of significant narrowing of the spinal canal mild degenerative disc changes with hypertrophic changes in the facet joints visualized most prominent on the left, no significant narrowing of the spinal canal on the right neuroforamina is seen, moderate narrowing of the left neuroforamina is seen at this level. Degenerative facet changes of the lumbar spine visualized most prominent at L3-L4 and L5-S1 that demonstrate mild progression in comparison to the prior study        [address] [place] [address]   6/20/2022  Biplanar images were obtained. The gallbladder is physiologically distended. The gallbladder wall is upper limits of normal.  There is echogenic debris within the gallbladder in the decubitus position, suspected gallbladder sludge. No evidence of cholelithiasis. The common bile duct measures up to  3 mm in diameter. No intrahepatic bile duct dilatation is seen. No focal liver lesions are noted. The echogenicity of liver is unremarkable. The pancreas was not visualized due to interfering bowel gas. Tail of pancreas is not well visualized. No free fluid is seen within Morisons pouch. NO SIGN OF CHOLELITHIASIS OR BILE DUCT DILATATION. SLUDGE WITHIN THE GALLBLADDER. NO DEFINITE FOCAL LIVER ABNORMALITY. US DUP LOWER EXTREMITIES BILATERAL VENOUS  6/20/2022   NO SONOGRAPHIC EVIDENCE OF DEEP VENOUS THROMBOSIS WITHIN THE BILATERAL LOWER EXTREMITIES.             Labs:    Labs reviewed and discussed with patient and staff    No results found for: POCGLU  Lab Results   Component Value Date     06/28/2022    K 4.1 06/28/2022    CL 99 06/28/2022    CO2 28 06/28/2022    BUN 15 06/28/2022    CREATININE 0.60 06/28/2022    CALCIUM 8.6 06/28/2022    LABALBU 3.2 06/19/2022    BILITOT <0.2 06/19/2022 ALKPHOS 47 06/19/2022     06/19/2022    ALT 72 06/19/2022     Lab Results   Component Value Date    WBC 11.2 06/28/2022    RBC 3.68 06/28/2022    HGB 10.0 06/28/2022    HCT 31.8 06/28/2022    MCV 86.2 06/28/2022    MCH 27.2 06/28/2022    MCHC 31.5 06/28/2022    RDW 17.6 06/28/2022     06/28/2022    MPV 10.7 06/09/2014     No results found for: VITD25  Lab Results   Component Value Date    APPEARANCE CLOUDY 01/11/2013    COLORU Yellow 06/19/2022    NITRU Negative 06/19/2022    GLUCOSEU 500 06/19/2022    KETUA 15 06/19/2022    UROBILINOGEN 1.0 06/19/2022    BILIRUBINUR Negative 06/19/2022    BILIRUBINUR neg 10/17/2011     Lab Results   Component Value Date    PROTIME 15.4 06/19/2022     Lab Results   Component Value Date    INR 1.2 06/19/2022           The patient remains highly medically complex and continues to have severe problems with activities of daily living and mobility. The patient was assessed to be able to tolerate intensive rehabilitation and therefore was admitted to Rehabilitation to address these needs.     The patient has been found to have severe abnormality of gait and mobility with impaired self care and is admitted to the acute inpatient rehab program.       Prior Function; everyday activities:     Social History     Socioeconomic History    Marital status:      Spouse name: Not on file    Number of children: 1    Years of education: Not on file    Highest education level: Not on file   Occupational History    Not on file   Tobacco Use    Smoking status: Never Smoker    Smokeless tobacco: Never Used   Substance and Sexual Activity    Alcohol use: No    Drug use: No    Sexual activity: Not Currently   Other Topics Concern    Not on file   Social History Narrative    Lives With: her parents and her Son (5 y.o)    Type of Home: Apartment in [address] [place] [address] Nw: One level    Home Access: Stairs to enter with rails - Number of Steps: 1 Bathroom Shower/Tub: Tub/Shower unit, Equipment: Shower chair,Hand-held shower    Home Equipment: Walker, rolling,Wheelchair-manual    Has the patient had two or more falls in the past year or any fall with injury in the past year?: Yes (one - fell on mothers floor and couldn't get up)    ADL Assistance: Independent    Homemaking Assistance: Independent    Homemaking Responsibilities: Yes    Ambulation Assistance: Independent    Transfer Assistance: Independent    Active : No    Patient's  Info: mother    She is on disability from her MS                      Social Determinants of Health     Financial Resource Strain:     Difficulty of Paying Living Expenses: Not on file   Food Insecurity:     Worried About [address] in the Last Year: Not on file    Gege of Food in the Last Year: Not on file   Transportation Needs:     Lack of Transportation (Medical): Not on file    Lack of Transportation (Non-Medical): Not on file   Physical Activity:     Days of Exercise per Week: Not on file    Minutes of Exercise per Session: Not on file   Stress:     Feeling of Stress : Not on file   Social Connections:     Frequency of Communication with Friends and Family: Not on file    Frequency of Social Gatherings with Friends and Family: Not on file    Attends Yarsani Services: Not on file    Active Member of [address] [place] [address] [employer] or Organizations: Not on file    Attends Club or Organization Meetings: Not on file    Marital Status: Not on file   Intimate Partner Violence:     Fear of Current or Ex-Partner: Not on file    Emotionally Abused: Not on file    Physically Abused: Not on file    Sexually Abused: Not on file   Housing Stability:     Unable to Pay for Housing in the Last Year: Not on file    Number of [place] in the Last Year: Not on file    Unstable Housing in the Last Year: Not on file     Social supports listed above.       Prior Device(s) used:  As above    History of falls:  Rarely falls    In depth analysis of complex functional data; the patient has been:    Current Rehabilitation Assessments:  Physical Therapy:   Bed mobility:  Bed mobility  Bridging: Minimal assistance (06/28/22 1143)  Rolling to Left: Minimal assistance (06/28/22 1143)  Rolling to Right: Minimal assistance (06/28/22 1143)  Supine to Sit: Minimal assistance (06/28/22 1143)  Sit to Supine: Moderate assistance (06/28/22 1143)  Scooting: Minimal assistance (latearlly in supine) (06/28/22 1143)  Bed Mobility Comments: Hand over hand assist to complete all transitions. Instructed in logroll technique first with visual demonstration and cueing throughout. Instruction provided on segmental scooting. Bedrails not utilized. (06/28/22 1143)  Bed Mobility  Additional Factors: Head of bed flat; With handrails (06/28/22 1342)  Additional Factors: Head of bed flat; With handrails (06/28/22 1342)  Roll Left  Assistance Level: Stand by assist (06/28/22 1342)  Skilled Clinical Factors: with HR (06/28/22 1342)  Roll Right  Assistance Level: Stand by assist (06/28/22 1342)  Skilled Clinical Factors: with HR (06/28/22 1342)  Sit to Supine  Assistance Level: Minimal assistance (06/28/22 1342)  Skilled Clinical Factors: assistance with BLE (06/28/22 1342)  Supine to Sit  Assistance Level: Contact guard assist (06/28/22 1132)  Scooting  Assistance Level: Minimal assistance (06/28/22 1342)  Skilled Clinical Factors: scooting EOB to [facility], requires min assistance for lateral shift and anterior lean for lifting buttocks, assistance with technique to scoot to [facility] while flat in bed (06/28/22 1342)  Transfers:  Transfers  Sit to Stand: Contact guard assistance (06/28/22 1145)  Stand to sit: Contact guard assistance (06/28/22 1145)  Bed to Chair: Contact guard assistance (06/28/22 1145)  Lateral Transfers: Minimal Assistance;Contact guard assistance (Instruction provided on scooting laterally at EOB emphasizing forward weight shift) (06/28/22 1145)  Comment: Slow to complete. Heavy reliance on UEs for power. Verbal cues for sequencing and ergonomics. (06/28/22 1145)  Sit to Stand  Assistance Level: Contact guard assist (06/28/22 1132)  Skilled Clinical Factors: vc's for hand placement (06/28/22 1132)  Stand to Sit  Assistance Level: Contact guard assist (06/28/22 1132)  Skilled Clinical Factors: vc's for hand placement (06/28/22 1132)  Bed To/From Chair  Technique: Sit pivot (06/28/22 1132)  Assistance Level: Minimal assistance (06/28/22 1132)  Skilled Clinical Factors: multiple cues for hand placement and sequencing, fair follow through, vc/tc's (06/28/22 1132)  Stand Pivot  Assistance Level: Contact guard assist (06/28/22 1342)  Skilled Clinical Factors: poor approach to chair, multiple cues for sequencing BLE and manuevering ww (06/28/22 1342)  Gait:   Ambulation  Surface: level tile (06/28/22 1149)  Device: [address] (06/28/22 1149)  Other Apparatus: Wheelchair follow (06/28/22 1149)  Assistance: Contact guard assistance;Minimal assistance (06/28/22 1149)  Quality of Gait: Steadying throughout. increased reliance on [employer] for support with FF posture. Step to pattern with L LE lagging. (06/28/22 1149)  Distance: 8ft (06/28/22 1149)  Comments: Limited by fatigue (06/28/22 1149)  Ambulation  Surface: Carpet (06/28/22 1204)  Device: Rolling walker (06/28/22 1204)  Distance: 8' (06/28/22 1204)  Assistance Level: Contact guard assist (06/28/22 1204)  Gait Deviations: Decreased step length bilateral;Slow leonardo;Decreased heel strike right;Decreased heel strike left; Wide base of support (06/28/22 1204)  Skilled Clinical Factors: lateral sway, kaz toe out (06/28/22 1204)  Stairs:  Stairs/Curb  Stairs?: No (06/28/22 1149)  W/C mobility:       Occupational Therapy:   Hand Dominance: Right  ADL  Feeding: Modified independent  (06/28/22 1049)  Grooming: Setup (06/28/22 1049)  UE Bathing: Stand by assistance; Increased time to complete;Verbal cueing (06/28/22 1049)  LE Bathing: Dependent/Total (06/28/22 1049)  LE Bathing Skilled Clinical Factors: +2 assistance. Pt able to bend down in order to wash legs with SBA for safe sitting. +2 to wash posterior lillie area. 1 person for washing 1 person to maintain standing balance (06/28/22 1049)  UE Dressing: Minimal assistance (06/28/22 1049)  LE Dressing: Maximum assistance; Increased time to complete (06/28/22 1049)  Toileting: Dependent/Total (06/28/22 1049)  Toileting Skilled Clinical Factors: Pt incontinent of feces (06/28/22 1049)  Additional Comments: Pt displaying lack of endurance and fatigue throughout session, requiring increased assistance throughout progression of evaluation (06/28/22 1049)  Toilet Transfers  Toilet - Technique: Stand pivot (06/28/22 1054)  Equipment Used: Grab bars (06/28/22 1054)  Toilet Transfer: Dependent/Total (06/28/22 1054)  Toilet Transfers Comments: Catherine end of session, initially Min A for transfers (06/28/22 1054)     [address] - Transfer Type: To and From (06/28/22 1054)  Shower - Transfer To: Shower seat with back (06/28/22 1054)  Shower - Technique: Stand pivot (06/28/22 1054)  Shower Transfers: Moderate assistance (06/28/22 1054)  Shower Transfers Comments: Pt with decreased endurance and fatigued post shower, requiring more assistance (06/28/22 1054)    Speech Therapy:            Diet/Swallow:                         COGNITION:  OT: Cognition Comment: Anxiety can be limiting at times; no anxiety noted on this date. SP:          Prior to admission patient was independent with all ADLs and mobilityand did not require any outside services.        Past Medical History:   Diagnosis Date    Dislocated knee 1990    Right knee    Lumbar radiculopathy 6/22/2020    MS (multiple sclerosis) ([facility] [address] 6/18/2022    PTTD (posterior tibial tendon dysfunction)        Past Surgical History:   Procedure Laterality Date    CATARACT REMOVAL WITH IMPLANT Right     CATARACT REMOVAL WITH IMPLANT Left      SECTION      EYE SURGERY      FOOT SURGERY Left        Current Facility-Administered Medications   Medication Dose Route Frequency Provider Last Rate Last Admin    Vitamin D (CHOLECALCIFEROL) tablet 2,000 Units  2,000 Units Oral Dinner Jorge Simpers, DO        cyanocobalamin injection 1,000 mcg  1,000 mcg IntraMUSCular Weekly Nora Scullin, DO   1,000 mcg at 22 1002    coenzyme Q10 capsule 100 mg  100 mg Oral Daily Nora Scullin, DO   100 mg at 22 1001    lidocaine 4 % external patch 3 patch  3 patch TransDERmal Daily Nora Scullin, DO        acetaminophen (TYLENOL) tablet 650 mg  650 mg Oral Q4H PRN Nora Scullin, DO        bisacodyl (DULCOLAX) suppository 10 mg  10 mg Rectal Daily PRN Jorge Simpers, DO        fleet rectal enema 1 enema  1 enema Rectal Daily PRN Nora Scullin, DO        enoxaparin (LOVENOX) injection 40 mg  40 mg SubCUTAneous Daily Yara Cole MD   40 mg at 22 1001    multivitamin 1 tablet  1 tablet Oral Daily Yara Cole MD   1 tablet at 22 1001       No Known Allergies                   FAMILY HISTORY:  Does not pertain to chief complaint. Review of Systems   Constitutional: Positive for activity change and fatigue. Negative for chills, diaphoresis and fever. HENT: Negative for congestion, ear discharge, ear pain, hearing loss, nosebleeds, sore throat and tinnitus. Eyes: Negative for photophobia, pain and redness. Respiratory: Positive for shortness of breath. Negative for cough, wheezing and stridor. Shortness of breath on exertion   Cardiovascular: Negative for chest pain, palpitations and leg swelling. Gastrointestinal: Positive for constipation. Negative for abdominal pain, blood in stool, diarrhea, nausea and vomiting. Endocrine: Negative for polydipsia. Genitourinary: Negative for dysuria, flank pain, frequency, hematuria and urgency.    Musculoskeletal: Positive for arthralgias, back pain, gait problem and myalgias. Negative for neck pain. Skin: Negative for rash. Allergic/Immunologic: Positive for immunocompromised state. Negative for environmental allergies. Neurological: Positive for dizziness, weakness, numbness and loss of balance. Negative for tremors, focal weakness, seizures and headaches. Hematological: Does not bruise/bleed easily. Psychiatric/Behavioral: Positive for memory loss. Negative for hallucinations and suicidal ideas. The patient is not nervous/anxious. Objective  /75   Pulse (!) 108   Temp 98.2 °F (36.8 °C) (Oral)   Resp 19   Ht 5' 1\" (1.549 m)   Wt 176 lb 1.6 oz (79.9 kg)   LMP 06/01/2022   SpO2 100%   BMI 33.27 kg/m² *    Physical Exam  Vitals and nursing note reviewed. Constitutional:       General: She is not in acute distress. Appearance: She is well-developed. She is not ill-appearing or toxic-appearing. HENT:      Head: Normocephalic and atraumatic. Nose: Nose normal.      Mouth/Throat:      Mouth: Mucous membranes are dry. Eyes:      Pupils: Pupils are equal, round, and reactive to light. Cardiovascular:      Rate and Rhythm: Normal rate and regular rhythm. Pulses: Normal pulses. Heart sounds: Normal heart sounds. Pulmonary:      Effort: Pulmonary effort is normal. No respiratory distress. Breath sounds: Normal breath sounds. No wheezing or rales. Abdominal:      General: Bowel sounds are normal. There is no distension. Palpations: Abdomen is soft. There is no mass. Tenderness: There is no abdominal tenderness. There is no guarding or rebound. Hernia: No hernia is present. Musculoskeletal:         General: Normal range of motion. Cervical back: Normal range of motion. Right lower leg: Bony tenderness present. 1+ Edema present. Left lower leg: Bony tenderness present. 1+ Edema present.       Comments: Bilateral ankle deformities   Skin:     General: Skin is warm and dry.      Capillary Refill: Capillary refill takes less than 2 seconds. Neurological:      Mental Status: She is alert. Coordination: Finger-Nose-Finger Test abnormal, Heel to [employer] abnormal and Romberg Test abnormal.      Gait: Tandem walk abnormal.      Deep Tendon Reflexes:      Reflex Scores:       Tricep reflexes are 1+ on the right side and 1+ on the left side. Bicep reflexes are 1+ on the right side and 1+ on the left side. Brachioradialis reflexes are 1+ on the right side and 1+ on the left side. Patellar reflexes are 1+ on the right side and 1+ on the left side. Achilles reflexes are 0 on the right side and 0 on the left side. Psychiatric:         Mood and Affect: Mood normal.       Ortho Exam  Neurologic Exam     Mental Status   Disoriented to person. Disoriented to place. Disoriented to street. Oriented to country, city and area. Oriented to time. Disoriented to date and day. Oriented to year, month and season. Follows 1 step commands. Attention: decreased. Concentration: decreased. Level of consciousness: alert  Knowledge: poor. Able to name object. Able to read. Able to repeat. Able to write. Abnormal comprehension. Cranial Nerves     CN III, IV, VI   Pupils are equal, round, and reactive to light.     Motor Exam   Muscle bulk: decreased  Right arm tone: normal  Left arm tone: normal  Right leg tone: spastic  Left leg tone: spastic    Sensory Exam   Right arm light touch: decreased from fingers  Left arm light touch: decreased from fingers  Right leg light touch: decreased from knee  Left leg light touch: decreased from knee    Gait, Coordination, and Reflexes     Gait  Gait: wide-based    Coordination   Romberg: positive  Finger to nose coordination: abnormal  Heel to shin coordination: abnormal  Tandem walking coordination: abnormal    Reflexes   Right brachioradialis: 1+  Left brachioradialis: 1+  Right biceps: 1+  Left biceps: 1+  Right triceps: 1+  Left triceps: 1+  Right patellar: 1+  Left patellar: 1+  Right achilles: 0  Left achilles: 0  Right : 1+  Left : 1+      After extensive review of the records and above physical exam, I have formulated the following diagnoses and plan:      DIAGNOSES:    1. The patient was admitted to the acute rehabilitation unit with the primary rehab diagnosis being severe abnormality of gait and mobility and impaired self care and ADL's due to acute exacerbation of multiple sclerosis. Compared to Pre-Admission Assessment, patients medical and functional status remain challengingly complex and patient continues to requireintensive therapeutic intervention from multiple therapies, therefore, initiate acute intensive comprehensive inpatient rehabilitation program including PT/OT to improve balance, ambulation, ADLs, and to improve the P/AROM. Functional and medical status reassessed regarding patients ability to participate in therapies and patient found to be able to participate in acute intensive comprehensive inpatient rehabilitation program.  Therapeutic modifications regarding activities in therapies, place, amount of time per day and intensity of therapy made daily. Enroll in acute course of therapy program to include 1 1/2 hours per day of PT 5 to 7days per week and 1 1/2 hours per day of OT 5 to 7 days per week,   SLP and Rec T 1/2 hour per day 3-5 days per week. The patient is stable medically and physically on previous exam.  If deemed necessary therapy will be spread out over a 7-day window. This patient presented with significant new onset decreased mobility and inability to perform activities of daily living skills independently and is at significant risk for prolonged disability  For this reason they have been admitted to [address] [place] [address].   The patient's current functional and medical status are highly complex but the patient is able to participate in intensive rehabilitation. A comprehensive inpatient rehabilitation program is appropriate. The patient will undergo initial evaluation by the rehabilitation team and be discussed at regular treatment team meetings to assess progress, mobility, self care, mood and discharge issues. Physical therapy will be consulted for mobility and endurance issues and should be performed 1 to 2 times per day, 7 days per week for the length of stay. Occupational therapy will be consulted for activities of daily living and should be performed 1 to 2 times per day, 7 days per week for the length of stay. Their capacity to participate at an acute level, decision to be treated in the gym, room or on the unit, their activity goals for the day and the number of minutes of active participation will be reassessed and re-prescribed daily. Because this patient is medically complex, I will check a CBC, BMP, UA and orthostatic blood pressures. They will be reassessed daily regarding their ability to participate in an acute level rehabilitation program.  Recreational Therapy will be consulted for community re-entry and adjustment to disability. Communication, cognitive and emotional issues will also be addressed during this rehabilitation stay by rehabilitation psychologist or speech therapist as appropriate. I reviewed the patient's old and current charts and discussed other rehabilitation options with the rehabilitation team including the rehab RN and the admission team as well as the patient. I feel that the patient's functional recovery would be best served at an acute inpatient rehabilitation program because the patient needs intense therapy three hours per day, direct RN supervision and daily monitoring by a physician for medical status. This cannot be sufficiently provided by home health care, a skilled nursing facility or in an outpatient setting.   I further feel that the patient has the potential to improve functional abilities in an acute intensive rehabilitation program.    Old records were reviewed and summarized. 2.  Other diagnoses which complicate rehabilitation stay include:     Principal Problem:    Impaired mobility and activities of daily living dt exac of MS  Active Problems:  1. Urinary urgency,   Neurogenic bladder-recheck stat UA  2.   MS (multiple sclerosis)-consult neurology transition to outpatient [facility] therapy  3. Hyperglycemia-monitor for diabetes mellitus  4. Rhabdomyolysis-push fluids recheck BMP  5. Posterior tibial tendon dysfunction  6. Ataxic gait-focus on balance and therapy  7. Pain in joint, lower leg, Foot drop, left foot, Acquired deformity of left foot-consult podiatry as needed  8. Lumbar radiculopathy-post effective dose of pain medication  9. Asymptomatic tachycardia with activity-check EKG stat, add telemetry, add stat labs and cardiac consult-a.m. labs were essentially unremarkable         I am especially concerned about their recent medical complexities. The patient's high risk medication use includes-Lovenox. The patient is high risk for urinary tract infection, an admission urinalysis has been ordered. I will have the nurses check post void residual bladder volumes and place acatheter if excessive urine is retained in the bladder after voiding. The patient is risk for deep venous thromosis, complex deep venous thrombosis protocol prophylaxis has been ordered Lovenox. The patient is high risk for orthostasis and a hydration program and orthostatic blood pressure screening have been ordered. I will attempt to get old records from the patient's previous hospital stay. Care everywhere on [facility] was utilized. 3.  Current and previous medications were reviewed and summarized and compared to old medication lists from home and from the acute floor. 4.  Complex labs and x-rays were reviewed. I will review patient's old EKG and labs.     5.  Will provide emotional support for this patient regarding adjustment to their disability. Cognition and mood will be screened daily and addressed by rehabilitation psychology and/or speech therapy as appropriate. I have encouraged the patient to attend the Rehab Adjustment to Disability Support Group and recreational therapy. 6.  Estimated length of stay is   2-3 weeks. Discharge to home with help from family and home health PT, OT, RN, and aide. Patient should be independent at discharge. 7.  The patient's medical and rehab prognosis are good. [address] regarding the patient's back up to general medical needs. A welcome letter was presented with an explanation of my services, my specialty and what to expect during the rehabilitation process. As well as introducing myself, I also wrote my name on their bedside marker board with their name as well as the names of the other physicians with an explanation of our individual roles in their care, as well as the rehab process.             Deloris Beal D.O., F.A.A.P.M.&SANTIAGO

## 2022-06-28 NOTE — CONSULTS
[facility] Cardiology Consult Note        Date of Consult:   2022    Patient:    Eli Muniz    :    1980  CSN:    [id number]    Consulting Cardiologist: Juliana Jose MD     Primary Cardiologist: None    Requesting Physician:  Champ Chen DO      Reason for Consult:  Elevated cardiac enzymes after fall / tachycardia. Assessment:    1. Fall   2. Tachycardia, proable sinus tachycardia, R/O other  3. Elevated Cardiac Enzymes c/w rhadomyolysis  4. No prior cardiac history. 5. Multiple Sclerosis, recent exacerbation  6. UTI  7. Edema  8. Anemia  9. Hyperlipidemia  10. Lumbar radiculopathy  11. DJD    Plan:    1. Cardiac Supportive Care  2. IV and PO hydration for Rhabdomyolysis care. 3. Serial cardiac enzymes. 4. Telemetry for 48 hours. 5. Echocardiogram  6. Further Recommendations to follow  7. See Orders        HISTORY OF PRESENT ILLNESS:      Eli Muniz is a pleasant 35 y.o. female who presented through the emergency room with severe hip pain status post a fall at home. She was diagnosed with an exacerbation of her multiple sclerosis and rhabdomyolysis secondary to elevated CK levels. Fortunately her x-rays of her hips were unremarkable. She was prescribed IV fluids and IV methylprednisolone. She is hoping to transition to [facility] therapy as an outpatient for her MS. While in rehab, walking the talley she was noted on telemetry to have a heart rate up to 146 bpm.    Cardiology was asked to see in consultation. Patient History and Records, EMR reviewed. Patient Interviewed and examined. Good historian. States that she is feeling well overall. Denies CP, SOB, LH, Dizziness, TIA or CVA Symptoms. No Orthopnea, Edema or CHF symptoms. No Palpitations. No Syncope. No Fever, Chills or Cold symptoms. No GI,  or Bleeding complaints. Cardiac and general ROS otherwise negative. [address] otherwise negative other than noted.     Past Medical History: Diagnosis Date    Dislocated knee     Right knee    Lumbar radiculopathy 2020    MS (multiple sclerosis) ([facility] [address] 2022    PTTD (posterior tibial tendon dysfunction)        Past Surgical History:   Procedure Laterality Date    CATARACT REMOVAL WITH IMPLANT Right     CATARACT REMOVAL WITH IMPLANT Left      SECTION      EYE SURGERY      FOOT SURGERY Left        Prior to Admission medications    Medication Sig Start Date End Date Taking?  Authorizing Provider   mirabegron (MYRBETRIQ) 25 MG TB24 Take 1 tablet by mouth daily  Patient not taking: Reported on 2022   Heidy Childs MD   KESIMPTA 20 MG/0.4ML [facility]  12/15/21   Historical Provider, MD   vitamin D 25 MCG (1000 UT) CAPS Take by mouth    Historical Provider, MD   MULTIPLE VITAMINS-MINERALS ER PO Take 1 tablet by mouth    Historical Provider, MD       Scheduled Meds:   Vitamin D  2,000 Units Oral Dinner    cyanocobalamin  1,000 mcg IntraMUSCular Weekly    coenzyme Q10  100 mg Oral Daily    lidocaine  3 patch TransDERmal Daily    enoxaparin  40 mg SubCUTAneous Daily    multivitamin  1 tablet Oral Daily     Continuous Infusions:  PRN Meds:acetaminophen, bisacodyl, fleet    No Known Allergies    Social History     Socioeconomic History    Marital status:      Spouse name: Not on file    Number of children: 1    Years of education: Not on file    Highest education level: Not on file   Occupational History    Not on file   Tobacco Use    Smoking status: Never Smoker    Smokeless tobacco: Never Used   Substance and Sexual Activity    Alcohol use: No    Drug use: No    Sexual activity: Not Currently   Other Topics Concern    Not on file   Social History Narrative    Lives With: her parents and her Son (5 y.o)    Type of Home: Apartment in [facility] [address]    Home Layout: One level    Home Access: Stairs to enter with rails - Number of Steps: 1    Bathroom Shower/Tub: Tub/Shower unit, Equipment: Shower chair,Hand-held shower    Home Equipment: Pancho Rhein, rolling,Wheelchair-manual    Has the patient had two or more falls in the past year or any fall with injury in the past year?: Yes (one - fell on mothers floor and couldn't get up)    ADL Assistance: Independent    Homemaking Assistance: Independent    Homemaking Responsibilities: Yes    Ambulation Assistance: Independent    Transfer Assistance: Independent    Active : No    Patient's  Info: mother    She is on disability from her MS                      Social Determinants of Health     Financial Resource Strain:     Difficulty of Paying Living Expenses: Not on file   Food Insecurity:     Worried About [address] in the Last Year: Not on file    [address] in the Last Year: Not on file   Transportation Needs:     Lack of Transportation (Medical): Not on file    Lack of Transportation (Non-Medical):  Not on file   Physical Activity:     Days of Exercise per Week: Not on file    Minutes of Exercise per Session: Not on file   Stress:     Feeling of Stress : Not on file   Social Connections:     Frequency of Communication with Friends and Family: Not on file    Frequency of Social Gatherings with Friends and Family: Not on file    Attends Sikh Services: Not on file    Active Member of [address] [place] [address] [employer] or Organizations: Not on file    Attends Club or Organization Meetings: Not on file    Marital Status: Not on file   Intimate Partner Violence:     Fear of Current or Ex-Partner: Not on file    Emotionally Abused: Not on file    Physically Abused: Not on file    Sexually Abused: Not on file   Housing Stability:     Unable to Pay for Housing in the Last Year: Not on file    Number of [place] in the Last Year: Not on file    Unstable Housing in the Last Year: Not on file       Family History   Problem Relation Age of Onset    Hypertension Mother     Diabetes Maternal Uncle     Cancer Maternal Grandmother stomach cancer       Review Of Systems:    14 point ROS negative other than mentioned. Physical Exam:    CURRENT VITALS: /75   Pulse (!) 108   Temp 98.2 °F (36.8 °C) (Oral)   Resp 19   Ht 5' 1\" (1.549 m)   Wt 176 lb 1.6 oz (79.9 kg)   LMP 06/01/2022   SpO2 100%   BMI 33.27 kg/m²     CONSTITUTIONAL:  awake, alert, cooperative, no apparent distress,   ENT:  Normocephalic, without obvious abnormality, atraumatic, sinuses nontender on palpation, external ears without lesions,  NECK:  Supple, symmetrical, trachea midline, no adenopathy, thyroid symmetric, not enlarged and no tenderness, skin normal, No bruits. LUNGS:  No increased work of breathing, good air exchange, clear to auscultation bilaterally, no crackles, no wheezing  CARDIOVASCULAR:  Normal apical impulse, regular rate and rhythm, normal S1 and S2,  1/6 Systolic murmur noted. ABDOMEN:  Obese, normal bowel sounds, soft, non-distended, non-tender, no masses palpated, no hepatosplenomegally  EXTREMETIES: No edema, Pulses Strong Thruout. No ulcers. NEUROLOGIC:  Awake, alert, oriented to name, place and time. Following all commands and moving all extremties.   SKIN:  no bruising or bleeding, normal skin color, texture, turgor and no rashes    Labs:  Recent Results (from the past 24 hour(s))   COVID-19, Rapid    Collection Time: 06/27/22  4:35 PM    Specimen: Nasopharyngeal Swab   Result Value Ref Range    SARS-CoV-2, NAAT Not Detected Not Detected   Basic Metabolic Panel w/ Reflex to MG    Collection Time: 06/28/22  4:48 AM   Result Value Ref Range    Sodium 138 135 - 144 mEq/L    Potassium reflex Magnesium 4.1 3.4 - 4.9 mEq/L    Chloride 99 95 - 107 mEq/L    CO2 28 20 - 31 mEq/L    Anion Gap 11 9 - 15 mEq/L    Glucose 115 (H) 70 - 99 mg/dL    BUN 15 6 - 20 mg/dL    CREATININE 0.60 0.50 - 0.90 mg/dL    GFR Non-African American >60.0 >60    GFR  >60.0 >60    Calcium 8.6 8.5 - 9.9 mg/dL   CBC with Auto Differential    Collection Time: 06/28/22  4:48 AM   Result Value Ref Range    WBC 11.2 (H) 4.8 - 10.8 K/uL    RBC 3.68 (L) 4.20 - 5.40 M/uL    Hemoglobin 10.0 (L) 12.0 - 16.0 g/dL    Hematocrit 31.8 (L) 37.0 - 47.0 %    MCV 86.2 82.0 - 100.0 fL    MCH 27.2 27.0 - 31.3 pg    MCHC 31.5 (L) 33.0 - 37.0 %    RDW 17.6 (H) 11.5 - 14.5 %    Platelets 258 847 - 763 K/uL    Neutrophils % 83.8 %    Lymphocytes % 8.1 %    Monocytes % 6.5 %    Eosinophils % 1.4 %    Basophils % 0.2 %    Neutrophils Absolute 9.3 (H) 1.4 - 6.5 K/uL    Lymphocytes Absolute 0.9 (L) 1.0 - 4.8 K/uL    Monocytes Absolute 0.7 0.2 - 0.8 K/uL    Eosinophils Absolute 0.2 0.0 - 0.7 K/uL    Basophils Absolute 0.0 0.0 - 0.2 K/uL   EKG 12 Lead    Collection Time: 06/28/22 12:24 PM   Result Value Ref Range    Ventricular Rate 101 BPM    Atrial Rate 101 BPM    P-R Interval 146 ms    QRS Duration 78 ms    Q-T Interval 350 ms    QTc Calculation (Bazett) 453 ms    P Axis 70 degrees    R Axis 31 degrees    T Axis 34 degrees       ECG:     SR, RBBB, NSSTs    TELEMETRY:  Sinus tachycardia, doubt atrial flutter.      ECHO:    Pending        Tommy Singer MD  [facility] Cardiologist      Electronically signed on 6/28/22 at 4:10 PM EDT      -----

## 2022-06-28 NOTE — PROGRESS NOTES
Overseeing care of patient with orienting RN. Agree with documented assessment and charting. Patient denied pain. VSS. LBM 6/26. Per report patient is incontinent of bowel and bladder- at home too. No distress noted. Call light within reach and bed alarm activated.   Electronically signed by La Travis RN on 6/28/2022 at 3:52 AM

## 2022-06-28 NOTE — PLAN OF CARE
Problem: Discharge Planning  Goal: Discharge to home or other facility with appropriate resources  6/27/2022 2359 by Christal Arndt RN  Outcome: Progressing  6/27/2022 2211 by Christal Arndt RN  Outcome: Progressing     Problem: Safety - Adult  Goal: Free from fall injury  6/27/2022 2359 by Christal Arndt RN  Outcome: Progressing  6/27/2022 2211 by Christal Arndt RN  Outcome: Progressing

## 2022-06-29 PROBLEM — F79 INTELLECTUAL DISABILITY: Status: ACTIVE | Noted: 2022-06-29

## 2022-06-29 LAB
ANION GAP SERPL CALCULATED.3IONS-SCNC: 10 MEQ/L (ref 9–15)
BASOPHILS ABSOLUTE: 0 K/UL (ref 0–0.2)
BASOPHILS RELATIVE PERCENT: 0.4 %
BUN BLDV-MCNC: 15 MG/DL (ref 6–20)
C-REACTIVE PROTEIN, HIGH SENSITIVITY: 52.8 MG/L (ref 0–5)
CALCIUM SERPL-MCNC: 9 MG/DL (ref 8.5–9.9)
CHLORIDE BLD-SCNC: 105 MEQ/L (ref 95–107)
CO2: 27 MEQ/L (ref 20–31)
CREAT SERPL-MCNC: 0.62 MG/DL (ref 0.5–0.9)
EKG ATRIAL RATE: 101 BPM
EKG ATRIAL RATE: 88 BPM
EKG P AXIS: 51 DEGREES
EKG P AXIS: 70 DEGREES
EKG P-R INTERVAL: 146 MS
EKG P-R INTERVAL: 152 MS
EKG Q-T INTERVAL: 350 MS
EKG Q-T INTERVAL: 368 MS
EKG QRS DURATION: 78 MS
EKG QRS DURATION: 82 MS
EKG QTC CALCULATION (BAZETT): 445 MS
EKG QTC CALCULATION (BAZETT): 453 MS
EKG R AXIS: 1 DEGREES
EKG R AXIS: 31 DEGREES
EKG T AXIS: 17 DEGREES
EKG T AXIS: 34 DEGREES
EKG VENTRICULAR RATE: 101 BPM
EKG VENTRICULAR RATE: 88 BPM
EOSINOPHILS ABSOLUTE: 0.2 K/UL (ref 0–0.7)
EOSINOPHILS RELATIVE PERCENT: 2 %
GFR AFRICAN AMERICAN: >60
GFR NON-AFRICAN AMERICAN: >60
GLUCOSE BLD-MCNC: 100 MG/DL (ref 70–99)
HCT VFR BLD CALC: 30.4 % (ref 37–47)
HEMOGLOBIN: 9.7 G/DL (ref 12–16)
LV EF: 65 %
LVEF MODALITY: NORMAL
LYMPHOCYTES ABSOLUTE: 1 K/UL (ref 1–4.8)
LYMPHOCYTES RELATIVE PERCENT: 12.4 %
MCH RBC QN AUTO: 28 PG (ref 27–31.3)
MCHC RBC AUTO-ENTMCNC: 32 % (ref 33–37)
MCV RBC AUTO: 87.3 FL (ref 82–100)
MONOCYTES ABSOLUTE: 0.8 K/UL (ref 0.2–0.8)
MONOCYTES RELATIVE PERCENT: 10 %
NEUTROPHILS ABSOLUTE: 5.8 K/UL (ref 1.4–6.5)
NEUTROPHILS RELATIVE PERCENT: 75.2 %
PDW BLD-RTO: 17.6 % (ref 11.5–14.5)
PLATELET # BLD: 209 K/UL (ref 130–400)
POTASSIUM REFLEX MAGNESIUM: 4.4 MEQ/L (ref 3.4–4.9)
RBC # BLD: 3.48 M/UL (ref 4.2–5.4)
SEDIMENTATION RATE, ERYTHROCYTE: 66 MM (ref 0–20)
SODIUM BLD-SCNC: 142 MEQ/L (ref 135–144)
TROPONIN: 0.01 NG/ML (ref 0–0.01)
WBC # BLD: 7.8 K/UL (ref 4.8–10.8)

## 2022-06-29 PROCEDURE — 99221 1ST HOSP IP/OBS SF/LOW 40: CPT | Performed by: NURSE PRACTITIONER

## 2022-06-29 PROCEDURE — 85652 RBC SED RATE AUTOMATED: CPT

## 2022-06-29 PROCEDURE — 36415 COLL VENOUS BLD VENIPUNCTURE: CPT

## 2022-06-29 PROCEDURE — 84484 ASSAY OF TROPONIN QUANT: CPT

## 2022-06-29 PROCEDURE — 1180000000 HC REHAB R&B

## 2022-06-29 PROCEDURE — 93306 TTE W/DOPPLER COMPLETE: CPT

## 2022-06-29 PROCEDURE — 97530 THERAPEUTIC ACTIVITIES: CPT

## 2022-06-29 PROCEDURE — 85025 COMPLETE CBC W/AUTO DIFF WBC: CPT

## 2022-06-29 PROCEDURE — 97535 SELF CARE MNGMENT TRAINING: CPT

## 2022-06-29 PROCEDURE — 93010 ELECTROCARDIOGRAM REPORT: CPT | Performed by: INTERNAL MEDICINE

## 2022-06-29 PROCEDURE — 97110 THERAPEUTIC EXERCISES: CPT

## 2022-06-29 PROCEDURE — 86141 C-REACTIVE PROTEIN HS: CPT

## 2022-06-29 PROCEDURE — 6370000000 HC RX 637 (ALT 250 FOR IP): Performed by: PHYSICAL MEDICINE & REHABILITATION

## 2022-06-29 PROCEDURE — 97130 THER IVNTJ EA ADDL 15 MIN: CPT

## 2022-06-29 PROCEDURE — 6360000002 HC RX W HCPCS: Performed by: INTERNAL MEDICINE

## 2022-06-29 PROCEDURE — 99233 SBSQ HOSP IP/OBS HIGH 50: CPT | Performed by: PHYSICAL MEDICINE & REHABILITATION

## 2022-06-29 PROCEDURE — 97129 THER IVNTJ 1ST 15 MIN: CPT

## 2022-06-29 PROCEDURE — 80048 BASIC METABOLIC PNL TOTAL CA: CPT

## 2022-06-29 PROCEDURE — 6370000000 HC RX 637 (ALT 250 FOR IP): Performed by: INTERNAL MEDICINE

## 2022-06-29 PROCEDURE — 93005 ELECTROCARDIOGRAM TRACING: CPT | Performed by: INTERNAL MEDICINE

## 2022-06-29 RX ADMIN — Medication 2000 UNITS: at 17:12

## 2022-06-29 RX ADMIN — THERA TABS 1 TABLET: TAB at 09:59

## 2022-06-29 RX ADMIN — ENOXAPARIN SODIUM 40 MG: 100 INJECTION SUBCUTANEOUS at 09:59

## 2022-06-29 RX ADMIN — Medication 100 MG: at 09:59

## 2022-06-29 ASSESSMENT — ENCOUNTER SYMPTOMS
ABDOMINAL PAIN: 0
NAUSEA: 0
ABDOMINAL DISTENTION: 0
WHEEZING: 0
CHEST TIGHTNESS: 0
SHORTNESS OF BREATH: 0
VOMITING: 0
COUGH: 0
COLOR CHANGE: 0
TROUBLE SWALLOWING: 0

## 2022-06-29 NOTE — PROGRESS NOTES
Subjective: The patient complains of severe acute on chronic progressive fatigue and paresis and quadrat esthesia secondary to MS flareup partially relieved by rest, medications, PT,  OT, steroids, SLP and rest and exacerbated by recent illness -we will sclerosis flareup. I am concerned about patients medical complexities and barriers to advancing in rehab goals including -her tachycardia-initially presented through the emergency room with severe hip pain status post a fall at home. She was diagnosed with an exacerbation of her multiple sclerosis and rhabdomyolysis secondary to elevated CK levels. Fortunately her x-rays of her hips were unremarkable. She was prescribed IV fluids and IV methylprednisolone. She is hoping to transition to [facility] therapy as an outpatient for her MS.     Patient has had difficulty with endurance, balance, gait ataxia, generalized weakness and numbness. Medically complicated by tachycardia especially with activity. She will need telemetry monitoring while on the unit and to be followed by cardiology and or medical..  I reviewed and discussed cardiac note-Cardiac Supportive Care includin. IV and PO hydration for Rhabdomyolysis care. 2. Serial cardiac enzymes. 3. Telemetry for 48 hours. 4. Echocardiogram          I reviewed current care and plans for further care with other rehab providers including nursing and case management. According to recent nursing note, \" Patient asllep easilly aroused to  Obtain EKG=Pt denies any pain= states slept well=remains with eyes closed. Call light within reach bedside table with in reach-no further needs  \". ROS x10: The patient also complains of severely impaired mobility and activities of daily living. Otherwise no new problems with vision, hearing, nose, mouth, throat, dermal, cardiovascular, GI, , pulmonary, musculoskeletal, psychiatric or neurological. See also Acute Rehab PM&R H&P.        Vital signs:  /60 Pulse (!) 104   Temp 98.2 °F (36.8 °C) (Oral)   Resp 20   Ht 5' 1\" (1.549 m)   Wt 176 lb 1.6 oz (79.9 kg)   LMP 06/01/2022   SpO2 100%   BMI 33.27 kg/m²   I/O:   PO/Intake:  fair PO intake,    diet    Bowel:   continent   Bladder: continent no saenz  General:  Patient is well developed,   adequately nourished, and    well kempt. HEENT:    Pupils equal, hearing intact to loud voice, external inspection of ear and nose benign. Inspection of lips, tongue and gums benign    Musculoskeletal: No significant change in strength or tone. All joints stable. Inspection and palpation of digits and nails show no clubbing, cyanosis or inflammatory conditions. Neuro/Psychiatric: Affect: flat but pleasant. Alert and oriented to person, place and situation with  min cues. No significant change in deep tendon reflexes or sensation  Lungs:  Diminished, CTA-B. Respiration effort is   normal at rest.     Heart:   S1 = S2,   RRR-tachycardia. Abdomen:  Soft, non-tender, no enlargement of liver or spleen. Extremities:    lower extremity edema    Skin:   Intact to general survey,      Rehabilitation:  Physical Therapy:   Bed mobility:  Bed mobility  Bridging: Minimal assistance (06/28/22 1143)  Rolling to Left: Minimal assistance (06/28/22 1143)  Rolling to Right: Minimal assistance (06/28/22 1143)  Supine to Sit: Minimal assistance (06/28/22 1143)  Sit to Supine: Moderate assistance (06/28/22 1143)  Scooting: Minimal assistance (latearlly in supine) (06/28/22 1143)  Bed Mobility Comments: Hand over hand assist to complete all transitions. Instructed in logroll technique first with visual demonstration and cueing throughout. Instruction provided on segmental scooting. Bedrails not utilized. (06/28/22 1143)  Bed Mobility  Additional Factors: Head of bed flat; With handrails (06/28/22 1342)  Additional Factors: Head of bed flat; With handrails (06/28/22 1342)  Roll Left  Assistance Level: Stand by assist (06/28/22 1342)  Skilled Clinical Factors: with HR (06/28/22 1342)  Roll Right  Assistance Level: Stand by assist (06/28/22 1342)  Skilled Clinical Factors: with HR (06/28/22 1342)  Sit to Supine  Assistance Level: Minimal assistance (06/28/22 1342)  Skilled Clinical Factors: assistance with BLE (06/28/22 1342)  Supine to Sit  Assistance Level: Contact guard assist (06/28/22 1132)  Scooting  Assistance Level: Minimal assistance (06/28/22 1342)  Skilled Clinical Factors: scooting EOB to [facility], requires min assistance for lateral shift and anterior lean for lifting buttocks, assistance with technique to scoot to [facility] while flat in bed (06/28/22 1342)  Transfers:  Transfers  Sit to Stand: Contact guard assistance (06/28/22 1145)  Stand to sit: Contact guard assistance (06/28/22 1145)  Bed to Chair: Contact guard assistance (06/28/22 1145)  Lateral Transfers: Minimal Assistance;Contact guard assistance (Instruction provided on scooting laterally at EOB emphasizing forward weight shift) (06/28/22 1145)  Comment: Slow to complete. Heavy reliance on UEs for power. Verbal cues for sequencing and ergonomics.  (06/28/22 1145)  Sit to Stand  Assistance Level: Contact guard assist (06/28/22 1132)  Skilled Clinical Factors: vc's for hand placement (06/28/22 1132)  Stand to Sit  Assistance Level: Contact guard assist (06/28/22 1132)  Skilled Clinical Factors: vc's for hand placement (06/28/22 1132)  Bed To/From Chair  Technique: Sit pivot (06/28/22 1132)  Assistance Level: Minimal assistance (06/28/22 1132)  Skilled Clinical Factors: multiple cues for hand placement and sequencing, fair follow through, vc/tc's (06/28/22 1132)  Stand Pivot  Assistance Level: Contact guard assist (06/28/22 1342)  Skilled Clinical Factors: poor approach to chair, multiple cues for sequencing BLE and manuevering ww (06/28/22 1342)  Gait:   Ambulation  Surface: level tile (06/28/22 1149)  Device: Rolling Walker (06/28/22 1149)  Other Apparatus: Wheelchair follow (06/28/22 1149)  Assistance: Contact guard assistance;Minimal assistance (06/28/22 1149)  Quality of Gait: Steadying throughout. increased reliance on [facility] for support with FF posture. Step to pattern with L LE lagging. (06/28/22 1149)  Distance: 8ft (06/28/22 1149)  Comments: Limited by fatigue (06/28/22 1149)  Ambulation  Surface: Carpet (06/28/22 1204)  Device: Rolling walker (06/28/22 1204)  Distance: 8' (06/28/22 1204)  Assistance Level: Contact guard assist (06/28/22 1204)  Gait Deviations: Decreased step length bilateral;Slow leonardo;Decreased heel strike right;Decreased heel strike left; Wide base of support (06/28/22 1204)  Skilled Clinical Factors: lateral sway, kaz toe out (06/28/22 1204)  Stairs:  Stairs/Curb  Stairs?: No (06/28/22 1149)  W/C mobility:       Occupational Therapy:   Hand Dominance: Right  ADL  Feeding: Modified independent  (06/28/22 1049)  Grooming: Setup (06/28/22 1049)  UE Bathing: Stand by assistance; Increased time to complete;Verbal cueing (06/28/22 1049)  LE Bathing: Dependent/Total (06/28/22 1049)  LE Bathing Skilled Clinical Factors: +2 assistance. Pt able to bend down in order to wash legs with SBA for safe sitting. +2 to wash posterior lillie area. 1 person for washing 1 person to maintain standing balance (06/28/22 1049)  UE Dressing: Minimal assistance (06/28/22 1049)  LE Dressing: Maximum assistance; Increased time to complete (06/28/22 1049)  Toileting: Dependent/Total (06/28/22 1049)  Toileting Skilled Clinical Factors: Pt incontinent of feces (06/28/22 1049)  Additional Comments: Pt displaying lack of endurance and fatigue throughout session, requiring increased assistance throughout progression of evaluation (06/28/22 1049)  Toilet Transfers  Toilet - Technique: Stand pivot (06/28/22 1054)  Equipment Used: Grab bars (06/28/22 1054)  Toilet Transfer: Dependent/Total (06/28/22 1054)  Toilet Transfers Comments: Twards end of session, initially Min A for transfers (06/28/22 1054) Shower Transfers  Shower - Transfer Type: To and From (06/28/22 1054)  Shower - Transfer To: Shower seat with back (06/28/22 1054)  Shower - Technique: Stand pivot (06/28/22 1054)  Shower Transfers: Moderate assistance (06/28/22 1054)  Shower Transfers Comments: Pt with decreased endurance and fatigued post shower, requiring more assistance (06/28/22 1054)    Speech Therapy:      Comprehension: Within Functional Limits (Patient answered simple yes/no questions, basic questions and participated in conversation with good comprehension.  no repetition was required)  Verbal Expression: Within functional limits  Diet/Swallow:        Dysphagia Outcome Severity Scale: Level 7: Normal in all situations  Compensatory Swallowing Strategies : Upright as possible for all oral intake          Lab/X-ray studies reviewed, analyzed and discussed with patient and staff:   Recent Results (from the past 24 hour(s))   EKG 12 Lead    Collection Time: 06/28/22 12:24 PM   Result Value Ref Range    Ventricular Rate 101 BPM    Atrial Rate 101 BPM    P-R Interval 146 ms    QRS Duration 78 ms    Q-T Interval 350 ms    QTc Calculation (Bazett) 453 ms    P Axis 70 degrees    R Axis 31 degrees    T Axis 34 degrees   EKG 12 Lead    Collection Time: 06/29/22  4:30 AM   Result Value Ref Range    Ventricular Rate 88 BPM    Atrial Rate 88 BPM    P-R Interval 152 ms    QRS Duration 82 ms    Q-T Interval 368 ms    QTc Calculation (Bazett) 445 ms    P Axis 51 degrees    R Axis 1 degrees    T Axis 17 degrees   Basic Metabolic Panel w/ Reflex to MG    Collection Time: 06/29/22  4:50 AM   Result Value Ref Range    Sodium 142 135 - 144 mEq/L    Potassium reflex Magnesium 4.4 3.4 - 4.9 mEq/L    Chloride 105 95 - 107 mEq/L    CO2 27 20 - 31 mEq/L    Anion Gap 10 9 - 15 mEq/L    Glucose 100 (H) 70 - 99 mg/dL    BUN 15 6 - 20 mg/dL    CREATININE 0.62 0.50 - 0.90 mg/dL    GFR Non-African American >60.0 >60    GFR  >60.0 >60    Calcium 9.0 8.5 - 9.9 mg/dL   CBC with Auto Differential    Collection Time: 06/29/22  4:50 AM   Result Value Ref Range    WBC 7.8 4.8 - 10.8 K/uL    RBC 3.48 (L) 4.20 - 5.40 M/uL    Hemoglobin 9.7 (L) 12.0 - 16.0 g/dL    Hematocrit 30.4 (L) 37.0 - 47.0 %    MCV 87.3 82.0 - 100.0 fL    MCH 28.0 27.0 - 31.3 pg    MCHC 32.0 (L) 33.0 - 37.0 %    RDW 17.6 (H) 11.5 - 14.5 %    Platelets 289 220 - 874 K/uL    Neutrophils % 75.2 %    Lymphocytes % 12.4 %    Monocytes % 10.0 %    Eosinophils % 2.0 %    Basophils % 0.4 %    Neutrophils Absolute 5.8 1.4 - 6.5 K/uL    Lymphocytes Absolute 1.0 1.0 - 4.8 K/uL    Monocytes Absolute 0.8 0.2 - 0.8 K/uL    Eosinophils Absolute 0.2 0.0 - 0.7 K/uL    Basophils Absolute 0.0 0.0 - 0.2 K/uL   Sedimentation Rate    Collection Time: 06/29/22  4:50 AM   Result Value Ref Range    Sed Rate 66 (H) 0 - 20 mm   Troponin    Collection Time: 06/29/22  4:50 AM   Result Value Ref Range    Troponin 0.012 (HH) 0.000 - 0.010 ng/mL   High sensitivity CRP    Collection Time: 06/29/22  4:50 AM   Result Value Ref Range    CRP High Sensitivity 52.8 (H) 0.0 - 5.0 mg/L       XR CERVICAL SPINE   6/19/2022   NEGATIVE CERVICAL SPINE    XR HIP LEFT  6/19/2022   NEGATIVE PELVIS AND LEFT HIP     XR FEMUR RIGHT  6/19/2022  EXAMINATION: XR FEMUR RIGHT (4 VIEWS) CLINICAL HISTORY: FALL. PAIN. COMPARISONS: None available. FINDINGS: No fracture, dislocation, bone lesion. NEGATIVE RIGHT FEMUR    MRI LUMBAR SPINE : 6/20/2022: There is unremarkable alignment of the columns of the lumbar spine visualized, no evidence of spondylolisthesis. The contours of the lumbar vertebral bodies are unremarkable, no evidence of compression deformity is seen. The STIR sequence demonstrates unremarkable signal intensity of the bone marrow , no evidence of edema or infiltrative process. Disc desiccation and slightly decreased intervertebral disc height visualized at L3-4.  The cord terminates at the level of the T12-L1 intervertebral disc space, unremarkable signal intensity of internal cord, the terminal nerve fibers demonstrate unremarkable contour with no evidence of thickening or clumping. Bilateral renal cysts are seen. L1-L2 demonstrates mild degenerative changes in the intervertebral disc, right foraminal disc herniation is seen. No significant narrowing of the spinal canal is visualized, no significant narrowing of the right neuroforamina lower left neural foramina seen on this level. L2-L3 demonstrates unremarkable intervertebral disc and posterior elements, no evidence of significant narrowing of the spinal canal or neural foramina is visualized at this level. L3-L4 circumferential disc bulge with hypertrophic changes in the facet joints and ligamentum flavum, no significant narrowing of the spinal canal is visualized, mild narrowing of bilateral neural foramina is visualized at this level. L4-L5 mild degenerative disc changes and hypertrophic changes in the facet joints visualize, no significant narrowing of the spinal canal or bilateral neural foramina is visualized at this level. L5-S1 demonstrates unremarkable intervertebral disc and posterior elements, no evidence of significant narrowing of the spinal canal mild degenerative disc changes with hypertrophic changes in the facet joints visualized most prominent on the left, no significant narrowing of the spinal canal on the right neuroforamina is seen, moderate narrowing of the left neuroforamina is seen at this level. Degenerative facet changes of the lumbar spine visualized most prominent at L3-L4 and L5-S1 that demonstrate mild progression in comparison to the prior study    US ABDOMEN  6/20/2022: Biplanar images were obtained. The gallbladder is physiologically distended. The gallbladder wall is upper limits of normal.  There is echogenic debris within the gallbladder in the decubitus position, suspected gallbladder sludge. No evidence of cholelithiasis.   The common bile duct measures up to  3 mm in diameter. No intrahepatic bile duct dilatation is seen. No focal liver lesions are noted. The echogenicity of liver is unremarkable. The pancreas was not visualized due to interfering bowel gas. Tail of pancreas is not well visualized. No free fluid is seen within Morisons pouch. NO SIGN OF CHOLELITHIASIS OR BILE DUCT DILATATION. SLUDGE WITHIN THE GALLBLADDER. NO DEFINITE FOCAL LIVER ABNORMALITY. US DUP LOWER EXTREMITIES BILATERAL VENOUS  6/20/2022   NO SONOGRAPHIC EVIDENCE OF DEEP VENOUS THROMBOSIS WITHIN THE BILATERAL LOWER EXTREMITIES. Previous extensive, complex labs, notes and diagnostics reviewed and analyzed. ALLERGIES:    Allergies as of 06/27/2022    (No Known Allergies)      (please also verify by checking MAR)      I reviewed her [facility] prescription monitoring service data sheets in hopes of eliminating polypharmacy and weaning to the lowest effective dose of pain medications and eliminating the concomitant use of benzodiazepines. I see no medications of concern. I see no habits of combining sedatives and narcotics. Complex Physical Medicine & Rehab Issues Assess & Plan:   1. Severe abnormality of gait and mobility and impaired self-care and ADL's secondary to progressive masturbation of multiple sclerosis. Functional and medical status reassessed regarding patients ability to participate in therapies and patient found to be able to participate in acute intensive comprehensive inpatient rehabilitation program including PT/OT to improve balance, ambulation, ADLs, and to improve the P/AROM. Therapeutic modifications regarding activities in therapies, place, amount of time per day and intensity of therapy made daily. In bed therapies or bedside therapies prn.   2. Bowel and Bladder dysfunction  , Neurogenic bowel and bladder:  frequent toileting, ambulate to bathroom with assistance, check post void residuals.   Check for C.difficile x1 if >2 loose stools in 24 hours, continue bowel & bladder program.  Monitor bowel and bladder function. Lactinex 2 PO every AC. MOM prn, Brown Bomb prn, Glycerin suppository prn, enema prn. Encourage therapy and nursing to co-treat and problem solve re continence. 3. Severe MS pain as well as generalized OA pain: reassess pain every shift and prior to and after each therapy session, give prn Tylenol and consider scheduled Tylenol, modalities prn in therapy, masage, Lidoderm, K-pad prn. Consider scheduled AM pain meds. 4. Skin healing   and breakdown risk:  continue pressure relief program.  Daily skin exams and reports from nursing. 5. Fatigue due to nutritional and hydration deficiency: Add and titrate vitamin B12 vitamin D and CoQ10 continue to monitor I&Os, calorie counts prn, dietary consult prn. Add healthy snack at night. 6. Acute episodic insomnia with situational adjustment disorder:  prn Ambien, monitor for day time sedation. 7. Falls risk elevated:  patient to use call light to get nursing assistance to get up, bed and chair alarm. 8. Elevated DVT risk: progressive activities in PT, continue prophylaxis UMM hose, elevation and Lovenox. 9. Complex discharge planning:  Weekly team meeting every Thursday to re-assess progress towards goals, discuss and address social, psychological and medical comorbidities and to address difficulties they may be having progressing in therapy. Patient and family education is in progress. The patient is to follow-up with their family physician after discharge. Complex Active General Medical Issues that complicate care Assess & Plan:    1. Urinary urgency,   Neurogenic bladder-recheck stat UA  2.   MS (multiple sclerosis)-consult neurology transition to outpatient Ocrevus therapy  3. Hyperglycemia-monitor for diabetes mellitus  4. Rhabdomyolysis-push fluids recheck BMP  5. Baseline mental handicap-just new learning and therapy.   6.   Posterior tibial tendon dysfunction  7. Ataxic gait-focus on balance and therapy  8. Pain in joint, lower leg, Foot drop, left foot, Acquired deformity of left foot-consult podiatry as needed  9. Lumbar radiculopathy-post effective dose of pain medication  10. Asymptomatic sinus tachycardia with activity-check EKG stat, add telemetry, add stat labs and cardiac consult-a.m. labs were essentially [id number] initiated as above. Focus of today's plan-  Initiate and modify therapuetic plan to meet patients individual needs, add rest breaks as needed, and monitor heart rate on telemetry with cardiac work-up as above.     Electronically signed by Savannah Conklin DO on 6/29/22 at 8:02 AM EDT       Teddy James D.O., PM&R     Attending    [address]

## 2022-06-29 NOTE — CONSULTS
[facility] Neurology Consult Note  Name: Cody Montgomery  Age: 35 y.o. Gender: female  CodeStatus: Full Code  Allergies: No Known Allergies    Chief Complaint:No chief complaint on file. Primary Care Provider: Sabi Cheatham PA-C  InpatientTreatment Team: Treatment Team: Attending Provider: Benson Morales DO; Consulting Physician: Kendell Ramos MD; Consulting Physician: Evelyn Friend MD; Occupational Therapist Assistant: Jessica Cantor; Consulting Physician: Paula Montgomery MD; : Karl De Leon MSW, LSW; Occupational Therapist: Zoya Barroso, OTR/L; Patient Care Tech: India Seaman; Registered Nurse: Chandra Hernandez RN  Admission Date: 6/27/2022      HPI   Consulting provider: Dr. Yoni Mendieta for MS flare      Pt seen and examined on rehab unit for neurology consult. Patient is a 40-year-old -American female with past medical history of multiple sclerosis who was admitted to [facility] from 6/18/2022 until 6/27/2022 due to increasing weakness of the lower extremities with fall. She with known history of MS and extensive white matter disease and had recently been started on Loise Hick prior to admission. Given her lower extremity weakness MRI of the lumbar spine was completed to rule out any significant stenosis. MRI showed degenerative changes without any significant stenosis. Patient was initiated on IV methylprednisolone for several days. Her CK levels were noted to be elevated as she had fallen at home and spent more than a day on the floor. They trended down with IV fluids. Patient was noted to have LFTs which were thought to be secondary to her Symptoms. This was discussed with Dr. Vitaliy Edwards with recommendations to discontinue this on outpatient basis with plans to transition to [facility]. Patient is currently alert and oriented x3, no acute distress, cooperative.   She has bilateral lower extremity paraparesis at baseline due to her multiple sclerosis with left foot drop. She is participating in therapies. She is noted to have some sinus tachycardia with a heart rate into the 140s. Cardiology has been consulted and their notes reviewed. Patient placed on telemetry and echocardiogram has been ordered. Vitals:    06/29/22 0821   BP: 118/70   Pulse: (!) 103   Resp:    Temp: 98.1 °F (36.7 °C)   SpO2: 100%      Review of Systems   Constitutional: Negative for appetite change, fatigue and fever. HENT: Negative for hearing loss and trouble swallowing. Eyes: Negative for visual disturbance. Respiratory: Negative for cough, chest tightness, shortness of breath and wheezing. Cardiovascular: Negative for chest pain, palpitations and leg swelling. Gastrointestinal: Negative for abdominal distention, abdominal pain, nausea and vomiting. Genitourinary: Negative for difficulty urinating. Musculoskeletal: Positive for gait problem. Skin: Negative for color change and rash. Neurological: Positive for weakness. Negative for dizziness, tremors, seizures, syncope, facial asymmetry, speech difficulty, light-headedness, numbness and headaches. Psychiatric/Behavioral: Negative for agitation, confusion and hallucinations. The patient is not nervous/anxious. Physical Exam  Vitals and nursing note reviewed. Constitutional:       General: She is not in acute distress. Appearance: She is not diaphoretic. HENT:      Head: Normocephalic. Eyes:      Extraocular Movements: Extraocular movements intact. Pupils: Pupils are equal, round, and reactive to light. Cardiovascular:      Rate and Rhythm: Regular rhythm. Tachycardia present. Pulmonary:      Effort: Pulmonary effort is normal. No respiratory distress. Breath sounds: Normal breath sounds. Abdominal:      General: Bowel sounds are normal.      Palpations: Abdomen is soft. Skin:     General: Skin is warm and dry.    Neurological:      Mental Status: She is alert and oriented to person, place, and time. Cranial Nerves: No cranial nerve deficit. Motor: Weakness (  3 out of 5 in bilateral lower extremities with left foot drop) and abnormal muscle tone present. No tremor, atrophy, seizure activity or pronator drift. Coordination: Coordination normal.      Gait: Gait abnormal.      Deep Tendon Reflexes: Reflexes abnormal.      Reflex Scores:       Patellar reflexes are 0 on the right side and 0 on the left side. Achilles reflexes are 0 on the right side and 0 on the left side. Medications:  Reviewed    Infusion Medications:   Scheduled Medications:    Vitamin D  2,000 Units Oral Dinner    cyanocobalamin  1,000 mcg IntraMUSCular Weekly    coenzyme Q10  100 mg Oral Daily    lidocaine  3 patch TransDERmal Daily    enoxaparin  40 mg SubCUTAneous Daily    multivitamin  1 tablet Oral Daily     PRN Meds: acetaminophen, bisacodyl, fleet    Labs:   Recent Labs     06/27/22 0628 06/28/22 0448 06/29/22 0450   WBC 8.0 11.2* 7.8   HGB 9.9* 10.0* 9.7*   HCT 31.1* 31.8* 30.4*    212 209     Recent Labs     06/27/22 0628 06/28/22 0448 06/29/22  0450    138 142   K 4.2 4.1 4.4    99 105   CO2 31 28 27   BUN 16 15 15   CREATININE 0.65 0.60 0.62   CALCIUM 8.8 8.6 9.0     No results for input(s): AST, ALT, BILIDIR, BILITOT, ALKPHOS in the last 72 hours. No results for input(s): INR in the last 72 hours. Recent Labs     06/29/22 0450   TROPONINI 0.012*       Urinalysis:   Lab Results   Component Value Date    NITRU Negative 06/19/2022    WBCUA  06/18/2022    BACTERIA MODERATE 06/18/2022    RBCUA 10-20 06/18/2022    BLOODU Negative 06/19/2022    SPECGRAV 1.038 06/19/2022    GLUCOSEU 500 06/19/2022       Radiology:   Most recent    EEG No valid procedures specified.     MRI of Brain Results for orders placed during the hospital encounter of 01/20/22    MRI BRAIN W WO CONTRAST    Narrative  EXAMINATION: MRI BRAIN W WO CONTRAST    CLINICAL HISTORY: G35 MS (multiple sclerosis) ([facility] [address] ICD10    COMPARISONS: Brain MRI from October 16, 2020    TECHNIQUE:    Multiplanar multisequence images of the brain were obtained before and after IV administration of contrast. Diffusion perfusion imaging was obtained. BRAIN MRI FINDINGS:    There are no extra-axial collections. There is no evidence of hemorrhage. There are no areas of perfusion diffusion signal abnormality to suggest ischemia. The susceptibility images do not demonstrate evidence of hemosiderin deposition within the brain  parenchyma or the leptomeninges. There is preservation of the gray-white matter differentiation. There are marked areas of T2/FLAIR increased signal in the subcortical and periventricular white matter of both hemispheres with areas of confluence. There is involvement of corpus callosum  and there are areas of signal dropout on T1 imaging. There are no areas of abnormal enhancement after IV contrast administration. There is prominence of the sulci and ventricles consistent with moderate global cerebral atrophy and chronic involutional changes out of proportion to the patient's age. The midline structures are intact, the corpus callosum is within normal limits. The region of the pineal gland and the sella turcica are unremarkable. There are no space-occupying lesions in the posterior fossa. The basilar cisterns are patent. The craniocervical junction is unremarkable. The visualized portions of the orbits are within normal limits, the globes are intact. The visualized portions of the paranasal sinuses are within normal limits. The calvarium and soft tissues are unremarkable. Impression  There are extensive areas of signal abnormality in the subcortical and periventricular white matter and involving the corpus callosum without enhancement to suggest active inflammation.  The findings are similar to the previous study and may reflect  severe chronic microangiopathy, vasculitis, MS or other demyelinating process. Results for orders placed during the hospital encounter of 10/16/20    MRI BRAIN WO CONTRAST    Narrative  EXAMINATION: MRI BRAIN WO CONTRAST, MRI CERVICAL SPINE WO CONTRAST    CLINICAL HISTORY: R26.0 Ataxic gait ICD10    COMPARISONS: Brain CT from August 29, 2014    TECHNIQUE:    Multiplanar multisequence images of the brain were obtained without contrast. Diffusion perfusion imaging was obtained. FINDINGS:    There are no extra-axial collections. There is no evidence of hemorrhage. There are no areas of signal abnormality on perfusion diffusion imaging to suggest ischemia. The susceptibility images do not demonstrate evidence of hemosiderin deposition within  the brain parenchyma or the leptomeninges. There is preservation of the gray-white matter differentiation. There are extensive areas of T2/FLAIR signal abnormality in the subcortical and periventricular white matter in the confluent distribution especially surrounding the atria of both lateral  ventricles. Some lesions are oriented perpendicular to the corpus callosum and there are other scattered discontiguous subcortical lesions. There are no white matter lesions in the posterior fossa. There is prominence of the sulci and ventricles  consistent with moderate global cerebral atrophy and chronic involutional changes. The midline structures are intact, the corpus callosum is within normal limits. The region of the pineal gland and the sella turcica are unremarkable. There are no space-occupying lesions in the posterior fossa. The basilar cisterns are patent. The craniocervical junction is unremarkable. The visualized portions of the orbits are within normal limits, the globes are intact. The visualized portions of the paranasal sinuses are within normal limits. The calvarium and soft tissues are unremarkable. Impression  1.  There are no acute intracranial changes, there is no evidence of hemorrhage or acute ischemia. 2. There are extensive areas of T2/FLAIR signal abnormality in the periventricular and subcortical white matter with a confluent distribution. Some lesions are oriented perpendicular to the corpus callosum. The findings are nonspecific but may be  associated with a demyelinating process such as multiple sclerosis versus chronic microangiopathy, vasculitis or other demyelinating process. EXAMINATION: MRI BRAIN WO CONTRAST, MRI CERVICAL SPINE WO CONTRAST    CLINICAL HISTORY: R26.0 Ataxic gait ICD10    COMPARISONS: NONE AVAILABLE    TECHNIQUE:    Multiplanar multisequence MRI images of the cervical spine were obtained without contrast.    FINDINGS:        There is no acute fracture. There is preservation of the vertebral body heights. There is intervertebral disc desiccation and disc space narrowing at every level. The bone marrow signal is within normal limits. The cord is normal in course and caliber. There are areas of signal abnormality noted within the cord to the right side at C3-4, also on the right side at C4, and on the left at C5-6. These may represent foci of demyelination. There is no prevertebral soft tissue swelling. Anterior soft tissues are unremarkable. The craniocervical junction is unremarkable. C2-3: There is a  indenting the anterior thecal sac but not abutting the cord with mild central canal narrowing. The facet joints are unremarkable. There is no narrowing of the neural foramina. C3-4:There is a less than 2 mm disc bulge flattening the anterior portion of the thecal sac without narrowing of the central canal.  The facet joints are unremarkable. There is no narrowing of the neural foramina. C4-5:There is a less than 2 mm disc bulge flattening the anterior portion of the thecal sac without narrowing of the central canal.  The facet joints are unremarkable. There is no narrowing of the neural foramina.     C5-6:There is no disc herniation or central canal narrowing. The facet joints are unremarkable. There is no narrowing of the neural foramina. C6-7:There is no disc herniation or central canal narrowing. The facet joints are unremarkable. There is no narrowing of the neural foramina. C7-T1:There is no disc herniation or central canal narrowing. The facet joints are unremarkable. There is no narrowing of the neural foramina. IMPRESSION:  1. There is no acute fracture or subluxation. 2. The cord is normal in course and caliber. There are foci of signal abnormality most likely between C3 and C6 on both the right and left sides of the cord which may represent areas of demyelination. The findings may be secondary to edema and process  such as multiple sclerosis versus prior ischemia or other etiologies. 3. There is mild multilevel spondylosis including intervertebral disc space narrowing at every level and very degrees of disc bulges as described in greater detail in each level above. MRA of the Head and Neck: No results found for this or any previous visit. No results found for this or any previous visit. No results found for this or any previous visit. CT of the Head: No results found for this or any previous visit. No results found for this or any previous visit. No results found for this or any previous visit. Carotid duplex: No results found for this or any previous visit. No results found for this or any previous visit. No results found for this or any previous visit. Echo No results found for this or any previous visit. Assessment/Plan:  Multiple  sclerosis with exacerbation. Patient with bilateral lower extremity paraparesis and left foot drop at baseline but came in with increasing lower extremity weakness. Treated with IV methylprednisolone with some improvement.   Patient on [facility] outpatient  but this will be discontinued due to her elevated LFTs. She will be initiated on Ocrevus after follow-up with Dr. Liberty Seymour with therapies. We will monitor her progress.       Collaborating physicians: Dr Albarado Nicely    Electronically signed by JACKI Waite CNP on 6/29/2022 at 1:49 PM

## 2022-06-29 NOTE — PROGRESS NOTES
[facility] Cardiology Progress Note        Date:     2022    Patient:    Dominik Salazar    :    1980  CSN:    [id number]    Rounding Cardiologist: Gladys Cabrera MD     Primary Cardiologist: None    Requesting Physician:  Kim Osuna DO      Reason for Initial Consult:  Elevated cardiac enzymes after fall / tachycardia. Subjective:    Feels well. No complaints CV wise. Telemetry reviewed and only ST with activity. No other dysrhythmias. Primary to treat underling infection. 14 system General and Cardiac ROS otherwise negative or unchanged. Assessment:    1. Fall   2. Tachycardia, proable reactive sinus tachycardia  3. Elevated Cardiac Enzymes c/w rhadomyolysis ( low normal Troponin noted ). 4. No prior cardiac history. 5. Multiple Sclerosis, recent exacerbation  6. UTI  7. Edema  8. Anemia  9. Hyperlipidemia  10. Lumbar radiculopathy  11. DJD    Plan:    1. Cardiac Supportive Care  2. IV and PO hydration for Rhabdomyolysis care. 3. Serial cardiac enzymes. 4. Telemetry for 48 hours. 5. Echocardiogram  6. Further Recommendations to follow  7. See Orders    ======================================================    HISTORY OF PRESENT ILLNESS:      Dominik Salazar is a pleasant 35 y.o. female who presented through the emergency room with severe hip pain status post a fall at home. She was diagnosed with an exacerbation of her multiple sclerosis and rhabdomyolysis secondary to elevated CK levels. Fortunately her x-rays of her hips were unremarkable. She was prescribed IV fluids and IV methylprednisolone. She is hoping to transition to [facility] therapy as an outpatient for her MS. While in rehab, walking the talley she was noted on telemetry to have a heart rate up to 146 bpm.    Cardiology was asked to see in consultation. Patient History and Records, EMR reviewed. Patient Interviewed and examined. Good historian.     States that she is feeling well overall. Denies CP, SOB, LH, Dizziness, TIA or CVA Symptoms. No Orthopnea, Edema or CHF symptoms. No Palpitations. No Syncope. No Fever, Chills or Cold symptoms. No GI,  or Bleeding complaints. Cardiac and general ROS otherwise negative. [address] otherwise negative other than noted. Past Medical History:   Diagnosis Date    Dislocated knee     Right knee    Lumbar radiculopathy 2020    MS (multiple sclerosis) ([facility] [address] 2022    PTTD (posterior tibial tendon dysfunction)        Past Surgical History:   Procedure Laterality Date    CATARACT REMOVAL WITH IMPLANT Right     CATARACT REMOVAL WITH IMPLANT Left      SECTION      EYE SURGERY      FOOT SURGERY Left        Prior to Admission medications    Medication Sig Start Date End Date Taking?  Authorizing Provider   mirabegron (MYRBETRIQ) 25 MG TB24 Take 1 tablet by mouth daily  Patient not taking: Reported on 2022   Akash Hogan MD   KESIMPTA 20 MG/0.4ML [facility]  12/15/21   Historical Provider, MD   vitamin D 25 MCG (1000 UT) CAPS Take by mouth    Historical Provider, MD   MULTIPLE VITAMINS-MINERALS ER PO Take 1 tablet by mouth    Historical Provider, MD       Scheduled Meds:   Vitamin D  2,000 Units Oral Dinner    cyanocobalamin  1,000 mcg IntraMUSCular Weekly    coenzyme Q10  100 mg Oral Daily    lidocaine  3 patch TransDERmal Daily    enoxaparin  40 mg SubCUTAneous Daily    multivitamin  1 tablet Oral Daily     Continuous Infusions:  PRN Meds:acetaminophen, bisacodyl, fleet    No Known Allergies    Social History     Socioeconomic History    Marital status:      Spouse name: Not on file    Number of children: 1    Years of education: Not on file    Highest education level: Not on file   Occupational History    Not on file   Tobacco Use    Smoking status: Never Smoker    Smokeless tobacco: Never Used   Substance and Sexual Activity    Alcohol use: No    Drug use: No    Sexual activity: Not Currently   Other Topics Concern    Not on file   Social History Narrative    Lives With: her parents and her Son (5 y.o)    Type of Home: Apartment in [facility] [address]    Home Layout: One level    Home Access: Stairs to enter with rails - Number of Steps: 1    Bathroom Shower/Tub: Tub/Shower unit, Equipment: Shower chair,Hand-held shower    Home Equipment: Alexandrea Madison, rolling,Wheelchair-manual    Has the patient had two or more falls in the past year or any fall with injury in the past year?: Yes (one - fell on mothers floor and couldn't get up)    ADL Assistance: Independent    Homemaking Assistance: Independent    Homemaking Responsibilities: Yes    Ambulation Assistance: Independent    Transfer Assistance: Independent    Active : No    Patient's  Info: mother    She is on disability from her MS                      Social Determinants of Health     Financial Resource Strain:     Difficulty of Paying Living Expenses: Not on file   Food Insecurity:     Worried About [address] in the Last Year: Not on file    [address] in the Last Year: Not on file   Transportation Needs:     Lack of Transportation (Medical): Not on file    Lack of Transportation (Non-Medical):  Not on file   Physical Activity:     Days of Exercise per Week: Not on file    Minutes of Exercise per Session: Not on file   Stress:     Feeling of Stress : Not on file   Social Connections:     Frequency of Communication with Friends and Family: Not on file    Frequency of Social Gatherings with Friends and Family: Not on file    Attends Congregational Services: Not on file    Active Member of Clubs or Organizations: Not on file    Attends Club or Organization Meetings: Not on file    Marital Status: Not on file   Intimate Partner Violence:     Fear of Current or Ex-Partner: Not on file    Emotionally Abused: Not on file    Physically Abused: Not on file    Sexually Abused: Not on file Housing Stability:     Unable to Pay for Housing in the Last Year: Not on file    Number of Places Lived in the Last Year: Not on file    Unstable Housing in the Last Year: Not on file       Family History   Problem Relation Age of Onset    Hypertension Mother     Diabetes Maternal Uncle     Cancer Maternal Grandmother         stomach cancer       Review Of Systems:    14 point ROS negative other than mentioned. Physical Exam:    CURRENT VITALS: /70   Pulse (!) 103   Temp 98.1 °F (36.7 °C) (Oral)   Resp 20   Ht 5' 1\" (1.549 m)   Wt 176 lb 1.6 oz (79.9 kg)   LMP 06/01/2022   SpO2 100%   BMI 33.27 kg/m²     CONSTITUTIONAL:  awake, alert, cooperative, no apparent distress,   ENT:  Normocephalic, without obvious abnormality, atraumatic, sinuses nontender on palpation, external ears without lesions,  NECK:  Supple, symmetrical, trachea midline, no adenopathy, thyroid symmetric, not enlarged and no tenderness, skin normal, No bruits. LUNGS:  No increased work of breathing, good air exchange, clear to auscultation bilaterally, no crackles, no wheezing  CARDIOVASCULAR:  Normal apical impulse, regular rate and rhythm, normal S1 and S2,  1/6 Systolic murmur noted. ABDOMEN:  Obese, normal bowel sounds, soft, non-distended, non-tender, no masses palpated, no hepatosplenomegally  EXTREMETIES: No edema, Pulses Strong Thruout. No ulcers. NEUROLOGIC:  Awake, alert, oriented to name, place and time. Following all commands and moving all extremties.   SKIN:  no bruising or bleeding, normal skin color, texture, turgor and no rashes    Labs:  Recent Results (from the past 24 hour(s))   EKG 12 Lead    Collection Time: 06/28/22 12:24 PM   Result Value Ref Range    Ventricular Rate 101 BPM    Atrial Rate 101 BPM    P-R Interval 146 ms    QRS Duration 78 ms    Q-T Interval 350 ms    QTc Calculation (Bazett) 453 ms    P Axis 70 degrees    R Axis 31 degrees    T Axis 34 degrees   EKG 12 Lead    Collection Time: 06/29/22  4:30 AM   Result Value Ref Range    Ventricular Rate 88 BPM    Atrial Rate 88 BPM    P-R Interval 152 ms    QRS Duration 82 ms    Q-T Interval 368 ms    QTc Calculation (Bazett) 445 ms    P Axis 51 degrees    R Axis 1 degrees    T Axis 17 degrees   Basic Metabolic Panel w/ Reflex to MG    Collection Time: 06/29/22  4:50 AM   Result Value Ref Range    Sodium 142 135 - 144 mEq/L    Potassium reflex Magnesium 4.4 3.4 - 4.9 mEq/L    Chloride 105 95 - 107 mEq/L    CO2 27 20 - 31 mEq/L    Anion Gap 10 9 - 15 mEq/L    Glucose 100 (H) 70 - 99 mg/dL    BUN 15 6 - 20 mg/dL    CREATININE 0.62 0.50 - 0.90 mg/dL    GFR Non-African American >60.0 >60    GFR  >60.0 >60    Calcium 9.0 8.5 - 9.9 mg/dL   CBC with Auto Differential    Collection Time: 06/29/22  4:50 AM   Result Value Ref Range    WBC 7.8 4.8 - 10.8 K/uL    RBC 3.48 (L) 4.20 - 5.40 M/uL    Hemoglobin 9.7 (L) 12.0 - 16.0 g/dL    Hematocrit 30.4 (L) 37.0 - 47.0 %    MCV 87.3 82.0 - 100.0 fL    MCH 28.0 27.0 - 31.3 pg    MCHC 32.0 (L) 33.0 - 37.0 %    RDW 17.6 (H) 11.5 - 14.5 %    Platelets 364 897 - 642 K/uL    Neutrophils % 75.2 %    Lymphocytes % 12.4 %    Monocytes % 10.0 %    Eosinophils % 2.0 %    Basophils % 0.4 %    Neutrophils Absolute 5.8 1.4 - 6.5 K/uL    Lymphocytes Absolute 1.0 1.0 - 4.8 K/uL    Monocytes Absolute 0.8 0.2 - 0.8 K/uL    Eosinophils Absolute 0.2 0.0 - 0.7 K/uL    Basophils Absolute 0.0 0.0 - 0.2 K/uL   Sedimentation Rate    Collection Time: 06/29/22  4:50 AM   Result Value Ref Range    Sed Rate 66 (H) 0 - 20 mm   Troponin    Collection Time: 06/29/22  4:50 AM   Result Value Ref Range    Troponin 0.012 (HH) 0.000 - 0.010 ng/mL   High sensitivity CRP    Collection Time: 06/29/22  4:50 AM   Result Value Ref Range    CRP High Sensitivity 52.8 (H) 0.0 - 5.0 mg/L       ECG:     SR, RBBB, NSSTs    TELEMETRY:  Sinus tachycardia, doubt atrial flutter.      ECHO:    Pending        Luna Celis MD  [facility] Cardiologist          -----

## 2022-06-29 NOTE — PROGRESS NOTES
Occupational Therapy Get up and Go Note            Date: 2022  Patient Name: Adrian Nash        MRN: [id number]    Account #: [de-identified]  : 1980  (35 y.o.)      Subjective:  Patient states:  Patient stated that she slept well last night  Pain:  Pain at start of treatment: No    Pain at end of treatment: No    Location: N/A  Nursing notified: Not Applicable      Objective:  ADL:  Feeding:Patient was independent with set up of tray including opening all containers and prepping her coffee with powdered cream and sugar    LE Self-Care:  Patient sat on the side of the bed and donned her shoes by slipping them on after they were handed to her. Bed transfers:  Patient was able to sit up on the side of the bed and then completed a low pivot transfer into the wheelchair with SBA and verbal cues to wait until the wheelchair was locked       Treatment consisted of:   [x] ADL Training  [] Strengthening   [] Transfer Training    [] DME Education  [] HEP   [] Patient Education  [] Other:    Safety:  Safety Devices  Safety Devices in place: Yes  Type of devices:  All fall risk precautions in place      Therapy Time:   Individual Group Co-Treat   Time In 730       Time Out 0737         Minutes 7             ADL/IADL trainin minutes         Electronically signed by:    FRANCHESKA Terrell    2022, 7:55 AM

## 2022-06-29 NOTE — PROGRESS NOTES
Lab called [phone] evan critical trop of 0.012.  Called Dr. Deena Sullivan # he was not on call -call OT Dr. Becky Zabala at [phone]- awaiting response

## 2022-06-29 NOTE — CARE COORDINATION
Referral made to the waiver program.  Electronically signed by Luis Casiano RN on 6/29/22 at 11:27 AM EDT

## 2022-06-29 NOTE — PROGRESS NOTES
Nutrition Assessment     Type and Reason for Visit: Initial,Consult    Nutrition Recommendations/Plan:   Modify Current Diet     Malnutrition Assessment:  Malnutrition Status: No malnutrition    Nutrition Assessment:  Pt is stable from a nutritional standpoint, with verbalized good appetite, and noted good intake at meals, with no nutritional complaints. Pt is agreeable to a Carb 3 Control to promote weight control and JOHN diet. Estimated Daily Nutrient Needs:  Energy (kcal):  3985-8813(kg x 15-17) Weight Used for Energy Requirements: Current       Nutrition Related Findings:   PMH-MS. Labs/meds reviewed. +1 LUE and non-pitting BLE edema noted. Last BM noted 6/29. Wound Type: None    Current Nutrition Therapies:    ADULT DIET;  Regular    Anthropometric Measures:  · Height: 5' 1\" (154.9 cm)  · Current Body Wt: 176 lb (79.8 kg) (6/29)   · BMI: 33.3    Nutrition Diagnosis:   · Overweight/Obese related to excessive energy intake (limited physical activity) as evidenced by BMI    Nutrition Interventions:   Food and/or Nutrient Delivery: Modify Current Diet  Nutrition Education/Counseling: No recommendation at this time  Coordination of Nutrition Care: Continue to monitor while inpatient       Goals:     Goals: PO intake 75% or greater (weight control)       Nutrition Monitoring and Evaluation:      Food/Nutrient Intake Outcomes: Food and Nutrient Intake  Physical Signs/Symptoms Outcomes: [address], LD

## 2022-06-29 NOTE — CARE COORDINATION
[facility]  INPATIENT REHABILITATION  TEAM CONFERENCE NOTE  [facility]  Admit Date: 2022       Date: 2022  Patient Name: Zoe Gray        MRN: [id number]    : 1980  (35 y.o.)  Gender: female        REHAB DIAGNOSIS:   Diagnosis: Impaired mobility and activities of daily living dt exac of MS    CO MORBIDITIES:      Past Medical History:   Diagnosis Date    Dislocated knee     Right knee    Intellectual disability 2022    Lumbar radiculopathy 2020    MS (multiple sclerosis) ([facility] [address] 2022    PTTD (posterior tibial tendon dysfunction)      Past Surgical History:   Procedure Laterality Date    CATARACT REMOVAL WITH IMPLANT Right     CATARACT REMOVAL WITH IMPLANT Left      SECTION  2013    EYE SURGERY      FOOT SURGERY Left         Restrictions  Restrictions/Precautions: Fall Risk  CASE MANAGEMENT    Social/Functional History  Social/Functional History  Lives With: Son (5 y/o)  Type of Home: Apartment  Home Layout: One level  Home Access: Stairs to enter without rails  Entrance Stairs - Number of Steps: 1 (2\" threshold)  Bathroom Shower/Tub: Tub/Shower unit  Bathroom Toilet: Standard  Bathroom Equipment: Shower chair,Hand-held shower  Bathroom Accessibility: Accessible  Home Equipment: Walker, rolling,Wheelchair-manual,Reacher,Long-handled shoehorn  Has the patient had two or more falls in the past year or any fall with injury in the past year?: Yes  Receives Help From: Family  ADL Assistance: Independent (later reports that son helps put on socks when needed)  Homemaking Assistance: Independent  Homemaking Responsibilities: Yes  Ambulation Assistance: Independent [employer])  Transfer Assistance: Independent  Active : No  Patient's  Info: mother  Occupation: On disability  Type of Occupation: [employer]: watch tv, bowling with son       Pts personal preferences: n/a    Pts assets/resources/support system: 9 year old son, mother supportive    COVERAGE INFORMATION:Payor: Kadie Menon / Plan: Cheryl Face / Product Type: *No Product type* /       NURSING  No active isolations    Weight: 176 lb 1.6 oz (79.9 kg) / Body mass index is 33.27 kg/m². ADULT DIET; Regular; 3 carb choices (45 gm/meal); No Added Salt (3-4 gm)    SpO2: 100 % (06/29/22 0821)    Oxygen to be continued upon discharge: No  Home-going needs (nocturnal Pox, sleep study, RX, equipment): No    Skin Issues: Yes; bruising and abrasions open to air  Home-going needs (education, training, RX, Wound RN): Yes    Pain Managed: Yes    Bladder continence: No; purewick at night  Discharging with Saenz: n/a   Training done: No   Urology following: No   Plan/date to remove saenz: No   Bladder retraining started: No    Bowel continence:  Yes  Last BM date: 6/29/22  Need for bowel program: Yes    Anticoagulants: Lovenox  Home-going needs (Education, labs): Yes    Antibiotics: none  Stop date: n/a  Home-going needs (education, RX, PICC): No      Other: L foot drag  Elevated Troponin this AM--Dr. Ally Oliver gave the approval for pt to resume therapy and plans to assess and order labs and adjust medications  Dr. Alexy Perry following  Pt wore depends at home prior to hospitalization for urinary incontinence      PHYSICAL THERAPY  Bed mobility:  Bed mobility  Bridging: Minimal assistance (06/28/22 1143)  Rolling to Left: Minimal assistance (06/28/22 1143)  Rolling to Right: Minimal assistance (06/28/22 1143)  Supine to Sit: Minimal assistance (06/28/22 1143)  Sit to Supine: Moderate assistance (06/28/22 1143)  Scooting: Minimal assistance (latearlly in supine) (06/28/22 1143)  Bed Mobility Comments: Hand over hand assist to complete all transitions. Instructed in logroll technique first with visual demonstration and cueing throughout. Instruction provided on segmental scooting. Bedrails not utilized.  (06/28/22 1143)  Bed Mobility  Additional Factors: Head of bed flat;With handrails (06/29/22 1342)  Additional Factors: Head of bed flat; With handrails (06/29/22 1342)  Roll Left  Assistance Level: Stand by assist (06/29/22 1342)  Skilled Clinical Factors: with HR (06/28/22 1342)  Roll Right  Assistance Level: Stand by assist (06/28/22 1342)  Skilled Clinical Factors: with HR (06/28/22 1342)  Sit to Supine  Assistance Level: Contact guard assist (06/29/22 1115)  Skilled Clinical Factors: utilizes BUE to assist BLE (06/29/22 1115)  Supine to Sit  Assistance Level: Contact guard assist (06/29/22 1342)  Skilled Clinical Factors: decreased effort with hospital bed (06/29/22 1342)  Scooting  Assistance Level: Contact guard assist (06/29/22 1342)  Skilled Clinical Factors: scooting to EOB (06/29/22 1342)  Transfers:  Transfers  Sit to Stand: Contact guard assistance (06/28/22 1145)  Stand to sit: Contact guard assistance (06/28/22 1145)  Bed to Chair: Contact guard assistance (06/28/22 1145)  Lateral Transfers: Minimal Assistance;Contact guard assistance (Instruction provided on scooting laterally at EOB emphasizing forward weight shift) (06/28/22 1145)  Comment: Slow to complete. Heavy reliance on UEs for power. Verbal cues for sequencing and ergonomics. (06/28/22 1145)  Transfers  Surface: Wheelchair;From bed (06/29/22 1342)  Additional Factors: Verbal cues; Hand placement cues (06/29/22 1342)  Device: Alexandrea Madison (06/29/22 1342)  Sit to Stand  Assistance Level: Contact guard assist (06/29/22 1342)  Skilled Clinical Factors: good hand placement this session (06/29/22 1342)  Stand to Sit  Assistance Level: Contact guard assist (06/29/22 1342)  Skilled Clinical Factors: vc's for hand placement, improved approach to chair (06/29/22 1342)  Bed To/From Chair  Technique: Sit pivot (06/29/22 1342)  Assistance Level: Contact guard assist (06/29/22 1342)  Skilled Clinical Factors: cues intially for hand placement, good follow through.  Bed > w/c (06/29/22 2488)  Stand Pivot  Assistance Level: Contact guard assist (06/28/22 1342)  Skilled Clinical Factors: poor approach to chair, multiple cues for sequencing BLE and manuevering ww (06/28/22 1342)  Gait:   Ambulation  Surface: level tile (06/28/22 1149)  Device: Rolling Walker (06/28/22 1149)  Other Apparatus: Wheelchair follow (06/28/22 1149)  Assistance: Contact guard assistance;Minimal assistance (06/28/22 1149)  Quality of Gait: Steadying throughout. increased reliance on [employer] for support with FF posture. Step to pattern with L LE lagging. (06/28/22 1149)  Distance: 8ft (06/28/22 1149)  Comments: Limited by fatigue (06/28/22 1149)  Ambulation  Surface: Carpet (06/29/22 1345)  Device: Rolling walker (06/29/22 1345)  Distance: 12' (06/29/22 1345)  Assistance Level: Contact guard assist (06/29/22 1345)  Gait Deviations: Decreased step length bilateral;Slow leonardo;Decreased heel strike right;Decreased heel strike left; Wide base of support (06/29/22 1345)  Skilled Clinical Factors: lateral sway, kaz toe out, decreased weight shift to L (06/29/22 1345)  Stairs:  Stairs/Curb  Stairs?: No (06/28/22 1149)  W/C mobility:     LTG:  Long term goal 1: Pt to complete bed mobility with indep  Long term goal 2: Pt to complete functional transfers bed/chair/car with indep  Long term goal 3: Pt to ambulate 50-150ft with LRD and indep  Long term goal 4: Pt to tolerate 5min standing activities indep  Long term goal 5: Pt to manage 2\" curb step indep  PT Treatment Time:  1.5 hrs      OCCUPATIONAL THERAPY    EVALUATION SELF CARE STATUS:  Hand Dominance: Right  Feeding: Modified independent  (06/28/22 1049)  Grooming: Setup (06/29/22 1341)  Grooming Skilled Clinical Factors: for oral care and grooming (06/29/22 1341)  UE Bathing: Stand by assistance; Increased time to complete (06/29/22 1341)  LE Bathing: Dependent/Total (06/29/22 1341)  LE Bathing Skilled Clinical Factors: +2 assistance.  Pt able to bend down in order to wash legs with SBA for safe sitting. +2 to wash posterior lillie area. 1 person for washing 1 person to maintain standing balance (06/29/22 1341)  UE Dressing: Stand by assistance (06/29/22 1341)  LE Dressing: Increased time to complete;Maximum assistance (06/29/22 1341)  LE Dressing Skilled Clinical Factors: to don pants and depends (06/29/22 1341)  Toileting: Dependent/Total (06/29/22 1341)  Toileting Skilled Clinical Factors: Pt incontinent of feces and urine (06/29/22 1341)  Additional Comments: Pt displaying lack of endurance and fatigue throughout session, requiring increased assistance throughout progression of evaluation (06/28/22 1049)             CURRENT SELF CARE:  Grooming/Oral Hygiene  Assistance Level: Set-up (06/29/22 1314)  Upper Extremity Bathing  Assistance Level: Set-up (while seated in wheelchair) (06/29/22 1314)  Skilled Clinical Factors: washing up at sink (06/29/22 1314)  Lower Extremity Bathing  Assistance Level: Maximum assistance (06/29/22 1314)  Skilled Clinical Factors: washing up at sink (06/29/22 1314)  Upper Extremity Dressing  Assistance Level: Set-up (06/29/22 1314)  Lower Extremity Dressing  Assistance Level: Maximum assistance (06/29/22 1314)  Skilled Clinical Factors: to don pants, depends (06/29/22 1314)  Putting On/Taking Off Footwear  Assistance Level: Maximum assistance (06/29/22 1314)  Skilled Clinical Factors: Patient was able to attempt to put left sock on and successfully place over toes. (06/29/22 1314)  Toileting  Assistance Level: Dependent; Requires x 2 assistance;Verbal cues (Patient required verbal cues for hand placement.) (06/29/22 1314)  Skilled Clinical Factors: 1 to wipe perineal area and 1 for CGA while standing. (06/29/22 1314)  Toilet Transfers  Technique: Stand pivot (06/29/22 1314)  Equipment: Standard toilet;Grab bars (06/29/22 1314)  Additional Factors: Verbal cues;Cues for hand placement (06/29/22 1314)  Assistance Level: Minimal assistance (06/29/22 1314)  Skilled Clinical Factors: Patient required +2 assistance to transfer back from toilet to wheelchair due to fatigue. (06/29/22 1314)  Tub/Shower Transfers  Skilled Clinical Factors: Patient completed ADL at sink level due to level of fatigue. (06/29/22 1314)        LTG:  Eating  Assistance Needed: Independent  CARE Score: 6  Discharge Goal: Independent, Oral Hygiene  Assistance Needed: Setup or clean-up assistance  CARE Score: 5  Discharge Goal: Independent, [address] needed: Dependent  CARE Score: 1  Discharge Goal: Supervision or touching assistance, Shower/Bathe Self  Assistance Needed: Substantial/maximal assistance  CARE Score: 2  Discharge Goal: Supervision or touching assistance  Upper Body Dressing  Assistance Needed: Partial/moderate assistance  CARE Score: 3  Discharge Goal: Independent, Lower Body Dressing  Assistance Needed: Substantial/maximal assistance  CARE Score: 2  Discharge Goal: Supervision or touching assistance, Putting On/Taking Off Footwear  Assistance Needed: Dependent  CARE Score: 1  Discharge Goal: Supervision or touching assistance, Toilet Transfer  Assistance needed: Substantial/maximal assistance  CARE Score: 2  Discharge Goal: Supervision or touching assistance  OT Treatment Time: 1.5 hrs      SPEECH THERAPY       Comprehension: Within Functional Limits (Patient answered simple yes/no questions, basic questions and participated in conversation with good comprehension. no repetition was required)  Verbal Expression: Within functional limits      Diet/Swallow:        Dysphagia Outcome Severity Scale: Level 7: Normal in all situations    Compensatory Swallowing Strategies : Upright as possible for all oral intake         LTG:  Long-term Goals  Timeframe for Long-term Goals: 2-3 weeks  Goal 1: Pt will improve his Cognition from mod assist to supervised to mod I level for adequate functional recall and safety awareness for (home, community).   Long-term Goals  Timeframe for Long-term Goals: N/A          COGNITION  OT: Cognition Comment: Anxiety can be limiting at times; no anxiety noted on this date. SP:        RECREATIONAL THERAPY  Attendance to recreational therapy programs:    []  Pet Therapy  [] Music Therapy  [] Art Therapy    [] Recreation Therapy Group [] Support Group           Patient social interaction (mood, participation): good, motivated    Patient strengths: mother and 4 y/o son supportive    Patients goal: return home with 4 y/o son    Problems/Barriers: stoop to enter apartment, fatigues quickly, OT does not have goaled to be independent        1. Safety:          - Intervention / Plan:    [x]  falls protocol     [x]  PT/OT    [x]  SP        - Results:         2. Potential DME needs:         - Intervention / Plan:  [x]  PT/OT     [x]  Assess equipment needs/access       - Results:         3. Weakness:          - Intervention / Plan:  [x]  PT/OT      []  Other:         - Results:         4. Discharge planning needs:          - Intervention / Plan:  [x]  Weekly team conference      [x]  family training        - Results:         5.            - Intervention / Plan:          - Results:         6.            - Intervention / Plan:         - Results:         7.            - Intervention / Plan:         - Results:           Discharge Plan   Estimated Length of Stay: 14 days    Tentative Discharge date: 7/12/22      Anticipated Discharge Destination:  Home      Team recommendations:    1. Follow up Therapy :    PT  OT  SLP  RN  Social Work  [facility]    2. Home Health vs OP TBD    Other:     Equipment needed at Discharge:  Other: TBD      Team Members Present at Conference:    Physician: Dr. Benson Morales  : Jamshid Thorpe RN  : MEL Park LSW  RN: Liam Gamboa RN  Physical Therapist: Wilma Mohr PT  Occupational Therapist: Debbie Hodges, OTR  Speech Therapist: Lynne Caldwell, SLP     Electronically signed by MEL Park LSW on 6/30/2022 at 11:26 AM

## 2022-06-29 NOTE — CARE COORDINATION
Facility/Department: [facility] CASE MANAGEMENT    NAME: Adrian Nash      : 1980  MRN: [id number]  R255/R255-01      Social/Functional History  Social/Functional History  Lives With: Son (6 y/o)  Type of Home: Apartment  Home Layout: One level  Home Access: Stairs to enter without rails  Entrance Stairs - Number of Steps: 1 (2\" threshold)  Bathroom Shower/Tub: Tub/Shower unit  Bathroom Toilet: Standard  Bathroom Equipment: Shower chair,Hand-held shower  Bathroom Accessibility: Accessible  Home Equipment: Druscilla Covert, rolling,Wheelchair-manual,Reacher,Long-handled shoehorn  Has the patient had two or more falls in the past year or any fall with injury in the past year?: Yes  Receives Help From: Family  ADL Assistance: Independent (later reports that son helps put on socks when needed)  Homemaking Assistance: Independent  Homemaking Responsibilities: Yes  Ambulation Assistance: Independent [employer])  Transfer Assistance: Independent  Active : No  Patient's  Info: mother  Occupation: On disability  Type of Occupation: [employer]: watch tv, bowling with son    Spoke with patient and explained role in the team. Patient questions answered appropriately. Explained discharge process. Patient stated understanding. Patient is from home with her mother; however, the patient plans on going to her apartment at discharge with her son (8yo). She says there are less stairs into her apartment. The patient states that her mother is nearby and can help. Her mother helps with her son and with shopping. 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 The patient stated her dad used to do her housework, but is no longer able to because he is not able, and now she wants an aid through [employer] to help her at home. The patient is also interested in [employer] at discharge. The patient has a wheelchair and a walker at home, and she has a shower chair.  The patient gets her medication from [facility] on [address] in [facility] [place]. She has not had any problem affording her medication.   Electronically signed by Elia Marquez RN on 6/29/22 at 10:53 AM EDT

## 2022-06-29 NOTE — PROGRESS NOTES
OCCUPATIONAL THERAPY  INPATIENT REHAB TREATMENT NOTE  [facility]      NAME: Rafael Turcios  : 1980 (35 y.o.)  MRN: [id number]  CODE STATUS: Full Code  Room: R255/R255-01    Date of Service: 2022    Referring Physician: Dr. Ewa Garcia  Rehab Diagnosis: Impaired mobility and ADLs D/T Exacerabation of MS    Restrictions  Restrictions/Precautions  Restrictions/Precautions: Fall Risk              Patient's date of birth confirmed: Yes    SAFETY:   All fall risk precautions in place. SUBJECTIVE:  Subjective: \"I just want to get home to be with my son. \"  Pain: Patient denied pain at both beginning and end of session. Pain at start of treatment: No    Pain at end of treatment: No      COGNITION:  Orientation  Overall Orientation Status: Within Functional Limits  Orientation Level: Oriented X4      Comprehension: SBA  Expression: Independent  Social Interaction: Independent  Problem Solving: Min A  Memory: SBA    OBJECTIVE:     Patient completed ADL while seated in wheelchair at sink at below levels:  Patient requested not to shower due to increase in fatigue while toileting. Grooming/Oral Hygiene  Assistance Level: Set-up  Upper Extremity Bathing  Assistance Level: Set-up (while seated in wheelchair)  Skilled Clinical Factors: washing up at sink  Lower Extremity Bathing  Assistance Level: Maximum assistance  Skilled Clinical Factors: washing up at sink  Upper Extremity Dressing  Assistance Level: Set-up  Lower Extremity Dressing  Assistance Level: Maximum assistance  Skilled Clinical Factors: to don pants, depends  Putting On/Taking Off Footwear  Assistance Level: Maximum assistance  Skilled Clinical Factors: Patient was able to attempt to put left sock on and successfully place over toes. Toileting  Assistance Level: Dependent; Requires x 2 assistance;Verbal cues (Patient required verbal cues for hand placement.)  Skilled Clinical Factors: 1 to wipe perineal area and 1 for CGA while standing. Toilet Transfers  Technique: Stand pivot  Equipment: Standard toilet;Grab bars  Additional Factors: Verbal cues;Cues for hand placement  Assistance Level: Minimal assistance  Skilled Clinical Factors: Patient required +2 assistance to transfer back from toilet to wheelchair due to fatigue. Tub/Shower Transfers  Skilled Clinical Factors: Patient completed ADL at sink level due to level of fatigue. Equipment recommendations:  OT Equipment Recommendations  Other: Continue to assess      ASSESSMENT:   Continue to assess. PLAN OF CARE:  Strengthening,Balance training,Functional mobility training,Neuromuscular re-education,Self-Care / Smiley Guzmán training,Safety education & training,Home management training  continue with POC    Patient goals : \"My dad can't take care of me now, so I need to get better. He has congestive heart failure. \"  Time Frame for Long term goals : Within 2 weeks, Pt to demonstrate progress in the following areas listed below to achieve specific LTG's as stated in the initial evaluation. Long Term Goal 1: Increase independence in ADLs  Long Term Goal 2: Increaese endurance to complete clothes management  Long Term Goal 3: Increase static/dynamic balance  Long Term Goal 4: Increase independence to perform kitchen mobility        Therapy Time:   Individual Group Co-Treat   Time In 0830       Time Out 0930         Minutes 60                   ADL/IADL trainin minutes     Electronically signed by:     JAMAL Baugh,   2022, 1:47 PM

## 2022-06-29 NOTE — PROGRESS NOTES
[facility]  Facility/Department: [facility]  Speech Language Pathology   Treatment Note          Rosy Claudio  1980  R255/R255-01  [x]   confirmed    Date: 2022    Rehab Diagnosis: Impaired mobility and activities of daily living dt exac of MS      Restrictions/Precautions: Fall Risk    Weight: 176 lb 1.6 oz (79.9 kg)     ADULT DIET; Regular    SpO2: 100 % (22 0821)  No active isolations    Speech Dx: Cognitive Linguistic Impairment    Subjective:  Alert, Cooperative and Pleasant        Interventions used this date:  Cognitive Skill Development    Objective/Assessment:  Patient progressing towards goals:  Short-term Goals  Timeframe for Short-term Goals: 1-2 weeks  Goal 1: To increase safety awareness and judgment for safe completion of ADLs secondary to pt's cognitive deficits,  pt will complete mid to high level problem solving tasks related to ADLs (e.g. medication management, ADL safety) with 90% accuracy and superised assist. Patient completed prescription label task I with 71% accuracy, with min cueing 86% accuracy. Goal 2: To increase safety awareness and judgment for safe completion of ADLs secondary to pt's cognitive deficits, pt will sequence common activities of daily living with (verbal/written) steps with 90% accuracy and supervised assist. Patient completed a 4 step written sequencing task related to everyday ADLs I with 68% accuracy, with min cueing 71% accuracy. Patient required reminders to slow rate of task completion to improve accuracy. Goal 5: To decrease cognitive deficits and improve attention to tasks for safe completion of ADLs, pt will complete structured tasks addressing (sustained, selective, alternating, divided) attention with 90% accuracy and supervised assist. Patient completed \"What's different? \" between 2 pictures attention task I with 50% accuracy, with location cueing 90% accuracy.       Treatment/Activity Tolerance:  Patient tolerated treatment well    Plan:  Continue per POC    Pain Assessment:  Patient does not c/o pain. Pain Re-assessment:  Patient does not c/o pain. Patient/Caregiver Education:  Patient educated on session and progression towards goals.     Safety Devices:  Call light within reach and Chair alarm in place    Speech Therapy Level of Assistance Scale    AUDITORY COMPREHENSION  Rating:  Modified Independent    VERBAL EXPRESSION  Rating:  Modified Independent    MOTOR SPEECH  Rating: Independent    PROBLEM SOLVING  Rating: Minimal Assistance-Moderate Assistance            Therapy Time  SLP Individual Minutes  Time In: 0930  Time Out: 1000  Minutes: 30              Signature: Electronically signed by FIDELIA Blank on 6/29/2022 at 10:53 AM

## 2022-06-29 NOTE — PROGRESS NOTES
standing at [employer] x1 min, HR elevating to 130. Pt requires seated RB to decrease HR. Good technique demonstrated with sit > stand, vc's provided for stand > sit for hand placement to improve control of descend. Step up exercises utilized to continue to improve strength and endurance, limited reps performed d/t elevating HR. Goals:  Long Term Goals  Long term goal 1: Pt to complete bed mobility with indep  Long term goal 2: Pt to complete functional transfers bed/chair/car with indep  Long term goal 3: Pt to ambulate 50-150ft with LRD and indep  Long term goal 4: Pt to tolerate 5min standing activities indep  Long term goal 5: Pt to manage 2\" curb step indep  Additional Goals?: Yes  Long term goal 6: Pt to complete HEP with indep    PLAN OF CARE/Safety:   Plan Comment: Cont.  POC      Therapy Time:   Individual   Time In 1100   Time Out 1200   Minutes 60     Minutes:  Transfer/Bed mobility trainin  Gait trainin  Neuro re education: 0  Therapeutic ex: [address], PTA, 22 at 12:02 PM

## 2022-06-29 NOTE — PROGRESS NOTES
OCCUPATIONAL THERAPY  INPATIENT REHAB TREATMENT NOTE  [facility]      NAME: Chandu Andres  : 1980 (35 y.o.)  MRN: [id number]  CODE STATUS: Full Code  Room: R255/R255-01    Date of Service: 2022    Referring Physician: Dr. Oral Darden  Rehab Diagnosis: Impaired mobility and ADLs D/T Exacerabation of MS    Restrictions  Restrictions/Precautions  Restrictions/Precautions: Fall Risk         Patient's date of birth confirmed: Yes    SAFETY:  Safety Devices  Safety Devices in place: Yes  Type of devices: All fall risk precautions in place    SUBJECTIVE:  Subjective: \"I've never tried that before. \" -when discussing FM clothespins activity with pt  Pain: Patient had no c/o pain at both beginning and end of session. Pain at start of treatment: No    Pain at end of treatment: No        COGNITION:  Orientation  Overall Orientation Status: Within Functional Limits  Orientation Level: Oriented X4  Cognition  Overall Cognitive Status: WFL      Pt's current cognitive status is:  Patient's cognition will improve or maintain baseline to safely perform ADLs:  Comprehension: Independent  Expression: Independent  Social Interaction: Independent  Problem Solving: Mod I  Memory: Mod I    OBJECTIVE:    Pt participated in seated FMC/UE strengthening/functional reach activities of manipulating graded resistive clips to place/remove from rods at various heights/lengths alternating between UEs and placing/removing rings from dowel rods at various heights while wearing 1# wrist weights requiring pt to weight shift, cross midline, and lean/reach forward out of NISHA to improve seated activity tolerance, trunk control, hand/digit strength, UE strength/endurance, FMC, motor control, and functional reach for self-care tasks, ADLs, and functional transfers. Occasional rest breaks required d/t signs of UE fatigue. Verbal cues given for instruction, sequencing, and energy conservation strategies.      Pt participated in seated [address] therapeutic activity manipulating small pegs to place in peg board to match visual model provided to improve [address], dexterity, in-hand manipulation, sequencing, and cognitive skills for self-care tasks and ADLs. Verbal cues given for instruction, sequencing, and in-hand manipulation techniques. Pt required extended time due to occasional difficulties grasping and manipulating small pegs. Education:  Education  Education Given To: Patient  Education Provided: Plan of Care  Education Method: Verbal  Barriers to Learning: None  Education Outcome: Verbalized understanding      ASSESSMENT:  Activity Tolerance: Patient tolerated treatment well. PLAN OF CARE:  Strengthening,Balance training,Functional mobility training,Neuromuscular re-education,Self-Care / Kellie Gonzalez training,Safety education & training,Home management training  continue with POC    Patient goals : \"My dad can't take care of me now, so I need to get better. He has congestive heart failure. \"  Time Frame for Long term goals : Within 2 weeks, Pt to demonstrate progress in the following areas listed below to achieve specific LTG's as stated in the initial evaluation. Long Term Goal 1: Increase independence in ADLs  Long Term Goal 2: Increaese endurance to complete clothes management  Long Term Goal 3: Increase static/dynamic balance  Long Term Goal 4:  Increase independence to perform kitchen mobility        Therapy Time:   Individual Group Co-Treat   Time In 1400       Time Out 1430         Minutes 30                   Therapeutic activities: 30 minutes     Electronically signed by:    JAMAL Alexander,   6/29/2022, 3:52 PM

## 2022-06-29 NOTE — PROGRESS NOTES
Patient asllep easilly aroused to  Obtain EKG=Pt denies any pain= states slept well=remains with eyes closed.  Call light within reach bedside table with in reach=no further needs

## 2022-06-29 NOTE — PROGRESS NOTES
Physical Therapy Rehab Treatment Note  Facility/Department: [facility]  [facility]       NAME: Isabela Burger  : 1980 (6 y.o.)  MRN: [id number]  CODE STATUS: Full Code    Date of Service: 2022       Restrictions:  Restrictions/Precautions: Fall Risk       SUBJECTIVE:   Subjective: \"Lunch was good. \"    Pain  Pain: Denies pre and post session pain. OBJECTIVE:      Bed Mobility  Additional Factors: Head of bed flat; With handrails  Roll Left  Assistance Level: Stand by assist  Supine to Sit  Assistance Level: Contact guard assist  Skilled Clinical Factors: decreased effort with hospital bed  Scooting  Assistance Level: Contact guard assist  Skilled Clinical Factors: scooting to EOB    Transfers  Surface: Wheelchair;From bed  Additional Factors: Verbal cues; Hand placement cues  Device: Walker  Sit to Stand  Assistance Level: Contact guard assist  Skilled Clinical Factors: good hand placement this session  Stand to Sit  Assistance Level: Contact guard assist  Skilled Clinical Factors: vc's for hand placement, improved approach to chair  Bed To/From Chair  Technique: Sit pivot  Assistance Level: CGA   Skilled Clinical Factors: cues intially for hand placement, good follow through. Bed > w/c       Ambulation  Surface: Carpet  Device: Rolling walker  Distance: 12'  Assistance Level: Contact guard assist  Gait Deviations: Decreased step length bilateral;Slow leonardo;Decreased heel strike right;Decreased heel strike left; Wide base of support  Skilled Clinical Factors: lateral sway, kaz toe out, decreased weight shift to L    PT Exercises  Exercise Treatment: 2\" step up x2 reps x 2 sets  A/AROM Exercises: Seated: LAQ, Marches x10, Hip add ball squeeze, hip abd RTB x20, seated hamstring curls RTB x15        ASSESSMENT/PROGRESS TOWARDS GOALS:   Assessment  Assessment: Performed step ups this session to initiate curb step training.  Pt fatigues quickly with standing activities including step ups and gait training. Pt reporting feeling elevated HR with step up activity, RN notified. Pt continues to make progress toward ambulation goal this session. Goals:  Long Term Goals  Long term goal 1: Pt to complete bed mobility with indep  Long term goal 2: Pt to complete functional transfers bed/chair/car with indep  Long term goal 3: Pt to ambulate 50-150ft with LRD and indep  Long term goal 4: Pt to tolerate 5min standing activities indep  Long term goal 5: Pt to manage 2\" curb step indep  Additional Goals?: Yes  Long term goal 6: Pt to complete HEP with indep    PLAN OF CARE/Safety:   Plan Comment: Cont.  POC      Therapy Time:   Individual   Time In 1330   Time Out 1400   Minutes 30     Minutes:  Transfer/Bed mobility trainin  Gait trainin  Neuro re education: 0  Therapeutic ex: Jakub Schmidt, PTA, 22 at 4:12 PM

## 2022-06-30 LAB
ALBUMIN SERPL-MCNC: 3.1 G/DL (ref 3.5–4.6)
ALP BLD-CCNC: 54 U/L (ref 40–130)
ALT SERPL-CCNC: 57 U/L (ref 0–33)
ANION GAP SERPL CALCULATED.3IONS-SCNC: 9 MEQ/L (ref 9–15)
AST SERPL-CCNC: 25 U/L (ref 0–35)
BASOPHILS ABSOLUTE: 0 K/UL (ref 0–0.2)
BASOPHILS RELATIVE PERCENT: 0.6 %
BILIRUB SERPL-MCNC: 0.3 MG/DL (ref 0.2–0.7)
BILIRUBIN DIRECT: <0.2 MG/DL (ref 0–0.4)
BILIRUBIN, INDIRECT: ABNORMAL MG/DL (ref 0–0.6)
BUN BLDV-MCNC: 17 MG/DL (ref 6–20)
CALCIUM SERPL-MCNC: 8.3 MG/DL (ref 8.5–9.9)
CHLORIDE BLD-SCNC: 102 MEQ/L (ref 95–107)
CO2: 27 MEQ/L (ref 20–31)
CREAT SERPL-MCNC: 0.69 MG/DL (ref 0.5–0.9)
EOSINOPHILS ABSOLUTE: 0.1 K/UL (ref 0–0.7)
EOSINOPHILS RELATIVE PERCENT: 2 %
GFR AFRICAN AMERICAN: >60
GFR NON-AFRICAN AMERICAN: >60
GLUCOSE BLD-MCNC: 102 MG/DL (ref 70–99)
HCT VFR BLD CALC: 28.5 % (ref 37–47)
HEMOGLOBIN: 9.3 G/DL (ref 12–16)
LYMPHOCYTES ABSOLUTE: 1 K/UL (ref 1–4.8)
LYMPHOCYTES RELATIVE PERCENT: 14.2 %
MCH RBC QN AUTO: 28 PG (ref 27–31.3)
MCHC RBC AUTO-ENTMCNC: 32.6 % (ref 33–37)
MCV RBC AUTO: 86 FL (ref 82–100)
MONOCYTES ABSOLUTE: 0.9 K/UL (ref 0.2–0.8)
MONOCYTES RELATIVE PERCENT: 12.5 %
NEUTROPHILS ABSOLUTE: 5.2 K/UL (ref 1.4–6.5)
NEUTROPHILS RELATIVE PERCENT: 70.7 %
PDW BLD-RTO: 18.2 % (ref 11.5–14.5)
PLATELET # BLD: 211 K/UL (ref 130–400)
POTASSIUM REFLEX MAGNESIUM: 4.1 MEQ/L (ref 3.4–4.9)
RBC # BLD: 3.31 M/UL (ref 4.2–5.4)
SODIUM BLD-SCNC: 138 MEQ/L (ref 135–144)
TOTAL CK: 73 U/L (ref 0–170)
TOTAL PROTEIN: 5.7 G/DL (ref 6.3–8)
TROPONIN: 0.03 NG/ML (ref 0–0.01)
WBC # BLD: 7.4 K/UL (ref 4.8–10.8)

## 2022-06-30 PROCEDURE — 97110 THERAPEUTIC EXERCISES: CPT

## 2022-06-30 PROCEDURE — 97116 GAIT TRAINING THERAPY: CPT

## 2022-06-30 PROCEDURE — 85025 COMPLETE CBC W/AUTO DIFF WBC: CPT

## 2022-06-30 PROCEDURE — 97530 THERAPEUTIC ACTIVITIES: CPT

## 2022-06-30 PROCEDURE — 6370000000 HC RX 637 (ALT 250 FOR IP): Performed by: INTERNAL MEDICINE

## 2022-06-30 PROCEDURE — 1180000000 HC REHAB R&B

## 2022-06-30 PROCEDURE — 82550 ASSAY OF CK (CPK): CPT

## 2022-06-30 PROCEDURE — 97535 SELF CARE MNGMENT TRAINING: CPT

## 2022-06-30 PROCEDURE — 97129 THER IVNTJ 1ST 15 MIN: CPT

## 2022-06-30 PROCEDURE — 84484 ASSAY OF TROPONIN QUANT: CPT

## 2022-06-30 PROCEDURE — 36415 COLL VENOUS BLD VENIPUNCTURE: CPT

## 2022-06-30 PROCEDURE — 97112 NEUROMUSCULAR REEDUCATION: CPT

## 2022-06-30 PROCEDURE — 80076 HEPATIC FUNCTION PANEL: CPT

## 2022-06-30 PROCEDURE — 6370000000 HC RX 637 (ALT 250 FOR IP): Performed by: PHYSICAL MEDICINE & REHABILITATION

## 2022-06-30 PROCEDURE — 6360000002 HC RX W HCPCS: Performed by: INTERNAL MEDICINE

## 2022-06-30 PROCEDURE — 80048 BASIC METABOLIC PNL TOTAL CA: CPT

## 2022-06-30 PROCEDURE — 97130 THER IVNTJ EA ADDL 15 MIN: CPT

## 2022-06-30 PROCEDURE — 99233 SBSQ HOSP IP/OBS HIGH 50: CPT | Performed by: PHYSICAL MEDICINE & REHABILITATION

## 2022-06-30 RX ORDER — ASPIRIN 81 MG/1
81 TABLET ORAL DAILY
Status: DISCONTINUED | OUTPATIENT
Start: 2022-06-30 | End: 2022-07-12 | Stop reason: HOSPADM

## 2022-06-30 RX ORDER — CLOPIDOGREL BISULFATE 75 MG/1
75 TABLET ORAL DAILY
Status: DISCONTINUED | OUTPATIENT
Start: 2022-06-30 | End: 2022-07-12 | Stop reason: HOSPADM

## 2022-06-30 RX ORDER — OXYBUTYNIN CHLORIDE 5 MG/1
5 TABLET, EXTENDED RELEASE ORAL NIGHTLY
Status: DISCONTINUED | OUTPATIENT
Start: 2022-06-30 | End: 2022-07-12 | Stop reason: HOSPADM

## 2022-06-30 RX ADMIN — ASPIRIN 81 MG: 81 TABLET, COATED ORAL at 13:35

## 2022-06-30 RX ADMIN — ACETAMINOPHEN 650 MG: 325 TABLET ORAL at 13:35

## 2022-06-30 RX ADMIN — THERA TABS 1 TABLET: TAB at 08:52

## 2022-06-30 RX ADMIN — Medication 100 MG: at 08:52

## 2022-06-30 RX ADMIN — ENOXAPARIN SODIUM 40 MG: 100 INJECTION SUBCUTANEOUS at 08:52

## 2022-06-30 RX ADMIN — Medication 2000 UNITS: at 17:52

## 2022-06-30 RX ADMIN — CLOPIDOGREL BISULFATE 75 MG: 75 TABLET ORAL at 13:35

## 2022-06-30 RX ADMIN — OXYBUTYNIN CHLORIDE 5 MG: 5 TABLET, EXTENDED RELEASE ORAL at 20:36

## 2022-06-30 ASSESSMENT — PAIN SCALES - WONG BAKER: WONGBAKER_NUMERICALRESPONSE: 2

## 2022-06-30 ASSESSMENT — PAIN DESCRIPTION - ORIENTATION: ORIENTATION: LEFT

## 2022-06-30 ASSESSMENT — PAIN SCALES - GENERAL
PAINLEVEL_OUTOF10: 0
PAINLEVEL_OUTOF10: 2
PAINLEVEL_OUTOF10: 0
PAINLEVEL_OUTOF10: 5
PAINLEVEL_OUTOF10: 0

## 2022-06-30 ASSESSMENT — PAIN - FUNCTIONAL ASSESSMENT: PAIN_FUNCTIONAL_ASSESSMENT: PREVENTS OR INTERFERES WITH MANY ACTIVE NOT PASSIVE ACTIVITIES

## 2022-06-30 ASSESSMENT — PAIN DESCRIPTION - LOCATION: LOCATION: FOOT

## 2022-06-30 ASSESSMENT — PAIN DESCRIPTION - DESCRIPTORS: DESCRIPTORS: THROBBING

## 2022-06-30 NOTE — PROGRESS NOTES
[facility] Cardiology Progress Note        Date:     2022    Patient:    Sherri Thorpe    :    1980  CSN:    [id number]    Rounding Cardiologist: Jessica Damon MD     Primary Cardiologist: None    Requesting Physician:  Elvi Sanford DO      Reason for Initial Consult:  Elevated cardiac enzymes after fall / tachycardia. Subjective:    Feels well. No complaints CV wise. Telemetry reviewed and only ST with activity. No other dysrhythmias. Primary to treat underling infection. Echo done and normal except for Moderate PFO with R>L shunt noted. Will start Plavix and ASA for now. 14 system General and Cardiac ROS otherwise negative or unchanged. Assessment:    1. Fall   2. Tachycardia, probable reactive sinus tachycardia  3. Elevated Cardiac Enzymes c/w rhadomyolysis ( low normal Troponin noted ). 4. No prior cardiac history  5. Normal LV Function, LVEF 65%. 6. PFO, Moderate, by echo contrast .  7. Multiple Sclerosis, recent exacerbation  8. UTI  9. Edema  10. Anemia  11. Hyperlipidemia  12. Lumbar radiculopathy  13. DJD    Plan:    1. Cardiac Supportive Care  2. IV and PO hydration for Rhabdomyolysis care. 3. DC Telemetry. 4. ASA 81 mg /day  5. Plavix 75 mg / day  6. Eventual outpatient Cardiac MRI and PFO closure. 7. Doubt need for cardiac catheterization. 8. Further Recommendations to follow  9. See Orders    ======================================================       HISTORY OF PRESENT ILLNESS:      Sherri Thorpe is a pleasant 35 y.o. female who presented through the emergency room with severe hip pain status post a fall at home. She was diagnosed with an exacerbation of her multiple sclerosis and rhabdomyolysis secondary to elevated CK levels. Fortunately her x-rays of her hips were unremarkable. She was prescribed IV fluids and IV methylprednisolone. She is hoping to transition to [facility] therapy as an outpatient for her MS.     While in rehab, walking the talley she was noted on telemetry to have a heart rate up to 146 bpm.    Cardiology was asked to see in consultation. Patient History and Records, EMR reviewed. Patient Interviewed and examined. Good historian. States that she is feeling well overall. Denies CP, SOB, LH, Dizziness, TIA or CVA Symptoms. No Orthopnea, Edema or CHF symptoms. No Palpitations. No Syncope. No Fever, Chills or Cold symptoms. No GI,  or Bleeding complaints. Cardiac and general ROS otherwise negative. [address] otherwise negative other than noted. Past Medical History:   Diagnosis Date    Dislocated knee     Right knee    Intellectual disability 2022    Lumbar radiculopathy 2020    MS (multiple sclerosis) ([facility] [address] 2022    PTTD (posterior tibial tendon dysfunction)        Past Surgical History:   Procedure Laterality Date    CATARACT REMOVAL WITH IMPLANT Right     CATARACT REMOVAL WITH IMPLANT Left      SECTION      EYE SURGERY      FOOT SURGERY Left        Prior to Admission medications    Medication Sig Start Date End Date Taking?  Authorizing Provider   mirabegron (MYRBETRIQ) 25 MG TB24 Take 1 tablet by mouth daily  Patient not taking: Reported on 2022   MD WILTON BearSIMPTA 20 MG/0.4ML [facility]  12/15/21   Historical Provider, MD   vitamin D 25 MCG (1000 UT) CAPS Take by mouth    Historical Provider, MD   MULTIPLE VITAMINS-MINERALS ER PO Take 1 tablet by mouth    Historical Provider, MD       Scheduled Meds:   oxybutynin  5 mg Oral Nightly    Vitamin D  2,000 Units Oral Dinner    cyanocobalamin  1,000 mcg IntraMUSCular Weekly    coenzyme Q10  100 mg Oral Daily    lidocaine  3 patch TransDERmal Daily    enoxaparin  40 mg SubCUTAneous Daily    multivitamin  1 tablet Oral Daily     Continuous Infusions:  PRN Meds:acetaminophen, bisacodyl, fleet    No Known Allergies    Social History     Socioeconomic History    Marital status:  Spouse name: Not on file    Number of children: 1    Years of education: Not on file    Highest education level: Not on file   Occupational History    Not on file   Tobacco Use    Smoking status: Never Smoker    Smokeless tobacco: Never Used   Substance and Sexual Activity    Alcohol use: No    Drug use: No    Sexual activity: Not Currently   Other Topics Concern    Not on file   Social History Narrative    Lives With: her parents (mom Lisseth Crowley) father is sick  and her Son (5 y.o)    Type of Home: Apartment in [address] [place] [address] Nw: One level    Home Access: Stairs to enter with rails - Number of Steps: 1    Bathroom Shower/Tub: Tub/Shower unit, Equipment: Shower chair,Hand-held shower    Home Equipment: Alanna Chirag, rolling,Wheelchair-manual    Has the patient had two or more falls in the past year or any fall with injury in the past year?: Yes (one - fell on mothers floor and couldn't get up)    ADL Assistance: Independent    Homemaking Assistance: Independent    Homemaking Responsibilities: Yes    Ambulation Assistance: Independent    Transfer Assistance: Independent    Active : No    Patient's  Info: mother    She is on disability from her MS-Had been a [place] prior to her MS. Was in special classes in school. Pending Waiver services for Aide services. Social Determinants of Health     Financial Resource Strain:     Difficulty of Paying Living Expenses: Not on file   Food Insecurity:     Worried About Running Out of Food in the Last Year: Not on file    Gege of Food in the Last Year: Not on file   Transportation Needs:     Lack of Transportation (Medical): Not on file    Lack of Transportation (Non-Medical):  Not on file   Physical Activity:     Days of Exercise per Week: Not on file    Minutes of Exercise per Session: Not on file   Stress:     Feeling of Stress : Not on file   Social Connections:     Frequency of Communication with Friends and Family: Not on file    Frequency of Social Gatherings with Friends and Family: Not on file    Attends Holiness Services: Not on file    Active Member of Clubs or Organizations: Not on file    Attends Club or Organization Meetings: Not on file    Marital Status: Not on file   Intimate Partner Violence:     Fear of Current or Ex-Partner: Not on file    Emotionally Abused: Not on file    Physically Abused: Not on file    Sexually Abused: Not on file   Housing Stability:     Unable to Pay for Housing in the Last Year: Not on file    Number of [place] in the Last Year: Not on file    Unstable Housing in the Last Year: Not on file       Family History   Problem Relation Age of Onset    Hypertension Mother     Diabetes Maternal Uncle     Cancer Maternal Grandmother         stomach cancer       Review Of Systems:    14 point ROS negative other than mentioned. Physical Exam:    CURRENT VITALS: BP (!) 108/54   Pulse 84   Temp 98.2 °F (36.8 °C)   Resp 20   Ht 5' 1\" (1.549 m)   Wt 176 lb 1.6 oz (79.9 kg)   LMP 06/01/2022   SpO2 100%   BMI 33.27 kg/m²     CONSTITUTIONAL:  awake, alert, cooperative, no apparent distress,   ENT:  Normocephalic, without obvious abnormality, atraumatic, sinuses nontender on palpation, external ears without lesions,  NECK:  Supple, symmetrical, trachea midline, no adenopathy, thyroid symmetric, not enlarged and no tenderness, skin normal, No bruits. LUNGS:  No increased work of breathing, good air exchange, clear to auscultation bilaterally, no crackles, no wheezing  CARDIOVASCULAR:  Normal apical impulse, regular rate and rhythm, normal S1 and S2,  1/6 Systolic murmur noted. ABDOMEN:  Obese, normal bowel sounds, soft, non-distended, non-tender, no masses palpated, no hepatosplenomegally  EXTREMETIES: No edema, Pulses Strong Thruout. No ulcers. NEUROLOGIC:  Awake, alert, oriented to name, place and time.  Following all commands and moving all

## 2022-06-30 NOTE — PROGRESS NOTES
OCCUPATIONAL THERAPY  INPATIENT REHAB TREATMENT NOTE  [facility]      NAME: Dominik Salazar  : 1980 (35 y.o.)  MRN: [id number]  CODE STATUS: Full Code  Room: R255/R255-01    Date of Service: 2022    Referring Physician: Dr. Екатерина Snyder  Rehab Diagnosis: Impaired mobility and ADLs D/T Exacerabation of MS    Restrictions  Restrictions/Precautions  Restrictions/Precautions: Fall Risk           Patient's date of birth confirmed: Yes    SAFETY:  Safety Devices  Safety Devices in place: Yes  Type of devices: All fall risk precautions in place    SUBJECTIVE:  Subjective: \"I had a really good lunch today. \"- upon entering room after lunch for therapy session  Pain: Patient had no c/o pain at both beginning and end of session. Pain at start of treatment: No    Pain at end of treatment: No        COGNITION:  Orientation  Overall Orientation Status: Within Functional Limits  Orientation Level: Oriented X4  Cognition  Overall Cognitive Status: WFL      Pt's current cognitive status is:  Patient's cognition will improve or maintain baseline to safely perform ADLs:  Comprehension: Independent  Expression: Independent  Social Interaction: Independent  Problem Solving: Supervision  Memory: Independent    OBJECTIVE:    Pt participated in seated [address] functional activities of manipulating graded resistive therputty rolling/pinching/flattenening with BUEs then locating/retrieval 10 small beads throughout and picking up pennies and manipulating in hands to place into slot on top of container to improve [address], dexterity, in-hand manipulation, hand/digit strength, and motor control for self-care tasks and ADLs. Extended time needed to complete [address] tasks due to occasional difficulty with FM and in-hand manipulation skills. Verbal cues given for instruction, sequencing, and FM/in-hand manipulation techniques to improve grasp/function.            Education:  Education  Education Given To: Patient  Education Provided: Plan of Care  Education Method: Verbal  Barriers to Learning: None  Education Outcome: Verbalized understanding    Equipment recommendations:  OT Equipment Recommendations  Other: Continue to assess      ASSESSMENT:  Assessment: Pt demonstrated good willingness to participate in therapy session this date. Activity Tolerance: Patient tolerated treatment well      PLAN OF CARE:  Strengthening,Balance training,Functional mobility training,Neuromuscular re-education,Self-Care / Linford Links training,Safety education & training,Home management training  continue with POC    Patient goals : \"My dad can't take care of me now, so I need to get better. He has congestive heart failure. \"  Time Frame for Long term goals : Within 2 weeks, Pt to demonstrate progress in the following areas listed below to achieve specific LTG's as stated in the initial evaluation. Long Term Goal 1: Increase independence in ADLs  Long Term Goal 2: Increaese endurance to complete clothes management  Long Term Goal 3: Increase static/dynamic balance  Long Term Goal 4:  Increase independence to perform kitchen mobility        Therapy Time:   Individual Group Co-Treat   Time In 1300       Time Out 1330         Minutes 30                   Therapeutic activities: 30 minutes     Electronically signed by:    JAMAL Ryan,   6/30/2022, 1:39 PM

## 2022-06-30 NOTE — PROGRESS NOTES
Physical Therapy Rehab Treatment Note  Facility/Department: [facility]  Room: R255/R255-01       NAME: Adrian Nash  : 1980 (28 y.o.)  MRN: [id number]  CODE STATUS: Full Code    Date of Service: 2022       Restrictions:  Restrictions/Precautions: Fall Risk       SUBJECTIVE:   Subjective: \"I'm doing okay. \"    Pain  Pain: Denies pre and post session pain. OBJECTIVE:     Transfers  Surface: Wheelchair  Device: Walker  Sit to [employer] Level: Contact guard assist  Skilled Clinical Factors: vc's for hand placement occ  Stand to Sit  Assistance Level: Contact guard assist  Skilled Clinical Factors: vc's for hand placement  Stand Pivot  Assistance Level: Minimal assistance  Skilled Clinical Factors: poor approach to chair, step by step cues for improving alignment and sequencing ww with BLE    Ambulation  Surface: Carpet  Device: Rolling walker  Distance: 5' two turns  Activity Comments:  post ambulation  Assistance Level: Contact guard assist  Gait Deviations: Decreased step length bilateral;Slow leonardo;Decreased heel strike right;Decreased heel strike left; Wide base of support  Skilled Clinical Factors: lateral sway, kaz toe out, decreased weight shift to L    Neuromuscular Education  Neuromuscular Comments: ant/post/lateral gait drills in // bars, limited kaz foot clearance, requires BUE support throughout, multiple cues for upright posture, technique and sequencing of BUE/BLE, emphasis on weight shifting and foot clearance.  Increased reliance on BUE with // bars when exhibiting correct technique    PT Exercises  Exercise Treatment: fwd stepping over target x6 reps BLE x 2 sets  A/AROM Exercises: Seated: AP, LAQ, Marching x20 ea, trialed standing heel raises, unable to perform on LLE, standing marches (multiple sets with minimal reps for strengthening with proper technique), mini squats x10  Static Standing Balance Exercises: standing at ww to improving strength and endurance, max standing time 45 seconds, lateral weight shifting in // bars       ASSESSMENT/PROGRESS TOWARDS GOALS:   Assessment  Assessment: Tx limited by pt's elevating HR with standing activities. Initially pt reports feeling like her heart was racing, relieved by seated RB. Ther ex utilized to continue strengthening. Gait drills utilized to improve gait technique and quality, HR elevates to 138 BPM requiring seated RB's throughout. Focus of tx on improving posture/ overall body mechanics with exercises/trsfs and strengthening BLE. Goals:  Long Term Goals  Long term goal 1: Pt to complete bed mobility with indep  Long term goal 2: Pt to complete functional transfers bed/chair/car with indep  Long term goal 3: Pt to ambulate 50-150ft with LRD and indep  Long term goal 4: Pt to tolerate 5min standing activities indep  Long term goal 5: Pt to manage 2\" curb step indep  Additional Goals?: Yes  Long term goal 6: Pt to complete HEP with indep    PLAN OF CARE/Safety:   Plan Comment: Cont.  POC      Therapy Time:   Individual   Time In 1100   Time Out 1200   Minutes 60     Minutes:  Transfer/Bed mobility trainin  Gait trainin  Neuro re education: 21  Therapeutic ex: Denise Smalls, BINDU, 22 at 11:58 AM

## 2022-06-30 NOTE — PROGRESS NOTES
Per conversation w/Dr. Miguel Portillo hold all therapies until cardiology clears for therapy w/elevated Troponin level that was called this morning. Paged Dr. Sharda Gutierrez via phone call at this time. No S/S of distress at this time and call light is w/in reach. Therapy was also notified to hold therapies until cleared. Electronically signed by Kamla Coates RN on 6/30/2022 at 8:38 AM    Dr. Sharda Gutierrez called back, okay for pt to resume therapy. He will assess pt, order labs and adjust medications as needed after his assessment. I updated Dr. Miguel Portillo via phone message at this time and therapy that pt may return to her previous schedule.  Electronically signed by Kamla Coates RN on 6/30/2022 at 8:48 AM

## 2022-06-30 NOTE — PROGRESS NOTES
Physical Therapy Rehab Treatment Note  [facility]  [facility]       NAME: Sheila Poster  : 1980 (62 y.o.)  MRN: [id number]  CODE STATUS: Full Code    Date of Service: 2022       Restrictions:  Restrictions/Precautions: Fall Risk       SUBJECTIVE:   Subjective: \"I'm okay. \"    Pain  Pain: 5/10 L foot, RN notified and providing pain medication      OBJECTIVE:     Transfers  Surface: Wheelchair  Additional Factors: Verbal cues; Hand placement cues  Device: Walker  Sit to Stand  Assistance Level: Contact guard assist  Skilled Clinical Factors: vc's for hand placement occ  Stand to Sit  Assistance Level: Contact guard assist  Skilled Clinical Factors: vc's for hand placement  Stand Pivot  Assistance Level: Contact guard assist  Skilled Clinical Factors: improved follow through of vc's for approach to chair, multiple cues required with sequencing BLE and ww    Ambulation  Surface: Carpet  Device: Rolling walker  Distance: 30'  Activity Comments:  BPM post ambulation  Assistance Level: Contact guard assist;Minimal assistance  Gait Deviations: Decreased step length bilateral;Slow leonardo;Decreased heel strike right;Decreased heel strike left; Wide base of support  Skilled Clinical Factors: lateral sway, kaz toe out, decreased weight shift to L, assistance with weight shifting    PT Exercises  Exercise Treatment: 2\" step taps x10 alternating BLE / 2\" step ups x10  A/AROM Exercises: Seated AP, LAQ, Marching, hip add ball squeeze, hip abd RTB x20 ea  Static Standing Balance Exercises: standing at ww to improving strength and endurance, lateral weight shifting at ww     ASSESSMENT/PROGRESS TOWARDS GOALS:   Assessment  Assessment: Pt continues to require seated RB's for elevated HR and fatigue. Continue to work toward curb step goal with step up/tap exercises. Progress toward gait goal demonstrated this session secondary to pt increasing overall ambulatory distance.     Goals:  Long Term Goals  Long term goal 1: Pt to complete bed mobility with indep  Long term goal 2: Pt to complete functional transfers bed/chair/car with indep  Long term goal 3: Pt to ambulate 50-150ft with LRD and indep  Long term goal 4: Pt to tolerate 5min standing activities indep  Long term goal 5: Pt to manage 2\" curb step indep  Additional Goals?: Yes  Long term goal 6: Pt to complete HEP with indep    PLAN OF CARE/Safety:   Plan Comment: Cont.  POC      Therapy Time:   Individual   Time In 1330   Time Out 1400   Minutes 30     Minutes:  Transfer/Bed mobility trainin  Gait training: 10  Neuro re education: 0  Therapeutic ex: Farzana Danielson, PTA, 22 at 4:24 PM

## 2022-06-30 NOTE — PROGRESS NOTES
OCCUPATIONAL THERAPY  INPATIENT REHAB TREATMENT NOTE  [facility]      NAME: Dominik Salazar  : 1980 (35 y.o.)  MRN: [id number]  CODE STATUS: Full Code  Room: R255/R255-01    Date of Service: 2022    Referring Physician: Dr. Екатерина Snyder  Rehab Diagnosis: Impaired mobility and ADLs D/T Exacerabation of MS    Restrictions  Restrictions/Precautions  Restrictions/Precautions: Fall Risk              Patient's date of birth confirmed: Yes    SAFETY:  Safety Devices  Safety Devices in place: Yes  Type of devices: All fall risk precautions in place    SUBJECTIVE:  Subjective: \"I havent done this yet\"  Pain: Patient had no c/o pain at both beginning and end of session. OBJECTIVE:       While seated, completed fine motor task with focus on coordination, activity tolerance, and strength in order to improve general function. Activity completed with . 5# wrist weights to further target UE strength/activity tolerance     Challenged pt through usage of small dowels. Pt required to manipulate dowels, through pinching/grasping, reaching and placing according to instruction into vertical peg tree. Repetitive bilateral UE reaches completed to forward/vertical planes and at times required >90 degrees of shoulder flexion. Pt tolerated activity well but did complain of fatigue as the task went on. While seated, challenged strength, activity tolerance, ROM, and flexibility for improved ADL performance. Challenged pt through usage of .5# weights to complete BUE exercises. 1 sets x 10 reps completed.  Exercises focused on all UE joints and planes of motion including scapular protraction/retraction, shoulder flexion/extension/rotation/horizontal abduction, elbow flexion/extension  Education:  Education  Education Given To: Patient  Education Provided: Plan of Care;Role of Therapy;Precautions  Education Method: Verbal  Education Outcome: Verbalized understanding    Equipment recommendations:   Discussion regarding equipment - pt reports she is interested in a tub bench. Plan to trial       ASSESSMENT:  Good participation in session- tolerated well. PLAN OF CARE:  Strengthening,Balance training,Functional mobility training,Neuromuscular re-education,Self-Care / Kishore Granados training,Safety education & training,Home management training  continue with POC    Patient goals : \"My dad can't take care of me now, so I need to get better. He has congestive heart failure. \"  Time Frame for Long term goals : Within 2 weeks, Pt to demonstrate progress in the following areas listed below to achieve specific LTG's as stated in the initial evaluation. Long Term Goal 1: Increase independence in ADLs  Long Term Goal 2: Increaese endurance to complete clothes management  Long Term Goal 3: Increase static/dynamic balance  Long Term Goal 4:  Increase independence to perform kitchen mobility        Therapy Time:   Individual Group Co-Treat   Time In 1000       Time Out 1030         Minutes 30                   Therapeutic activities: 30 minutes     Electronically signed by:    Cecil Hamlin OT,   6/30/2022, 10:11 AM

## 2022-06-30 NOTE — PROGRESS NOTES
verbal prompts during alternating attention + divergent naming task to recall words recently stated, as she frequently repeated words already given. Goal 5: To decrease cognitive deficits and improve attention to tasks for safe completion of ADLs, pt will complete structured tasks addressing (sustained, selective, alternating, divided) attention with 90% accuracy and supervised assist.  Pt completed alternating attention with divergent naming task with 93% acc. Pt was able to re-direct self to task when distracted. Treatment/Activity Tolerance:  Patient tolerated treatment well    Plan:  Continue per POC    Pain Assessment:  Patient does not c/o pain. Pain Re-assessment:  Patient does not c/o pain. Patient/Caregiver Education:  Patient educated on session and progression towards goals. Patient stated verbal understanding of directions. Education needs reinforcement. Safety Devices:  Chair alarm in place      Dysphagia Outcome Severity Scale    SWALLOWING  Rating:  DNT    Speech Therapy Level of Assistance Scale    AUDITORY COMPREHENSION  Rating:  Modified Independent    VERBAL EXPRESSION  Rating:  Modified Independent    MOTOR SPEECH  Rating: Independent    PROBLEM SOLVING  Rating: Minimal Assistance-Moderate Assistance    MEMORY  Rating:   Moderate Assistance      Therapy Time  SLP Individual Minutes  Time In: 2238  Time Out: 1100  Minutes: 25        Signature: Electronically signed by FIDELIA Hayes on 6/30/2022 at 11:00 AM

## 2022-06-30 NOTE — PROGRESS NOTES
[facility]  INPATIENT REHABILITATION  UPDATE NOTE  Room: R255/R255-01  Admit Date: 2022       Date: 2022  Patient Name: Sherri Thorpe        MRN: [id number]    : 1980  (35 y.o.)  Gender: female        Anticipated Discharge Plan: Patient plans to discharge  home with 5year old son. Patient is being followed by Neurology, Sarah Pap, Cardiology, PT, OT, ST, RN, LSW, and RN Case Manager. We believe that patient would benefit from a continued stay in order to maximize overall function and to ensure a safe discharge. Family training to be scheduled once safe. Barriers to discharge: stoop to enter apartment, fatigues quickly, OT does not have pt goaled to be independent at this time, pt cannot live w/ mother dt stairs in the home, pt's mother cannot live with pt dt apartment being only one bedroom. Referral was made for waiver services on 22 via the [address] on [employer].     REHAB DIAGNOSIS:   Diagnosis: Impaired mobility and activities of daily living dt exac of MS    CO MORBIDITIES:      Past Medical History:   Diagnosis Date    Dislocated knee     Right knee    Intellectual disability 2022    Lumbar radiculopathy 2020    MS [facility]multiple sclerosis) ([facility] [address] 2022    PTTD (posterior tibial tendon dysfunction)      Past Surgical History:   Procedure Laterality Date    CATARACT REMOVAL WITH IMPLANT Right     CATARACT REMOVAL WITH IMPLANT Left      SECTION  2013    EYE SURGERY      FOOT SURGERY Left         Last set of vitals:  Vital Signs  Temp: 98.2 °F (36.8 °C)  Temp Source: Oral  Heart Rate: 84  Heart Rate Source: Monitor  Resp: 20  BP: (!) 108/54  BP Location: Left upper arm  BP Method: Automatic  MAP (Calculated): 72  Patient Position: Sitting  Level of Consciousness: Alert (0)  MEWS Score: 2    Results/Findings:   Labs:   Recent Labs     22  0448 22  0450 22  0509   WBC 11.2* 7.8 7.4   HGB 10.0* 9.7* 9.3*   HCT 31.8* 30.4* 28.5*    209 211     Recent Labs     06/28/22  0448 06/29/22  0450 06/30/22  0509    142 138   K 4.1 4.4 4.1   CL 99 105 102   CO2 28 27 27   BUN 15 15 17   CREATININE 0.60 0.62 0.69   CALCIUM 8.6 9.0 8.3*     Recent Labs     06/30/22  0509   AST 25   ALT 57*   BILIDIR <0.2   BILITOT 0.3   ALKPHOS 54     No results for input(s): INR in the last 72 hours. Recent Labs     06/29/22  0450 06/30/22  0509   CKTOTAL  --  73   TROPONINI 0.012* 0.025*       Urinalysis:      Lab Results   Component Value Date/Time    NITRU Negative 06/19/2022 08:18 PM    WBCUA  06/18/2022 05:13 PM    BACTERIA MODERATE 06/18/2022 05:13 PM    RBCUA 10-20 06/18/2022 05:13 PM    BLOODU Negative 06/19/2022 08:18 PM    SPECGRAV 1.038 06/19/2022 08:18 PM    GLUCOSEU 500 06/19/2022 08:18 PM       Radiology:  US DUP LOWER EXTREMITIES BILATERAL VENOUS   Final Result   NO FINDINGS OF  DEEP VENOUS THROMBUS IN THE VISUALIZED VESSELS OF THE LEFT OR RIGHT  LOWER EXTREMITY.          Restrictions  Restrictions/Precautions: Fall Risk  CASE MANAGEMENT    Social/Functional History  Social/Functional History  Lives With: Son (5 y/o)  Type of Home: Apartment  Home Layout: One level  Home Access: Stairs to enter without rails  Entrance Stairs - Number of Steps: 1 (2\" threshold)  Bathroom Shower/Tub: Tub/Shower unit  Bathroom Toilet: Standard  Bathroom Equipment: Shower chair,Hand-held shower  Bathroom Accessibility: Accessible  Home Equipment: [address], rolling,Wheelchair-manual,Reacher,Long-handled shoehorn  Has the patient had two or more falls in the past year or any fall with injury in the past year?: Yes  Receives Help From: Family  ADL Assistance: Independent (later reports that son helps put on socks when needed)  Homemaking Assistance: Independent  Homemaking Responsibilities: Yes  Ambulation Assistance: Independent [employer])  Transfer Assistance: Independent  Active : No  Patient's  Info: mother  Occupation: On disability  Type of Occupation: [employer]: watch tv, bowling with son       COVERAGE INFORMATION:Payor: Gary Gamboa / Plan: Praveen Stallworth / Product Type: *No Product type* /       NURSING  Weight: 176 lb 1.6 oz (79.9 kg) / Body mass index is 33.27 kg/m². ADULT DIET; Regular; 3 carb choices (45 gm/meal); No Added Salt (3-4 gm)    SpO2: 100 % (06/30/22 0612)  No active isolations    Skin Issues: Yes; bruising and abrasions open to air    Pain Managed: Yes    Bladder continence: No; purewick at night    Bowel continence: Yes      Other: Lovenox  L foot drag  Elevated Troponin this AM--Dr. James Mims gave the approval for pt to resume therapy and plans to assess and order labs and adjust medications  Dr. Vitaliy Edwards following  Pt wore depends at home prior to hospitalization for urinary incontinence      PHYSICAL THERAPY  Initial status:           Bed mobility:  Bed mobility  Bridging: Minimal assistance  Rolling to Left: Minimal assistance  Rolling to Right: Minimal assistance  Supine to Sit: Minimal assistance  Sit to Supine: Unable to assess  Scooting: Minimal assistance (latearlly in supine)  Bed Mobility Comments: HOB slightly elevated; heavy use of hand rails; vc's for hand placement and sequencing with LE's. Transfers:  Transfers  Sit to Stand: Contact guard assistance  Stand to sit: Contact guard assistance  Bed to Chair: Contact guard assistance  Lateral Transfers: Minimal Assistance;Contact guard assistance (Instruction provided on scooting laterally at EOB emphasizing forward weight shift)  Comment: Slow to complete. Heavy reliance on UEs for power. Verbal cues for sequencing and ergonomics. Gait:   Ambulation  Surface: level tile  Device: Rolling Walker  Other Apparatus: Wheelchair follow  Assistance: Contact guard assistance;Minimal assistance  Quality of Gait: Steadying throughout. increased reliance on [employer] for support with FF posture.  Step to pattern with L LE lagging. Distance: 8ft  Comments: Limited by fatigue  Stairs:   unsafe  W/C mobility:    NA          Current status:   Bed Mobility  Additional Factors: Head of bed flat; With handrails  Roll Left  Assistance Level: Stand by assist  Supine to Sit  Assistance Level: Contact guard assist  Skilled Clinical Factors: decreased effort with hospital bed  Scooting  Assistance Level: Contact guard assist  Skilled Clinical Factors: scooting to EOB  Transfers:  Transfers  Surface: Wheelchair  Device: Walker  Sit to [facility]  Assistance Level: Contact guard assist  Skilled Clinical Factors: vc's for hand placement occ  Stand to Sit  Assistance Level: Contact guard assist  Skilled Clinical Factors: vc's for hand placement  Stand Pivot  Assistance Level: Minimal assistance  Skilled Clinical Factors: poor approach to chair, step by step cues for improving alignment and sequencing ww with BLE  Gait:   Ambulation  Surface: Carpet  Device: Rolling walker  Distance: 5' two turns  Activity Comments:  post ambulation  Assistance Level: Contact guard assist  Gait Deviations: Decreased step length bilateral;Slow leonardo;Decreased heel strike right;Decreased heel strike left; Wide base of support  Skilled Clinical Factors: lateral sway, kaz toe out, decreased weight shift to L  Stairs:   Unsafe  W/C mobility:   NA    Assessment: pt struggles with deconditioning as well as increased HR with minimal activity. Continued PT indicated to progress mobility and facilitate DC at highest level of indep and safety.     LTG:  Long term goal 1: Pt to complete bed mobility with indep  Long term goal 2: Pt to complete functional transfers bed/chair/car with indep  Long term goal 3: Pt to ambulate 50-150ft with LRD and indep  Long term goal 4: Pt to tolerate 5min standing activities indep  Long term goal 5: Pt to manage 2\" curb step indep    Signature: Electronically signed by Kitty Jennings, PT on 6/30/22 at 12:31 PM EDT      OCCUPATIONAL THERAPY   Initial Self Care Status:  ADL  Feeding: Modified independent   Grooming: Setup  Grooming Skilled Clinical Factors: for oral care and grooming  UE Bathing: Stand by assistance,Increased time to complete,Verbal cueing  LE Bathing: Dependent/Total  LE Bathing Skilled Clinical Factors: +2 assistance. Pt able to bend down in order to wash legs with SBA for safe sitting. +2 to wash posterior lillie area. 1 person for washing 1 person to maintain standing balance  UE Dressing: Minimal assistance  LE Dressing: Maximum assistance,Increased time to complete  LE Dressing Skilled Clinical Factors: to don pants and depends  Toileting: Dependent/Total  Toileting Skilled Clinical Factors: Pt incontinent of feces  Additional Comments: Pt displaying lack of endurance and fatigue throughout session, requiring increased assistance throughout progression of evaluation  Toilet Transfers  Toilet - Technique: Stand pivot  Equipment Used: Grab bars  Toilet Transfer: Dependent/Total  Toilet Transfers Comments: Twards end of session, initially Min A for transfers  Shower Transfers  Shower - Transfer Type: To and From  Shower - Transfer To: Shower seat with back  Shower - Technique: Stand pivot  Shower Transfers: Moderate assistance  Shower Transfers Comments: Pt with decreased endurance and fatigued post shower, requiring more assistance      Current Self Care Status:  Grooming/Oral Hygiene  Assistance Level: Set-up (06/29/22 1314)  Upper Extremity Bathing  Assistance Level: Set-up (while seated in wheelchair) (06/29/22 1314)  Lower Extremity Bathing  Assistance Level: Maximum assistance (06/29/22 1314)  Upper Extremity Dressing  Assistance Level: Set-up (06/29/22 1314)  Lower Extremity Dressing  Assistance Level: Maximum assistance (06/29/22 1314)  Putting On/Taking Off Footwear  Assistance Level: Maximum assistance (06/29/22 1314)  Toileting  Assistance Level: Dependent; Requires x 2 assistance;Verbal cues (22)  Toilet Transfers  Technique: Stand pivot (22)  Equipment: Standard toilet;Grab bars (22)  Additional Factors: Verbal cues;Cues for hand placement (22)  Assistance Level: Minimal assistance (22)  Tub/Shower Transfers  Minimal to Moderate assistance    Pt continues to require extensive assist with ADLs at this time and is participating well to achieve goals. Pt continue to demo decreased endurance and standing balance. Anticipate continued progress. Pt still well below baseline and requires continued OT for safe d/c    Within 1 Weeks Pt. will be: Bathing: Min A  UE Dressing: Independent  LE Dressing: Min A  Toileting: Mod A  Toilet Transfer: Mod A    Signature: Electronically signed by Santos Givens OT on 22 at 12:43 PM EDT        SPEECH THERAPY    Initial Status:  Diet:   Regular solids with thin liquids  Dysphagia Outcome Severity Scale:  Ratin    Speech Therapy Level of Assistance Scale: Auditory Comprehension:  Rating: Modified Independent  Verbal Expression:  Rating:Modified Independent  Motor Speech:  Rating: Independent  Problem Solving:  Rating: Moderate Assistance  Memory:  Rating: Moderate Assistance      Long Term Goals:  Long-term Goals  Timeframe for Long-term Goals: 2-3 weeks  Goal 1: Pt will improve his Cognition from mod assist to supervised to mod I level for adequate functional recall and safety awareness for (home, community). Long-term Goals  Timeframe for Long-term Goals: N/A        Patient's Response to Therapy:  Patient presented with moderate cognitive deficits in the areas of attention, problem solving and memory. ST does suspect some baseline memory and problem solving skills. Patient reported to ST during the initial eval that she participated in learning disability classes in high school. Patient is cooperative and pleasant.        It is expected that patient would benefit from ongoing speech therapy, as she has responded positively to skilled intervention, thus far. Current Status:  Diet:   ADULT DIET; Regular; 3 carb choices (45 gm/meal); No Added Salt (3-4 gm)  Compensatory Swallowing Strategies : Upright as possible for all oral intake  Dysphagia Outcome Severity Scale:  Ratin    Speech Therapy Level of Assistance Scale: Auditory Comprehension:  Rating: Modified Independent  Verbal Expression:  Rating:Modified Independent  Motor Speech:  Rating: Independent  Problem Solving:  Rating: Minimal Assistance-Moderate Assistance  Memory:  Rating:  Moderate Assistance            Signature: Electronically signed by FIDELIA Blank on 22 at 8:36 AM EDT      Electronically signed by MEL Hayes, LSW on 2022 at 2:21 PM

## 2022-06-30 NOTE — PROGRESS NOTES
OCCUPATIONAL THERAPY  INPATIENT REHAB TREATMENT NOTE  [facility]      NAME: Chandu Andres  : 1980 (35 y.o.)  MRN: [id number]  CODE STATUS: Full Code  Room: 55/R255-01    Date of Service: 2022    Referring Physician: Dr. Oral Darden  Rehab Diagnosis: Impaired mobility and ADLs D/T Exacerabation of MS    Restrictions  Restrictions/Precautions  Restrictions/Precautions: Fall Risk              Patient's date of birth confirmed: Yes    SAFETY:  Safety Devices  Safety Devices in place: Yes  Type of devices: All fall risk precautions in place    SUBJECTIVE:  Subjective: \"I am planning to go back to my apartment where there are no stairs. My dad helps with my son\". Pain: Patient had no c/o pain at both beginning and end of session. Pain at start of treatment: No    Pain at end of treatment: No    Location: NA  Nursing notified: Not Applicable  RN: [id number]  Intervention: None    COGNITION:  Cognition  Cognition Comment: cues needed to problem solve and motor plan BUE exercises using weighted dowel    OBJECTIVE:  OT Exercises  A/AROM Exercises: use of 1 1/2 # dowel adrienne to improve B UE strength/endurance, trunk strength, and coordination for functional transfers and ADLS/IADLs. Pt tolerated 20 reps x2 of shoulder flexion/ext, horizontal shoulder add/abd, elbow flexion/ext, and trunk rotation and flexion extension. Pt tolerated well with cues for body positioning and motor planning. Pt needed short rest breaks in between exercises.     Education:  Education  Education Given To: Patient  Education Provided Comments: education provided on correct body mechanics with exercises  Education Method: Demonstration;Verbal  Barriers to Learning: Cognition  Education Outcome: Continued education needed    ASSESSMENT:  Activity Tolerance: Patient tolerated evaluation without incident;Patient tolerated treatment well      PLAN OF CARE:  Strengthening,Balance training,Functional mobility training,Neuromuscular re-education,Self-Care / Willene Leisure training,Safety education & training,Home management training  continue with POC    Patient goals : \"My dad can't take care of me now, so I need to get better. He has congestive heart failure. \"  Time Frame for Long term goals : Within 2 weeks, Pt to demonstrate progress in the following areas listed below to achieve specific LTG's as stated in the initial evaluation. Long Term Goal 1: Increase independence in ADLs  Long Term Goal 2: Increaese endurance to complete clothes management  Long Term Goal 3: Increase static/dynamic balance  Long Term Goal 4:  Increase independence to perform kitchen mobility        Therapy Time:   Individual Group Co-Treat   Time In 1530       Time Out 1600         Minutes 30                   Therapeutic activities: 30 minutes     Electronically signed by:    FRANCHESKA Mejia,   6/30/2022, 4:13 PM

## 2022-06-30 NOTE — PROGRESS NOTES
INDIVIDUALIZED OVERALL REHAB PLAN OF CARE  ADDENDUM TO REHAB PROGRESS NOTE-for audit purposes must also refer to this day's clinical note and combine the information      Date: 2022  Patient Name: Chandu Andres   Room: I558/F362-49    MRN: [id number]    : 1980  (42 y.o.)  Gender: female       Today 2022 during weekly team meeting, I reviewed the patient Chandu Andres in detail with the therapists and nurses involved in patient's care gathering complex physiatric data regarding current medical issues, progress in therapies, factors limiting progress, social issues, psychological issues, ongoing therapeutic plans and discharge planning. Legend:  I= independent Im =Modified independent  S=Supervised SB=stand by BARRERA=set up CG=contact betina Min= minimal Mod=Moderate Max=maximal Max of 2 =maximal assist of 2 people      CURRENT FUNCTIONAL STATUS:    Barriers to progress and discharge complex medical conditions, complex social situations and bowel/bladder dysfunction      NURSING ISSUES:    Wants a tub bench at home. Patient is from home with her mother; however, the patient plans on going to her apartment at discharge with her son (8yo). She says there are less stairs into her apartment. The patient states that her mother is nearby and can help. Her mother helps with her son and with shopping. 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 The patient stated her dad used to do her housework, but is no longer able to because he is not able, and now she wants an aid through [employer] to help her at home. The patient is also interested in [facility] at discharge. The patient has a wheelchair and a walker at home, and she has a shower chair. The patient gets her medication from [facility] [address] in [facility]. She has not had any problem affording her medication. Nursing will continue to focus on bowel and bladder continence transitioning toward independence by time of discharge. Monitoring post void residuals monitoring for severe constipation and bowel obstruction. Bowel function- incontinent   But now continent Plans to address- scheduled voids     Bladder function-  incontinent    Plans to address- schedule voids before bed and therapy     Skin deficits- dressing changes   Plans to address- pressure relief     Hydration/Nutritional deficits- monitoring for dysphagia  Plans to address-Push PO, assist with feeds as needed     high HR--Low BP- poor PO intake  Plans to address- check ortho BPs before therapies-and use abdominal binder and TEDs as needed    Pt and Family training goals-  teach home tecnology options like Rosalie use and home assistive devices      Focus on achieving ADL goals with co-treating with OT when possible. Focus on cognition and co treat with SLP when possible. PHYSICAL THERAPY  Bed mobility:  Supine to Sit: Minimal assistance (06/28/22 1143)  Sit to Supine: Moderate assistance (06/28/22 1143)  Transfers:  Sit to Stand: Contact guard assistance (06/28/22 1145)  Bed to Chair: Contact guard assistance (06/28/22 1145)  Gait:   Device: Rolling Walker (06/28/22 1149)  Other Apparatus: Wheelchair follow (06/28/22 1149)  Assistance: Contact guard assistance;Minimal assistance (06/28/22 1149)  Distance: 8ft (06/28/22 1149)  Quality of Gait: Steadying throughout. increased reliance on [employer] for support with FF posture. Step to pattern with L LE lagging. (06/28/22 1149)  Comments: Limited by fatigue (06/28/22 1149)  Stairs:     W/C mobility:           Pt and Family training goals-  Focus on sequencing and endurance        OCCUPATIONAL THERAPY  Hand Dominance: Right  ADL  Feeding: Modified independent  (06/28/22 1049)  Grooming: Setup (06/29/22 1341)  Grooming Skilled Clinical Factors: for oral care and grooming (06/29/22 1341)  UE Bathing: Stand by assistance; Increased time to complete (06/29/22 1341)  LE Bathing: Dependent/Total (06/29/22 1341)  LE Bathing Skilled Clinical Factors: +2 assistance. Pt able to bend down in order to wash legs with SBA for safe sitting. +2 to wash posterior lillie area. 1 person for washing 1 person to maintain standing balance (06/29/22 1341)  UE Dressing: Stand by assistance (06/29/22 1341)  LE Dressing: Increased time to complete;Maximum assistance (06/29/22 1341)  LE Dressing Skilled Clinical Factors: to don pants and depends (06/29/22 1341)  Toileting: Dependent/Total (06/29/22 1341)  Toileting Skilled Clinical Factors: Pt incontinent of feces and urine (06/29/22 1341)  Additional Comments: Pt displaying lack of endurance and fatigue throughout session, requiring increased assistance throughout progression of evaluation (06/28/22 1049)  Toilet Transfers  Toilet - Technique: Stand pivot (06/29/22 1339)  Equipment Used: Standard toilet (06/29/22 1339)  Toilet Transfer: Minimal assistance (verbal cues for hand placement) (06/29/22 1339)  Toilet Transfers Comments: Twards end of session, initially Min A for transfers (06/28/22 1054)     [address] - Transfer Type: To and From (06/28/22 1054)  Shower - Transfer To: Shower seat with back (06/28/22 1054)  Shower - Technique: Stand pivot (06/28/22 1054)  Shower Transfers: Moderate assistance (06/28/22 1054)  Shower Transfers Comments: Patient did not complete shower transfer as patient washed up at sink in wheelchair. (06/29/22 1339)      Plans for addressing ADL deficits affecting: Focus on sequencing. Bladder function disrupting functional progress- PureW[employer]      Plans to address- wean off use of PureWik     Skin deficits- skin tears   Plans to address- coordinate patient dressing changes education with nursing as part of ADL training                SPEECH THERAPY/SLP     Comprehension: Within Functional Limits (Patient answered simple yes/no questions, basic questions and participated in conversation with good comprehension.  no repetition was required)  Verbal Expression:  [facility] with meeting wants and needs. No word finding deficits have been noted during conversation)    Plans for cognitive and communication issues affecting addressing:   Pt and Family training goals-  teach patient family memory strategies      Diet/Swallow:        Dysphagia Outcome Severity Scale: Level 7: Normal in all situations    Compensatory Swallowing Strategies : Upright as possible for all oral intake             COGNITION  OT: Cognition Comment: Anxiety can be limiting at times; no anxiety noted on this date. SP:        Social History     Socioeconomic History    Marital status:      Spouse name: Not on file    Number of children: 1    Years of education: Not on file    Highest education level: Not on file   Occupational History    Not on file   Tobacco Use    Smoking status: Never Smoker    Smokeless tobacco: Never Used   Substance and Sexual Activity    Alcohol use: No    Drug use: No    Sexual activity: Not Currently   Other Topics Concern    Not on file   Social History Narrative    Lives With: her parents (mom Mayank Mendoza) father is sick  and her Son (5 y.o)    Type of Home: Apartment in [place] [address]    Home Layout: One level    Home Access: Stairs to enter with rails - Number of Steps: 1    Bathroom Shower/Tub: Tub/Shower unit, Equipment: Shower chair,Hand-held shower    Home Equipment: Cammie Zaldivar, rolling,Wheelchair-manual    Has the patient had two or more falls in the past year or any fall with injury in the past year?: Yes (one - fell on mothers floor and couldn't get up)    ADL Assistance: Independent    Homemaking Assistance: Independent    Homemaking Responsibilities: Yes    Ambulation Assistance: Independent    Transfer Assistance: Independent    Active : No    Patient's  Info: mother    She is on disability from her MS-Had been a [place] prior to her MS. Was in special classes in school. Pending Waiver services for Aide services.                        Social goal 1: Pt to complete bed mobility with indep  Long term goal 2: Pt to complete functional transfers bed/chair/car with indep  Long term goal 3: Pt to ambulate 50-150ft with LRD and indep  Long term goal 4: Pt to tolerate 5min standing activities indep  Long term goal 5: Pt to manage 2\" curb step indep  OT:Eating  Assistance Needed: Independent  CARE Score: 6  Discharge Goal: Independent, Oral Hygiene  Assistance Needed: Setup or clean-up assistance  CARE Score: 5  Discharge Goal: Independent, [address] needed: Dependent  CARE Score: 1  Discharge Goal: Supervision or touching assistance, Shower/Bathe Self  Assistance Needed: Substantial/maximal assistance  CARE Score: 2  Discharge Goal: Supervision or touching assistance  Upper Body Dressing  Assistance Needed: Partial/moderate assistance  CARE Score: 3  Discharge Goal: Independent, Lower Body Dressing  Assistance Needed: Substantial/maximal assistance  CARE Score: 2  Discharge Goal: Supervision or touching assistance, Putting On/Taking Off Footwear  Assistance Needed: Dependent  CARE Score: 1  Discharge Goal: Supervision or touching assistance, Toilet Transfer  Assistance needed: Substantial/maximal assistance  CARE Score: 2  Discharge Goal: Supervision or touching assistance  SP:Long-term Goals  Timeframe for Long-term Goals: 2-3 weeks  Goal 1: Pt will improve his Cognition from mod assist to supervised to mod I level for adequate functional recall and safety awareness for (home, community).   Long-term Goals  Timeframe for Long-term Goals: N/A           From a cognitive standpoint they will need:        24 hr supervision  --progress to occasional           Significant problems/ barriers to functional progress include: Pt is at a high risk for functional loss,      []  Acute infection/UTI    []  Low BP's     []  COPD flare-up   []  Uncontrolled blood sugar     []  Progressive anemia     []  poor endurance    [x]  Severe pain exacerbation [x]  Impaired mental status    [x]  Urinary incontinence    []  Bowel incontinence           Plan to correct barriers to functional progress: Add scheduled rest breaks, CoQ 10 and Vit B 12, control pain by using ice Lidoderm rest and massage as well as pain medications prior to therapy. Spread therapy of 15 hours out over a 7 day window to accommodate rest breaks and medical interventions. Patient seems to be making fair to good response to these interventions. Based on a comprehensive evaluation of the above, the individualized therapy and Discharge plan will be:    -Times stated are an average that will be varied based on the patient's daily need. PT    1 1/2  hrs/day 5-7 days per week      OT    1 1/2 hrs per day 5-7 days per week     ST     1/2    hrs /day 3-5 days per week       Estimated LOS   2 week(s)    - Overall functional prognosis:     [x]  Good    []  Fair    []  Poor -Medical Prognosis:   [x]  Good    []  Fair    []  Poor    This patient was made aware of the discussion of Plan of Care, their projected dicharge date and their projected function at discharge.          Joyce Peoples, DO

## 2022-06-30 NOTE — FLOWSHEET NOTE
1930 Assumed care of patient. Head to toe assessment complete, see chart  0600 Critical troponin level called to Dr. Johny Hill. Patient rested peacefully through the night.

## 2022-07-01 LAB
ANION GAP SERPL CALCULATED.3IONS-SCNC: 10 MEQ/L (ref 9–15)
BASOPHILS ABSOLUTE: 0 K/UL (ref 0–0.2)
BASOPHILS RELATIVE PERCENT: 0.5 %
BUN BLDV-MCNC: 27 MG/DL (ref 6–20)
CALCIUM SERPL-MCNC: 8.9 MG/DL (ref 8.5–9.9)
CHLORIDE BLD-SCNC: 105 MEQ/L (ref 95–107)
CO2: 25 MEQ/L (ref 20–31)
CREAT SERPL-MCNC: 0.62 MG/DL (ref 0.5–0.9)
EOSINOPHILS ABSOLUTE: 0.2 K/UL (ref 0–0.7)
EOSINOPHILS RELATIVE PERCENT: 2.1 %
GFR AFRICAN AMERICAN: >60
GFR NON-AFRICAN AMERICAN: >60
GLUCOSE BLD-MCNC: 104 MG/DL (ref 70–99)
HCT VFR BLD CALC: 27.6 % (ref 37–47)
HEMOGLOBIN: 8.9 G/DL (ref 12–16)
LYMPHOCYTES ABSOLUTE: 0.9 K/UL (ref 1–4.8)
LYMPHOCYTES RELATIVE PERCENT: 11.7 %
MCH RBC QN AUTO: 27.9 PG (ref 27–31.3)
MCHC RBC AUTO-ENTMCNC: 32.2 % (ref 33–37)
MCV RBC AUTO: 86.8 FL (ref 82–100)
MONOCYTES ABSOLUTE: 0.9 K/UL (ref 0.2–0.8)
MONOCYTES RELATIVE PERCENT: 12.3 %
NEUTROPHILS ABSOLUTE: 5.6 K/UL (ref 1.4–6.5)
NEUTROPHILS RELATIVE PERCENT: 73.4 %
PDW BLD-RTO: 17.9 % (ref 11.5–14.5)
PLATELET # BLD: 221 K/UL (ref 130–400)
POTASSIUM REFLEX MAGNESIUM: 4.2 MEQ/L (ref 3.4–4.9)
RBC # BLD: 3.18 M/UL (ref 4.2–5.4)
SODIUM BLD-SCNC: 140 MEQ/L (ref 135–144)
WBC # BLD: 7.7 K/UL (ref 4.8–10.8)

## 2022-07-01 PROCEDURE — 85025 COMPLETE CBC W/AUTO DIFF WBC: CPT

## 2022-07-01 PROCEDURE — 99233 SBSQ HOSP IP/OBS HIGH 50: CPT | Performed by: PSYCHIATRY & NEUROLOGY

## 2022-07-01 PROCEDURE — 97535 SELF CARE MNGMENT TRAINING: CPT

## 2022-07-01 PROCEDURE — 97112 NEUROMUSCULAR REEDUCATION: CPT

## 2022-07-01 PROCEDURE — 6360000002 HC RX W HCPCS: Performed by: INTERNAL MEDICINE

## 2022-07-01 PROCEDURE — APPSS15 APP SPLIT SHARED TIME 0-15 MINUTES: Performed by: NURSE PRACTITIONER

## 2022-07-01 PROCEDURE — 97116 GAIT TRAINING THERAPY: CPT

## 2022-07-01 PROCEDURE — 99232 SBSQ HOSP IP/OBS MODERATE 35: CPT | Performed by: PHYSICAL MEDICINE & REHABILITATION

## 2022-07-01 PROCEDURE — 1180000000 HC REHAB R&B

## 2022-07-01 PROCEDURE — 6370000000 HC RX 637 (ALT 250 FOR IP): Performed by: INTERNAL MEDICINE

## 2022-07-01 PROCEDURE — 80048 BASIC METABOLIC PNL TOTAL CA: CPT

## 2022-07-01 PROCEDURE — 6370000000 HC RX 637 (ALT 250 FOR IP): Performed by: PHYSICAL MEDICINE & REHABILITATION

## 2022-07-01 PROCEDURE — 97110 THERAPEUTIC EXERCISES: CPT

## 2022-07-01 PROCEDURE — 36415 COLL VENOUS BLD VENIPUNCTURE: CPT

## 2022-07-01 PROCEDURE — 97530 THERAPEUTIC ACTIVITIES: CPT

## 2022-07-01 RX ADMIN — THERA TABS 1 TABLET: TAB at 10:07

## 2022-07-01 RX ADMIN — Medication 2000 UNITS: at 17:48

## 2022-07-01 RX ADMIN — ASPIRIN 81 MG: 81 TABLET, COATED ORAL at 10:07

## 2022-07-01 RX ADMIN — OXYBUTYNIN CHLORIDE 5 MG: 5 TABLET, EXTENDED RELEASE ORAL at 22:00

## 2022-07-01 RX ADMIN — ENOXAPARIN SODIUM 40 MG: 100 INJECTION SUBCUTANEOUS at 10:07

## 2022-07-01 RX ADMIN — CLOPIDOGREL BISULFATE 75 MG: 75 TABLET ORAL at 10:07

## 2022-07-01 RX ADMIN — Medication 100 MG: at 10:07

## 2022-07-01 ASSESSMENT — ENCOUNTER SYMPTOMS
VOMITING: 0
WHEEZING: 0
ABDOMINAL DISTENTION: 0
CHEST TIGHTNESS: 0
SHORTNESS OF BREATH: 0
ABDOMINAL PAIN: 0
NAUSEA: 0
COLOR CHANGE: 0
COUGH: 0
TROUBLE SWALLOWING: 0

## 2022-07-01 NOTE — PROGRESS NOTES
OCCUPATIONAL THERAPY  INPATIENT REHAB TREATMENT NOTE  [facility]      NAME: Rosy Claudio  : 1980 (35 y.o.)  MRN: [id number]  CODE STATUS: Full Code  Room: R255/R255-01    Date of Service: 2022    Referring Physician: Dr. Juarez Severe  Rehab Diagnosis: Impaired mobility and ADLs D/T Exacerabation of MS    Restrictions  Restrictions/Precautions  Restrictions/Precautions: Fall Risk        Patient's date of birth confirmed: Yes    SAFETY:  Safety Devices  Type of devices: Call light within reach; Chair alarm in place; Left in chair    SUBJECTIVE:  Subjective: \"I slept good last night, so I'm not tired this morning\"    Pain at start of treatment: Denies    Pain at end of treatment:  Denies    COGNITION:  Orientation  Overall Orientation Status: Within Functional Limits  Cognition  Overall Cognitive Status: Exceptions  Arousal/Alertness: Appropriate responses to stimuli  Following Commands: Follows one step commands consistently  Attention Span: Appears intact  Memory: Appears intact  Safety Judgement: Good awareness of safety precautions  Problem Solving: Assistance required to generate solutions;Assistance required to identify errors made  Insights: Fully aware of deficits  Initiation: Does not require cues  Sequencing: Does not require cues      Pt's current cognitive status is:  Comprehension: Supervision  Expression: Independent  Social Interaction: Independent  Problem Solving: Supervision  Memory: Independent    OBJECTIVE:    Patient completed morning ADL as follows, requesting a sponge bath at sink. Feeding  Assistance Level: Set-up  Grooming/Oral Hygiene  Assistance Level: Set-up  Upper Extremity Bathing  Assistance Level: Set-up  Lower Extremity Bathing  Assistance Level: Moderate assistance  Upper Extremity Dressing  Assistance Level: Set-up  Lower Extremity Dressing  Assistance Level: Moderate assistance  Putting On/Taking Off Footwear  Assistance Level:  Moderate assistance  Toileting  Skilled Clinical Factors: Patient did not need to go during treatment session  Toilet Transfers  Skilled Clinical Factors: Patient did not need to use commode during treatment session  Tub/Shower Transfers  Skilled Clinical Factors: Pateint completed sponge bath at sink per her request      Education:  Education  Education Given To: Patient  Education Provided: Equipment  Education Provided Comments: Patient stated she has a long handled sponge to wash her back at home. Therapist educates patient that sponge could also be used to wash lower legs and feet. Education Method: Verbal  Barriers to Learning: Cognition  Education Outcome: Verbalized understanding    Equipment recommendations:  OT Equipment Recommendations  Other: Continue to assess      ASSESSMENT:  Activity Tolerance: Patient tolerated treatment well   Patient tolerated ADL well with no reports of fatigue during sponge bath. Patient would benefit from continued occupational therapy to maximize safety and independence with self-care skills. PLAN OF CARE:  Strengthening,Balance training,Functional mobility training,Neuromuscular re-education,Self-Care / Bard Handler training,Safety education & training,Home management training  continue with POC    Patient goals : \"My dad can't take care of me now, so I need to get better. He has congestive heart failure. \"  Time Frame for Long term goals : Within 2 weeks, Pt to demonstrate progress in the following areas listed below to achieve specific LTG's as stated in the initial evaluation. Long Term Goal 1: Increase independence in ADLs  Long Term Goal 2: Increaese endurance to complete clothes management  Long Term Goal 3: Increase static/dynamic balance  Long Term Goal 4:  Increase independence to perform kitchen mobility        Therapy Time:   Individual Group Co-Treat   Time In 0830       Time Out 0930         Minutes 60           ADL/IADL trainin minutes     Electronically signed by:    ABBE Booker/L,   7/1/2022, 11:50 AM

## 2022-07-01 NOTE — PROGRESS NOTES
[address] [facility] Cardiology Progress Note        Date:     2022    Patient:    Cata Rich    :    1980  CSN:    [id number]    Rounding Cardiologist: Elsy Bishop MD     Primary Cardiologist: None    Requesting Physician:  Cr Godinez DO      Reason for Initial Consult:  Elevated cardiac enzymes after fall / tachycardia. Subjective:    Feels well. No complaints CV wise. Telemetry reviewed and only ST with activity. No other dysrhythmias. Primary to treat underling infection. Echo done and normal except for Moderate PFO with R>L shunt noted. Will start Plavix and ASA for now. 14 system General and Cardiac ROS otherwise negative or unchanged. Assessment:    1. Fall   2. Tachycardia, probable reactive sinus tachycardia  3. Elevated Cardiac Enzymes c/w rhadomyolysis ( low normal Troponin noted ). 4. No prior cardiac history  5. Normal LV Function, LVEF 65%. 6. PFO, Moderate, by echo contrast .  7. Multiple Sclerosis, recent exacerbation  8. UTI  9. Edema  10. Anemia  11. Hyperlipidemia  12. Lumbar radiculopathy  13. DJD    Plan:    1. Cardiac Supportive Care  2. IV and PO hydration for Rhabdomyolysis care. 3. DC Telemetry. 4. ASA 81 mg /day  5. Plavix 75 mg / day  6. Eventual outpatient Cardiac MRI and PFO closure. 7. Doubt need for cardiac catheterization. 8. Further Recommendations to follow  9. See Orders    ======================================================       HISTORY OF PRESENT ILLNESS:      Cata Rich is a pleasant 35 y.o. female who presented through the emergency room with severe hip pain status post a fall at home. She was diagnosed with an exacerbation of her multiple sclerosis and rhabdomyolysis secondary to elevated CK levels. Fortunately her x-rays of her hips were unremarkable. She was prescribed IV fluids and IV methylprednisolone. She is hoping to transition to [facility] therapy as an outpatient for her MS.     While in rehab, walking the talley she was noted on telemetry to have a heart rate up to 146 bpm.    Cardiology was asked to see in consultation. Patient History and Records, EMR reviewed. Patient Interviewed and examined. Good historian. States that she is feeling well overall. Denies CP, SOB, LH, Dizziness, TIA or CVA Symptoms. No Orthopnea, Edema or CHF symptoms. No Palpitations. No Syncope. No Fever, Chills or Cold symptoms. No GI,  or Bleeding complaints. Cardiac and general ROS otherwise negative. [address] otherwise negative other than noted. Past Medical History:   Diagnosis Date    Dislocated knee     Right knee    Intellectual disability 2022    Lumbar radiculopathy 2020    MS (multiple sclerosis) ([facility] [address] 2022    PTTD (posterior tibial tendon dysfunction)        Past Surgical History:   Procedure Laterality Date    CATARACT REMOVAL WITH IMPLANT Right     CATARACT REMOVAL WITH IMPLANT Left      SECTION      EYE SURGERY      FOOT SURGERY Left        Prior to Admission medications    Medication Sig Start Date End Date Taking?  Authorizing Provider   mirabegron (MYRBETRIQ) 25 MG TB24 Take 1 tablet by mouth daily  Patient not taking: Reported on 2022   Heidy Childs MD   KESIMPTA 20 MG/0.4ML [facility]  12/15/21   Historical Provider, MD   vitamin D 25 MCG (1000 UT) CAPS Take by mouth    Historical Provider, MD   MULTIPLE VITAMINS-MINERALS ER PO Take 1 tablet by mouth    Historical Provider, MD       Scheduled Meds:   oxybutynin  5 mg Oral Nightly    clopidogrel  75 mg Oral Daily    aspirin  81 mg Oral Daily    Vitamin D  2,000 Units Oral Dinner    cyanocobalamin  1,000 mcg IntraMUSCular Weekly    coenzyme Q10  100 mg Oral Daily    lidocaine  3 patch TransDERmal Daily    enoxaparin  40 mg SubCUTAneous Daily    multivitamin  1 tablet Oral Daily     Continuous Infusions:  PRN Meds:acetaminophen, bisacodyl, fleet    No Known Allergies    Social History Socioeconomic History    Marital status:      Spouse name: Not on file    Number of children: 1    Years of education: Not on file    Highest education level: Not on file   Occupational History    Not on file   Tobacco Use    Smoking status: Never Smoker    Smokeless tobacco: Never Used   Substance and Sexual Activity    Alcohol use: No    Drug use: No    Sexual activity: Not Currently   Other Topics Concern    Not on file   Social History Narrative    Lives With: her parents (mom Sb Kaur) father is sick  and her Son (5 y.o)    Type of Home: Apartment in [facility] [address]    Home Layout: One level    Home Access: Stairs to enter with rails - Number of Steps: 1    Bathroom Shower/Tub: Tub/Shower unit, Equipment: Shower chair,Hand-held shower    Home Equipment: [employer], rolling,Wheelchair-manual    Has the patient had two or more falls in the past year or any fall with injury in the past year?: Yes (one - fell on mothers floor and couldn't get up)    ADL Assistance: Independent    Homemaking Assistance: Independent    Homemaking Responsibilities: Yes    Ambulation Assistance: Independent    Transfer Assistance: Independent    Active : No    Patient's  Info: mother    She is on disability from her MS-Had been a [place] prior to her MS. Was in special classes in school. Pending Waiver services for Aide services. Social Determinants of Health     Financial Resource Strain:     Difficulty of Paying Living Expenses: Not on file   Food Insecurity:     Worried About Running Out of Food in the Last Year: Not on file    Gege of Food in the Last Year: Not on file   Transportation Needs:     Lack of Transportation (Medical): Not on file    Lack of Transportation (Non-Medical):  Not on file   Physical Activity:     Days of Exercise per Week: Not on file    Minutes of Exercise per Session: Not on file   Stress:     Feeling of Stress : Not on file Social Connections:     Frequency of Communication with Friends and Family: Not on file    Frequency of Social Gatherings with Friends and Family: Not on file    Attends [employer] Services: Not on file    Active Member of Clubs or Organizations: Not on file    Attends Club or Organization Meetings: Not on file    Marital Status: Not on file   Intimate Partner Violence:     Fear of Current or Ex-Partner: Not on file    Emotionally Abused: Not on file    Physically Abused: Not on file    Sexually Abused: Not on file   Housing Stability:     Unable to Pay for Housing in the Last Year: Not on file    Number of [place] in the Last Year: Not on file    Unstable Housing in the Last Year: Not on file       Family History   Problem Relation Age of Onset    Hypertension Mother     Diabetes Maternal Uncle     Cancer Maternal Grandmother         stomach cancer       Review Of Systems:    14 point ROS negative other than mentioned. Physical Exam:    CURRENT VITALS: BP (!) 107/53   Pulse 93   Temp 98.2 °F (36.8 °C) (Oral)   Resp 17   Ht 5' 1\" (1.549 m)   Wt 176 lb 1.6 oz (79.9 kg)   LMP 06/01/2022   SpO2 98%   BMI 33.27 kg/m²     CONSTITUTIONAL:  awake, alert, cooperative, no apparent distress,   ENT:  Normocephalic, without obvious abnormality, atraumatic, sinuses nontender on palpation, external ears without lesions,  NECK:  Supple, symmetrical, trachea midline, no adenopathy, thyroid symmetric, not enlarged and no tenderness, skin normal, No bruits. LUNGS:  No increased work of breathing, good air exchange, clear to auscultation bilaterally, no crackles, no wheezing  CARDIOVASCULAR:  Normal apical impulse, regular rate and rhythm, normal S1 and S2,  1/6 Systolic murmur noted. ABDOMEN:  Obese, normal bowel sounds, soft, non-distended, non-tender, no masses palpated, no hepatosplenomegally  EXTREMETIES: No edema, Pulses Strong Thruout. No ulcers.   NEUROLOGIC:  Awake, alert, oriented to name, place and time. Following all commands and moving all extremties. SKIN:  no bruising or bleeding, normal skin color, texture, turgor and no rashes    Labs:  Recent Results (from the past 24 hour(s))   Basic Metabolic Panel w/ Reflex to MG    Collection Time: 07/01/22  5:53 AM   Result Value Ref Range    Sodium 140 135 - 144 mEq/L    Potassium reflex Magnesium 4.2 3.4 - 4.9 mEq/L    Chloride 105 95 - 107 mEq/L    CO2 25 20 - 31 mEq/L    Anion Gap 10 9 - 15 mEq/L    Glucose 104 (H) 70 - 99 mg/dL    BUN 27 (H) 6 - 20 mg/dL    CREATININE 0.62 0.50 - 0.90 mg/dL    GFR Non-African American >60.0 >60    GFR  >60.0 >60    Calcium 8.9 8.5 - 9.9 mg/dL   CBC with Auto Differential    Collection Time: 07/01/22  5:53 AM   Result Value Ref Range    WBC 7.7 4.8 - 10.8 K/uL    RBC 3.18 (L) 4.20 - 5.40 M/uL    Hemoglobin 8.9 (L) 12.0 - 16.0 g/dL    Hematocrit 27.6 (L) 37.0 - 47.0 %    MCV 86.8 82.0 - 100.0 fL    MCH 27.9 27.0 - 31.3 pg    MCHC 32.2 (L) 33.0 - 37.0 %    RDW 17.9 (H) 11.5 - 14.5 %    Platelets 160 667 - 282 K/uL    Neutrophils % 73.4 %    Lymphocytes % 11.7 %    Monocytes % 12.3 %    Eosinophils % 2.1 %    Basophils % 0.5 %    Neutrophils Absolute 5.6 1.4 - 6.5 K/uL    Lymphocytes Absolute 0.9 (L) 1.0 - 4.8 K/uL    Monocytes Absolute 0.9 (H) 0.2 - 0.8 K/uL    Eosinophils Absolute 0.2 0.0 - 0.7 K/uL    Basophils Absolute 0.0 0.0 - 0.2 K/uL       ECG:     SR, RBBB, NSSTs    TELEMETRY:  Sinus rhythm     ECHO:    LVEF 65%  Moderate PFO, with R>L shunts.         Gladys Cabrera MD  [facility] Cardiologist          -----

## 2022-07-01 NOTE — CARE COORDINATION
Spoke with the patient about her upcoming planned dc date for 7/12/2022 to home with her son and some help from her mother. Called the patient's mother Maury Blake and  She did not answer (left voicemail). The patient is ok with the DC date. A referral to the waiver program has been made. I did inform the patient that there has been issues with staffing and services have been pending up to 6 months. The patient is interested in [address] at [facility]. When offered freedom of choice the patient would like [facility] at discharge. Will make referral closer to DC date when [address] orders are obtained.    Electronically signed by Shefali Mcdonnell RN on 7/1/22 at 5:00 PM EDT

## 2022-07-01 NOTE — PROGRESS NOTES
Subjective: The patient complains of severe acute on chronic progressive fatigue and paresis and quadrat esthesia secondary to MS flareup partially relieved by rest, medications, PT,  OT, steroids, SLP and rest and exacerbated by recent illness -we will sclerosis flareup. I am concerned about patients medical complexities and barriers to advancing in rehab goals including -her tachycardia-initially presented through the emergency room with severe hip pain status post a fall at home. She was diagnosed with an exacerbation of her multiple sclerosis and rhabdomyolysis secondary to elevated CK levels. Fortunately her x-rays of her hips were unremarkable. She was prescribed IV fluids and IV methylprednisolone. She is hoping to transition to [facility] therapy as an outpatient for her MS.     Patient has had difficulty with endurance, balance, gait ataxia, generalized weakness and numbness. Medically complicated by tachycardia especially with activity. She will need telemetry monitoring while on the unit and to be followed by cardiology and or medical..  I reviewed and discussed cardiac note-Cardiac Supportive Care including:IV and PO hydration for Rhabdomyolysis care. Serial cardiac enzymes. Telemetry for 48 hours. Echocardiogram-sofar work-up of his been unremarkable and cardiology feels that it is a tachycardia always sinus in response to debility and deconditioning. Have okayed her for therapy. I reviewed current care and plans for further care with other rehab providers including nursing and case management. According to recent nursing note, \" Per conversation w/Dr. Vin Moncada hold all therapies until cardiology clears for therapy w/elevated Troponin level that was called this morning. Paged Dr. Janelle Regan via phone call at this time. No S/S of distress at this time and call light is w/in reach. Therapy was also notified to hold therapies until cleared. ..Rose Dominguez called back, okay for pt to resume therapy. He will assess pt, order labs and adjust medications as needed after his assessment. I updated Dr. Lani Severe via phone message at this time and therapy that pt may return to her previous schedule. \".    ROS x10: The patient also complains of severely impaired mobility and activities of daily living. Otherwise no new problems with vision, hearing, nose, mouth, throat, dermal, cardiovascular, GI, , pulmonary, musculoskeletal, psychiatric or neurological. See also Acute Rehab PM&R H&P. Vital signs:  BP (!) 107/53   Pulse 93   Temp 98.2 °F (36.8 °C) (Oral)   Resp 17   Ht 5' 1\" (1.549 m)   Wt 176 lb 1.6 oz (79.9 kg)   LMP 06/01/2022   SpO2 98%   BMI 33.27 kg/m²   I/O:   PO/Intake:  fair PO intake,    diet    Bowel:   continent   Bladder: continent no saenz  General:  Patient is well developed,   adequately nourished, and    well kempt. HEENT:    Pupils equal, hearing intact to loud voice, external inspection of ear and nose benign. Inspection of lips, tongue and gums benign    Musculoskeletal: No significant change in strength or tone. All joints stable. Inspection and palpation of digits and nails show no clubbing, cyanosis or inflammatory conditions. Neuro/Psychiatric: Affect: flat but pleasant. Alert and oriented to person, place and situation with  min cues. No significant change in deep tendon reflexes or sensation  Lungs:  Diminished, CTA-B. Respiration effort is   normal at rest.     Heart:   S1 = S2,   RRR-tachycardia. Abdomen:  Soft, non-tender, no enlargement of liver or spleen. Extremities:    lower extremity edema    Skin:   Intact to general survey,      Rehabilitation:  Physical Therapy:   Bed mobility:  Bed mobility  Bridging: Minimal assistance (06/28/22 1143)  Rolling to Left: Minimal assistance (06/28/22 1143)  Rolling to Right: Minimal assistance (06/28/22 1143)  Supine to Sit: Minimal assistance (06/28/22 1143)  Sit to Supine:  Moderate assistance (06/28/22 1143)  Scooting: Minimal assistance (latearlly in supine) (06/28/22 1143)  Bed Mobility Comments: Hand over hand assist to complete all transitions. Instructed in logroll technique first with visual demonstration and cueing throughout. Instruction provided on segmental scooting. Bedrails not utilized. (06/28/22 1143)  Bed Mobility  Additional Factors: Head of bed flat; With handrails (06/29/22 1342)  Additional Factors: Head of bed flat; With handrails (06/29/22 1342)  Roll Left  Assistance Level: Stand by assist (06/29/22 1342)  Skilled Clinical Factors: with HR (06/28/22 1342)  Roll Right  Assistance Level: Stand by assist (06/28/22 1342)  Skilled Clinical Factors: with HR (06/28/22 1342)  Sit to Supine  Assistance Level: Contact guard assist (06/29/22 1115)  Skilled Clinical Factors: utilizes BUE to assist BLE (06/29/22 1115)  Supine to Sit  Assistance Level: Contact guard assist (06/29/22 1342)  Skilled Clinical Factors: decreased effort with hospital bed (06/29/22 1342)  Scooting  Assistance Level: Contact guard assist (06/29/22 1342)  Skilled Clinical Factors: scooting to EOB (06/29/22 1342)  Transfers:  Transfers  Sit to Stand: Contact guard assistance (06/28/22 1145)  Stand to sit: Contact guard assistance (06/28/22 1145)  Bed to Chair: Contact guard assistance (06/28/22 1145)  Lateral Transfers: Minimal Assistance;Contact guard assistance (Instruction provided on scooting laterally at EOB emphasizing forward weight shift) (06/28/22 1145)  Comment: Slow to complete. Heavy reliance on UEs for power. Verbal cues for sequencing and ergonomics. (06/28/22 1145)  Transfers  Surface: Wheelchair (06/30/22 1335)  Additional Factors: Verbal cues; Hand placement cues (06/30/22 1335)  Device: [employer] (06/30/22 1335)  Sit to Stand  Assistance Level: Contact guard assist (06/30/22 1335)  Skilled Clinical Factors: vc's for hand placement occ (06/30/22 1335)  Stand to Sit  Assistance Level: Contact guard assist (06/30/22 1335)  Skilled Clinical Factors: vc's for hand placement (06/30/22 1335)  Bed To/From Chair  Technique: Sit pivot (06/29/22 1342)  Assistance Level: Contact guard assist (06/29/22 1342)  Skilled Clinical Factors: cues intially for hand placement, good follow through. Bed > w/c (06/29/22 1342)  Stand Pivot  Assistance Level: Contact guard assist (06/30/22 1335)  Skilled Clinical Factors: improved follow through of vc's for approach to chair, multiple cues required with sequencing BLE and ww (06/30/22 1335)  Gait:   Ambulation  Surface: level tile (06/28/22 1149)  Device: [employer] (06/28/22 1149)  Other Apparatus: Wheelchair follow (06/28/22 1149)  Assistance: Contact guard assistance;Minimal assistance (06/28/22 1149)  Quality of Gait: Steadying throughout. increased reliance on [employer] for support with FF posture. Step to pattern with L LE lagging. (06/28/22 1149)  Distance: 8ft (06/28/22 1149)  Comments: Limited by fatigue (06/28/22 1149)  Ambulation  Surface: Carpet (06/30/22 1342)  Device: Rolling walker (06/30/22 1342)  Distance: 30' (06/30/22 1342)  Activity Comments:  BPM post ambulation (06/30/22 1342)  Assistance Level: Contact guard assist;Minimal assistance (06/30/22 1342)  Gait Deviations: Decreased step length bilateral;Slow leonardo;Decreased heel strike right;Decreased heel strike left; Wide base of support (06/30/22 1342)  Skilled Clinical Factors: lateral sway, kaz toe out, decreased weight shift to L, assistance with weight shifting (06/30/22 1342)  Stairs:  Stairs/Curb  Stairs?: No (06/28/22 1149)  W/C mobility:       Occupational Therapy:   Hand Dominance: Right  ADL  Feeding: Modified independent  (06/28/22 1049)  Grooming: Setup (06/29/22 1341)  Grooming Skilled Clinical Factors: for oral care and grooming (06/29/22 1341)  UE Bathing: Stand by assistance; Increased time to complete (06/29/22 1341)  LE Bathing: Dependent/Total (06/29/22 1341)  LE Bathing Skilled Clinical Factors: +2 assistance. Pt able to bend down in order to wash legs with SBA for safe sitting. +2 to wash posterior lillie area. 1 person for washing 1 person to maintain standing balance (06/29/22 1341)  UE Dressing: Stand by assistance (06/29/22 1341)  LE Dressing: Increased time to complete;Maximum assistance (06/29/22 1341)  LE Dressing Skilled Clinical Factors: to don pants and depends (06/29/22 1341)  Toileting: Dependent/Total (06/29/22 1341)  Toileting Skilled Clinical Factors: Pt incontinent of feces and urine (06/29/22 1341)  Additional Comments: Pt displaying lack of endurance and fatigue throughout session, requiring increased assistance throughout progression of evaluation (06/28/22 1049)  Toilet Transfers  Toilet - Technique: Stand pivot (06/29/22 1339)  Equipment Used: Standard toilet (06/29/22 1339)  Toilet Transfer: Minimal assistance (verbal cues for hand placement) (06/29/22 1339)  Toilet Transfers Comments: Twards end of session, initially Min A for transfers (06/28/22 1054)     [address] - Transfer Type: To and From (06/28/22 1054)  Shower - Transfer To: Shower seat with back (06/28/22 1054)  Shower - Technique: Stand pivot (06/28/22 1054)  Shower Transfers: Moderate assistance (06/28/22 1054)  Shower Transfers Comments: Patient did not complete shower transfer as patient washed up at sink in wheelchair. (06/29/22 1339)    Speech Therapy:      Comprehension: Within Functional Limits (Patient answered simple yes/no questions, basic questions and participated in conversation with good comprehension. no repetition was required)  Verbal Expression:  [facility] with meeting wants and needs.  No word finding deficits have been noted during conversation)  Diet/Swallow:        Dysphagia Outcome Severity Scale: Level 7: Normal in all situations  Compensatory Swallowing Strategies : Upright as possible for all oral intake          Lab/X-ray studies reviewed, analyzed and discussed with patient and staff:   Recent Results (from the past 24 hour(s))   Basic Metabolic Panel w/ Reflex to MG    Collection Time: 07/01/22  5:53 AM   Result Value Ref Range    Sodium 140 135 - 144 mEq/L    Potassium reflex Magnesium 4.2 3.4 - 4.9 mEq/L    Chloride 105 95 - 107 mEq/L    CO2 25 20 - 31 mEq/L    Anion Gap 10 9 - 15 mEq/L    Glucose 104 (H) 70 - 99 mg/dL    BUN 27 (H) 6 - 20 mg/dL    CREATININE 0.62 0.50 - 0.90 mg/dL    GFR Non-African American >60.0 >60    GFR  >60.0 >60    Calcium 8.9 8.5 - 9.9 mg/dL   CBC with Auto Differential    Collection Time: 07/01/22  5:53 AM   Result Value Ref Range    WBC 7.7 4.8 - 10.8 K/uL    RBC 3.18 (L) 4.20 - 5.40 M/uL    Hemoglobin 8.9 (L) 12.0 - 16.0 g/dL    Hematocrit 27.6 (L) 37.0 - 47.0 %    MCV 86.8 82.0 - 100.0 fL    MCH 27.9 27.0 - 31.3 pg    MCHC 32.2 (L) 33.0 - 37.0 %    RDW 17.9 (H) 11.5 - 14.5 %    Platelets 766 765 - 846 K/uL    Neutrophils % 73.4 %    Lymphocytes % 11.7 %    Monocytes % 12.3 %    Eosinophils % 2.1 %    Basophils % 0.5 %    Neutrophils Absolute 5.6 1.4 - 6.5 K/uL    Lymphocytes Absolute 0.9 (L) 1.0 - 4.8 K/uL    Monocytes Absolute 0.9 (H) 0.2 - 0.8 K/uL    Eosinophils Absolute 0.2 0.0 - 0.7 K/uL    Basophils Absolute 0.0 0.0 - 0.2 K/uL       XR CERVICAL SPINE   6/19/2022   NEGATIVE CERVICAL SPINE    XR HIP LEFT  6/19/2022  NEGATIVE PELVIS AND LEFT HIP     XR FEMUR RIGHT  6/19/2022: No fracture, dislocation, bone lesion. NEGATIVE RIGHT FEMUR    MRI LUMBAR SPINE : 6/20/2022: There is unremarkable alignment of the columns of the lumbar spine visualized, no evidence of spondylolisthesis. The contours of the lumbar vertebral bodies are unremarkable, no evidence of compression deformity is seen. The STIR sequence demonstrates unremarkable signal intensity of the bone marrow , no evidence of edema or infiltrative process. Disc desiccation and slightly decreased intervertebral disc height visualized at L3-4.  The cord terminates at the level of the T12-L1 intervertebral disc space, unremarkable signal intensity of internal cord, the terminal nerve fibers demonstrate unremarkable contour with no evidence of thickening or clumping. Bilateral renal cysts are seen. L1-L2 demonstrates mild degenerative changes in the intervertebral disc, right foraminal disc herniation is seen. No significant narrowing of the spinal canal is visualized, no significant narrowing of the right neuroforamina lower left neural foramina seen on this level. L2-L3 demonstrates unremarkable intervertebral disc and posterior elements, no evidence of significant narrowing of the spinal canal or neural foramina is visualized at this level. L3-L4 circumferential disc bulge with hypertrophic changes in the facet joints and ligamentum flavum, no significant narrowing of the spinal canal is visualized, mild narrowing of bilateral neural foramina is visualized at this level. L4-L5 mild degenerative disc changes and hypertrophic changes in the facet joints visualize, no significant narrowing of the spinal canal or bilateral neural foramina is visualized at this level. L5-S1 demonstrates unremarkable intervertebral disc and posterior elements, no evidence of significant narrowing of the spinal canal mild degenerative disc changes with hypertrophic changes in the facet joints visualized most prominent on the left, no significant narrowing of the spinal canal on the right neuroforamina is seen, moderate narrowing of the left neuroforamina is seen at this level. Degenerative facet changes of the lumbar spine visualized most prominent at L3-L4 and L5-S1 that demonstrate mild progression in comparison to the prior study    US ABDOMEN  6/20/2022: Biplanar images were obtained. The gallbladder is physiologically distended. The gallbladder wall is upper limits of normal.  There is echogenic debris within the gallbladder in the decubitus position, suspected gallbladder sludge.  No evidence of cholelithiasis. The common bile duct measures up to  3 mm in diameter. No intrahepatic bile duct dilatation is seen. No focal liver lesions are noted. The echogenicity of liver is unremarkable. The pancreas was not visualized due to interfering bowel gas. Tail of pancreas is not well visualized. No free fluid is seen within Morisons pouch. NO SIGN OF CHOLELITHIASIS OR BILE DUCT DILATATION. SLUDGE WITHIN THE GALLBLADDER. NO DEFINITE FOCAL LIVER ABNORMALITY. US DUP LOWER EXTREMITIES BILATERAL VENOUS  6/20/2022   NO SONOGRAPHIC EVIDENCE OF DEEP VENOUS THROMBOSIS WITHIN THE BILATERAL LOWER EXTREMITIES. Previous extensive, complex labs, notes and diagnostics reviewed and analyzed. ALLERGIES:    Allergies as of 06/27/2022    (No Known Allergies)      (please also verify by checking MAR)      Yesterday I evaluated this patient for periodic reassessment of medical and functional status. The patient was discussed in detail at the treatment team meeting focusing on current medical issues, progress in therapies, social issues, psychological issues, barriers to progress and strategies to address these barriers, and discharge planning. See the hand written addendum to rehab progress note. The patient continues to be high risk for future disability and their medical and rehabilitation prognosis continue to be good and therefore, we will continue the patient's rehabilitation course as planned. The patient's tentative discharge date was set. Patient and family education was discussed. The patient was made aware of the team discussion regarding their progress. Discharge plans were discussed along with barriers to progress and strategies to address these barriers, patient encouraged to continue to discuss discharge plans with . Complex Physical Medicine & Rehab Issues Assess & Plan:   1.  Severe abnormality of gait and mobility and impaired self-care and ADL's secondary to progressive masturbation of multiple sclerosis. Functional and medical status reassessed regarding patients ability to participate in therapies and patient found to be able to participate in acute intensive comprehensive inpatient rehabilitation program including PT/OT to improve balance, ambulation, ADLs, and to improve the P/AROM. Therapeutic modifications regarding activities in therapies, place, amount of time per day and intensity of therapy made daily. In bed therapies or bedside therapies prn.   2. Bowel and Bladder dysfunction  , Neurogenic bowel and bladder:  frequent toileting, ambulate to bathroom with assistance, check post void residuals. Check for C.difficile x1 if >2 loose stools in 24 hours, continue bowel & bladder program.  Monitor bowel and bladder function. Lactinex 2 PO every AC. MOM prn, Brown Bomb prn, Glycerin suppository prn, enema prn. Encourage therapy and nursing to co-treat and problem solve re continence. [place] Ditropan. 3. Severe MS pain as well as generalized OA pain: reassess pain every shift and prior to and after each therapy session, give prn Tylenol and consider scheduled Tylenol, modalities prn in therapy, masage, Lidoderm, K-pad prn. Consider scheduled AM pain meds. 4. Skin healing   and breakdown risk:  continue pressure relief program.  Daily skin exams and reports from nursing. 5. Fatigue due to nutritional and hydration deficiency: Add and titrate vitamin B12 vitamin D and CoQ10 continue to monitor I&Os, calorie counts prn, dietary consult prn. Add healthy snack at night. 6. Acute episodic insomnia with situational adjustment disorder:  prn Ambien, monitor for day time sedation. 7. Falls risk elevated:  patient to use call light to get nursing assistance to get up, bed and chair alarm. 8. Elevated DVT risk: progressive activities in PT, continue prophylaxis UMM hose, elevation and Lovenox.   9. Complex discharge planning:  DE 7/12/22--home with [facility]--try to get aide with waiver. Parents are elderly and son is only 5. Weekly team meeting every Thursday to re-assess progress towards goals, discuss and address social, psychological and medical comorbidities and to address difficulties they may be having progressing in therapy. Patient and family education is in progress. The patient is to follow-up with their family physician after discharge. Complex Active General Medical Issues that complicate care Assess & Plan:    1. Urinary urgency,   Neurogenic bladder-recheck stat UA  2.   MS (multiple sclerosis)-consult neurology transition to outpatient Ocrevus therapy if possible. 3.   Hyperglycemia-monitor for diabetes mellitus  4. Rhabdomyolysis-push fluids recheck BMP  5. Baseline mental handicap-just new learning and therapy. 6.   Posterior tibial tendon dysfunction  7. Ataxic gait-focus on balance and therapy  8. Pain in joint, lower leg, Foot drop, left foot, Acquired deformity of left foot-consult podiatry as needed  9. Lumbar radiculopathy-post effective dose of pain medication  10. Asymptomatic sinus tachycardia with activity dt deconditioning- SP EKG stat-OK,  , add stat labs and cardiac consult-a.m. labs were essentially [id number]  Was OK. OK for therapy per cardiology. Continue telemetry for now       Focus of today's plan-  Initiate and modify therapuetic plan to meet patients individual needs, add rest breaks as needed, and monitor heart rate on telemetry with cardiac work-up as above.     Electronically signed by Savannah Conklin DO on 6/29/22 at 8:02 AM EDSALLY James D.O., PM&R     Attending    [address]

## 2022-07-01 NOTE — PROGRESS NOTES
[facility] Daily Progress Note  Name: Luke Flaherty  Age: 35 y.o. Gender: female  CodeStatus: Full Code  Allergies: No Known Allergies    Chief Complaint:No chief complaint on file. Primary Care Provider: Andreas Carrera PA-C  InpatientTreatment Team: Treatment Team: Attending Provider: Louis Howell DO; Consulting Physician: Gurjit Gupta MD; Consulting Physician: Tonia Baxetr MD; Consulting Physician: Tamara Shaikh MD; Nursing Student: Bryson Recinos; Patient Care Tech: Sheba Huston; Registered Nurse: Beni Anderson, RN; Occupational Therapist: Alfredo Keith OT; Occupational Therapist Assistant: JAMAL Sanchez  Admission Date: 6/27/2022      HPI   Pt seen and examined on rehab unit for neurology follow-up for multiple sclerosis with bilateral lower extremity paraparesis and left foot drop. Patient alert and oriented x3, no acute distress, cooperative. She was observed ambulating on the bars in therapy. Some improvement in mobility noted. Of note patient has had cardiac work-up that revealed moderate PFO. She has been initiated on aspirin and Plavix with plans for future PFO closure. CBC and BMP reviewed from today. MS exacerbation with increasing weakness. Patient was seen by me in rehabilitation and she is willing to the right significantly. This is new for her. Vitals:    07/01/22 0636   BP: (!) 107/53   Pulse: 93   Resp: 17   Temp: 98.2 °F (36.8 °C)   SpO2:       Review of Systems   Constitutional: Negative for fatigue and fever. HENT: Negative for hearing loss and trouble swallowing. Eyes: Negative for visual disturbance. Respiratory: Negative for cough, chest tightness, shortness of breath and wheezing. Cardiovascular: Negative for chest pain, palpitations and leg swelling. Gastrointestinal: Negative for abdominal distention, abdominal pain, nausea and vomiting. Genitourinary: Positive for difficulty urinating.    Musculoskeletal: Positive for gait problem. Skin: Negative for color change and rash. Neurological: Positive for weakness. Negative for dizziness, tremors, seizures, syncope, facial asymmetry, speech difficulty, light-headedness, numbness and headaches. Psychiatric/Behavioral: Negative for agitation, confusion and hallucinations. The patient is not nervous/anxious. Physical Exam  Vitals and nursing note reviewed. Constitutional:       General: She is not in acute distress. Appearance: She is not diaphoretic. HENT:      Head: Normocephalic and atraumatic. Eyes:      Pupils: Pupils are equal, round, and reactive to light. Cardiovascular:      Rate and Rhythm: Normal rate and regular rhythm. Pulmonary:      Effort: Pulmonary effort is normal. No respiratory distress. Breath sounds: Normal breath sounds. Abdominal:      General: Bowel sounds are normal. There is no distension. Palpations: Abdomen is soft. Tenderness: There is no abdominal tenderness. Skin:     General: Skin is warm and dry. Neurological:      Mental Status: She is alert and oriented to person, place, and time. Cranial Nerves: No cranial nerve deficit. Motor: Weakness and abnormal muscle tone present. No tremor, atrophy or seizure activity.       Gait: Gait abnormal.      Deep Tendon Reflexes: Reflexes abnormal.               Medications:  Reviewed    Infusion Medications:    Scheduled Medications:    oxybutynin  5 mg Oral Nightly    clopidogrel  75 mg Oral Daily    aspirin  81 mg Oral Daily    Vitamin D  2,000 Units Oral Dinner    cyanocobalamin  1,000 mcg IntraMUSCular Weekly    coenzyme Q10  100 mg Oral Daily    lidocaine  3 patch TransDERmal Daily    enoxaparin  40 mg SubCUTAneous Daily    multivitamin  1 tablet Oral Daily     PRN Meds: acetaminophen, bisacodyl, fleet    Labs:   Recent Labs     06/29/22  0450 06/30/22  0509 07/01/22  0553   WBC 7.8 7.4 7.7   HGB 9.7* 9.3* 8.9*   HCT 30.4* 28.5* 27.6*    211 221 Recent Labs     06/29/22  0450 06/30/22  0509 07/01/22  0553    138 140   K 4.4 4.1 4.2    102 105   CO2 27 27 25   BUN 15 17 27*   CREATININE 0.62 0.69 0.62   CALCIUM 9.0 8.3* 8.9     Recent Labs     06/30/22  0509   AST 25   ALT 57*   BILIDIR <0.2   BILITOT 0.3   ALKPHOS 54     No results for input(s): INR in the last 72 hours. Recent Labs     06/29/22  0450 06/30/22  0509   CKTOTAL  --  73   TROPONINI 0.012* 0.025*       Urinalysis:   Lab Results   Component Value Date/Time    NITRU Negative 06/19/2022 08:18 PM    WBCUA  06/18/2022 05:13 PM    BACTERIA MODERATE 06/18/2022 05:13 PM    RBCUA 10-20 06/18/2022 05:13 PM    BLOODU Negative 06/19/2022 08:18 PM    SPECGRAV 1.038 06/19/2022 08:18 PM    GLUCOSEU 500 06/19/2022 08:18 PM       Radiology:   Most recent    EEG No valid procedures specified. MRI of Brain Results for orders placed during the hospital encounter of 01/20/22    MRI BRAIN W WO CONTRAST    Narrative  EXAMINATION: MRI BRAIN W WO CONTRAST    CLINICAL HISTORY: G35 MS (multiple sclerosis) ([facility] [address] ICD10    COMPARISONS: Brain MRI from October 16, 2020    TECHNIQUE:    Multiplanar multisequence images of the brain were obtained before and after IV administration of contrast. Diffusion perfusion imaging was obtained. BRAIN MRI FINDINGS:    There are no extra-axial collections. There is no evidence of hemorrhage. There are no areas of perfusion diffusion signal abnormality to suggest ischemia. The susceptibility images do not demonstrate evidence of hemosiderin deposition within the brain  parenchyma or the leptomeninges. There is preservation of the gray-white matter differentiation. There are marked areas of T2/FLAIR increased signal in the subcortical and periventricular white matter of both hemispheres with areas of confluence. There is involvement of corpus callosum  and there are areas of signal dropout on T1 imaging.   There are no areas of abnormal enhancement after IV contrast administration. There is prominence of the sulci and ventricles consistent with moderate global cerebral atrophy and chronic involutional changes out of proportion to the patient's age. The midline structures are intact, the corpus callosum is within normal limits. The region of the pineal gland and the sella turcica are unremarkable. There are no space-occupying lesions in the posterior fossa. The basilar cisterns are patent. The craniocervical junction is unremarkable. The visualized portions of the orbits are within normal limits, the globes are intact. The visualized portions of the paranasal sinuses are within normal limits. The calvarium and soft tissues are unremarkable. Impression  There are extensive areas of signal abnormality in the subcortical and periventricular white matter and involving the corpus callosum without enhancement to suggest active inflammation. The findings are similar to the previous study and may reflect  severe chronic microangiopathy, vasculitis, MS or other demyelinating process. Results for orders placed during the hospital encounter of 10/16/20    MRI BRAIN WO CONTRAST    Narrative  EXAMINATION: MRI BRAIN WO CONTRAST, MRI CERVICAL SPINE WO CONTRAST    CLINICAL HISTORY: R26.0 Ataxic gait ICD10    COMPARISONS: Brain CT from August 29, 2014    TECHNIQUE:    Multiplanar multisequence images of the brain were obtained without contrast. Diffusion perfusion imaging was obtained. FINDINGS:    There are no extra-axial collections. There is no evidence of hemorrhage. There are no areas of signal abnormality on perfusion diffusion imaging to suggest ischemia. The susceptibility images do not demonstrate evidence of hemosiderin deposition within  the brain parenchyma or the leptomeninges. There is preservation of the gray-white matter differentiation.  There are extensive areas of T2/FLAIR signal abnormality in the subcortical and periventricular white matter in the confluent distribution especially surrounding the atria of both lateral  ventricles. Some lesions are oriented perpendicular to the corpus callosum and there are other scattered discontiguous subcortical lesions. There are no white matter lesions in the posterior fossa. There is prominence of the sulci and ventricles  consistent with moderate global cerebral atrophy and chronic involutional changes. The midline structures are intact, the corpus callosum is within normal limits. The region of the pineal gland and the sella turcica are unremarkable. There are no space-occupying lesions in the posterior fossa. The basilar cisterns are patent. The craniocervical junction is unremarkable. The visualized portions of the orbits are within normal limits, the globes are intact. The visualized portions of the paranasal sinuses are within normal limits. The calvarium and soft tissues are unremarkable. Impression  1. There are no acute intracranial changes, there is no evidence of hemorrhage or acute ischemia. 2. There are extensive areas of T2/FLAIR signal abnormality in the periventricular and subcortical white matter with a confluent distribution. Some lesions are oriented perpendicular to the corpus callosum. The findings are nonspecific but may be  associated with a demyelinating process such as multiple sclerosis versus chronic microangiopathy, vasculitis or other demyelinating process. EXAMINATION: MRI BRAIN WO CONTRAST, MRI CERVICAL SPINE WO CONTRAST    CLINICAL HISTORY: R26.0 Ataxic gait ICD10    COMPARISONS: NONE AVAILABLE    TECHNIQUE:    Multiplanar multisequence MRI images of the cervical spine were obtained without contrast.    FINDINGS:        There is no acute fracture. There is preservation of the vertebral body heights. There is intervertebral disc desiccation and disc space narrowing at every level. The bone marrow signal is within normal limits.     The cord is normal in course and caliber. There are areas of signal abnormality noted within the cord to the right side at C3-4, also on the right side at C4, and on the left at C5-6. These may represent foci of demyelination. There is no prevertebral soft tissue swelling. Anterior soft tissues are unremarkable. The craniocervical junction is unremarkable. C2-3: There is a  indenting the anterior thecal sac but not abutting the cord with mild central canal narrowing. The facet joints are unremarkable. There is no narrowing of the neural foramina. C3-4:There is a less than 2 mm disc bulge flattening the anterior portion of the thecal sac without narrowing of the central canal.  The facet joints are unremarkable. There is no narrowing of the neural foramina. C4-5:There is a less than 2 mm disc bulge flattening the anterior portion of the thecal sac without narrowing of the central canal.  The facet joints are unremarkable. There is no narrowing of the neural foramina. C5-6:There is no disc herniation or central canal narrowing. The facet joints are unremarkable. There is no narrowing of the neural foramina. C6-7:There is no disc herniation or central canal narrowing. The facet joints are unremarkable. There is no narrowing of the neural foramina. C7-T1:There is no disc herniation or central canal narrowing. The facet joints are unremarkable. There is no narrowing of the neural foramina. IMPRESSION:  1. There is no acute fracture or subluxation. 2. The cord is normal in course and caliber. There are foci of signal abnormality most likely between C3 and C6 on both the right and left sides of the cord which may represent areas of demyelination. The findings may be secondary to edema and process  such as multiple sclerosis versus prior ischemia or other etiologies.   3. There is mild multilevel spondylosis including intervertebral disc space narrowing at every level and very degrees of disc bulges as described in greater detail in each level above. MRA of the Head and Neck: No results found for this or any previous visit. No results found for this or any previous visit. No results found for this or any previous visit. CT of the Head: No results found for this or any previous visit. No results found for this or any previous visit. No results found for this or any previous visit. Carotid duplex: No results found for this or any previous visit. No results found for this or any previous visit. No results found for this or any previous visit. Echo No results found for this or any previous visit. Assessment/Plan:    6/29/2022:  Multiple  sclerosis with exacerbation. Patient with bilateral lower extremity paraparesis and left foot drop at baseline but came in with increasing lower extremity weakness. Treated with IV methylprednisolone with some improvement. Patient on Sidumula 30 outpatient  but this will be discontinued due to her elevated LFTs. She will be initiated on Ocrevus after follow-up with Dr. Lorene Whitney with therapies. We will monitor her progress    7/1/2022:  Multiple sclerosis with bilateral lower extremity paraparesis and left foot drop. Continue with therapies  Patient had tachycardia and cardiology was consulted. She had echocardiogram done that showed moderate PFO with right to left shunt. She has been started on aspirin and Plavix per cardiology. Bilateral lower extremity venous duplex was negative. Cardiology to arrange for PFO closure. I have personally performed a face to face diagnostic evaluation on this patient, reviewed all data and investigations, and am the sole provider of all clinical decisions on the neurological status of this patient. And had a exacerbation and her EDSS scores have changed now. She is feeling to the right and requires more help. She has been on Sidumula 30 and had a relapse.   She may need to be considered for Cavazos Crimes as now she is becoming primary progressive multiple sclerosis. Facundo Gonsales MD, [address], American Board of Psychiatry & Neurology  Board Certified in Vascular Neurology  Board Certified in Neuromuscular Medicine  Certified in Neurorehabilitation     Collaborating physicians: Dr Hong Gonsales    Electronically signed by JACKI Draper CNP on 7/1/2022 at 1:18 PM

## 2022-07-01 NOTE — PROGRESS NOTES
Physical Therapy Rehab Treatment Note  [facility] [facility]  [facility]       NAME: Sherri Thorpe  : 1980 (35 y.o.)  MRN: [id number]  CODE STATUS: Full Code    Date of Service: 2022       Restrictions:  Restrictions/Precautions: Fall Risk       SUBJECTIVE:   Subjective: \"I'm doing pretty good, these half hours go by fast.\"    Pain  Pain: Pt reports no pain      OBJECTIVE:     Transfers  Surface: Wheelchair  Additional Factors: Verbal cues  Device: Walker  Sit to Stand  Assistance Level: Contact guard assist  Skilled Clinical Factors: improved carryover of cues for hand placement  Stand to Sit  Assistance Level: Contact guard assist  Skilled Clinical Factors: improved carryover of cues for hand placement  Stand Pivot  Assistance Level: Contact guard assist  Skilled Clinical Factors: improved follow through of vc's for approach to chair, minimal cues still required for sequencing BLE and ww    Ambulation  Surface: Carpet  Device: Rolling walker  Distance: 35' two turns, 8' x 2  Assistance Level: Contact guard assist  Gait Deviations: Decreased step length bilateral;Slow leonardo;Decreased heel strike right;Decreased heel strike left;Narrow base of support  Skilled Clinical Factors: improved weight shifting and foot clearance, good carryover of cues to not lift ww  . A/AROM Exercises: Seated LAQ, hip abd side kicks, marching x20 ea  Dynamic Sitting Balance Exercises: seated EOB trunk rotation with PB L/R, flx/ext EOB with PB to initiate core activation x10 reps ea x2 sets, catch while seated EOB with PB   Static Standing Balance Exercises: trialed static standing without UE support, unable to perform, requires one UE on [employer]       ASSESSMENT/PROGRESS TOWARDS GOALS:   Assessment  Assessment: Good carryover from gait drills during AM tx to improve gait quality. Pt exhibits improved foot clearance and weight shifting.  Pt does still require cues for approach to chair and performing SPT with good technique. Good porgression made toward trsf and gait goals this session. Goals:  Long Term Goals  Long term goal 1: Pt to complete bed mobility with indep  Long term goal 2: Pt to complete functional transfers bed/chair/car with indep  Long term goal 3: Pt to ambulate 50-150ft with LRD and indep  Long term goal 4: Pt to tolerate 5min standing activities indep  Long term goal 5: Pt to manage 2\" curb step indep  Additional Goals?: Yes  Long term goal 6: Pt to complete HEP with indep    PLAN OF CARE/Safety:   Plan Comment: Cont.  POC      Therapy Time:   Individual   Time In 1330   Time Out 1400   Minutes 30     Minutes:  Transfer/Bed mobility trainin  Gait training: 10  Neuro re education: 0  Therapeutic ex: [facility], \[facility]\"", 22 at 4:25 PM

## 2022-07-01 NOTE — PLAN OF CARE
Problem: Discharge Planning  Goal: Discharge to home or other facility with appropriate resources  Outcome: Progressing     Problem: ABCDS Injury Assessment  Goal: Absence of physical injury  Outcome: Progressing     Problem: Safety - Adult  Goal: Free from fall injury  Outcome: Progressing     Problem: Skin/Tissue Integrity  Goal: Absence of new skin breakdown  Description: 1. Monitor for areas of redness and/or skin breakdown  2. Assess vascular access sites hourly  3. Every 4-6 hours minimum:  Change oxygen saturation probe site  4. Every 4-6 hours:  If on nasal continuous positive airway pressure, respiratory therapy assess nares and determine need for appliance change or resting period.   Outcome: Progressing     Problem: Pain  Goal: Verbalizes/displays adequate comfort level or baseline comfort level  Outcome: Progressing

## 2022-07-01 NOTE — PROGRESS NOTES
OCCUPATIONAL THERAPY  INPATIENT REHAB TREATMENT NOTE  [facility]      NAME: Jack Quarles  : 1980 (35 y.o.)  MRN: [id number]  CODE STATUS: Full Code  Room: R255/R255-01    Date of Service: 2022    Referring Physician: Dr. Cristopher Dacosta  Rehab Diagnosis: Impaired mobility and ADLs D/T Exacerabation of MS    Restrictions  Restrictions/Precautions  Restrictions/Precautions: Fall Risk            Patient's date of birth confirmed: Yes    SAFETY:  Safety Devices  Safety Devices in place: Yes  Type of devices: Call light within reach; Chair alarm in place; Left in chair    SUBJECTIVE:  Subjective: \"I can tell Im getting stronger\"  Pain: No pain      OBJECTIVE:    While seated, challenged strength, activity tolerance, ROM, and flexibility for improved ADL performance. Completed BUE reciprocal exercises using ergometer arm bike with min resistance. Pt tolerated 3 mins x 2 trials sets. Pt reqired rest break between sets due to fatigue     Patient engaged in standing fine motor tasks with cognitive and visual perceptual components to increase [place] with ADL/IADL completion. Parquetry:    Pt required to assemble different parquetry shapes (trapezoid, square, triangle, nazanin, etc- each being a different color) and sizes into various 2D structures/creations according to a visual model presented on a piece of paper. Pt completed 1 pattern. Pt completed the structures on the table top while looking at the reference card. Pieces were poorly organized and displaced but did take on the general strength    80% correct- disorganized and sideways \"I am not good with puzzles\"  CGA transfers  Max stand tolerance of 1 min to 1.5 mins with progressive fatigue noted. Education:   transfer techniques, biomechanics     ASSESSMENT:   Pt with good participation.  Continues to demo decreased standing tolerance with max of 1-2 mins during session      PLAN OF CARE:  Strengthening,Balance training,Functional mobility training,Neuromuscular re-education,Self-Care / Rowe Gene training,Safety education & training,Home management training  continue with POC    Patient goals : \"My dad can't take care of me now, so I need to get better. He has congestive heart failure. \"  Time Frame for Long term goals : Within 2 weeks, Pt to demonstrate progress in the following areas listed below to achieve specific LTG's as stated in the initial evaluation. Long Term Goal 1: Increase independence in ADLs  Long Term Goal 2: Increaese endurance to complete clothes management  Long Term Goal 3: Increase static/dynamic balance  Long Term Goal 4:  Increase independence to perform kitchen mobility        Therapy Time:   Individual Group Co-Treat   Time In 1300       Time Out 1330         Minutes 30             Therapeutic activities: 30 minutes     Electronically signed by:    Stewart Espinosa OT,   7/1/2022, 1:15 PM

## 2022-07-01 NOTE — PROGRESS NOTES
Physical Therapy Rehab Treatment Note  Facility/Department: [facility]  Room: R255/R255-01       NAME: Carlene Jimenez  : 1980 (43 y.o.)  MRN: [id number]  CODE STATUS: Full Code    Date of Service: 2022       Restrictions:  Restrictions/Precautions: Fall Risk       SUBJECTIVE:   Subjective: \"I'm doing good, the nurse helped me wash my back. \"    Pain  Pain: Pt reports no pain      OBJECTIVE:     Transfers  Surface: Wheelchair  Additional Factors: Verbal cues; Hand placement cues  Device: Walker  Sit to Stand  Assistance Level: Contact guard assist  Skilled Clinical Factors: vc's for hand placement occ  Stand to Sit  Assistance Level: Contact guard assist  Skilled Clinical Factors: vc's for hand placement  Stand Pivot  Assistance Level: Contact guard assist  Skilled Clinical Factors: improved follow through of vc's for approach to chair, minimal cues still required for sequencing BLE and ww    Ambulation  Surface: Carpet  Device: Rolling walker  Distance: 35', 8' x2  Assistance Level: Contact guard assist;Minimal assistance  Gait Deviations: Decreased step length bilateral;Slow leonardo;Decreased heel strike right;Decreased heel strike left;Narrow base of support  Skilled Clinical Factors: lateral sway,  decreased weight shift to L, assistance with weight shifting, ff posture, decreased kaz foot clearance, vc's for keeping ww down and not lifitng up for energy conservation    Curb  Curb Height: 2''  Device: Rolling walker  Number of Curbs: 2  Additional Factors: Verbal cues  Assistance Level: Contact guard assist  Skilled Clinical Factors: good carryover of technique, visual demo provided, minimal cues for technique/safety to improve quality    Neuromuscular Education  Neuromuscular Comments: ant/post/lateral gait drills in // bars, vc's for kaz foot clearance, lateral weight shifting and improving overall gait technique and quality.  Increased reliance on BUE on // bars with upright posture    PT Exercises  Exercise Treatment: 2\" step taps x10 alternating BLE / 2\" step ups x10 reps, fwd/ lateral stepping over YTB to improve strength, step length and foot clearance x10 BLE. A/AROM Exercises: Seated LAQ, hip abd side kicks, marching x20 ea       ASSESSMENT/PROGRESS TOWARDS GOALS:    Assessment  Assessment: Pt demonstrating improved foot clearance during gait drills this session. Improved technique overall. Cues for upright posture with good follow through. Gait drills and standing ther ex activities utilized to continue to build strength and improve pt's overall endurance to decrease risk of falls. Goals:  Long Term Goals  Long term goal 1: Pt to complete bed mobility with indep  Long term goal 2: Pt to complete functional transfers bed/chair/car with indep  Long term goal 3: Pt to ambulate 50-150ft with LRD and indep  Long term goal 4: Pt to tolerate 5min standing activities indep  Long term goal 5: Pt to manage 2\" curb step indep  Additional Goals?: Yes  Long term goal 6: Pt to complete HEP with indep    PLAN OF CARE/Safety:   Plan Comment: Cont.  POC      Therapy Time:   Individual   Time In 1100   Time Out 1200   Minutes 60     Minutes:  Transfer/Bed mobility training: 15  Gait training: 15  Neuro re education: 15  Therapeutic ex: [address] PTA, 07/01/22 at 12:03 PM

## 2022-07-02 LAB
ANION GAP SERPL CALCULATED.3IONS-SCNC: 10 MEQ/L (ref 9–15)
BASOPHILS ABSOLUTE: 0 K/UL (ref 0–0.2)
BASOPHILS RELATIVE PERCENT: 0.6 %
BUN BLDV-MCNC: 21 MG/DL (ref 6–20)
CALCIUM SERPL-MCNC: 8.8 MG/DL (ref 8.5–9.9)
CHLORIDE BLD-SCNC: 104 MEQ/L (ref 95–107)
CO2: 25 MEQ/L (ref 20–31)
CREAT SERPL-MCNC: 0.6 MG/DL (ref 0.5–0.9)
EOSINOPHILS ABSOLUTE: 0.1 K/UL (ref 0–0.7)
EOSINOPHILS RELATIVE PERCENT: 1.9 %
GFR AFRICAN AMERICAN: >60
GFR NON-AFRICAN AMERICAN: >60
GLUCOSE BLD-MCNC: 95 MG/DL (ref 70–99)
HCT VFR BLD CALC: 27.2 % (ref 37–47)
HEMOGLOBIN: 8.8 G/DL (ref 12–16)
LYMPHOCYTES ABSOLUTE: 1 K/UL (ref 1–4.8)
LYMPHOCYTES RELATIVE PERCENT: 12.8 %
MCH RBC QN AUTO: 28 PG (ref 27–31.3)
MCHC RBC AUTO-ENTMCNC: 32.4 % (ref 33–37)
MCV RBC AUTO: 86.3 FL (ref 82–100)
MONOCYTES ABSOLUTE: 0.8 K/UL (ref 0.2–0.8)
MONOCYTES RELATIVE PERCENT: 10.3 %
NEUTROPHILS ABSOLUTE: 5.8 K/UL (ref 1.4–6.5)
NEUTROPHILS RELATIVE PERCENT: 74.4 %
PDW BLD-RTO: 18.2 % (ref 11.5–14.5)
PLATELET # BLD: 242 K/UL (ref 130–400)
POTASSIUM REFLEX MAGNESIUM: 4 MEQ/L (ref 3.4–4.9)
RBC # BLD: 3.15 M/UL (ref 4.2–5.4)
SODIUM BLD-SCNC: 139 MEQ/L (ref 135–144)
WBC # BLD: 7.8 K/UL (ref 4.8–10.8)

## 2022-07-02 PROCEDURE — 97129 THER IVNTJ 1ST 15 MIN: CPT

## 2022-07-02 PROCEDURE — 97112 NEUROMUSCULAR REEDUCATION: CPT

## 2022-07-02 PROCEDURE — 97110 THERAPEUTIC EXERCISES: CPT

## 2022-07-02 PROCEDURE — 80048 BASIC METABOLIC PNL TOTAL CA: CPT

## 2022-07-02 PROCEDURE — 85025 COMPLETE CBC W/AUTO DIFF WBC: CPT

## 2022-07-02 PROCEDURE — 97530 THERAPEUTIC ACTIVITIES: CPT

## 2022-07-02 PROCEDURE — 97116 GAIT TRAINING THERAPY: CPT

## 2022-07-02 PROCEDURE — 97130 THER IVNTJ EA ADDL 15 MIN: CPT

## 2022-07-02 PROCEDURE — 1180000000 HC REHAB R&B

## 2022-07-02 PROCEDURE — 6370000000 HC RX 637 (ALT 250 FOR IP): Performed by: INTERNAL MEDICINE

## 2022-07-02 PROCEDURE — 6370000000 HC RX 637 (ALT 250 FOR IP): Performed by: PHYSICAL MEDICINE & REHABILITATION

## 2022-07-02 PROCEDURE — 6360000002 HC RX W HCPCS: Performed by: INTERNAL MEDICINE

## 2022-07-02 PROCEDURE — 36415 COLL VENOUS BLD VENIPUNCTURE: CPT

## 2022-07-02 RX ADMIN — ENOXAPARIN SODIUM 40 MG: 100 INJECTION SUBCUTANEOUS at 10:19

## 2022-07-02 RX ADMIN — ASPIRIN 81 MG: 81 TABLET, COATED ORAL at 11:19

## 2022-07-02 RX ADMIN — Medication 100 MG: at 11:19

## 2022-07-02 RX ADMIN — THERA TABS 1 TABLET: TAB at 11:19

## 2022-07-02 RX ADMIN — CLOPIDOGREL BISULFATE 75 MG: 75 TABLET ORAL at 11:19

## 2022-07-02 RX ADMIN — Medication 2000 UNITS: at 20:42

## 2022-07-02 RX ADMIN — OXYBUTYNIN CHLORIDE 5 MG: 5 TABLET, EXTENDED RELEASE ORAL at 20:43

## 2022-07-02 ASSESSMENT — PAIN SCALES - GENERAL: PAINLEVEL_OUTOF10: 0

## 2022-07-02 NOTE — PROGRESS NOTES
Occupational Therapy  OCCUPATIONAL THERAPY  INPATIENT REHAB TREATMENT NOTE  [facility]      NAME: Zackery Paniagua  : 1980 (35 y.o.)  MRN: [id number]  CODE STATUS: Full Code  Room: R255/R255-01    Date of Service: 2022    Referring Physician: Dr. Mak Zayas  Rehab Diagnosis: Impaired mobility and ADLs D/T Exacerabation of MS    Restrictions                 Patient's date of birth confirmed: Yes    SAFETY:       SUBJECTIVE:  Subjective: \"I think Im getting better\"    Pain at start of treatment: No    Pain at end of treatment: No    Location: na  Nursing notified: Not Applicable  RN: [email or web address]  Intervention: None    COGNITION:  Orientation  Orientation Level: Oriented X4  Cognition  Arousal/Alertness: Appropriate responses to stimuli  Following Commands: Follows one step commands consistently  Problem Solving: Assistance required to generate solutions;Assistance required to identify errors made  Initiation: Does not require cues      Comprehension: Mod I  Expression: Mod I  Social Interaction: Mod I  Problem Solving: Supervision    OBJECTIVE:      OT Exercises  A/AROM Exercises: Pt completed BUE strengthening exercises with 1# cuff weights with 15 reps of shoulder flexion/extension. horizontal ab/adduction,elbow flexion/extension, punch outs. Pt demonstrate good follow through with instructions. Standing CGA with cues for safety. Completing tabletop fine motor  tasks with unilateral support 1:35 and 2:00  Seated parquetry with min cues after instruction, difficulty distinguishing blue squares from deep purple rectangles. Education to pt to look at shape vs color when attempting to complete to determine the proper fit. PLAN OF CARE:  Strengthening,Balance training,Functional mobility training,Neuromuscular re-education,Self-Care / Alexandro Sheth training,Safety education & training,Home management training  continue with POC    Patient goals : \"My dad can't take care of me now, so I need to get better.  He has congestive heart failure. \"  Time Frame for Long term goals : Within 2 weeks, Pt to demonstrate progress in the following areas listed below to achieve specific LTG's as stated in the initial evaluation. Long Term Goal 1: Increase independence in ADLs  Long Term Goal 2: Increaese endurance to complete clothes management  Long Term Goal 3: Increase static/dynamic balance  Long Term Goal 4:  Increase independence to perform kitchen mobility        Therapy Time:   Individual Group Co-Treat   Time In 1300       Time Out 1330         Minutes 30                   Therapeutic activities: 30 minutes     Electronically signed by:    JAMAL Schilling,   7/2/2022, 2:48 PM

## 2022-07-02 NOTE — PROGRESS NOTES
Occupational Therapy  OCCUPATIONAL THERAPY  INPATIENT REHAB TREATMENT NOTE  [facility]      NAME: Dominik Salazar  : 1980 (35 y.o.)  MRN: [id number]  CODE STATUS: Full Code  Room: R255/R255-01    Date of Service: 2022    Referring Physician: Dr. Екатерина Snyder  Rehab Diagnosis: Impaired mobility and ADLs D/T Exacerabation of MS    Restrictions                 Patient's date of birth confirmed: Yes    SAFETY:       SUBJECTIVE:       Pain at start of treatment: No    Pain at end of treatment: No    Location: na  Nursing notified: Not Applicable  RN: na  Intervention: None    COGNITION:  Orientation  Orientation Level: Oriented X4  Cognition  Arousal/Alertness: Appropriate responses to stimuli  Following Commands: Follows one step commands consistently  Problem Solving: Assistance required to generate solutions;Assistance required to identify errors made  Initiation: Does not require cues      Patient's cognition will improve or maintain baseline to safely perform ADLs:    OBJECTIVE:                Pt completed sit to stand transfer with min A x 1 for 52 seconds static to facilitate transfers and toileting tasks. Pt completing pipe tree task with max verbal cues and demonstration for task initiation and completion as well as sequencing throughout. Demonstrated difficulty problem solving to follow pattern. Attempted to sort cards by suit, pt able to sort with 3 mistakes by suit. When instructed to take hearts and put them in order from smallest to largest pt  Demo max difficulty ordering them 3,4,2,5,6,9,7,8,10,A,Q, J, K. Provided patient with cues for correction,with poor follow through and inability to complete correctly. OT Exercises  A/AROM Exercises: Pt completed BUE strengthening exercises with 1# cuff weights with 15 reps of shoulder flexion/extension. horizontal ab/adduction,elbow flexion/extension, punch outs. Pt demonstrate good follow through with instructions.            PLAN OF CARE:  Strengthening,Balance training,Functional mobility training,Neuromuscular re-education,Self-Care / ADL,Endurance training,Safety education & training,Home management training  continue with POC    Patient goals : \"My dad can't take care of me now, so I need to get better. He has congestive heart failure. \"  Time Frame for Long term goals : Within 2 weeks, Pt to demonstrate progress in the following areas listed below to achieve specific LTG's as stated in the initial evaluation. Long Term Goal 1: Increase independence in ADLs  Long Term Goal 2: Increaese endurance to complete clothes management  Long Term Goal 3: Increase static/dynamic balance  Long Term Goal 4:  Increase independence to perform kitchen mobility        Therapy Time:   Individual Group Co-Treat   Time In 1000       Time Out 1100         Minutes 60                   Therapeutic activities: 30 minutes  Cognitive Retrainin minutes     Electronically signed by:    JAMAL Corrigan,   2022, 12:02 PM

## 2022-07-02 NOTE — PROGRESS NOTES
Physical Therapy Rehab Treatment Note  Facility/Department: [facility]  Room: R255/R255-01       NAME: Jack Quarles  : 1980 (22 y.o.)  MRN: [id number]  CODE STATUS: Full Code    Date of Service: 2022  Chart Reviewed: Yes  Patient assessed for rehabilitation services?: Yes  Family / Caregiver Present: No    Restrictions:  Restrictions/Precautions: Fall Risk       SUBJECTIVE:   Subjective: \"i just found out i have a hole in my heart. i get an MRI Tuesday. \"    Pain    010    Energy level:  Post: 8/10     OBJECTIVE:      Transfers  Sit to Stand: Stand by assistance  Stand to sit: Stand by assistance  Bed to Chair: Stand by assistance  Comment: VC for hand placement when sitting down    Ambulation  Surface: carpet  Device: Rolling Walker  Assistance: Contact guard assistance  Quality of Gait: difficulty with LLE foot clearance, FF posture, downward gaze, short BLE step length. Step to pattern  Distance: 35'x2 with seated rest break . Comments: reports fatigue. PT Exercises  Exercise Treatment: seated: ankle pumps and heel raises , LAQ, marching, side kicks x20. STS from [facility] 2x5  Static Standing Balance Exercises: static standing one UE, tandem stance one UE x30sec each  Dynamic Standing Balance Exercises: standing marching at [address] x10, standing single leg fwd taps x10 BLE                        ASSESSMENT/PROGRESS TOWARDS GOALS:   Pt demonstrated fatigue post gait requiring a seated rest break. VC for step length and LLE foot clearance needed with minimal corrections.         Goals:  Long Term Goals  Long term goal 1: Pt to complete bed mobility with indep  Long term goal 2: Pt to complete functional transfers bed/chair/car with indep  Long term goal 3: Pt to ambulate 50-150ft with LRD and indep  Long term goal 4: Pt to tolerate 5min standing activities indep  Long term goal 5: Pt to manage 2\" curb step indep  Additional Goals?: Yes  Long term goal 6: Pt to complete HEP with indep    PLAN OF CARE/Safety:   Plan Comment: Cont.  POC      Therapy Time:   Individual   Time In 0900   Time Out 1000   Minutes 60     Minutes:60  Transfer/Bed mobility trainin  Gait trainin  Neuro re education:10  Therapeutic ex:30      Pablo Charlton PTA, 22 at 10:00 AM

## 2022-07-02 NOTE — PROGRESS NOTES
Subjective: The patient complains of severe acute on chronic progressive fatigue and paresis and quadrat esthesia secondary to MS flareup partially relieved by rest, medications, PT,  OT, steroids, SLP and rest and exacerbated by recent illness -we will sclerosis flareup. ROS x10: The patient also complains of severely impaired mobility and activities of daily living. Otherwise no new problems with vision, hearing, nose, mouth, throat, dermal, cardiovascular, GI, , pulmonary, musculoskeletal, psychiatric or neurological. See also Acute Rehab PM&R H&P. Vital signs:  /62   Pulse 100   Temp 99.7 °F (37.6 °C) (Oral)   Resp 19   Ht 5' 1\" (1.549 m)   Wt 176 lb 1.6 oz (79.9 kg)   SpO2 99%   BMI 33.27 kg/m²   I/O:   PO/Intake:  fair PO intake,    diet    Bowel:   continent   Bladder: continent no saenz  General:  Patient is well developed,   adequately nourished, and    well kempt. HEENT:    Pupils equal, hearing intact to loud voice, external inspection of ear and nose benign. Inspection of lips, tongue and gums benign    Musculoskeletal: No significant change in strength or tone. All joints stable. Inspection and palpation of digits and nails show no clubbing, cyanosis or inflammatory conditions. Neuro/Psychiatric: Affect: flat but pleasant. Alert and oriented to person, place and situation with  min cues. No significant change in deep tendon reflexes or sensation  Lungs:  Diminished, CTA-B. Respiration effort is   normal at rest.     Heart:   S1 = S2,   RRR-tachycardia. Abdomen:  Soft, non-tender, no enlargement of liver or spleen.   Extremities:    lower extremity edema    Skin:   Intact to general survey,      Rehabilitation:  Physical Therapy:   Bed mobility:  Bed mobility  Bridging: Minimal assistance (06/28/22 1143)  Rolling to Left: Minimal assistance (06/28/22 1143)  Rolling to Right: Minimal assistance (06/28/22 1143)  Supine to Sit: Minimal assistance (06/28/22 Factors: improved carryover of cues for hand placement (07/01/22 1340)  Bed To/From Chair  Technique: Sit pivot (06/29/22 1342)  Assistance Level: Contact guard assist (06/29/22 1342)  Skilled Clinical Factors: cues intially for hand placement, good follow through. Bed > w/c (06/29/22 1342)  Stand Pivot  Assistance Level: Contact guard assist (07/01/22 1340)  Skilled Clinical Factors: improved follow through of vc's for approach to chair, minimal cues still required for sequencing BLE and ww (07/01/22 1340)  Gait:   Ambulation  Surface: carpet (07/02/22 1354)  Device: [employer] (07/02/22 1354)  Other Apparatus: Wheelchair follow (06/28/22 1149)  Assistance: Contact guard assistance (07/02/22 1354)  Quality of Gait: difficulty with LLE foot clearance, FF posture, downward gaze, short BLE step length.  Step to pattern (07/02/22 1354)  Distance: 25' (07/02/22 1354)  Comments: reports fatigue post (07/02/22 1354)  Ambulation  Surface: Carpet (07/01/22 1341)  Device: Rolling walker (07/01/22 1341)  Distance: 28' two turns, 8' x 2 (07/01/22 1341)  Activity Comments:  BPM post ambulation (06/30/22 1342)  Assistance Level: Contact guard assist (07/01/22 1341)  Gait Deviations: Decreased step length bilateral;Slow leonardo;Decreased heel strike right;Decreased heel strike left;Narrow base of support (07/01/22 1341)  Skilled Clinical Factors: improved weight shifting and foot clearance, good carryover of cues to not lift ww (07/01/22 1341)  Stairs:  Stairs/Curb  Stairs?: No (06/28/22 1149)  Curb  Curb Height: 2'' (07/01/22 1112)  Device: Rolling walker (07/01/22 1112)  Number of Curbs: 2 (07/01/22 1112)  Additional Factors: Verbal cues (07/01/22 1112)  Assistance Level: Contact guard assist (07/01/22 1112)  Skilled Clinical Factors: good carryover of technique, visual demo provided, minimal cues for technique/safety to improve quality (07/01/22 1112)  W/C mobility:       Occupational Therapy:   Hand Dominance: Right  ADL  Feeding: Modified independent  (06/28/22 1049)  Grooming: Setup (06/29/22 1341)  Grooming Skilled Clinical Factors: for oral care and grooming (06/29/22 1341)  UE Bathing: Stand by assistance; Increased time to complete (06/29/22 1341)  LE Bathing: Dependent/Total (06/29/22 1341)  LE Bathing Skilled Clinical Factors: +2 assistance. Pt able to bend down in order to wash legs with SBA for safe sitting. +2 to wash posterior lillie area. 1 person for washing 1 person to maintain standing balance (06/29/22 1341)  UE Dressing: Stand by assistance (06/29/22 1341)  LE Dressing: Increased time to complete;Maximum assistance (06/29/22 1341)  LE Dressing Skilled Clinical Factors: to don pants and depends (06/29/22 1341)  Toileting: Dependent/Total (06/29/22 1341)  Toileting Skilled Clinical Factors: Pt incontinent of feces and urine (06/29/22 1341)  Additional Comments: Pt displaying lack of endurance and fatigue throughout session, requiring increased assistance throughout progression of evaluation (06/28/22 1049)  Toilet Transfers  Toilet - Technique: Stand pivot (06/29/22 1339)  Equipment Used: Standard toilet (06/29/22 1339)  Toilet Transfer: Minimal assistance (verbal cues for hand placement) (06/29/22 1339)  Toilet Transfers Comments: Twards end of session, initially Min A for transfers (06/28/22 1054)     [address] - Transfer Type: To and From (06/28/22 1054)  Shower - Transfer To: Shower seat with back (06/28/22 1054)  Shower - Technique: Stand pivot (06/28/22 1054)  Shower Transfers: Moderate assistance (06/28/22 1054)  Shower Transfers Comments: Patient did not complete shower transfer as patient washed up at sink in wheelchair. (06/29/22 1339)    Speech Therapy:      Comprehension: Within Functional Limits (Patient answered simple yes/no questions, basic questions and participated in conversation with good comprehension.  no repetition was required)  Verbal Expression:  [facility] with meeting wants unremarkable, no evidence of compression deformity is seen. The STIR sequence demonstrates unremarkable signal intensity of the bone marrow , no evidence of edema or infiltrative process. Disc desiccation and slightly decreased intervertebral disc height visualized at L3-4. The cord terminates at the level of the T12-L1 intervertebral disc space, unremarkable signal intensity of internal cord, the terminal nerve fibers demonstrate unremarkable contour with no evidence of thickening or clumping. Bilateral renal cysts are seen. L1-L2 demonstrates mild degenerative changes in the intervertebral disc, right foraminal disc herniation is seen. No significant narrowing of the spinal canal is visualized, no significant narrowing of the right neuroforamina lower left neural foramina seen on this level. L2-L3 demonstrates unremarkable intervertebral disc and posterior elements, no evidence of significant narrowing of the spinal canal or neural foramina is visualized at this level. L3-L4 circumferential disc bulge with hypertrophic changes in the facet joints and ligamentum flavum, no significant narrowing of the spinal canal is visualized, mild narrowing of bilateral neural foramina is visualized at this level. L4-L5 mild degenerative disc changes and hypertrophic changes in the facet joints visualize, no significant narrowing of the spinal canal or bilateral neural foramina is visualized at this level. L5-S1 demonstrates unremarkable intervertebral disc and posterior elements, no evidence of significant narrowing of the spinal canal mild degenerative disc changes with hypertrophic changes in the facet joints visualized most prominent on the left, no significant narrowing of the spinal canal on the right neuroforamina is seen, moderate narrowing of the left neuroforamina is seen at this level.      Degenerative facet changes of the lumbar spine visualized most prominent at L3-L4 and L5-S1 that demonstrate mild progression in comparison to the prior study    US ABDOMEN  6/20/2022: Biplanar images were obtained. The gallbladder is physiologically distended. The gallbladder wall is upper limits of normal.  There is echogenic debris within the gallbladder in the decubitus position, suspected gallbladder sludge. No evidence of cholelithiasis. The common bile duct measures up to  3 mm in diameter. No intrahepatic bile duct dilatation is seen. No focal liver lesions are noted. The echogenicity of liver is unremarkable. The pancreas was not visualized due to interfering bowel gas. Tail of pancreas is not well visualized. No free fluid is seen within Morisons pouch. NO SIGN OF CHOLELITHIASIS OR BILE DUCT DILATATION. SLUDGE WITHIN THE GALLBLADDER. NO DEFINITE FOCAL LIVER ABNORMALITY. US DUP LOWER EXTREMITIES BILATERAL VENOUS  6/20/2022   NO SONOGRAPHIC EVIDENCE OF DEEP VENOUS THROMBOSIS WITHIN THE BILATERAL LOWER EXTREMITIES. Previous extensive, complex labs, notes and diagnostics reviewed and analyzed. ALLERGIES:    Allergies as of 06/27/2022    (No Known Allergies)      (please also verify by checking MAR)      Yesterday I evaluated this patient for periodic reassessment of medical and functional status. The patient was discussed in detail at the treatment team meeting focusing on current medical issues, progress in therapies, social issues, psychological issues, barriers to progress and strategies to address these barriers, and discharge planning. See the hand written addendum to rehab progress note. The patient continues to be high risk for future disability and their medical and rehabilitation prognosis continue to be good and therefore, we will continue the patient's rehabilitation course as planned. The patient's tentative discharge date was set. Patient and family education was discussed. The patient was made aware of the team discussion regarding their progress.   Discharge plans were discussed along with barriers to progress and strategies to address these barriers, patient encouraged to continue to discuss discharge plans with . Complex Physical Medicine & Rehab Issues Assess & Plan:   1. Severe abnormality of gait and mobility and impaired self-care and ADL's secondary to progressive masturbation of multiple sclerosis. Functional and medical status reassessed regarding patients ability to participate in therapies and patient found to be able to participate in acute intensive comprehensive inpatient rehabilitation program including PT/OT to improve balance, ambulation, ADLs, and to improve the P/AROM. Therapeutic modifications regarding activities in therapies, place, amount of time per day and intensity of therapy made daily. In bed therapies or bedside therapies prn.   2. Bowel and Bladder dysfunction  , Neurogenic bowel and bladder:  frequent toileting, ambulate to bathroom with assistance, check post void residuals. Check for C.difficile x1 if >2 loose stools in 24 hours, continue bowel & bladder program.  Monitor bowel and bladder function. Lactinex 2 PO every AC. MOM prn, Brown Bomb prn, Glycerin suppository prn, enema prn. Encourage therapy and nursing to co-treat and problem solve re continence. [place] Ditropan. 3. Severe MS pain as well as generalized OA pain: reassess pain every shift and prior to and after each therapy session, give prn Tylenol and consider scheduled Tylenol, modalities prn in therapy, masage, Lidoderm, K-pad prn. Consider scheduled AM pain meds. 4. Skin healing   and breakdown risk:  continue pressure relief program.  Daily skin exams and reports from nursing. 5. Fatigue due to nutritional and hydration deficiency: Add and titrate vitamin B12 vitamin D and CoQ10 continue to monitor I&Os, calorie counts prn, dietary consult prn. Add healthy snack at night.   6. Acute episodic insomnia with situational adjustment disorder:  prn Ambien, monitor for day time sedation. 7. Falls risk elevated:  patient to use call light to get nursing assistance to get up, bed and chair alarm. 8. Elevated DVT risk: progressive activities in PT, continue prophylaxis UMM hose, elevation and Lovenox. 9. Complex discharge planning:  DC 7/12/22--home with [facility]--try to get aide with waiver. Parents are elderly and son is only 5. Weekly team meeting every Thursday to re-assess progress towards goals, discuss and address social, psychological and medical comorbidities and to address difficulties they may be having progressing in therapy. Patient and family education is in progress. The patient is to follow-up with their family physician after discharge. Complex Active General Medical Issues that complicate care Assess & Plan:    1. Urinary urgency,   Neurogenic bladder-recheck stat UA  2.   MS (multiple sclerosis)-consult neurology transition to outpatient Ocrevus therapy if possible. 3.   Hyperglycemia-monitor for diabetes mellitus  4. Rhabdomyolysis-push fluids recheck BMP  5. Baseline mental handicap-just new learning and therapy. 6.   Posterior tibial tendon dysfunction  7. Ataxic gait-focus on balance and therapy  8. Pain in joint, lower leg, Foot drop, left foot, Acquired deformity of left foot-consult podiatry as needed  9. Lumbar radiculopathy-post effective dose of pain medication  10. Asymptomatic sinus tachycardia with activity dt deconditioning- SP EKG stat-OK,  , add stat labs and cardiac consult-a.m. labs were essentially [id number]  Was OK. OK for therapy per cardiology.   Continue telemetry for now       Focus of today's plan-  Cont therapies    Anemia unchanged Hct 27  GFR above 60  Jim Fragoso D.O., PM&R     Attending    [address]

## 2022-07-02 NOTE — PROGRESS NOTES
Physical Therapy Rehab Treatment Note  [facility]  [facility]       NAME: Lul Lee  : 1980 (35 y.o.)  MRN: [id number]  CODE STATUS: Full Code    Date of Service: 2022  Chart Reviewed: Yes  Patient assessed for rehabilitation services?: Yes  Family / Caregiver Present: No    Restrictions:  Restrictions/Precautions: Fall Risk       SUBJECTIVE:   Subjective: \"im okay\"    Pain  0/10    Energy Level:   Post: 8/10     OBJECTIVE:      Transfers  Sit to Stand: Stand by assistance  Stand to sit: Stand by assistance  Bed to Chair: Stand by assistance  Comment: VC for hand placement when sitting down    Ambulation  Surface: carpet  Device: Rolling Walker  Assistance: Contact guard assistance  Quality of Gait: difficulty with LLE foot clearance, FF posture, downward gaze, short BLE step length. Step to pattern  Distance: 25'  Comments: reports fatigue post                   PT Exercises  Exercise Treatment: seated EOB: side kicks x10, marching x10, hip ADD ball squeeze hold 3 x10, GS , hip Abd YTB x10, hamstring curls YTB x10  Dynamic Sitting Balance Exercises: seated EOB trunk rotation with PB L/R, flx/ext EOB with PB to initiate core activation x10 reps ea x2 sets, catch while seated EOB with PB                         ASSESSMENT/PROGRESS TOWARDS GOALS:  Pt with need for multiple VC for posture and to raise gaze during seated intervention and again during gait. Post gait pt reported fatigue. She stated the distance she ambulated was similar to when she is at home and walks to the bathroom.         Goals:  Long Term Goals  Long term goal 1: Pt to complete bed mobility with indep  Long term goal 2: Pt to complete functional transfers bed/chair/car with indep  Long term goal 3: Pt to ambulate 50-150ft with LRD and indep  Long term goal 4: Pt to tolerate 5min standing activities indep  Long term goal 5: Pt to manage 2\" curb step indep  Additional Goals?: Yes  Long term goal 6: Pt to complete HEP with indep    PLAN OF CARE/Safety:   Plan Comment: Cont. POC  Safety Devices  Type of Devices:  All fall risk precautions in place      Therapy Time:   Individual   Time In 1330   Time Out 1400   Minutes 30     Minutes:30  Transfer/Bed mobility trainin  Gait training:10  Neuro re education:0  Therapeutic ex:20      Uzair Xiao PTA, 22 at 2:06 PM

## 2022-07-03 LAB
ANION GAP SERPL CALCULATED.3IONS-SCNC: 9 MEQ/L (ref 9–15)
BASOPHILS ABSOLUTE: 0 K/UL (ref 0–0.2)
BASOPHILS RELATIVE PERCENT: 0.7 %
BUN BLDV-MCNC: 22 MG/DL (ref 6–20)
CALCIUM SERPL-MCNC: 8.7 MG/DL (ref 8.5–9.9)
CHLORIDE BLD-SCNC: 105 MEQ/L (ref 95–107)
CO2: 27 MEQ/L (ref 20–31)
CREAT SERPL-MCNC: 0.64 MG/DL (ref 0.5–0.9)
EOSINOPHILS ABSOLUTE: 0.2 K/UL (ref 0–0.7)
EOSINOPHILS RELATIVE PERCENT: 2.4 %
GFR AFRICAN AMERICAN: >60
GFR NON-AFRICAN AMERICAN: >60
GLUCOSE BLD-MCNC: 91 MG/DL (ref 70–99)
HCT VFR BLD CALC: 27.2 % (ref 37–47)
HEMOGLOBIN: 8.9 G/DL (ref 12–16)
LYMPHOCYTES ABSOLUTE: 1 K/UL (ref 1–4.8)
LYMPHOCYTES RELATIVE PERCENT: 14.6 %
MCH RBC QN AUTO: 28.2 PG (ref 27–31.3)
MCHC RBC AUTO-ENTMCNC: 32.9 % (ref 33–37)
MCV RBC AUTO: 85.7 FL (ref 82–100)
MONOCYTES ABSOLUTE: 0.7 K/UL (ref 0.2–0.8)
MONOCYTES RELATIVE PERCENT: 10.8 %
NEUTROPHILS ABSOLUTE: 4.8 K/UL (ref 1.4–6.5)
NEUTROPHILS RELATIVE PERCENT: 71.5 %
PDW BLD-RTO: 18.1 % (ref 11.5–14.5)
PLATELET # BLD: 241 K/UL (ref 130–400)
POTASSIUM REFLEX MAGNESIUM: 4.1 MEQ/L (ref 3.4–4.9)
RBC # BLD: 3.17 M/UL (ref 4.2–5.4)
SODIUM BLD-SCNC: 141 MEQ/L (ref 135–144)
WBC # BLD: 6.8 K/UL (ref 4.8–10.8)

## 2022-07-03 PROCEDURE — 6370000000 HC RX 637 (ALT 250 FOR IP): Performed by: PHYSICAL MEDICINE & REHABILITATION

## 2022-07-03 PROCEDURE — 85025 COMPLETE CBC W/AUTO DIFF WBC: CPT

## 2022-07-03 PROCEDURE — 6370000000 HC RX 637 (ALT 250 FOR IP): Performed by: INTERNAL MEDICINE

## 2022-07-03 PROCEDURE — 6360000002 HC RX W HCPCS: Performed by: INTERNAL MEDICINE

## 2022-07-03 PROCEDURE — 1180000000 HC REHAB R&B

## 2022-07-03 PROCEDURE — 80048 BASIC METABOLIC PNL TOTAL CA: CPT

## 2022-07-03 PROCEDURE — 36415 COLL VENOUS BLD VENIPUNCTURE: CPT

## 2022-07-03 RX ADMIN — OXYBUTYNIN CHLORIDE 5 MG: 5 TABLET, EXTENDED RELEASE ORAL at 20:15

## 2022-07-03 RX ADMIN — CLOPIDOGREL BISULFATE 75 MG: 75 TABLET ORAL at 09:42

## 2022-07-03 RX ADMIN — THERA TABS 1 TABLET: TAB at 09:42

## 2022-07-03 RX ADMIN — ENOXAPARIN SODIUM 40 MG: 100 INJECTION SUBCUTANEOUS at 09:42

## 2022-07-03 RX ADMIN — Medication 100 MG: at 09:42

## 2022-07-03 RX ADMIN — Medication 2000 UNITS: at 17:08

## 2022-07-03 RX ADMIN — ASPIRIN 81 MG: 81 TABLET, COATED ORAL at 09:42

## 2022-07-03 ASSESSMENT — PAIN SCALES - GENERAL: PAINLEVEL_OUTOF10: 0

## 2022-07-03 NOTE — PROGRESS NOTES
Hospitalist Consult/Progress Note  7/3/2022 9:28 AM    Assessment and Plan:   1. Generalized weakness, Gait instability and Decreased Functional Status secondary to MS exacerbation: Followed by neurology. Received IV steroids. Had fall without injury overnight. Fall precautions. PT OT to evaluate. Maximize nutrition status. Assessing if needs DME at home. SW on board. 2. Rhabdomyolysis: Secondary to being found on floor. CK was in the 4000s. Trending down <1000. Received IVF. Resolved. 3. LE edema: US negative for DVT. In the setting of immobility. 4. UTI: Gram negative rods in urine. Received 3 days of antibiotics. 5. Normocytic anemia: H/H stable. No overt bleeding. Follow trend. H/H stable. 6. PFO: Moderate per ECHO. Cardiology following and managing. Will need outpatient cardiac MRI and PFO closure. 7. Bowel Regimen and GI PPx: stool softners PRN ordered with hold parameters for loose stools or diarrhea. On antiacid  8. Diet: ADULT DIET; Regular; 3 carb choices (45 gm/meal); No Added Salt (3-4 gm)  9. Advance Directive: Full Code   10. Nutrition status: Supplemental Vitamins ordered. Dietitian assessment  11. Vaccinations: Immunization records reviewed. If has not received appropriate vaccinations, will order to be given prior to discharge. 12. DVT prophylaxis: Chemical prophylaxis SQ enoxaparin  13. Discharge planning: ALEX on board. Tentative discharge date 7/12/22  14. High Risk Readmission Screening Tool Score Noted.      Additionally, the following hospital problems were addressed:  Principal Problem:    Impaired mobility and activities of daily living dt exac of MS  Active Problems:    Urinary urgency    MS (multiple sclerosis) (HCC)    Hyperglycemia    Neurogenic bladder    Rhabdomyolysis    Intellectual disability    Paraparesis (HCC)    Posterior tibial tendon dysfunction    Ataxic gait    Pain in joint, lower leg    Foot drop, left foot    Acquired deformity of left foot    Lumbar radiculopathy  Resolved Problems:    * No resolved hospital problems. *      ** Total time spent reviewing medical records, evaluating patient, speaking with RN's and consultants where I was focused exclusively on this patient:  minutes. This time is excluding time spent performing procedures or significant events occurring earlier or later in the day requiring my attention and focus. Subjective:   Admit Date: 6/27/2022  PCP: Vaishali Schmidt PA-C  35 y.o. female with PMH of MS, anemia, HPL and obesity transferred to rehab for further management after inpatient admission for MS exacerbation. Received IV steroids and followed by neurology. Once medically optimized she is transferred for further therapies. OOB in wheelchair. No acute complaints. Participating in PT. Afebrile. Denies chest pain, SOB, N/V, fevers or chills. VSS. Labs reviewed. Objective:     Vitals:    07/02/22 0744 07/02/22 1939 07/02/22 2042 07/03/22 0735   BP: 122/62 125/68  113/76   Pulse: 100 95  98   Resp: 19  18 17   Temp: 99.7 °F (37.6 °C) 98.1 °F (36.7 °C)  98.2 °F (36.8 °C)   TempSrc: Oral Oral  Oral   SpO2: 99% 100%  98%   Weight:       Height:         General appearance: No acute distress,  No conversational dyspnea noted. Dentition intact. Answers questions appropriately  Neurological: Alert, awake, and oriented x3 . Motor and sensory grossly intact. No focal deficits. GCS of 15. Lungs: CTAB, no exp wheezes, No rales No retractions; No use of accessory muscles  Heart:  S1, S2 normal, tachy but regular  Abdomen: (+) BS, soft, non-tender; non distended no guarding or rigidity. Extremities:  no cyanosis, edema bilat lower exts, no calf tenderness bilaterally.  Dry skin noted       Medications:      oxybutynin  5 mg Oral Nightly    clopidogrel  75 mg Oral Daily    aspirin  81 mg Oral Daily    Vitamin D  2,000 Units Oral Dinner    cyanocobalamin  1,000 mcg IntraMUSCular Weekly    coenzyme Q10  100 mg Oral Daily    lidocaine  3 patch TransDERmal Daily    enoxaparin  40 mg SubCUTAneous Daily    multivitamin  1 tablet Oral Daily       LABS Reviewed    IMAGING Reviewed    Meghna Jay, JACKI - NP  Rounding Hospitalist    Additional work up or/and treatment plan may be added today or then after based on clinical progression. I am managing a portion of pt care. Some medical issues are handled by other specialists and Primary Rehabilitation provider. Additional work up and treatment should be done in out pt setting by pt PCP and other out pt providers.

## 2022-07-03 NOTE — PROGRESS NOTES
Assessment completed. VSS. Denied pain. LBM 7/2. No distress noted. Call light within reach and bed alarm activated.   Electronically signed by Shimon Luciano RN on 7/3/2022 at 12:46 AM

## 2022-07-03 NOTE — PROGRESS NOTES
Subjective: The patient complains of severe acute on chronic progressive fatigue and paresis and quadrat esthesia secondary to MS flareup partially relieved by rest, medications, PT,  OT, steroids, SLP and rest and exacerbated by recent illness -we will sclerosis flareup. rn  Assessment completed. VSS. Denied pain. LBM 7/2. No distress noted. Call light within reach and bed alarm activated. Electronically signed by Castillo Guerin RN on 7/3/2022 at 12:46 AM      ROS x10: The patient also complains of severely impaired mobility and activities of daily living. Otherwise no new problems with vision, hearing, nose, mouth, throat, dermal, cardiovascular, GI, , pulmonary, musculoskeletal, psychiatric or neurological. See also Acute Rehab PM&R H&P. Vital signs:  /76   Pulse 98   Temp 98.2 °F (36.8 °C) (Oral)   Resp 17   Ht 5' 1\" (1.549 m)   Wt 176 lb 1.6 oz (79.9 kg)   SpO2 98%   BMI 33.27 kg/m²   I/O:   PO/Intake:  fair PO intake,    diet    Bowel:   continent   Bladder: continent no asenz  General:  Patient is well developed,   adequately nourished, and    well kempt. HEENT:    Pupils equal, hearing intact to loud voice, external inspection of ear and nose benign. Inspection of lips, tongue and gums benign    Musculoskeletal: No significant change in strength or tone. All joints stable. Inspection and palpation of digits and nails show no clubbing, cyanosis or inflammatory conditions. Neuro/Psychiatric: Affect: flat but pleasant. Alert and oriented to person, place and situation with  min cues. No significant change in deep tendon reflexes or sensation  Lungs:  Diminished, CTA-B. Respiration effort is   normal at rest.     Heart:   S1 = S2,   RRR-tachycardia. Abdomen:  Soft, non-tender, no enlargement of liver or spleen.   Extremities:    lower extremity edema    Skin:   Intact to general survey,      Rehabilitation:  Physical Therapy:   Bed mobility:  Bed mobility  Bridging: Minimal assistance (06/28/22 1143)  Rolling to Left: Minimal assistance (06/28/22 1143)  Rolling to Right: Minimal assistance (06/28/22 1143)  Supine to Sit: Minimal assistance (06/28/22 1143)  Sit to Supine: Moderate assistance (06/28/22 1143)  Scooting: Minimal assistance (latearlly in supine) (06/28/22 1143)  Bed Mobility Comments: Hand over hand assist to complete all transitions. Instructed in logroll technique first with visual demonstration and cueing throughout. Instruction provided on segmental scooting. Bedrails not utilized. (06/28/22 1143)  Bed Mobility  Additional Factors: Head of bed flat; With handrails (06/29/22 1342)  Additional Factors: Head of bed flat; With handrails (06/29/22 1342)  Roll Left  Assistance Level: Stand by assist (06/29/22 1342)  Skilled Clinical Factors: with HR (06/28/22 1342)  Roll Right  Assistance Level: Stand by assist (06/28/22 1342)  Skilled Clinical Factors: with HR (06/28/22 1342)  Sit to Supine  Assistance Level: Contact guard assist (06/29/22 1115)  Skilled Clinical Factors: utilizes BUE to assist BLE (06/29/22 1115)  Supine to Sit  Assistance Level: Contact guard assist (06/29/22 1342)  Skilled Clinical Factors: decreased effort with hospital bed (06/29/22 1342)  Scooting  Assistance Level: Contact guard assist (06/29/22 1342)  Skilled Clinical Factors: scooting to EOB (06/29/22 1342)  Transfers:  Transfers  Sit to Stand: Stand by assistance (07/02/22 1354)  Stand to sit: Stand by assistance (07/02/22 1354)  Bed to Chair: Stand by assistance (07/02/22 1354)  Lateral Transfers: Minimal Assistance;Contact guard assistance (Instruction provided on scooting laterally at EOB emphasizing forward weight shift) (06/28/22 1145)  Comment: VC for hand placement when sitting down (07/02/22 1354)  Transfers  Surface: Wheelchair (07/01/22 1340)  Additional Factors: Verbal cues (07/01/22 1340)  Device: Cricket Melecio (07/01/22 1340)  Sit to Stand  Assistance Level: Contact guard assist (07/01/22 1340)  Skilled Clinical Factors: improved carryover of cues for hand placement (07/01/22 1340)  Stand to Sit  Assistance Level: Contact guard assist (07/01/22 1340)  Skilled Clinical Factors: improved carryover of cues for hand placement (07/01/22 1340)  Bed To/From Chair  Technique: Sit pivot (06/29/22 1342)  Assistance Level: Contact guard assist (06/29/22 1342)  Skilled Clinical Factors: cues intially for hand placement, good follow through. Bed > w/c (06/29/22 1342)  Stand Pivot  Assistance Level: Contact guard assist (07/01/22 1340)  Skilled Clinical Factors: improved follow through of vc's for approach to chair, minimal cues still required for sequencing BLE and ww (07/01/22 1340)  Gait:   Ambulation  Surface: carpet (07/02/22 1354)  Device: Henreitta Fester (07/02/22 1354)  Other Apparatus: Wheelchair follow (06/28/22 1149)  Assistance: Contact guard assistance (07/02/22 1354)  Quality of Gait: difficulty with LLE foot clearance, FF posture, downward gaze, short BLE step length.  Step to pattern (07/02/22 1354)  Distance: 25' (07/02/22 1354)  Comments: reports fatigue post (07/02/22 1354)  Ambulation  Surface: Carpet (07/01/22 1341)  Device: Rolling walker (07/01/22 1341)  Distance: 28' two turns, 8' x 2 (07/01/22 1341)  Activity Comments:  BPM post ambulation (06/30/22 1342)  Assistance Level: Contact guard assist (07/01/22 1341)  Gait Deviations: Decreased step length bilateral;Slow leonardo;Decreased heel strike right;Decreased heel strike left;Narrow base of support (07/01/22 1341)  Skilled Clinical Factors: improved weight shifting and foot clearance, good carryover of cues to not lift ww (07/01/22 1341)  Stairs:  Stairs/Curb  Stairs?: No (06/28/22 1149)  Curb  Curb Height: 2'' (07/01/22 1112)  Device: Rolling walker (07/01/22 1112)  Number of Curbs: 2 (07/01/22 1112)  Additional Factors: Verbal cues (07/01/22 1112)  Assistance Level: Contact guard assist (07/01/22 1112)  Skilled Clinical Factors: good carryover of technique, visual demo provided, minimal cues for technique/safety to improve quality (07/01/22 1112)  W/C mobility:       Occupational Therapy:   Hand Dominance: Right  ADL  Feeding: Modified independent  (06/28/22 1049)  Grooming: Setup (06/29/22 1341)  Grooming Skilled Clinical Factors: for oral care and grooming (06/29/22 1341)  UE Bathing: Stand by assistance; Increased time to complete (06/29/22 1341)  LE Bathing: Dependent/Total (06/29/22 1341)  LE Bathing Skilled Clinical Factors: +2 assistance. Pt able to bend down in order to wash legs with SBA for safe sitting. +2 to wash posterior lillie area. 1 person for washing 1 person to maintain standing balance (06/29/22 1341)  UE Dressing: Stand by assistance (06/29/22 1341)  LE Dressing: Increased time to complete;Maximum assistance (06/29/22 1341)  LE Dressing Skilled Clinical Factors: to don pants and depends (06/29/22 1341)  Toileting: Dependent/Total (06/29/22 1341)  Toileting Skilled Clinical Factors: Pt incontinent of feces and urine (06/29/22 1341)  Additional Comments: Pt displaying lack of endurance and fatigue throughout session, requiring increased assistance throughout progression of evaluation (06/28/22 1049)  Toilet Transfers  Toilet - Technique: Stand pivot (06/29/22 1339)  Equipment Used: Standard toilet (06/29/22 1339)  Toilet Transfer: Minimal assistance (verbal cues for hand placement) (06/29/22 1339)  Toilet Transfers Comments: Twards end of session, initially Min A for transfers (06/28/22 1054)     [address] - Transfer Type: To and From (06/28/22 1054)  Shower - Transfer To: Shower seat with back (06/28/22 1054)  Shower - Technique: Stand pivot (06/28/22 1054)  Shower Transfers:  Moderate assistance (06/28/22 1054)  Shower Transfers Comments: Patient did not complete shower transfer as patient washed up at sink in wheelchair. (06/29/22 1339)    Speech Therapy:      Comprehension: Within Functional Limits (Patient answered simple yes/no questions, basic questions and participated in conversation with good comprehension. no repetition was required)  Verbal Expression:  Replaced by [facility] with meeting wants and needs. No word finding deficits have been noted during conversation)  Diet/Swallow:        Dysphagia Outcome Severity Scale: Level 7: Normal in all situations  Compensatory Swallowing Strategies : Upright as possible for all oral intake          Lab/X-ray studies reviewed, analyzed and discussed with patient and staff:   Recent Results (from the past 24 hour(s))   Basic Metabolic Panel w/ Reflex to MG    Collection Time: 07/03/22  4:42 AM   Result Value Ref Range    Sodium 141 135 - 144 mEq/L    Potassium reflex Magnesium 4.1 3.4 - 4.9 mEq/L    Chloride 105 95 - 107 mEq/L    CO2 27 20 - 31 mEq/L    Anion Gap 9 9 - 15 mEq/L    Glucose 91 70 - 99 mg/dL    BUN 22 (H) 6 - 20 mg/dL    CREATININE 0.64 0.50 - 0.90 mg/dL    GFR Non-African American >60.0 >60    GFR  >60.0 >60    Calcium 8.7 8.5 - 9.9 mg/dL   CBC with Auto Differential    Collection Time: 07/03/22  4:42 AM   Result Value Ref Range    WBC 6.8 4.8 - 10.8 K/uL    RBC 3.17 (L) 4.20 - 5.40 M/uL    Hemoglobin 8.9 (L) 12.0 - 16.0 g/dL    Hematocrit 27.2 (L) 37.0 - 47.0 %    MCV 85.7 82.0 - 100.0 fL    MCH 28.2 27.0 - 31.3 pg    MCHC 32.9 (L) 33.0 - 37.0 %    RDW 18.1 (H) 11.5 - 14.5 %    Platelets 663 915 - 628 K/uL    Neutrophils % 71.5 %    Lymphocytes % 14.6 %    Monocytes % 10.8 %    Eosinophils % 2.4 %    Basophils % 0.7 %    Neutrophils Absolute 4.8 1.4 - 6.5 K/uL    Lymphocytes Absolute 1.0 1.0 - 4.8 K/uL    Monocytes Absolute 0.7 0.2 - 0.8 K/uL    Eosinophils Absolute 0.2 0.0 - 0.7 K/uL    Basophils Absolute 0.0 0.0 - 0.2 K/uL       XR CERVICAL SPINE   6/19/2022   NEGATIVE CERVICAL SPINE    XR HIP LEFT  6/19/2022  NEGATIVE PELVIS AND LEFT HIP     XR FEMUR RIGHT  6/19/2022: No fracture, dislocation, bone lesion.     NEGATIVE RIGHT FEMUR    MRI LUMBAR SPINE : 6/20/2022: There is unremarkable alignment of the columns of the lumbar spine visualized, no evidence of spondylolisthesis. The contours of the lumbar vertebral bodies are unremarkable, no evidence of compression deformity is seen. The STIR sequence demonstrates unremarkable signal intensity of the bone marrow , no evidence of edema or infiltrative process. Disc desiccation and slightly decreased intervertebral disc height visualized at L3-4. The cord terminates at the level of the T12-L1 intervertebral disc space, unremarkable signal intensity of internal cord, the terminal nerve fibers demonstrate unremarkable contour with no evidence of thickening or clumping. Bilateral renal cysts are seen. L1-L2 demonstrates mild degenerative changes in the intervertebral disc, right foraminal disc herniation is seen. No significant narrowing of the spinal canal is visualized, no significant narrowing of the right neuroforamina lower left neural foramina seen on this level. L2-L3 demonstrates unremarkable intervertebral disc and posterior elements, no evidence of significant narrowing of the spinal canal or neural foramina is visualized at this level. L3-L4 circumferential disc bulge with hypertrophic changes in the facet joints and ligamentum flavum, no significant narrowing of the spinal canal is visualized, mild narrowing of bilateral neural foramina is visualized at this level. L4-L5 mild degenerative disc changes and hypertrophic changes in the facet joints visualize, no significant narrowing of the spinal canal or bilateral neural foramina is visualized at this level.  L5-S1 demonstrates unremarkable intervertebral disc and posterior elements, no evidence of significant narrowing of the spinal canal mild degenerative disc changes with hypertrophic changes in the facet joints visualized most prominent on the left, no significant narrowing of the spinal canal on the right neuroforamina is seen, moderate narrowing of the left neuroforamina is seen at this level. Degenerative facet changes of the lumbar spine visualized most prominent at L3-L4 and L5-S1 that demonstrate mild progression in comparison to the prior study    US ABDOMEN  6/20/2022: Biplanar images were obtained. The gallbladder is physiologically distended. The gallbladder wall is upper limits of normal.  There is echogenic debris within the gallbladder in the decubitus position, suspected gallbladder sludge. No evidence of cholelithiasis. The common bile duct measures up to  3 mm in diameter. No intrahepatic bile duct dilatation is seen. No focal liver lesions are noted. The echogenicity of liver is unremarkable. The pancreas was not visualized due to interfering bowel gas. Tail of pancreas is not well visualized. No free fluid is seen within Morisons pouch. NO SIGN OF CHOLELITHIASIS OR BILE DUCT DILATATION. SLUDGE WITHIN THE GALLBLADDER. NO DEFINITE FOCAL LIVER ABNORMALITY. US DUP LOWER EXTREMITIES BILATERAL VENOUS  6/20/2022   NO SONOGRAPHIC EVIDENCE OF DEEP VENOUS THROMBOSIS WITHIN THE BILATERAL LOWER EXTREMITIES. Previous extensive, complex labs, notes and diagnostics reviewed and analyzed. ALLERGIES:    Allergies as of 06/27/2022    (No Known Allergies)      (please also verify by checking MAR)      Yesterday I evaluated this patient for periodic reassessment of medical and functional status. The patient was discussed in detail at the treatment team meeting focusing on current medical issues, progress in therapies, social issues, psychological issues, barriers to progress and strategies to address these barriers, and discharge planning. See the hand written addendum to rehab progress note. The patient continues to be high risk for future disability and their medical and rehabilitation prognosis continue to be good and therefore, we will continue the patient's rehabilitation course as planned.   The patient's tentative discharge date was set. Patient and family education was discussed. The patient was made aware of the team discussion regarding their progress. Discharge plans were discussed along with barriers to progress and strategies to address these barriers, patient encouraged to continue to discuss discharge plans with . Complex Physical Medicine & Rehab Issues Assess & Plan:   1. Severe abnormality of gait and mobility and impaired self-care and ADL's secondary to progressive masturbation of multiple sclerosis. Functional and medical status reassessed regarding patients ability to participate in therapies and patient found to be able to participate in acute intensive comprehensive inpatient rehabilitation program including PT/OT to improve balance, ambulation, ADLs, and to improve the P/AROM. Therapeutic modifications regarding activities in therapies, place, amount of time per day and intensity of therapy made daily. In bed therapies or bedside therapies prn.   2. Bowel and Bladder dysfunction  , Neurogenic bowel and bladder:  frequent toileting, ambulate to bathroom with assistance, check post void residuals. Check for C.difficile x1 if >2 loose stools in 24 hours, continue bowel & bladder program.  Monitor bowel and bladder function. Lactinex 2 PO every AC. MOM prn, Brown Bomb prn, Glycerin suppository prn, enema prn. Encourage therapy and nursing to co-treat and problem solve re continence. Norris Ditropan. 3. Severe MS pain as well as generalized OA pain: reassess pain every shift and prior to and after each therapy session, give prn Tylenol and consider scheduled Tylenol, modalities prn in therapy, masage, Lidoderm, K-pad prn. Consider scheduled AM pain meds. 4. Skin healing   and breakdown risk:  continue pressure relief program.  Daily skin exams and reports from nursing.   5. Fatigue due to nutritional and hydration deficiency: Add and titrate vitamin B12 vitamin D and CoQ10 continue to monitor I&Os, calorie counts prn, dietary consult prn. Add healthy snack at night. 6. Acute episodic insomnia with situational adjustment disorder:  prn Ambien, monitor for day time sedation. 7. Falls risk elevated:  patient to use call light to get nursing assistance to get up, bed and chair alarm. 8. Elevated DVT risk: progressive activities in PT, continue prophylaxis UMM hose, elevation and Lovenox. 9. Complex discharge planning:  DC 7/12/22--home with [facility]--try to get aide with waiver. Parents are elderly and son is only 5. Weekly team meeting every Thursday to re-assess progress towards goals, discuss and address social, psychological and medical comorbidities and to address difficulties they may be having progressing in therapy. Patient and family education is in progress. The patient is to follow-up with their family physician after discharge. Complex Active General Medical Issues that complicate care Assess & Plan:    1. Urinary urgency,   Neurogenic bladder-recheck stat UA  2.   MS (multiple sclerosis)-consult neurology transition to outpatient Ocrevus therapy if possible. 3.   Hyperglycemia-monitor for diabetes mellitus  4. Rhabdomyolysis-push fluids recheck BMP  5. Baseline mental handicap-just new learning and therapy. 6.   Posterior tibial tendon dysfunction  7. Ataxic gait-focus on balance and therapy  8. Pain in joint, lower leg, Foot drop, left foot, Acquired deformity of left foot-consult podiatry as needed  9. Lumbar radiculopathy-post effective dose of pain medication  10. Asymptomatic sinus tachycardia with activity dt deconditioning- SP EKG stat-OK,  , add stat labs and cardiac consult-a.m. labs were essentially [id number]  Was OK. OK for therapy per cardiology.   Continue telemetry for now       Focus of today's plan-  Cont therapies    Anemia unchanged Hct 27 steady  GFR above 60  Repeat labs reviewed    Evelyn Martinezo Crew. Lalit Moya D.O., PM&R     Attending    [address]

## 2022-07-04 PROBLEM — Q21.12 PFO (PATENT FORAMEN OVALE): Status: ACTIVE | Noted: 2022-07-04

## 2022-07-04 LAB
ANION GAP SERPL CALCULATED.3IONS-SCNC: 8 MEQ/L (ref 9–15)
BASOPHILS ABSOLUTE: 0 K/UL (ref 0–0.2)
BASOPHILS RELATIVE PERCENT: 0.7 %
BUN BLDV-MCNC: 19 MG/DL (ref 6–20)
CALCIUM SERPL-MCNC: 8.6 MG/DL (ref 8.5–9.9)
CHLORIDE BLD-SCNC: 108 MEQ/L (ref 95–107)
CO2: 27 MEQ/L (ref 20–31)
CREAT SERPL-MCNC: 0.67 MG/DL (ref 0.5–0.9)
EOSINOPHILS ABSOLUTE: 0.2 K/UL (ref 0–0.7)
EOSINOPHILS RELATIVE PERCENT: 2.3 %
GFR AFRICAN AMERICAN: >60
GFR NON-AFRICAN AMERICAN: >60
GLUCOSE BLD-MCNC: 97 MG/DL (ref 70–99)
HCT VFR BLD CALC: 28.3 % (ref 37–47)
HEMOGLOBIN: 9.1 G/DL (ref 12–16)
LYMPHOCYTES ABSOLUTE: 0.9 K/UL (ref 1–4.8)
LYMPHOCYTES RELATIVE PERCENT: 13.4 %
MCH RBC QN AUTO: 27.8 PG (ref 27–31.3)
MCHC RBC AUTO-ENTMCNC: 32.1 % (ref 33–37)
MCV RBC AUTO: 86.5 FL (ref 82–100)
MONOCYTES ABSOLUTE: 0.7 K/UL (ref 0.2–0.8)
MONOCYTES RELATIVE PERCENT: 9.8 %
NEUTROPHILS ABSOLUTE: 5.2 K/UL (ref 1.4–6.5)
NEUTROPHILS RELATIVE PERCENT: 73.8 %
PDW BLD-RTO: 18.2 % (ref 11.5–14.5)
PLATELET # BLD: 249 K/UL (ref 130–400)
POTASSIUM REFLEX MAGNESIUM: 4.3 MEQ/L (ref 3.4–4.9)
RBC # BLD: 3.27 M/UL (ref 4.2–5.4)
SODIUM BLD-SCNC: 143 MEQ/L (ref 135–144)
WBC # BLD: 7.1 K/UL (ref 4.8–10.8)

## 2022-07-04 PROCEDURE — 97530 THERAPEUTIC ACTIVITIES: CPT

## 2022-07-04 PROCEDURE — 6370000000 HC RX 637 (ALT 250 FOR IP): Performed by: PHYSICAL MEDICINE & REHABILITATION

## 2022-07-04 PROCEDURE — 6370000000 HC RX 637 (ALT 250 FOR IP): Performed by: INTERNAL MEDICINE

## 2022-07-04 PROCEDURE — 97110 THERAPEUTIC EXERCISES: CPT

## 2022-07-04 PROCEDURE — 99232 SBSQ HOSP IP/OBS MODERATE 35: CPT | Performed by: PHYSICAL MEDICINE & REHABILITATION

## 2022-07-04 PROCEDURE — 1180000000 HC REHAB R&B

## 2022-07-04 PROCEDURE — 97535 SELF CARE MNGMENT TRAINING: CPT

## 2022-07-04 PROCEDURE — 97116 GAIT TRAINING THERAPY: CPT

## 2022-07-04 PROCEDURE — 85025 COMPLETE CBC W/AUTO DIFF WBC: CPT

## 2022-07-04 PROCEDURE — 80048 BASIC METABOLIC PNL TOTAL CA: CPT

## 2022-07-04 PROCEDURE — 36415 COLL VENOUS BLD VENIPUNCTURE: CPT

## 2022-07-04 PROCEDURE — 97112 NEUROMUSCULAR REEDUCATION: CPT

## 2022-07-04 PROCEDURE — 6360000002 HC RX W HCPCS: Performed by: INTERNAL MEDICINE

## 2022-07-04 RX ADMIN — Medication 2000 UNITS: at 16:42

## 2022-07-04 RX ADMIN — ASPIRIN 81 MG: 81 TABLET, COATED ORAL at 08:02

## 2022-07-04 RX ADMIN — CLOPIDOGREL BISULFATE 75 MG: 75 TABLET ORAL at 08:02

## 2022-07-04 RX ADMIN — ENOXAPARIN SODIUM 40 MG: 100 INJECTION SUBCUTANEOUS at 08:02

## 2022-07-04 RX ADMIN — OXYBUTYNIN CHLORIDE 5 MG: 5 TABLET, EXTENDED RELEASE ORAL at 20:41

## 2022-07-04 RX ADMIN — Medication 100 MG: at 08:02

## 2022-07-04 RX ADMIN — THERA TABS 1 TABLET: TAB at 08:02

## 2022-07-04 NOTE — PLAN OF CARE
Problem: Discharge Planning  Goal: Discharge to home or other facility with appropriate resources  Outcome: Progressing  Flowsheets (Taken 7/3/2022 0945 by Brandon Reich, RN)  Discharge to home or other facility with appropriate resources: Identify barriers to discharge with patient and caregiver     Problem: ABCDS Injury Assessment  Goal: Absence of physical injury  Outcome: Progressing  Flowsheets (Taken 7/3/2022 1009 by Brandon Reich, RN)  Absence of Physical Injury: Implement safety measures based on patient assessment     Problem: Safety - Adult  Goal: Free from fall injury  Outcome: Progressing  Flowsheets (Taken 7/3/2022 1009 by Brandon Reich RN)  Free From Fall Injury: Instruct family/caregiver on patient safety     Problem: Skin/Tissue Integrity  Goal: Absence of new skin breakdown  Description: 1. Monitor for areas of redness and/or skin breakdown  2. Assess vascular access sites hourly  3. Every 4-6 hours minimum:  Change oxygen saturation probe site  4. Every 4-6 hours:  If on nasal continuous positive airway pressure, respiratory therapy assess nares and determine need for appliance change or resting period.   Outcome: Progressing     Problem: Pain  Goal: Verbalizes/displays adequate comfort level or baseline comfort level  Outcome: Progressing

## 2022-07-04 NOTE — PROGRESS NOTES
Assessment completed. VSS. Denied pain. LBM 7/3. No distress noted. Call light within reach and bed alarm activated.   Electronically signed by Ld Carrington RN on 7/3/2022 at 11:49 PM

## 2022-07-04 NOTE — PROGRESS NOTES
Verbalized understanding;Demonstrated understanding    Equipment recommendations:   walker tray      ASSESSMENT:   Patient continues to demo improved transfer status. Pt progressing to completing some IADLs today. Will continue to progress as patient is able. PLAN OF CARE:  Strengthening,Balance training,Functional mobility training,Neuromuscular re-education,Self-Care / Murphy Gene training,Safety education & training,Home management training  continue with POC    Patient goals : \"My dad can't take care of me now, so I need to get better. He has congestive heart failure. \"  Time Frame for Long term goals : Within 2 weeks, Pt to demonstrate progress in the following areas listed below to achieve specific LTG's as stated in the initial evaluation. Long Term Goal 1: Increase independence in ADLs  Long Term Goal 2: Increaese endurance to complete clothes management  Long Term Goal 3: Increase static/dynamic balance  Long Term Goal 4:  Increase independence to perform kitchen mobility        Therapy Time:   Individual Group Co-Treat   Time In 1330       Time Out 1400         Minutes 30         Therapeutic activities: 30 minutes     Electronically signed by:    Stewart Espinosa OT,   7/4/2022, 1:38 PM

## 2022-07-04 NOTE — PROGRESS NOTES
Assessment completed. A&O x4. Denies pain at this time. In chair with alarm activated. Call light in reach.  Electronically signed by Ellen Addison LPN on 5/0/7096 at 4:12 PM

## 2022-07-04 NOTE — PROGRESS NOTES
Subjective: The patient complains of severe acute on chronic progressive fatigue and paresis and quadrat esthesia secondary to MS flareup partially relieved by rest, medications, PT,  OT, steroids, SLP and rest and exacerbated by recent illness -we will sclerosis flareup. I am concerned about patients medical complexities and barriers to advancing in rehab goals including -her tachycardia-initially presented through the emergency room with severe hip pain status post a fall at home. She was diagnosed with an exacerbation of her multiple sclerosis and rhabdomyolysis secondary to elevated CK levels. Fortunately her x-rays of her hips were unremarkable. She was prescribed IV fluids and IV methylprednisolone. She is hoping to transition to [facility] therapy as an outpatient for her MS.     Patient has had difficulty with endurance, balance, gait ataxia, generalized weakness and numbness. Medically complicated by tachycardia especially with activity. She will need telemetry monitoring while on the unit and to be followed by cardiology and or medical..  I reviewed and discussed cardiac note-Cardiac Supportive Care including:IV and PO hydration for Rhabdomyolysis care. Serial cardiac enzymes. Telemetry for 48 hours. Echocardiogram-sofar work-up of his been unremarkable and cardiology feels that it is a tachycardia always sinus in response to debility and deconditioning. Have okayed her for therapy. I reviewed current care and plans for further care with other rehab providers including nursing and case management. According to recent nursing note, \" VSS. Denied pain. LBM 7/3. No distress noted. Call light within reach and bed alarm activated. \".    I am concerned about the moderate sized PFO that they found on recent echocardiogram.    ROS x10: The patient also complains of severely impaired mobility and activities of daily living.   Otherwise no new problems with vision, hearing, nose, mouth, throat, dermal, cardiovascular, GI, , pulmonary, musculoskeletal, psychiatric or neurological. See also Acute Rehab PM&R H&P. Vital signs:  /75   Pulse 87   Temp 97.5 °F (36.4 °C) (Oral)   Resp 20   Ht 5' 1\" (1.549 m)   Wt 176 lb 1.6 oz (79.9 kg)   SpO2 100%   BMI 33.27 kg/m²   I/O:   PO/Intake:  fair PO intake,    diet    Bowel:   continent LBM 7/3  Bladder: continent no saenz  General:  Patient is well developed,   adequately nourished, and    well kempt. HEENT:    Pupils equal, hearing intact to loud voice, external inspection of ear and nose benign. Inspection of lips, tongue and gums benign    Musculoskeletal: No significant change in strength or tone. All joints stable. Inspection and palpation of digits and nails show no clubbing, cyanosis or inflammatory conditions. Neuro/Psychiatric: Affect: flat but pleasant. Alert and oriented to person, place and situation with  min cues. No significant change in deep tendon reflexes or sensation  Lungs:  Diminished, CTA-B. Respiration effort is   normal at rest.     Heart:   S1 = S2,   RRR-tachycardia. Abdomen:  Soft, non-tender, no enlargement of liver or spleen. Extremities:    lower extremity edema    Skin:   Intact to general survey,      Rehabilitation:  Physical Therapy:   Bed mobility:  Bed mobility  Bridging: Minimal assistance (06/28/22 1143)  Rolling to Left: Minimal assistance (06/28/22 1143)  Rolling to Right: Minimal assistance (06/28/22 1143)  Supine to Sit: Minimal assistance (06/28/22 1143)  Sit to Supine: Moderate assistance (06/28/22 1143)  Scooting: Minimal assistance (latearlly in supine) (06/28/22 1143)  Bed Mobility Comments: Hand over hand assist to complete all transitions. Instructed in logroll technique first with visual demonstration and cueing throughout. Instruction provided on segmental scooting. Bedrails not utilized. (06/28/22 1143)  Bed Mobility  Additional Factors: Head of bed flat; With handrails (06/29/22 1342)  Additional Factors: Head of bed flat; With handrails (06/29/22 1342)  Roll Left  Assistance Level: Stand by assist (06/29/22 1342)  Skilled Clinical Factors: with HR (06/28/22 1342)  Roll Right  Assistance Level: Stand by assist (06/28/22 1342)  Skilled Clinical Factors: with HR (06/28/22 1342)  Sit to Supine  Assistance Level: Contact guard assist (06/29/22 1115)  Skilled Clinical Factors: utilizes BUE to assist BLE (06/29/22 1115)  Supine to Sit  Assistance Level: Contact guard assist (06/29/22 1342)  Skilled Clinical Factors: decreased effort with hospital bed (06/29/22 1342)  Scooting  Assistance Level: Contact guard assist (06/29/22 1342)  Skilled Clinical Factors: scooting to EOB (06/29/22 1342)  Transfers:  Transfers  Sit to Stand: Stand by assistance (07/02/22 1354)  Stand to sit: Stand by assistance (07/02/22 1354)  Bed to Chair: Stand by assistance (07/02/22 1354)  Lateral Transfers: Minimal Assistance;Contact guard assistance (Instruction provided on scooting laterally at EOB emphasizing forward weight shift) (06/28/22 1145)  Comment: VC for hand placement when sitting down (07/02/22 1354)  Transfers  Surface: Wheelchair (07/01/22 1340)  Additional Factors: Verbal cues (07/01/22 1340)  Device: Elverna Corti (07/01/22 1340)  Sit to Stand  Assistance Level: Contact guard assist (07/01/22 1340)  Skilled Clinical Factors: improved carryover of cues for hand placement (07/01/22 1340)  Stand to Sit  Assistance Level: Contact guard assist (07/01/22 1340)  Skilled Clinical Factors: improved carryover of cues for hand placement (07/01/22 1340)  Bed To/From Chair  Technique: Sit pivot (06/29/22 1342)  Assistance Level: Contact guard assist (06/29/22 1342)  Skilled Clinical Factors: cues intially for hand placement, good follow through.  Bed > w/c (06/29/22 1342)  Stand Pivot  Assistance Level: Contact guard assist (07/01/22 1340)  Skilled Clinical Factors: improved follow through of vc's for approach to chair, minimal cues still required for sequencing BLE and ww (07/01/22 1340)  Gait:   Ambulation  Surface: carpet (07/02/22 1354)  Device: Rolling Walker (07/02/22 1354)  Other Apparatus: Wheelchair follow (06/28/22 1149)  Assistance: Contact guard assistance (07/02/22 1354)  Quality of Gait: difficulty with LLE foot clearance, FF posture, downward gaze, short BLE step length. Step to pattern (07/02/22 1354)  Distance: 25' (07/02/22 1354)  Comments: reports fatigue post (07/02/22 1354)  Ambulation  Surface: Carpet (07/01/22 1341)  Device: Rolling walker (07/01/22 1341)  Distance: 28' two turns, 8' x 2 (07/01/22 1341)  Activity Comments:  BPM post ambulation (06/30/22 1342)  Assistance Level: Contact guard assist (07/01/22 1341)  Gait Deviations: Decreased step length bilateral;Slow leonardo;Decreased heel strike right;Decreased heel strike left;Narrow base of support (07/01/22 1341)  Skilled Clinical Factors: improved weight shifting and foot clearance, good carryover of cues to not lift ww (07/01/22 1341)  Stairs:  Stairs/Curb  Stairs?: No (06/28/22 1149)  Curb  Curb Height: 2'' (07/01/22 1112)  Device: Rolling walker (07/01/22 1112)  Number of Curbs: 2 (07/01/22 1112)  Additional Factors: Verbal cues (07/01/22 1112)  Assistance Level: Contact guard assist (07/01/22 1112)  Skilled Clinical Factors: good carryover of technique, visual demo provided, minimal cues for technique/safety to improve quality (07/01/22 1112)  W/C mobility:       Occupational Therapy:   Hand Dominance: Right  ADL  Feeding: Modified independent  (06/28/22 1049)  Grooming: Setup (06/29/22 1341)  Grooming Skilled Clinical Factors: for oral care and grooming (06/29/22 1341)  UE Bathing: Stand by assistance; Increased time to complete (06/29/22 1341)  LE Bathing: Dependent/Total (06/29/22 1341)  LE Bathing Skilled Clinical Factors: +2 assistance.  Pt able to bend down in order to wash legs with SBA for safe sitting. +2 to wash posterior lillie area. 1 person for washing 1 person to maintain standing balance (06/29/22 1341)  UE Dressing: Stand by assistance (06/29/22 1341)  LE Dressing: Increased time to complete;Maximum assistance (06/29/22 1341)  LE Dressing Skilled Clinical Factors: to don pants and depends (06/29/22 1341)  Toileting: Dependent/Total (06/29/22 1341)  Toileting Skilled Clinical Factors: Pt incontinent of feces and urine (06/29/22 1341)  Additional Comments: Pt displaying lack of endurance and fatigue throughout session, requiring increased assistance throughout progression of evaluation (06/28/22 1049)  Toilet Transfers  Toilet - Technique: Stand pivot (06/29/22 1339)  Equipment Used: Standard toilet (06/29/22 1339)  Toilet Transfer: Minimal assistance (verbal cues for hand placement) (06/29/22 1339)  Toilet Transfers Comments: Twards end of session, initially Min A for transfers (06/28/22 1054)     [address] - Transfer Type: To and From (06/28/22 1054)  Shower - Transfer To: Shower seat with back (06/28/22 1054)  Shower - Technique: Stand pivot (06/28/22 1054)  Shower Transfers: Moderate assistance (06/28/22 1054)  Shower Transfers Comments: Patient did not complete shower transfer as patient washed up at sink in wheelchair. (06/29/22 1339)    Speech Therapy:      Comprehension: Within Functional Limits (Patient answered simple yes/no questions, basic questions and participated in conversation with good comprehension. no repetition was required)  Verbal Expression:  [facility] with meeting wants and needs.  No word finding deficits have been noted during conversation)  Diet/Swallow:        Dysphagia Outcome Severity Scale: Level 7: Normal in all situations  Compensatory Swallowing Strategies : Upright as possible for all oral intake          Lab/X-ray studies reviewed, analyzed and discussed with patient and staff:   Recent Results (from the past 24 hour(s))   Basic Metabolic Panel w/ Reflex to MG    Collection Time: 07/04/22 4:31 AM   Result Value Ref Range    Sodium 143 135 - 144 mEq/L    Potassium reflex Magnesium 4.3 3.4 - 4.9 mEq/L    Chloride 108 (H) 95 - 107 mEq/L    CO2 27 20 - 31 mEq/L    Anion Gap 8 (L) 9 - 15 mEq/L    Glucose 97 70 - 99 mg/dL    BUN 19 6 - 20 mg/dL    CREATININE 0.67 0.50 - 0.90 mg/dL    GFR Non-African American >60.0 >60    GFR  >60.0 >60    Calcium 8.6 8.5 - 9.9 mg/dL   CBC with Auto Differential    Collection Time: 07/04/22  4:31 AM   Result Value Ref Range    WBC 7.1 4.8 - 10.8 K/uL    RBC 3.27 (L) 4.20 - 5.40 M/uL    Hemoglobin 9.1 (L) 12.0 - 16.0 g/dL    Hematocrit 28.3 (L) 37.0 - 47.0 %    MCV 86.5 82.0 - 100.0 fL    MCH 27.8 27.0 - 31.3 pg    MCHC 32.1 (L) 33.0 - 37.0 %    RDW 18.2 (H) 11.5 - 14.5 %    Platelets 463 852 - 732 K/uL    Neutrophils % 73.8 %    Lymphocytes % 13.4 %    Monocytes % 9.8 %    Eosinophils % 2.3 %    Basophils % 0.7 %    Neutrophils Absolute 5.2 1.4 - 6.5 K/uL    Lymphocytes Absolute 0.9 (L) 1.0 - 4.8 K/uL    Monocytes Absolute 0.7 0.2 - 0.8 K/uL    Eosinophils Absolute 0.2 0.0 - 0.7 K/uL    Basophils Absolute 0.0 0.0 - 0.2 K/uL       XR CERVICAL SPINE   6/19/2022   NEGATIVE CERVICAL SPINE    XR HIP LEFT  6/19/2022  NEGATIVE PELVIS AND LEFT HIP     XR FEMUR RIGHT  6/19/2022: No fracture, dislocation, bone lesion. NEGATIVE RIGHT FEMUR    MRI LUMBAR SPINE : 6/20/2022: There is unremarkable alignment of the columns of the lumbar spine visualized, no evidence of spondylolisthesis. The contours of the lumbar vertebral bodies are unremarkable, no evidence of compression deformity is seen. The STIR sequence demonstrates unremarkable signal intensity of the bone marrow , no evidence of edema or infiltrative process. Disc desiccation and slightly decreased intervertebral disc height visualized at L3-4.  The cord terminates at the level of the T12-L1 intervertebral disc space, unremarkable signal intensity of internal cord, the terminal nerve fibers demonstrate unremarkable contour with no evidence of thickening or clumping. Bilateral renal cysts are seen. L1-L2 demonstrates mild degenerative changes in the intervertebral disc, right foraminal disc herniation is seen. No significant narrowing of the spinal canal is visualized, no significant narrowing of the right neuroforamina lower left neural foramina seen on this level. L2-L3 demonstrates unremarkable intervertebral disc and posterior elements, no evidence of significant narrowing of the spinal canal or neural foramina is visualized at this level. L3-L4 circumferential disc bulge with hypertrophic changes in the facet joints and ligamentum flavum, no significant narrowing of the spinal canal is visualized, mild narrowing of bilateral neural foramina is visualized at this level. L4-L5 mild degenerative disc changes and hypertrophic changes in the facet joints visualize, no significant narrowing of the spinal canal or bilateral neural foramina is visualized at this level. L5-S1 demonstrates unremarkable intervertebral disc and posterior elements, no evidence of significant narrowing of the spinal canal mild degenerative disc changes with hypertrophic changes in the facet joints visualized most prominent on the left, no significant narrowing of the spinal canal on the right neuroforamina is seen, moderate narrowing of the left neuroforamina is seen at this level. Degenerative facet changes of the lumbar spine visualized most prominent at L3-L4 and L5-S1 that demonstrate mild progression in comparison to the prior study    US ABDOMEN  6/20/2022:  NO SIGN OF CHOLELITHIASIS OR BILE DUCT DILATATION. SLUDGE WITHIN THE GALLBLADDER. NO DEFINITE FOCAL LIVER ABNORMALITY. US DUP LOWER EXTREMITIES BILATERAL VENOUS  6/20/2022   NO SONOGRAPHIC EVIDENCE OF DEEP VENOUS THROMBOSIS WITHIN THE BILATERAL LOWER EXTREMITIES. Patent foramen ovale with right-to-left shunt was visualized.    On agitated saline contrast injection- Large amount of bubbles LA and LV   nearly immediately    Previous extensive, complex labs, notes and diagnostics reviewed and analyzed. ALLERGIES:    Allergies as of 06/27/2022    (No Known Allergies)      (please also verify by checking MAR)      Recently, I evaluated this patient for periodic reassessment of medical and functional status. The patient was discussed in detail at the treatment team meeting focusing on current medical issues, progress in therapies, social issues, psychological issues, barriers to progress and strategies to address these barriers, and discharge planning. See the hand written addendum to rehab progress note. The patient continues to be high risk for future disability and their medical and rehabilitation prognosis continue to be good and therefore, we will continue the patient's rehabilitation course as planned. The patient's tentative discharge date was set. Patient and family education was discussed. The patient was made aware of the team discussion regarding their progress. Discharge plans were discussed along with barriers to progress and strategies to address these barriers, patient encouraged to continue to discuss discharge plans with . Complex Physical Medicine & Rehab Issues Assess & Plan:   1. Severe abnormality of gait and mobility and impaired self-care and ADL's secondary to progressive masturbation of multiple sclerosis. Functional and medical status reassessed regarding patients ability to participate in therapies and patient found to be able to participate in acute intensive comprehensive inpatient rehabilitation program including PT/OT to improve balance, ambulation, ADLs, and to improve the P/AROM. Therapeutic modifications regarding activities in therapies, place, amount of time per day and intensity of therapy made daily.   In bed therapies or bedside therapies prn.   2. Bowel and Bladder dysfunction  , Neurogenic bowel and bladder: frequent toileting, ambulate to bathroom with assistance, check post void residuals. Check for C.difficile x1 if >2 loose stools in 24 hours, continue bowel & bladder program.  Monitor bowel and bladder function. Lactinex 2 PO every AC. MOM prn, Brown Bomb prn, Glycerin suppository prn, enema prn. Encourage therapy and nursing to co-treat and problem solve re continence. [place] Ditropan. 3. Severe MS pain as well as generalized OA pain: reassess pain every shift and prior to and after each therapy session, give prn Tylenol and consider scheduled Tylenol, modalities prn in therapy, masage, Lidoderm, K-pad prn. Consider scheduled AM pain meds. 4. Skin healing   and breakdown risk:  continue pressure relief program.  Daily skin exams and reports from nursing. 5. Fatigue due to nutritional and hydration deficiency: Add and titrate vitamin B12 vitamin D and CoQ10 continue to monitor I&Os, calorie counts prn, dietary consult prn. Add healthy snack at night. 6. Acute episodic insomnia with situational adjustment disorder:  prn Ambien, monitor for day time sedation. 7. Falls risk elevated:  patient to use call light to get nursing assistance to get up, bed and chair alarm. 8. Elevated DVT risk: progressive activities in PT, continue prophylaxis UMM hose, elevation and Lovenox. 9. Complex discharge planning:  [facility]--try to get aide with waiver. Parents are elderly and son is only 5. Toward her final weekly team meeting   Thursday to re-assess progress towards goals, discuss and address social, psychological and medical comorbidities and to address difficulties they may be having progressing in therapy. Patient and family education is in progress. The patient is to follow-up with their family physician after discharge. Complex Active General Medical Issues that complicate care Assess & Plan:    1.  Urinary urgency,   Neurogenic bladder-recheck stat UA  2.   MS (multiple sclerosis)-consult neurology transition to outpatient Ocrevus therapy if possible. 3.   Hyperglycemia-monitor for diabetes mellitus  4. Rhabdomyolysis-push fluids recheck BMP  5. Baseline mental handicap-just new learning and therapy. 6.   Posterior tibial tendon dysfunction  7. Ataxic gait-focus on balance and therapy  8. Pain in joint, lower leg, Foot drop, left foot, Acquired deformity of left foot-consult podiatry as needed  9. Lumbar radiculopathy-post effective dose of pain medication  10. Moderate sized PFO, asymptomatic sinus tachycardia with activity dt deconditioning- SP EKG stat-OK,  , add stat labs and cardiac consult-a.m. labs were essentially [id number]  Was OK.  cardiology. Continue telemetry for now. Cardiology and I agree to add aspirin and Plavix to the current dose and the Lovenox       Focus of today's plan-  Initiate and modify therapuetic plan to meet patients individual needs, add rest breaks as needed, and monitor heart rate on telemetry with cardiac work-up as above. Follow through with plans for PFO.     Electronically signed by Cr Godinez DO on 6/29/22 at 8:02 AM AARON Sandoval D.O., PM&R     Attending    [address]

## 2022-07-04 NOTE — PROGRESS NOTES
Physical Therapy Rehab Treatment Note  Facility/Department: [facility]  [facility]       NAME: Adrian Nash  : 1980 (35 y.o.)  MRN: [id number]  CODE STATUS: Full Code    Date of Service: 2022  Chart Reviewed: Yes  Patient assessed for rehabilitation services?: Yes  Family / Caregiver Present: No  General Comment  Comments: Pt in chair , \" Idont have any back pain, I slept in the bed last night\"    Restrictions:  Restrictions/Precautions: Fall Risk       SUBJECTIVE: Subjective: Pt in chair , \" I don't have any back pain, I slept in the bed last night\"          Post Treatment Pain Screenin/10       OBJECTIVE:         Bed mobility  Bridging: Stand by assistance  Rolling to Left: Stand by assistance  Rolling to Right: Stand by assistance  Sit to Supine: Stand by assistance  Bed Mobility Comments: Mat mobility    Transfers  Sit to Stand: Stand by assistance  Stand to sit: Stand by assistance  Bed to Chair: Stand by assistance  Car Transfer: Stand by assistance  Comment: VC for hand placement when sitting down    Ambulation  Surface: carpet  Device: Rolling Walker  Assistance: Contact guard assistance  Quality of Gait: difficulty with RLE foot clearance, FF posture, downward gaze, short BLE step length. Step to pattern<> occ step strough  Distance: 54ft, 25ft and 40 ft ( both limited by distance)  Comments: reports fatigue post 3rd ambulation with decreased Thomas Foot clearance    Stairs/Curb  Stairs?: Yes  Stairs  Curbs: 2\"  Device: Rolling walker  Assistance: Stand by assistance;Contact guard assistance  Comment: Vcs to not whip [employer] out when placing from curb to floor       ASSESSMENT/PROGRESS TOWARDS GOALS:  Body Structures, Functions, Activity Limitations Requiring Skilled Therapeutic Intervention: Decreased functional mobility ; Decreased ROM; Decreased body mechanics; Decreased strength;Decreased endurance;Decreased balance;Decreased ADL status  Assessment: Pt initially demo'd increased Thomas foot clearance and as distance progressed decreased Rt foot clearance. VCs to increase stride length and stay withing safety perameters of [employer]. Initiated 2\" curb step with VCs for safety. Pt tolerated static stand FA with 1 ue support x 1min, no LOB, challenged by FT. Initiated Thomas gastroc stretch to improve ROM for inc'd DF. Goals:  Long Term Goals  Long term goal 1: Pt to complete bed mobility with indep  Long term goal 2: Pt to complete functional transfers bed/chair/car with indep  Long term goal 3: Pt to ambulate 50-150ft with LRD and indep  Long term goal 4: Pt to tolerate 5min standing activities indep  Long term goal 5: Pt to manage 2\" curb step indep  Additional Goals?: Yes  Long term goal 6: Pt to complete HEP with indep    PLAN OF CARE/Safety:   Safety Devices  Type of Devices:  All fall risk precautions in place      Therapy Time:   Individual   Time In 0900   Time Out 1000   Minutes 60     Minutes:60      Transfer/Bed mobility training:10      Gait trainin      Neuro re education:5     Therapeutic ex:20      Renata Hendrickson PTA, 22 at 12:44 PM

## 2022-07-04 NOTE — PROGRESS NOTES
Physical Therapy Rehab Treatment Note  [facility]  [facility]       NAME: Abraham Beatty  : 1980 (35 y.o.)  MRN: [id number]  CODE STATUS: Full Code    Date of Service: 2022  Chart Reviewed: Yes  Patient assessed for rehabilitation services?: Yes  Family / Caregiver Present: No  General Comment  Comments: Pt in chair , \" Idont have any back pain, I slept in the bed last night\"    Restrictions:  Restrictions/Precautions: Fall Risk       SUBJECTIVE: Subjective: Pt states she is feeling good, no complaints          Post Treatment Pain Screenin/10       OBJECTIVE:    Transfers  Sit to Stand: Stand by assistance  Stand to sit: Stand by assistance  Bed to Chair: Stand by assistance  Car Transfer: Stand by assistance  Comment: VC for hand placement when sitting down    Ambulation  Surface: carpet  Device: Rolling Walker  Assistance: Contact guard assistance  Quality of Gait: difficulty with RLE foot clearance, FF posture, downward gaze, short BLE step length. Step to pattern<> occ step strough  Gait Deviations: Slow Marlys;Decreased step length;Decreased step height;Shuffles  Distance: 44ft x 2  Comments: decreased stride and step length with fatigue after  2nd Gait     Exercises: see exercise section    ASSESSMENT/PROGRESS TOWARDS GOALS:  Body Structures, Functions, Activity Limitations Requiring Skilled Therapeutic Intervention: Decreased functional mobility ; Decreased ROM; Decreased body mechanics; Decreased strength;Decreased endurance;Decreased balance;Decreased ADL status  Assessment: Initiated gait drills and single stepping to increase stride and foot clearance. Dynamic drills for SLS balance reactions, utilizing Thomas UE support. Pt very fatigued with increased gait deviations .     Goals:  Long Term Goals  Long term goal 1: Pt to complete bed mobility with indep  Long term goal 2: Pt to complete functional transfers bed/chair/car with indep  Long term goal 3: Pt to ambulate 50-150ft with LRD and indep  Long term goal 4: Pt to tolerate 5min standing activities indep  Long term goal 5: Pt to manage 2\" curb step indep  Additional Goals?: Yes  Long term goal 6: Pt to complete HEP with indep    PLAN OF CARE/Safety:   Safety Devices  Type of Devices:  All fall risk precautions in place      Therapy Time:   Individual   Time In 1401   Time Out 1431   Minutes 30     Minutes:30      Transfer/Bed mobility training:15      Gait training:      Neuro re education:15     Therapeutic ex:      Bety Hernadez PTA, 07/04/22 at 3:57 PM

## 2022-07-05 LAB
ANION GAP SERPL CALCULATED.3IONS-SCNC: 6 MEQ/L (ref 9–15)
BACTERIA: ABNORMAL /HPF
BASOPHILS ABSOLUTE: 0 K/UL (ref 0–0.2)
BASOPHILS RELATIVE PERCENT: 0.6 %
BILIRUBIN URINE: NEGATIVE
BLOOD, URINE: ABNORMAL
BUN BLDV-MCNC: 17 MG/DL (ref 6–20)
CALCIUM SERPL-MCNC: 8.5 MG/DL (ref 8.5–9.9)
CHLORIDE BLD-SCNC: 110 MEQ/L (ref 95–107)
CLARITY: ABNORMAL
CO2: 26 MEQ/L (ref 20–31)
COLOR: YELLOW
CREAT SERPL-MCNC: 0.66 MG/DL (ref 0.5–0.9)
EOSINOPHILS ABSOLUTE: 0.2 K/UL (ref 0–0.7)
EOSINOPHILS RELATIVE PERCENT: 2.9 %
EPITHELIAL CELLS, UA: ABNORMAL /HPF (ref 0–5)
GFR AFRICAN AMERICAN: >60
GFR NON-AFRICAN AMERICAN: >60
GLUCOSE BLD-MCNC: 92 MG/DL (ref 70–99)
GLUCOSE URINE: NEGATIVE MG/DL
HCT VFR BLD CALC: 27.1 % (ref 37–47)
HEMOGLOBIN: 8.7 G/DL (ref 12–16)
HYALINE CASTS: ABNORMAL /HPF (ref 0–5)
HYALINE CASTS: ABNORMAL /LPF (ref 0–5)
KETONES, URINE: ABNORMAL MG/DL
LEUKOCYTE ESTERASE, URINE: ABNORMAL
LYMPHOCYTES ABSOLUTE: 1 K/UL (ref 1–4.8)
LYMPHOCYTES RELATIVE PERCENT: 16.9 %
MCH RBC QN AUTO: 27.8 PG (ref 27–31.3)
MCHC RBC AUTO-ENTMCNC: 32.1 % (ref 33–37)
MCV RBC AUTO: 86.5 FL (ref 82–100)
MONOCYTES ABSOLUTE: 0.7 K/UL (ref 0.2–0.8)
MONOCYTES RELATIVE PERCENT: 10.7 %
NEUTROPHILS ABSOLUTE: 4.2 K/UL (ref 1.4–6.5)
NEUTROPHILS RELATIVE PERCENT: 68.9 %
NITRITE, URINE: NEGATIVE
PDW BLD-RTO: 18 % (ref 11.5–14.5)
PH UA: 5 (ref 5–9)
PLATELET # BLD: 241 K/UL (ref 130–400)
POTASSIUM REFLEX MAGNESIUM: 4.1 MEQ/L (ref 3.4–4.9)
PROTEIN UA: NEGATIVE MG/DL
RBC # BLD: 3.14 M/UL (ref 4.2–5.4)
RBC UA: ABNORMAL /HPF (ref 0–2)
SODIUM BLD-SCNC: 142 MEQ/L (ref 135–144)
SPECIFIC GRAVITY UA: 1.02 (ref 1–1.03)
URINE REFLEX TO CULTURE: YES
UROBILINOGEN, URINE: 0.2 E.U./DL
WBC # BLD: 6.1 K/UL (ref 4.8–10.8)
WBC UA: ABNORMAL /HPF (ref 0–5)

## 2022-07-05 PROCEDURE — 97535 SELF CARE MNGMENT TRAINING: CPT

## 2022-07-05 PROCEDURE — 85025 COMPLETE CBC W/AUTO DIFF WBC: CPT

## 2022-07-05 PROCEDURE — 6370000000 HC RX 637 (ALT 250 FOR IP): Performed by: INTERNAL MEDICINE

## 2022-07-05 PROCEDURE — 6360000002 HC RX W HCPCS: Performed by: PHYSICAL MEDICINE & REHABILITATION

## 2022-07-05 PROCEDURE — 97116 GAIT TRAINING THERAPY: CPT

## 2022-07-05 PROCEDURE — 97110 THERAPEUTIC EXERCISES: CPT

## 2022-07-05 PROCEDURE — 80048 BASIC METABOLIC PNL TOTAL CA: CPT

## 2022-07-05 PROCEDURE — 93005 ELECTROCARDIOGRAM TRACING: CPT | Performed by: INTERNAL MEDICINE

## 2022-07-05 PROCEDURE — 36415 COLL VENOUS BLD VENIPUNCTURE: CPT

## 2022-07-05 PROCEDURE — 87186 SC STD MICRODIL/AGAR DIL: CPT

## 2022-07-05 PROCEDURE — 1180000000 HC REHAB R&B

## 2022-07-05 PROCEDURE — 87077 CULTURE AEROBIC IDENTIFY: CPT

## 2022-07-05 PROCEDURE — 6370000000 HC RX 637 (ALT 250 FOR IP): Performed by: PHYSICAL MEDICINE & REHABILITATION

## 2022-07-05 PROCEDURE — 97530 THERAPEUTIC ACTIVITIES: CPT

## 2022-07-05 PROCEDURE — 99232 SBSQ HOSP IP/OBS MODERATE 35: CPT | Performed by: PHYSICAL MEDICINE & REHABILITATION

## 2022-07-05 PROCEDURE — 97129 THER IVNTJ 1ST 15 MIN: CPT

## 2022-07-05 PROCEDURE — 6360000002 HC RX W HCPCS: Performed by: INTERNAL MEDICINE

## 2022-07-05 PROCEDURE — 87086 URINE CULTURE/COLONY COUNT: CPT

## 2022-07-05 PROCEDURE — 81001 URINALYSIS AUTO W/SCOPE: CPT

## 2022-07-05 RX ADMIN — OXYBUTYNIN CHLORIDE 5 MG: 5 TABLET, EXTENDED RELEASE ORAL at 20:13

## 2022-07-05 RX ADMIN — CLOPIDOGREL BISULFATE 75 MG: 75 TABLET ORAL at 09:43

## 2022-07-05 RX ADMIN — ASPIRIN 81 MG: 81 TABLET, COATED ORAL at 09:43

## 2022-07-05 RX ADMIN — Medication 2000 UNITS: at 16:37

## 2022-07-05 RX ADMIN — Medication 100 MG: at 09:43

## 2022-07-05 RX ADMIN — ENOXAPARIN SODIUM 40 MG: 100 INJECTION SUBCUTANEOUS at 09:43

## 2022-07-05 RX ADMIN — CYANOCOBALAMIN 1000 MCG: 1000 INJECTION, SOLUTION INTRAMUSCULAR; SUBCUTANEOUS at 09:43

## 2022-07-05 RX ADMIN — THERA TABS 1 TABLET: TAB at 09:43

## 2022-07-05 NOTE — PROGRESS NOTES
Occupational Therapy  OCCUPATIONAL THERAPY  INPATIENT REHAB TREATMENT NOTE  [facility]      NAME: Alonzo Hamilton  : 1980 (35 y.o.)  MRN: [id number]  CODE STATUS: Full Code  Room: R255/R255-01    Date of Service: 2022    Referring Physician: Dr. Marie Swann  Rehab Diagnosis: Impaired mobility and ADLs D/T Exacerabation of MS    Restrictions  Restrictions/Precautions  Restrictions/Precautions: Fall Risk              Patient's date of birth confirmed: Yes    SAFETY:  Safety Devices  Safety Devices in place: Yes  Type of devices:  All fall risk precautions in place    SUBJECTIVE:  Subjective: \" I saw some on TV\"    Pain at start of treatment: No    Pain at end of treatment: No    Location:   Nursing notified: No  RN:   Intervention: None    COGNITION:  Orientation  Overall Orientation Status: Within Normal Limits  Orientation Level: Oriented X4  Cognition  Overall Cognitive Status: WFL      Pt's current cognitive status is:  Comprehension: Independent  Expression: Independent  Social Interaction: Independent  Problem Solving: Independent  Memory: Independent    OBJECTIVE:         Feeding  Assistance Level: Set-up  Grooming/Oral Hygiene  Assistance Level: Modified independent  Upper Extremity Bathing  Assistance Level: Modified independent  Lower Extremity Bathing  Assistance Level: Supervision  Upper Extremity Dressing  Assistance Level: Modified independent  Lower Extremity Dressing  Assistance Level: Minimal assistance  Putting On/Taking Off Footwear  Assistance Level: Modified independent  Toileting  Assistance Level: Supervision  Toilet Transfers  Technique: Stand step  Equipment: Standard toilet;Grab bars  Additional Factors: Verbal cues;Cues for hand placement  Assistance Level: Stand by assist  Skilled Clinical Factors: pt req vc's for w/c safety during transfer d/t standing without attempting to lock the w/c  Tub/Shower Transfers  Skilled Clinical Factors: Pateint completed sponge bath at sink per her request         Functional Mobility  Device: Wheelchair  Activity: To/From bathroom  Assistance Level: Minimal assistance  Supine to Sit  Assistance Level: Supervision  Scooting  Assistance Level: Supervision  Bed To/From Chair  Technique: Stand pivot  Assistance Level: Stand by assist     Provided instruction, demo and Sup for BUE shoulder ex completing shoulder flex/ext, horiz ab/ad, and chest presses 10x1 set each to continue improving BUE strength, act tolerance to increase pt ability to complete transfers and ADL tasks safely and with increased Ind. Education: transfer safety while completing w/c transfers for locking brakes       Equipment recommendations:          ASSESSMENT:  Assessment: Pt demonstrated good willingness to participate in therapy session this date. Activity Tolerance: Patient tolerated treatment well      PLAN OF CARE:  Strengthening,Balance training,Functional mobility training,Neuromuscular re-education,Self-Care / Lara F[employer] training,Safety education & training,Home management training  continue with POC    Patient goals : \"My dad can't take care of me now, so I need to get better. He has congestive heart failure. \"  Time Frame for Long term goals : Within 2 weeks, Pt to demonstrate progress in the following areas listed below to achieve specific LTG's as stated in the initial evaluation. Long Term Goal 1: Increase independence in ADLs  Long Term Goal 2: Increaese endurance to complete clothes management  Long Term Goal 3: Increase static/dynamic balance  Long Term Goal 4: Increase independence to perform kitchen mobility        Therapy Time:   Individual Group Co-Treat   Time In 0830       Time Out 0930         Minutes 60                   ADL/IADL trainin minutes  Therapeutic activities: 10 minutes     Electronically signed by:     JAMAL Haines,   2022, 9:13 AM

## 2022-07-05 NOTE — PROGRESS NOTES
Physical Therapy Rehab Treatment Note  [facility]  [facility]       NAME: Socrates Holley  : 1980 (89 y.o.)  MRN: [id number]  CODE STATUS: Full Code    Date of Service: 2022       Restrictions:  Restrictions/Precautions: Fall Risk       SUBJECTIVE:   Subjective: \"I'm doing pretty good. \"    Pain  Pain: Pt reports no pain      OBJECTIVE:     Bed Mobility  Additional Factors: Head of bed flat; With handrails  Roll Left  Assistance Level: Supervision  Roll Right  Assistance Level: Supervision  Sit to Supine  Assistance Level: Supervision  Skilled Clinical Factors: improved technique, does not require BUE to assist BLE this session  Supine to Sit  Assistance Level: Supervision  Skilled Clinical Factors: increased effort, good technique  Scooting  Assistance Level: Supervision  Skilled Clinical Factors: Scooting EOB and HOB, decreased assistance, good technique throughout    Transfers  Surface: Wheelchair; To mat;From mat  Additional Factors: Verbal cues  Device: Walker  Sit to Stand  Assistance Level: Stand by assist  Skilled Clinical Factors: good hand placement maintained  Stand to Sit  Assistance Level: Stand by assist  Skilled Clinical Factors: good hand placement to assist with eccentric control  Stand Pivot  Assistance Level: Stand by assist  Skilled Clinical Factors: no instability or LOB, SBA for occ cues for improving safety and quality of SPT to w/c    Ambulation  Surface: Carpet  Device: Rolling walker  Distance: 45' x 3, 15' x 2  Assistance Level: Contact guard assist  Gait Deviations: Decreased step length bilateral;Slow leonardo;Decreased heel strike right;Decreased heel strike left;Narrow base of support  Skilled Clinical Factors: occ cues for kaz foot clearance, decreased kaz knee ext during gait    Curb  Curb Height: 2''  Device: Rolling walker  Number of Curbs: 2  Additional Factors: Verbal cues  Assistance Level: Stand by assist;Contact guard assist  Skilled Clinical Factors: vc's for improved technique of placing ww on curb, good technique descending curb      PT Exercises  Exercise Treatment: 5X STS in 55 seconds  A/AROM Exercises: Seated: Marches, LAQ x20, Standing: Marches, mini squats, heel raises (limited ROM) x10 ea  Functional Mobility Circuit Training: manuevering around obstacles and through tight spaces with [employer]  Dynamic Sitting Balance Exercises: seated EOB trunk rotation with PB L/R, flx/ext EOB with PB to initiate core activation x10 reps ea x2 sets  Static Standing Balance Exercises: multiple trials static standing no UE support, pt able to maintain without UE support x15 sec max     ASSESSMENT/PROGRESS TOWARDS GOALS:   Assessment  Assessment: Trialed standing activity x5 min goal this session. Pt able to remain standing x3 min performing standing activities before requesting to sit. Improved carryover of STS technique demonstrated throughout tx. Pt continues to progress toward ambulation goal with increasing overall ambulatory distance and improving quality. Goals:  Long Term Goals  Long term goal 1: Pt to complete bed mobility with indep  Long term goal 2: Pt to complete functional transfers bed/chair/car with indep  Long term goal 3: Pt to ambulate 50-150ft with LRD and indep  Long term goal 4: Pt to tolerate 5min standing activities indep  Long term goal 5: Pt to manage 2\" curb step indep  Additional Goals?: Yes  Long term goal 6: Pt to complete HEP with indep    PLAN OF CARE/Safety:   Plan Comment: Cont.  POC      Therapy Time:   Individual   Time In 1000   Time Out 1100   Minutes 60     Minutes:  Transfer/Bed mobility trainin  Gait trainin  Neuro re education: 0  Therapeutic ex:19      Beth Jaime PTA, 22 at 11:39 AM

## 2022-07-05 NOTE — PROGRESS NOTES
OCCUPATIONAL THERAPY  INPATIENT REHAB TREATMENT NOTE  [facility]      NAME: Carlene Jimenez  : 1980 (35 y.o.)  MRN: [id number]  CODE STATUS: Full Code  Room: R255/R255-01    Date of Service: 2022    Referring Physician: Dr. Danielle Haynes  Rehab Diagnosis: Impaired mobility and ADLs D/T Exacerabation of MS    Restrictions  Restrictions/Precautions  Restrictions/Precautions: Fall Risk              Patient's date of birth confirmed: Yes    SAFETY:  Safety Devices  Safety Devices in place: Yes  Type of devices: All fall risk precautions in place    SUBJECTIVE:  Subjective: \"I will do it\"  Pain: no pain      OBJECTIVE:       While seated in w/c, challenged strength, activity tolerance, ROM, and flexibility for improved ADL performance. Challenged pt through usage of 2# weight to complete BUE exercises. 2 sets x 10 reps completed. Exercises focused on all UE joints and planes of motion including scapular protraction/retraction, shoulder flexion/extension/rotation/horizontal abduction, elbow flexion/extension, supination/pronation. Pt tolerated well. Cues needed to maintain attention with rep count        Education:  Education  Education Given To: Patient  Education Provided: Home Exercise Program;Plan of Care;Role of Therapy  Education Method: Verbal;Demonstration  Education Outcome: Verbalized understanding;Demonstrated understanding;Continued education needed      ASSESSMENT:   Pt agreeable to visit- good participation in the PM      PLAN OF CARE:  Strengthening,Balance training,Functional mobility training,Neuromuscular re-education,Self-Care / [employer] Class training,Safety education & training,Home management training  continue with POC    Patient goals : \"My dad can't take care of me now, so I need to get better. He has congestive heart failure. \"  Time Frame for Long term goals :  Within 2 weeks, Pt to demonstrate progress in the following areas listed below to achieve specific LTG's as stated in the initial evaluation. Long Term Goal 1: Increase independence in ADLs  Long Term Goal 2: Increaese endurance to complete clothes management  Long Term Goal 3: Increase static/dynamic balance  Long Term Goal 4:  Increase independence to perform kitchen mobility        Therapy Time:   Individual Group Co-Treat   Time In 1522       Time Out 1532         Minutes 10             Visit completed for makeup minutes- however after visit completed it was realized that another therapist had previously completed       Therapeutic activities: 10 minutes     Electronically signed by:    Radha Paul OT,   7/5/2022, 3:51 PM

## 2022-07-05 NOTE — PROGRESS NOTES
Subjective: The patient complains of severe acute on chronic progressive fatigue and paresis and quadrat esthesia secondary to MS flareup partially relieved by rest, medications, PT,  OT, steroids, SLP and rest and exacerbated by recent illness -we will sclerosis flareup. I am concerned about patients medical complexities and barriers to advancing in rehab goals including -her tachycardia-initially presented through the emergency room with severe hip pain status post a fall at home. She was diagnosed with an exacerbation of her multiple sclerosis and rhabdomyolysis secondary to elevated CK levels. Fortunately her x-rays of her hips were unremarkable. She was prescribed IV fluids and IV methylprednisolone. She is hoping to transition to [facility] therapy as an outpatient for her MS.     Patient has had difficulty with endurance, balance, gait ataxia, generalized weakness and numbness. Medically complicated by tachycardia especially with activity. She will need telemetry monitoring while on the unit and to be followed by cardiology and or medical..  I reviewed and discussed cardiac note-Cardiac Supportive Care including:IV and PO hydration for Rhabdomyolysis care. Serial cardiac enzymes. Telemetry for 48 hours. Echocardiogram-sofar work-up of his been unremarkable and cardiology feels that it is a tachycardia always sinus in response to debility and deconditioning. Have okayed her for therapy. I reviewed current care and plans for further care with other rehab providers including nursing and case management. According to recent nursing note, \"  Assessment completed. Patient medicated per orders. VSS   Patient has call light within reach. Patient denies any needs at this time. \".    I am concerned about the moderate sized PFO that they found on recent echocardiogram.    ROS x10: The patient also complains of severely impaired mobility and activities of daily living.   Otherwise no new problems with vision, hearing, nose, mouth, throat, dermal, cardiovascular, GI, , pulmonary, musculoskeletal, psychiatric or neurological. See also Acute Rehab PM&R H&P. Vital signs:  BP (!) 112/59   Pulse 91   Temp 98.1 °F (36.7 °C) (Oral)   Resp 20   Ht 5' 1\" (1.549 m)   Wt 176 lb 1.6 oz (79.9 kg)   SpO2 99%   BMI 33.27 kg/m²   I/O:   PO/Intake:  fair PO intake,    diet    Bowel:   continent LBM 7/3  Bladder: continent no saenz-malodorous urine  General:  Patient is well developed,   adequately nourished, and    well kempt. HEENT:    Pupils equal, hearing intact to loud voice, external inspection of ear and nose benign. Inspection of lips, tongue and gums benign    Musculoskeletal: No significant change in strength or tone. All joints stable. Inspection and palpation of digits and nails show no clubbing, cyanosis or inflammatory conditions. Neuro/Psychiatric: Affect: flat but pleasant. Alert and oriented to person, place and situation with  min cues. No significant change in deep tendon reflexes or sensation  Lungs:  Diminished, CTA-B. Respiration effort is   normal at rest.     Heart:   S1 = S2,   RRR-     Abdomen:  Soft, non-tender, no enlargement of liver or spleen. Extremities:    lower extremity edema    Skin:   Intact to general survey,      Rehabilitation:  Physical Therapy:   Bed mobility:  Bed mobility  Bridging: Stand by assistance (07/04/22 6288)  Rolling to Left: Stand by assistance (07/04/22 0651)  Rolling to Right: Stand by assistance (07/04/22 5997)  Supine to Sit: Minimal assistance (06/28/22 1143)  Sit to Supine: Stand by assistance (07/04/22 8399)  Scooting: Minimal assistance (latearlly in supine) (06/28/22 1143)  Bed Mobility Comments: Mat mobility (07/04/22 0937)  Bed Mobility  Additional Factors: Head of bed flat; With handrails (06/29/22 1342)  Additional Factors: Head of bed flat; With handrails (06/29/22 1342)  Roll Left  Assistance Level: Stand by assist (06/29/22 1342)  Skilled Clinical Factors: with HR (06/28/22 1342)  Roll Right  Assistance Level: Stand by assist (06/28/22 1342)  Skilled Clinical Factors: with HR (06/28/22 1342)  Sit to Supine  Assistance Level: Contact guard assist (06/29/22 1115)  Skilled Clinical Factors: utilizes BUE to assist BLE (06/29/22 1115)  Supine to Sit  Assistance Level: Contact guard assist (06/29/22 1342)  Skilled Clinical Factors: decreased effort with hospital bed (06/29/22 1342)  Scooting  Assistance Level: Contact guard assist (06/29/22 1342)  Skilled Clinical Factors: scooting to EOB (06/29/22 1342)  Transfers:  Transfers  Sit to Stand: Stand by assistance (07/04/22 0934)  Stand to sit: Stand by assistance (07/04/22 0934)  Bed to Chair: Stand by assistance (07/04/22 0934)  Lateral Transfers: Minimal Assistance;Contact guard assistance (Instruction provided on scooting laterally at EOB emphasizing forward weight shift) (06/28/22 1145)  Car Transfer: Stand by assistance (07/04/22 0934)  Comment: VC for hand placement when sitting down (07/04/22 0934)  Transfers  Surface: Wheelchair (07/01/22 1340)  Additional Factors: Verbal cues (07/01/22 1340)  Device: Frederich Gu (07/01/22 1340)  Sit to Stand  Assistance Level: Contact guard assist (07/01/22 1340)  Skilled Clinical Factors: improved carryover of cues for hand placement (07/01/22 1340)  Stand to Sit  Assistance Level: Contact guard assist (07/01/22 1340)  Skilled Clinical Factors: improved carryover of cues for hand placement (07/01/22 1340)  Bed To/From Chair  Technique: Sit pivot (06/29/22 1342)  Assistance Level: Contact guard assist (06/29/22 1342)  Skilled Clinical Factors: cues intially for hand placement, good follow through.  Bed > w/c (06/29/22 1342)  Stand Pivot  Assistance Level: Contact guard assist (07/01/22 1340)  Skilled Clinical Factors: improved follow through of vc's for approach to chair, minimal cues still required for sequencing BLE and ww (07/01/22 3688)  Gait: Ambulation  Surface: carpet (07/04/22 1405)  Device: Rolling Walker (07/04/22 1405)  Other Apparatus: Wheelchair follow (06/28/22 1149)  Assistance: Contact guard assistance (07/04/22 1405)  Quality of Gait: difficulty with RLE foot clearance, FF posture, downward gaze, short BLE step length.  Step to pattern<> occ step strough (07/04/22 1405)  Gait Deviations: Slow Leonardo;Decreased step length;Decreased step height;Shuffles (07/04/22 1405)  Distance: 44ft x 2 (07/04/22 1405)  Comments: decreased stride and step length with fatigue after  2nd Gait (07/04/22 1405)  Ambulation  Surface: Carpet (07/01/22 1341)  Device: Rolling walker (07/01/22 1341)  Distance: 28' two turns, 8' x 2 (07/01/22 1341)  Activity Comments:  BPM post ambulation (06/30/22 1342)  Assistance Level: Contact guard assist (07/01/22 1341)  Gait Deviations: Decreased step length bilateral;Slow leonardo;Decreased heel strike right;Decreased heel strike left;Narrow base of support (07/01/22 1341)  Skilled Clinical Factors: improved weight shifting and foot clearance, good carryover of cues to not lift ww (07/01/22 1341)  Stairs:  Stairs/Curb  Stairs?: Yes (07/04/22 0906)  Stairs  Curbs: 2\" (07/04/22 0906)  Device: Rolling walker (07/04/22 0906)  Assistance: Stand by assistance;Contact guard assistance (07/04/22 0906)  Comment: Vcs to not whip WW out when placing from curb to floor (07/04/22 0906)  Curb  Curb Height: 2'' (07/01/22 1112)  Device: Rolling walker (07/01/22 1112)  Number of Curbs: 2 (07/01/22 1112)  Additional Factors: Verbal cues (07/01/22 1112)  Assistance Level: Contact guard assist (07/01/22 1112)  Skilled Clinical Factors: good carryover of technique, visual demo provided, minimal cues for technique/safety to improve quality (07/01/22 1112)  W/C mobility:       Occupational Therapy:   Hand Dominance: Right  ADL  Feeding: Modified independent  (06/28/22 1049)  Grooming: Setup (06/29/22 1341)  Grooming Skilled Clinical Factors: for oral care and grooming (06/29/22 1341)  UE Bathing: Stand by assistance; Increased time to complete (06/29/22 1341)  LE Bathing: Dependent/Total (06/29/22 1341)  LE Bathing Skilled Clinical Factors: +2 assistance. Pt able to bend down in order to wash legs with SBA for safe sitting. +2 to wash posterior lillie area. 1 person for washing 1 person to maintain standing balance (06/29/22 1341)  UE Dressing: Stand by assistance (06/29/22 1341)  LE Dressing: Increased time to complete;Maximum assistance (06/29/22 1341)  LE Dressing Skilled Clinical Factors: to don pants and depends (06/29/22 1341)  Toileting: Dependent/Total (06/29/22 1341)  Toileting Skilled Clinical Factors: Pt incontinent of feces and urine (06/29/22 1341)  Additional Comments: Pt displaying lack of endurance and fatigue throughout session, requiring increased assistance throughout progression of evaluation (06/28/22 1049)  Toilet Transfers  Toilet - Technique: Stand pivot (06/29/22 1339)  Equipment Used: Standard toilet (06/29/22 1339)  Toilet Transfer: Minimal assistance (verbal cues for hand placement) (06/29/22 1339)  Toilet Transfers Comments: Twards end of session, initially Min A for transfers (06/28/22 1054)     [address] - Transfer Type: To and From (06/28/22 1054)  Shower - Transfer To: Shower seat with back (06/28/22 1054)  Shower - Technique: Stand pivot (06/28/22 1054)  Shower Transfers: Moderate assistance (06/28/22 1054)  Shower Transfers Comments: Patient did not complete shower transfer as patient washed up at sink in wheelchair. (06/29/22 1339)    Speech Therapy:      Comprehension: Within Functional Limits (Patient answered simple yes/no questions, basic questions and participated in conversation with good comprehension. no repetition was required)  Verbal Expression:  [facility] with meeting wants and needs.  No word finding deficits have been noted during conversation)  Diet/Swallow:        Dysphagia Outcome Severity ABNORMALITY. US DUP LOWER EXTREMITIES BILATERAL VENOUS  6/20/2022   NO SONOGRAPHIC EVIDENCE OF DEEP VENOUS THROMBOSIS WITHIN THE BILATERAL LOWER EXTREMITIES. Patent foramen ovale with right-to-left shunt was visualized. On agitated saline contrast injection- Large amount of bubbles LA and LV   nearly immediately    Previous extensive, complex labs, notes and diagnostics reviewed and analyzed. ALLERGIES:    Allergies as of 06/27/2022    (No Known Allergies)      (please also verify by checking [facility])          Complex Physical Medicine & Rehab Issues Assess & Plan:   1. Severe abnormality of gait and mobility and impaired self-care and ADL's secondary to progressive masturbation of multiple sclerosis. Functional and medical status reassessed regarding patients ability to participate in therapies and patient found to be able to participate in acute intensive comprehensive inpatient rehabilitation program including PT/OT to improve balance, ambulation, ADLs, and to improve the P/AROM. Therapeutic modifications regarding activities in therapies, place, amount of time per day and intensity of therapy made daily. In bed therapies or bedside therapies prn.   2. Bowel and Bladder dysfunction  , Neurogenic bowel and bladder:  frequent toileting, ambulate to bathroom with assistance, check post void residuals. Check for C.difficile x1 if >2 loose stools in 24 hours, continue bowel & bladder program.  Monitor bowel and bladder function. Lactinex 2 PO every AC. MOM prn, Brown Bomb prn, Glycerin suppository prn, enema prn. Encourage therapy and nursing to co-treat and problem solve re continence. [place] Ditropan. 3. Severe MS pain as well as generalized OA pain: reassess pain every shift and prior to and after each therapy session, give prn Tylenol and consider scheduled Tylenol, modalities prn in therapy, masage, Lidoderm, K-pad prn. Consider scheduled AM pain meds.   4. Skin healing   and breakdown risk: continue pressure relief program.  Daily skin exams and reports from nursing. 5. Fatigue due to nutritional and hydration deficiency: Add and titrate vitamin B12 vitamin D and CoQ10 continue to monitor I&Os, calorie counts prn, dietary consult prn. Add healthy snack at night. 6. Acute episodic insomnia with situational adjustment disorder:  prn Ambien, monitor for day time sedation. 7. Falls risk elevated:  patient to use call light to get nursing assistance to get up, bed and chair alarm. 8. Elevated DVT risk: progressive activities in PT, continue prophylaxis UMM hose, elevation and Lovenox. 9. Complex discharge planning:  DC 7/12/22--home with [facility]--try to get aide with waiver. Parents are elderly and son is only 5. Toward her final weekly team meeting   Thursday to re-assess progress towards goals, discuss and address social, psychological and medical comorbidities and to address difficulties they may be having progressing in therapy. Patient and family education is in progress. The patient is to follow-up with their family physician after discharge. Complex Active General Medical Issues that complicate care Assess & Plan:    1. Urinary urgency,   Neurogenic bladder-recheck stat UA  2.   MS (multiple sclerosis)-consult neurology transition to outpatient Ocrevus therapy if possible. 3.   Hyperglycemia-monitor for diabetes mellitus  4. Rhabdomyolysis-push fluids recheck BMP  5. Baseline mental handicap-just new learning and therapy. 6.   Posterior tibial tendon dysfunction -AFO  7. Malodorous urine-check stat UA check postvoid residual  8. Ataxic gait-focus on balance and therapy  9. Pain in joint, lower leg, Foot drop, left foot, Acquired deformity of left foot-consult podiatry as needed  10. Lumbar radiculopathy-post effective dose of pain medication  11.  Moderate sized PFO, asymptomatic sinus tachycardia with activity dt deconditioning- SP EKG stat-OK,  , add stat labs and cardiac consult-a.m. labs were essentially [id number]  Was OK.  cardiology. Continue telemetry for now. Cardiology and I agree to add aspirin and Plavix to the current dose and the Lovenox       Focus of today's plan-  Initiate and modify therapuetic plan to meet patients individual needs, add rest breaks as needed, and monitor heart rate on telemetry with cardiac work-up as above. Follow through with plans for PFO.     Electronically signed by Kalee Florez DO on 6/29/22 at 8:02 AM AARON Interiano D.O., PM&R     Attending    [facility] Brooke Aaron

## 2022-07-05 NOTE — PROGRESS NOTES
Assessment completed. Patient medicated per orders  VSS  Patient has call light within reach. Patient denies any needs at this time.

## 2022-07-05 NOTE — PROGRESS NOTES
Physical Therapy Rehab Treatment Note  Facility/Department: [facility]  Room: R255/R255-01       NAME: Adrian Nash  : 1980 (14 y.o.)  MRN: [id number]  CODE STATUS: Full Code    Date of Service: 2022       Restrictions:  Restrictions/Precautions: Fall Risk       SUBJECTIVE:   Subjective: \"I'm good. \"    Pain  Pain: Pt reports no pain    OBJECTIVE:     Transfers  Surface: Wheelchair  Additional Factors: Verbal cues  Device: Walker  Sit to Stand  Assistance Level: Stand by assist  Skilled Clinical Factors: good hand placement maintained  Stand to Sit  Assistance Level: Stand by assist  Skilled Clinical Factors: good hand placement to assist with eccentric control  Stand Pivot  Assistance Level: Stand by assist  Skilled Clinical Factors: no instability or LOB, SBA for occ cues for improving safety and quality of SPT to w/c  Car Transfer  Assistance Level: Stand by assist  Skilled Clinical Factors: VC's for improving approach and technique, good follow through    Ambulation  Surface: Carpet  Device: Rolling walker  Distance: 25' x 2, 50'  Assistance Level: Stand by assist;Contact guard assist  Gait Deviations: Decreased step length bilateral;Slow leonardo;Decreased heel strike right;Decreased heel strike left;Narrow base of support  Skilled Clinical Factors: occ cues for kaz foot clearance, decreased kaz knee ext during gait      PT Exercises  A/AROM Exercises: Standing: hamstring curls, mini squats, marches x10 ea          ASSESSMENT/PROGRESS TOWARDS GOALS:   Assessment  Assessment: Pt continues to improve overall ambulatory distance this session. Decreased kaz foot clearance with fatigue. Continues to demonstrate good technique with STS. Standing ther ex utilized to continue to improve strength and endurance.     Goals:  Long Term Goals  Long term goal 1: Pt to complete bed mobility with indep  Long term goal 2: Pt to complete functional transfers bed/chair/car with indep  Long term goal 3: Pt to ambulate 50-150ft with LRD and indep  Long term goal 4: Pt to tolerate 5min standing activities indep  Long term goal 5: Pt to manage 2\" curb step indep  Additional Goals?: Yes  Long term goal 6: Pt to complete HEP with indep    PLAN OF CARE/Safety:   Plan Comment: Cont.  POC      Therapy Time:   Individual   Time In 1330   Time Out 1400   Minutes 30     Minutes:  Transfer/Bed mobility training: 10  Gait training: 15  Neuro re education: 0  Therapeutic ex: 5       Alan Adler PTA, 07/05/22 at 4:13 PM

## 2022-07-05 NOTE — PROGRESS NOTES
[facility]  Facility/Department: [facility]  Speech Language Pathology   Treatment Note          Abraham Beatty  1980  R255/R255-01  [x]   confirmed    Date: 2022    Rehab Diagnosis: Impaired mobility and activities of daily living dt exac of MS      Restrictions/Precautions: Fall Risk    Weight: 176 lb 1.6 oz (79.9 kg)     ADULT DIET; Regular; 3 carb choices (45 gm/meal); No Added Salt (3-4 gm)    SpO2: 99 % (22 0739)  No active isolations    Speech Dx: Cognitive Linguistic Impairment    Subjective:  Alert, Cooperative and Pleasant        Interventions used this date:  Cognitive Skill Development    Objective/Assessment:  Patient progressing towards goals:  Short-term Goals  Timeframe for Short-term Goals: 1-2 weeks  Goal 1: To increase safety awareness and judgment for safe completion of ADLs secondary to pt's cognitive deficits,  pt will complete mid to high level problem solving tasks related to ADLs (e.g. medication management, ADL safety) with 90% accuracy and superised assist. Patient completed level 2 What doesn't belong? Task on the Ipad I with 66% accuracy. Reduced recall of category was noted with 2/3 items. Goal 4: To address pt's cognitive deficits and promote recall of personal and medical information, pt will answer questions addressing (remote, recent, delayed) recall with 90% accuracy and min cues. Patient completed level 1 recent memory task recall of short paragraphs on the Ipad I with 66% accuracy, with min cueing 83% accuracy. Goal 5: To decrease cognitive deficits and improve attention to tasks for safe completion of ADLs, pt will complete structured tasks addressing (sustained, selective, alternating, divided) attention with 90% accuracy and supervised assist. Informally addressed during the session. ST cued patient to maintain focus during paragraph level recall task.        Treatment/Activity Tolerance:  Patient tolerated treatment well    Plan:  Continue per POC    Pain Assessment:  Patient does not c/o pain. Pain Re-assessment:  Patient does not c/o pain. Patient/Caregiver Education:  Patient educated on session and progression towards goals. Safety Devices:  Call light within reach and Chair alarm in place      Speech Therapy Level of Assistance Scale    PROBLEM SOLVING  Rating: Minimal Assistance    MEMORY  Rating:  Minimal Assistance          Therapy Time  SLP Individual Minutes  Time In: 0940  Time Out: 1000  Minutes: 20        Patient's session began 10 minutes late secondary to patient having an EKG completed. ST will attempt to make up minutes as able to.        Signature: Electronically signed by FIDELIA Amaro on 7/5/2022 at 11:04 AM

## 2022-07-05 NOTE — PROGRESS NOTES
OCCUPATIONAL THERAPY  INPATIENT REHAB TREATMENT NOTE  [facility]      NAME: Cody Montgomery  : 1980 (35 y.o.)  MRN: [id number]  CODE STATUS: Full Code  Room: R255/R255-01    Date of Service: 2022    Referring Physician: Dr. Timothy Almaguer  Rehab Diagnosis: Impaired mobility and ADLs D/T Exacerabation of MS    Restrictions  Restrictions/Precautions  Restrictions/Precautions: Fall Risk              Patient's date of birth confirmed: Yes    SAFETY:  Safety Devices  Safety Devices in place: Yes  Type of devices: All fall risk precautions in place    SUBJECTIVE:  Subjective: \"I can work with you a little bit. \"  Pain: denies    Pain at start of treatment: No    Pain at end of treatment: No          OBJECTIVE:     Pt agreeable to make up time missed earlier this date. Pt completed FMC/hand strengthening task to increase independence with ADL tasks. Pt isolated and removed 16 small beads from red grade theraputty engaging hands bilaterally to complete task. Pt demonstrated min difficulty manipulating small beads. Equipment recommendations:  OT Equipment Recommendations  Other: Continue to assess      ASSESSMENT:  Assessment: G endurance to FM task  Activity Tolerance: Patient tolerated treatment well      PLAN OF CARE:  Strengthening,Balance training,Functional mobility training,Neuromuscular re-education,Self-Care / Abbott Sakshi training,Safety education & training,Home management training  continue with POC    Patient goals : \"My dad can't take care of me now, so I need to get better. He has congestive heart failure. \"  Time Frame for Long term goals : Within 2 weeks, Pt to demonstrate progress in the following areas listed below to achieve specific LTG's as stated in the initial evaluation. Long Term Goal 1: Increase independence in ADLs  Long Term Goal 2: Increaese endurance to complete clothes management  Long Term Goal 3: Increase static/dynamic balance  Long Term Goal 4:  Increase

## 2022-07-06 LAB
ANION GAP SERPL CALCULATED.3IONS-SCNC: 9 MEQ/L (ref 9–15)
BASOPHILS ABSOLUTE: 0 K/UL (ref 0–0.2)
BASOPHILS RELATIVE PERCENT: 0.5 %
BUN BLDV-MCNC: 17 MG/DL (ref 6–20)
CALCIUM SERPL-MCNC: 8.8 MG/DL (ref 8.5–9.9)
CHLORIDE BLD-SCNC: 106 MEQ/L (ref 95–107)
CO2: 26 MEQ/L (ref 20–31)
CREAT SERPL-MCNC: 0.63 MG/DL (ref 0.5–0.9)
EKG ATRIAL RATE: 85 BPM
EKG P AXIS: 60 DEGREES
EKG P-R INTERVAL: 160 MS
EKG Q-T INTERVAL: 376 MS
EKG QRS DURATION: 76 MS
EKG QTC CALCULATION (BAZETT): 447 MS
EKG R AXIS: 2 DEGREES
EKG T AXIS: 24 DEGREES
EKG VENTRICULAR RATE: 85 BPM
EOSINOPHILS ABSOLUTE: 0.2 K/UL (ref 0–0.7)
EOSINOPHILS RELATIVE PERCENT: 2.8 %
GFR AFRICAN AMERICAN: >60
GFR NON-AFRICAN AMERICAN: >60
GLUCOSE BLD-MCNC: 97 MG/DL (ref 70–99)
HCT VFR BLD CALC: 26.9 % (ref 37–47)
HEMOGLOBIN: 8.8 G/DL (ref 12–16)
LYMPHOCYTES ABSOLUTE: 1.1 K/UL (ref 1–4.8)
LYMPHOCYTES RELATIVE PERCENT: 17.4 %
MCH RBC QN AUTO: 28 PG (ref 27–31.3)
MCHC RBC AUTO-ENTMCNC: 32.8 % (ref 33–37)
MCV RBC AUTO: 85.5 FL (ref 82–100)
MONOCYTES ABSOLUTE: 0.6 K/UL (ref 0.2–0.8)
MONOCYTES RELATIVE PERCENT: 9.8 %
NEUTROPHILS ABSOLUTE: 4.4 K/UL (ref 1.4–6.5)
NEUTROPHILS RELATIVE PERCENT: 69.5 %
PDW BLD-RTO: 17.9 % (ref 11.5–14.5)
PLATELET # BLD: 246 K/UL (ref 130–400)
POTASSIUM REFLEX MAGNESIUM: 4.2 MEQ/L (ref 3.4–4.9)
RBC # BLD: 3.14 M/UL (ref 4.2–5.4)
SODIUM BLD-SCNC: 141 MEQ/L (ref 135–144)
WBC # BLD: 6.3 K/UL (ref 4.8–10.8)

## 2022-07-06 PROCEDURE — 97110 THERAPEUTIC EXERCISES: CPT

## 2022-07-06 PROCEDURE — 97535 SELF CARE MNGMENT TRAINING: CPT

## 2022-07-06 PROCEDURE — 99232 SBSQ HOSP IP/OBS MODERATE 35: CPT | Performed by: NURSE PRACTITIONER

## 2022-07-06 PROCEDURE — 36415 COLL VENOUS BLD VENIPUNCTURE: CPT

## 2022-07-06 PROCEDURE — 6370000000 HC RX 637 (ALT 250 FOR IP): Performed by: INTERNAL MEDICINE

## 2022-07-06 PROCEDURE — 97112 NEUROMUSCULAR REEDUCATION: CPT

## 2022-07-06 PROCEDURE — 97130 THER IVNTJ EA ADDL 15 MIN: CPT

## 2022-07-06 PROCEDURE — 1180000000 HC REHAB R&B

## 2022-07-06 PROCEDURE — 97530 THERAPEUTIC ACTIVITIES: CPT

## 2022-07-06 PROCEDURE — 6360000002 HC RX W HCPCS: Performed by: INTERNAL MEDICINE

## 2022-07-06 PROCEDURE — 85025 COMPLETE CBC W/AUTO DIFF WBC: CPT

## 2022-07-06 PROCEDURE — 97116 GAIT TRAINING THERAPY: CPT

## 2022-07-06 PROCEDURE — 80048 BASIC METABOLIC PNL TOTAL CA: CPT

## 2022-07-06 PROCEDURE — 97129 THER IVNTJ 1ST 15 MIN: CPT

## 2022-07-06 PROCEDURE — 6370000000 HC RX 637 (ALT 250 FOR IP): Performed by: PHYSICAL MEDICINE & REHABILITATION

## 2022-07-06 PROCEDURE — 99232 SBSQ HOSP IP/OBS MODERATE 35: CPT | Performed by: PHYSICAL MEDICINE & REHABILITATION

## 2022-07-06 RX ADMIN — ASPIRIN 81 MG: 81 TABLET, COATED ORAL at 08:14

## 2022-07-06 RX ADMIN — Medication 100 MG: at 08:14

## 2022-07-06 RX ADMIN — CLOPIDOGREL BISULFATE 75 MG: 75 TABLET ORAL at 08:13

## 2022-07-06 RX ADMIN — ENOXAPARIN SODIUM 40 MG: 100 INJECTION SUBCUTANEOUS at 08:13

## 2022-07-06 RX ADMIN — THERA TABS 1 TABLET: TAB at 08:14

## 2022-07-06 RX ADMIN — OXYBUTYNIN CHLORIDE 5 MG: 5 TABLET, EXTENDED RELEASE ORAL at 20:41

## 2022-07-06 RX ADMIN — Medication 2000 UNITS: at 16:37

## 2022-07-06 ASSESSMENT — ENCOUNTER SYMPTOMS
ABDOMINAL DISTENTION: 0
COLOR CHANGE: 0
WHEEZING: 0
NAUSEA: 0
TROUBLE SWALLOWING: 0
SHORTNESS OF BREATH: 0
COUGH: 0
VOMITING: 0
CHEST TIGHTNESS: 0
ABDOMINAL PAIN: 0

## 2022-07-06 ASSESSMENT — PAIN SCALES - GENERAL: PAINLEVEL_OUTOF10: 0

## 2022-07-06 NOTE — PROGRESS NOTES
[facility]    Acute  Rehabilitation  MUSIC THERAPY      Date:  7/6/2022        Patient Name: Dayna Meaz       MRN: [id number]        YOB: 1980 (42 y.o.)       Gender: female  Diagnosis: Impaired mobility and ADLs D/T Exacerabation of MS  Referring Practitioner: Dr. Dianne Cosme    RESTRICTIONS/PRECAUTIONS:  Restrictions/Precautions: Fall Risk  Vision: Impaired  Hearing: Within functional limits    Subjective/Observation:   Patient declined participating in individual music therapy session at 12:10pm stating \"no thanks. \"      Assessment/Plan:      [] Patient would like to have music therapy, just not today. [facility]. [] Patient would like to have music therapy another time, however their D/C is before [facility] will be back on unit. *If patient is still on rehab unit, or comes back to [facility], [facility]. [x] Patient does not want music therapy. [facility] unless pt specifically requests.        FLORECITA Keyes    7/6/2022  Electronically signed by Frances Dobson on 7/6/2022 at 12:33 PM

## 2022-07-06 NOTE — CARE COORDINATION
[address] [facility]  INPATIENT REHABILITATION  TEAM CONFERENCE NOTE  Room: R255/R255-01  Admit Date: 2022       Date: 2022  Patient Name: Tori Patton        MRN: [id number]    : 1980  (35 y.o.)  Gender: female        REHAB DIAGNOSIS:   Diagnosis: Impaired mobility and activities of daily living dt exac of MS    CO MORBIDITIES:      Past Medical History:   Diagnosis Date    Dislocated knee     Right knee    Intellectual disability 2022    Lumbar radiculopathy 2020    MS (multiple sclerosis) ([facility] [address] 2022    PTTD (posterior tibial tendon dysfunction)      Past Surgical History:   Procedure Laterality Date    CATARACT REMOVAL WITH IMPLANT Right     CATARACT REMOVAL WITH IMPLANT Left      SECTION  2013    EYE SURGERY      FOOT SURGERY Left         Restrictions  Restrictions/Precautions: Fall Risk  CASE MANAGEMENT    Social/Functional History  Social/Functional History  Lives With: Son (5 y/o)  Type of Home: Apartment  Home Layout: One level  Home Access: Stairs to enter without rails  Entrance Stairs - Number of Steps: 1 (2\" threshold)  Bathroom Shower/Tub: Tub/Shower unit  Bathroom Toilet: Standard  Bathroom Equipment: Shower chair,Hand-held shower  Bathroom Accessibility: Accessible  Home Equipment: Walker, rolling,Wheelchair-manual,Reacher,Long-handled shoehorn  Has the patient had two or more falls in the past year or any fall with injury in the past year?: Yes  Receives Help From: Family  ADL Assistance: Independent (later reports that son helps put on socks when needed)  Homemaking Assistance: Independent  Homemaking Responsibilities: Yes  Ambulation Assistance: Independent [employer])  Transfer Assistance: Independent  Active : No  Patient's  Info: mother  Occupation: On disability  Type of Occupation: [employer]: watch tv, bowling with son       Pts personal preferences: n/a    Pts assets/resources/support system: mother, father, 6 y/o son    COVERAGE INFORMATION:Payor: Rayneshanna Arias / Plan: Maurice Clore / Product Type: *No Product type* /       NURSING  No active isolations    Weight: 168 lb 3.2 oz (76.3 kg) / Body mass index is 31.78 kg/m². ADULT DIET; Regular; 3 carb choices (45 gm/meal); No Added Salt (3-4 gm)    SpO2: 100 % (07/06/22 0620)    Oxygen to be continued upon discharge: No  Home-going needs (nocturnal Pox, sleep study, RX, equipment): No    Skin Issues: Yes; blister from depend is open to air  Home-going needs (education, training, RX, Wound RN): Yes    Pain Managed: Yes    Bladder continence: No; purewick at night  Discharging with Saenz: No   Training done: No   Urology following: No   Plan/date to remove saenz: No   Bladder retraining started: No    Bowel continence:  Yes  Last BM date: 7/5/22  Need for bowel program: No    Anticoagulants: Plavix, Lovenox, and Aspirin  Home-going needs (Education, labs): Yes    Antibiotics: none  Stop date: n/a  Home-going needs (education, RX, PICC): No      Other: family training scheduled for 7/8      PHYSICAL THERAPY  Bed mobility:  Bed mobility  Bridging: Stand by assistance (07/04/22 0937)  Rolling to Left: Stand by assistance (07/04/22 3382)  Rolling to Right: Stand by assistance (07/04/22 0937)  Supine to Sit: Minimal assistance (06/28/22 1143)  Sit to Supine: Stand by assistance (07/04/22 4416)  Scooting: Minimal assistance (latearlly in supine) (06/28/22 1143)  Bed Mobility Comments: Mat mobility (07/04/22 7959)  Bed Mobility  Additional Factors: Without handrails; Head of bed flat (mat table) (07/06/22 1537)  Additional Factors: Without handrails; Head of bed flat (mat table) (07/06/22 1537)  Roll Left  Assistance Level: Supervision (07/06/22 1537)  Skilled Clinical Factors: with HR (06/28/22 1342)  Roll Right  Assistance Level: Supervision (07/06/22 1537)  Skilled Clinical Factors: with HR (06/28/22 1342)  Sit to Supine  Assistance Level: Supervision (07/06/22 1537)  Skilled Clinical Factors: improved technique (07/06/22 1537)  Supine to Sit  Assistance Level: Supervision (07/06/22 1537)  Skilled Clinical Factors: good technique maintained (07/06/22 1537)  Scooting  Assistance Level: Supervision (07/06/22 1537)  Skilled Clinical Factors: scooting to EOB (07/06/22 1537)  Transfers:  Transfers  Sit to Stand: Stand by assistance (07/04/22 0934)  Stand to sit: Stand by assistance (07/04/22 0934)  Bed to Chair: Stand by assistance (07/04/22 0934)  Lateral Transfers: Minimal Assistance;Contact guard assistance (Instruction provided on scooting laterally at EOB emphasizing forward weight shift) (06/28/22 1145)  Car Transfer: Stand by assistance (07/04/22 0934)  Comment: VC for hand placement when sitting down (07/04/22 0934)  Transfers  Surface: Wheelchair (07/06/22 1537)  Additional Factors: Verbal cues (07/06/22 1537)  Device: Elnoria Mathew (07/06/22 1537)  Sit to Stand  Assistance Level: Supervision (07/06/22 1537)  Skilled Clinical Factors: good technique (07/06/22 1537)  Stand to Sit  Assistance Level: Supervision (07/06/22 1537)  Skilled Clinical Factors: maintains one UE back prior to sitting throughout tx (07/06/22 1537)  Bed To/From Chair  Technique: Stand pivot (07/06/22 1537)  Assistance Level: Contact guard assist (07/06/22 1537)  Skilled Clinical Factors: cues for improving safety and technique, mat table> w/c (07/06/22 1537)  Stand Pivot  Assistance Level: Stand by assist (07/06/22 1011)  Skilled Clinical Factors: improved carryover of technique with minimal cues (07/06/22 1011)  Car Transfer  Assistance Level: Stand by assist (07/05/22 1340)  Skilled Clinical Factors: VC's for improving approach and technique, good follow through (07/05/22 1340)  Gait:   Ambulation  Surface: carpet (07/04/22 1405)  Device: Rolling Walker (07/04/22 1405)  Other Apparatus: Wheelchair follow (06/28/22 1149)  Assistance: Contact guard assistance (07/04/22 8023)  Quality of Gait: difficulty with RLE foot clearance, FF posture, downward gaze, short BLE step length.  Step to pattern<> occ step strough (07/04/22 1405)  Gait Deviations: Slow Leonardo;Decreased step length;Decreased step height;Shuffles (07/04/22 1405)  Distance: 44ft x 2 (07/04/22 1405)  Comments: decreased stride and step length with fatigue after  2nd Gait (07/04/22 1405)  Ambulation  Surface: Carpet (07/06/22 1540)  Device: Rolling walker (07/06/22 1540)  Distance: 50' two turns (07/06/22 1540)  Activity Comments:  BPM post ambulation (06/30/22 1342)  Assistance Level: Stand by assist (07/06/22 1540)  Gait Deviations: Decreased step length bilateral;Slow leonardo;Decreased heel strike right;Decreased heel strike left;Narrow base of support (07/06/22 1540)  Skilled Clinical Factors: decreased foot clearance this PM secondary to fatigue, initiates reciprocal pattern but only maintains ~4' (07/06/22 1540)  Stairs:  Stairs/Curb  Stairs?: Yes (07/04/22 0906)  Stairs  Curbs: 2\" (07/04/22 0906)  Device: Rolling walker (07/04/22 0906)  Assistance: Stand by assistance;Contact guard assistance (07/04/22 0906)  Comment: Vcs to not whip WW out when placing from curb to floor (07/04/22 0906)  Curb  Curb Height: 2'' (07/06/22 1013)  Device: Rolling walker (07/06/22 1013)  Number of Curbs: 2 (07/06/22 1013)  Additional Factors: Verbal cues (07/06/22 1013)  Assistance Level: Stand by assist (07/06/22 1013)  Skilled Clinical Factors: occ vc's for improving technique of ascending with ww, good follow through (07/06/22 1013)  W/C mobility:     LTG:  Long term goal 1: Pt to complete bed mobility with indep  Long term goal 2: Pt to complete functional transfers bed/chair/car with indep  Long term goal 3: Pt to ambulate 50-150ft with LRD and indep  Long term goal 4: Pt to tolerate 5min standing activities indep  Long term goal 5: Pt to manage 2\" curb step indep  PT Treatment Time:  1.5 hrs      OCCUPATIONAL THERAPY    EVALUATION SELF CARE (07/06/22 0915)  Toileting  Assistance Level: Supervision (07/05/22 1327)  Skilled Clinical Factors: NT did not need to go (07/06/22 0915)  Toilet Transfers  Technique: Stand step (07/05/22 1327)  Equipment: Standard toilet;Grab bars (07/05/22 1327)  Additional Factors: Verbal cues;Cues for hand placement (07/05/22 1327)  Assistance Level: Stand by assist (07/05/22 1327)  Skilled Clinical Factors: NT did not need to go (07/06/22 0915)  Tub/Shower Transfers  Type: Shower (07/06/22 0915)  Transfer From: Wheelchair (07/06/22 0915)  Transfer To:  Shower chair with back (07/06/22 0915)  Additional Factors: Verbal cues (07/06/22 0915)  Assistance Level: Contact guard assist (07/06/22 0915)  Skilled Clinical Factors: Pateint completed sponge bath at sink per her request (07/05/22 0855)        LTG:  Eating  Assistance Needed: Independent  CARE Score: 6  Discharge Goal: Independent, Oral Hygiene  Assistance Needed: Setup or clean-up assistance  CARE Score: 5  Discharge Goal: Independent, [address] needed: Dependent  CARE Score: 1  Discharge Goal: Supervision or touching assistance, Shower/Bathe Self  Assistance Needed: Substantial/maximal assistance  CARE Score: 2  Discharge Goal: Supervision or touching assistance  Upper Body Dressing  Assistance Needed: Partial/moderate assistance  CARE Score: 3  Discharge Goal: Independent, Lower Body Dressing  Assistance Needed: Substantial/maximal assistance  CARE Score: 2  Discharge Goal: Supervision or touching assistance, Putting On/Taking Off Footwear  Assistance Needed: Dependent  CARE Score: 1  Discharge Goal: Supervision or touching assistance, Toilet Transfer  Assistance needed: Substantial/maximal assistance  CARE Score: 2  Discharge Goal: Supervision or touching assistance  OT Treatment Time: 1.5 hrs      SPEECH THERAPY       Comprehension: Within Functional Limits (Patient answered simple yes/no questions, basic questions and participated in conversation / Plan:         - Results:           Discharge Plan   Estimated Length of Stay: 14 days    Tentative Discharge date: 7/12/22      Anticipated Discharge Destination:  Home      Team recommendations:    1. Follow up Therapy :    PT  OT  RN  Social Work  [facility]    2. Home Health    Other:     Equipment needed at Discharge:  Other: TBD      Team Members Present at Conference:    Physician: Dr. Kylah Ybarra  : Asha Jung RN  : MEL Reynolds LSW  RN: Olaf Gabriel RN  Physical Therapist: Jimmie Pro PT  Occupational Therapist: Cecil Hamlin OTR  Speech Therapist: Ardyce Cheadle, SLP  Other: Marquis Javier RN     Electronically signed by MEL Reynolds LSW on 7/7/2022 at 9:26 AM

## 2022-07-06 NOTE — PROGRESS NOTES
Subjective: The patient complains of severe acute on chronic progressive fatigue and paresis and quadrat esthesia secondary to MS flareup partially relieved by rest, medications, PT,  OT, steroids, SLP and rest and exacerbated by recent illness -we will sclerosis flareup. I am concerned about patients medical complexities and barriers to advancing in rehab goals including -her tachycardia-initially presented through the emergency room with severe hip pain status post a fall at home. She was diagnosed with an exacerbation of her multiple sclerosis and rhabdomyolysis secondary to elevated CK levels. Fortunately her x-rays of her hips were unremarkable. She was prescribed IV fluids and IV methylprednisolone. She is hoping to transition to [facility] therapy as an outpatient for her MS.     Patient has had difficulty with endurance, balance, gait ataxia, generalized weakness and numbness. Medically complicated by tachycardia especially with activity. She will need telemetry monitoring while on the unit and to be followed by cardiology and or medical..  I reviewed and discussed cardiac note-Cardiac Supportive Care including:IV and PO hydration for Rhabdomyolysis care. Serial cardiac enzymes. Telemetry for 48 hours. Echocardiogram-sofar work-up of his been unremarkable and cardiology feels that it is a tachycardia always sinus in response to debility and deconditioning. Have okayed her for therapy. I reviewed current care and plans for further care with other rehab providers including nursing and case management. According to recent nursing note, \" Assessment completed. A&O x4. Denies pain at this time. In chair with alarm activated. Call light in reach. \".    I am concerned about the moderate sized PFO that they found on recent echocardiogram.  I attempted to explain what a PFO was to the patient we will also give her written and repeat verbal instructions with nursing.   Of course cardiology is managing and evaluating this issue and they should also have a discussion with her regarding the plan. Her a.m. labs were unremarkable platelet count is slightly elevated at 409. She may be able to come off of her Lovenox next week. ROS x10: The patient also complains of severely impaired mobility and activities of daily living. Otherwise no new problems with vision, hearing, nose, mouth, throat, dermal, cardiovascular, GI, , pulmonary, musculoskeletal, psychiatric or neurological. See also Acute Rehab PM&R H&P. Vital signs:  BP (!) 102/52   Pulse 82   Temp 98.4 °F (36.9 °C)   Resp 16   Ht 5' 1\" (1.549 m)   Wt 168 lb 3.2 oz (76.3 kg)   SpO2 100%   BMI 31.78 kg/m²   I/O:   PO/Intake:  fair PO intake,    diet    Bowel:   continent LBM 7/3  Bladder: continent no saenz- urine is clear  General:  Patient is well developed,   adequately nourished, and    well kempt. HEENT:    Pupils equal, hearing intact to loud voice, external inspection of ear and nose benign. Inspection of lips, tongue and gums benign    Musculoskeletal: No significant change in strength or tone. All joints stable. Inspection and palpation of digits and nails show no clubbing, cyanosis or inflammatory conditions. Neuro/Psychiatric: Affect: flat but pleasant. Alert and oriented to person, place and situation with  min cues. No significant change in deep tendon reflexes or sensation  Lungs:  Diminished, CTA-B. Respiration effort is   normal at rest.     Heart:   S1 = S2,   RRR-     Abdomen:  Soft, non-tender, no enlargement of liver or spleen.   Extremities:    lower extremity edema    Skin:   Intact to general survey,      Rehabilitation:  Physical Therapy:   Bed mobility:  Bed mobility  Bridging: Stand by assistance (07/04/22 9451)  Rolling to Left: Stand by assistance (07/04/22 5545)  Rolling to Right: Stand by assistance (07/04/22 0935)  Supine to Sit: Minimal assistance (06/28/22 0706)  Sit to Supine: Stand by assistance (07/04/22 2094)  Scooting: Minimal assistance (latearlly in supine) (06/28/22 1143)  Bed Mobility Comments: Mat mobility (07/04/22 0937)  Bed Mobility  Additional Factors: Head of bed flat; With handrails (07/05/22 1027)  Additional Factors: Head of bed flat; With handrails (07/05/22 1027)  Roll Left  Assistance Level: Supervision (07/05/22 1027)  Skilled Clinical Factors: with HR (06/28/22 1342)  Roll Right  Assistance Level: Supervision (07/05/22 1027)  Skilled Clinical Factors: with HR (06/28/22 1342)  Sit to Supine  Assistance Level: Supervision (07/05/22 1027)  Skilled Clinical Factors: improved technique, does not require BUE to assist BLE this session (07/05/22 1027)  Supine to Sit  Assistance Level: Supervision (07/05/22 1027)  Skilled Clinical Factors: increased effort, good technique (07/05/22 1027)  Scooting  Assistance Level: Supervision (07/05/22 1027)  Skilled Clinical Factors: Scooting EOB and HOB, decreased assistance, good technique throughout (07/05/22 1027)  Transfers:  Transfers  Sit to Stand: Stand by assistance (07/04/22 0934)  Stand to sit: Stand by assistance (07/04/22 0934)  Bed to Chair: Stand by assistance (07/04/22 0934)  Lateral Transfers: Minimal Assistance;Contact guard assistance (Instruction provided on scooting laterally at EOB emphasizing forward weight shift) (06/28/22 1145)  Car Transfer: Stand by assistance (07/04/22 0934)  Comment: VC for hand placement when sitting down (07/04/22 0934)  Transfers  Surface: Wheelchair (07/05/22 1340)  Additional Factors: Verbal cues (07/05/22 1027)  Device: Bit9 (07/05/22 1340)  Sit to Stand  Assistance Level: Stand by assist (07/05/22 1340)  Skilled Clinical Factors: good hand placement maintained (07/05/22 1340)  Stand to Sit  Assistance Level: Stand by assist (07/05/22 1340)  Skilled Clinical Factors: good hand placement to assist with eccentric control (07/05/22 1340)  Bed To/From Chair  Technique: Sit pivot (06/29/22 5507)  Assistance Level: Contact guard assist (06/29/22 1342)  Skilled Clinical Factors: cues intially for hand placement, good follow through. Bed > w/c (06/29/22 1342)  Stand Pivot  Assistance Level: Stand by assist (07/05/22 1340)  Skilled Clinical Factors: no instability or LOB, SBA for occ cues for improving safety and quality of SPT to w/c (07/05/22 1340)  Car Transfer  Assistance Level: Stand by assist (07/05/22 1340)  Skilled Clinical Factors: VC's for improving approach and technique, good follow through (07/05/22 1340)  Gait:   Ambulation  Surface: carpet (07/04/22 1405)  Device: Rolling Walker (07/04/22 1405)  Other Apparatus: Wheelchair follow (06/28/22 1149)  Assistance: Contact guard assistance (07/04/22 1405)  Quality of Gait: difficulty with RLE foot clearance, FF posture, downward gaze, short BLE step length.  Step to pattern<> occ step strough (07/04/22 1405)  Gait Deviations: Slow Leonardo;Decreased step length;Decreased step height;Shuffles (07/04/22 1405)  Distance: 44ft x 2 (07/04/22 1405)  Comments: decreased stride and step length with fatigue after  2nd Gait (07/04/22 1405)  Ambulation  Surface: Carpet (07/05/22 1341)  Device: Rolling walker (07/05/22 1341)  Distance: 25' x 2, 50' (07/05/22 1341)  Activity Comments:  BPM post ambulation (06/30/22 1342)  Assistance Level: Stand by assist;Contact guard assist (07/05/22 1341)  Gait Deviations: Decreased step length bilateral;Slow leonardo;Decreased heel strike right;Decreased heel strike left;Narrow base of support (07/05/22 1341)  Skilled Clinical Factors: occ cues for kaz foot clearance, decreased kaz knee ext during gait (07/05/22 1341)  Stairs:  Stairs/Curb  Stairs?: Yes (07/04/22 0906)  Stairs  Curbs: 2\" (07/04/22 0906)  Device: Rolling walker (07/04/22 0906)  Assistance: Stand by assistance;Contact guard assistance (07/04/22 9245)  Comment: Vcs to not whip WW out when placing from curb to floor (07/04/22 0906)  Curb  Curb Height: 2'' (07/05/22 1010)  Device: Rolling walker (07/05/22 1010)  Number of Curbs: 2 (07/05/22 1010)  Additional Factors: Verbal cues (07/05/22 1010)  Assistance Level: Stand by assist;Contact guard assist (07/05/22 1010)  Skilled Clinical Factors: vc's for improved technique of placing ww on curb, good technique descending curb (07/05/22 1010)  W/C mobility:       Occupational Therapy:   Hand Dominance: Right  ADL  Feeding: Modified independent  (06/28/22 1049)  Grooming: Setup (06/29/22 1341)  Grooming Skilled Clinical Factors: for oral care and grooming (06/29/22 1341)  UE Bathing: Stand by assistance; Increased time to complete (06/29/22 1341)  LE Bathing: Dependent/Total (06/29/22 1341)  LE Bathing Skilled Clinical Factors: +2 assistance. Pt able to bend down in order to wash legs with SBA for safe sitting. +2 to wash posterior lillie area. 1 person for washing 1 person to maintain standing balance (06/29/22 1341)  UE Dressing: Stand by assistance (06/29/22 1341)  LE Dressing: Increased time to complete;Maximum assistance (06/29/22 1341)  LE Dressing Skilled Clinical Factors: to don pants and depends (06/29/22 1341)  Toileting: Dependent/Total (06/29/22 1341)  Toileting Skilled Clinical Factors: Pt incontinent of feces and urine (06/29/22 1341)  Additional Comments: Pt displaying lack of endurance and fatigue throughout session, requiring increased assistance throughout progression of evaluation (06/28/22 1049)  Toilet Transfers  Toilet - Technique: Stand pivot (06/29/22 1339)  Equipment Used: Standard toilet (06/29/22 1339)  Toilet Transfer: Minimal assistance (verbal cues for hand placement) (06/29/22 1339)  Toilet Transfers Comments: Twards end of session, initially Min A for transfers (06/28/22 1054)     [address] - Transfer Type: To and From (06/28/22 1054)  Shower - Transfer To: Shower seat with back (06/28/22 1054)  Shower - Technique: Stand pivot (06/28/22 1054)  Shower Transfers:  Moderate assistance (06/28/22 5603)  Shower Transfers Comments: Patient did not complete shower transfer as patient washed up at sink in wheelchair. (06/29/22 0433)    Speech Therapy:      Comprehension: Within Functional Limits (Patient answered simple yes/no questions, basic questions and participated in conversation with good comprehension. no repetition was required)  Verbal Expression:  [facility] with meeting wants and needs.  No word finding deficits have been noted during conversation)  Diet/Swallow:        Dysphagia Outcome Severity Scale: Level 7: Normal in all situations  Compensatory Swallowing Strategies : Upright as possible for all oral intake          Lab/X-ray studies reviewed, analyzed and discussed with patient and staff:   Recent Results (from the past 24 hour(s))   EKG 12 Lead    Collection Time: 07/05/22  9:37 AM   Result Value Ref Range    Ventricular Rate 85 BPM    Atrial Rate 85 BPM    P-R Interval 160 ms    QRS Duration 76 ms    Q-T Interval 376 ms    QTc Calculation (Bazett) 447 ms    P Axis 60 degrees    R Axis 2 degrees    T Axis 24 degrees   Urinalysis with Reflex to Culture    Collection Time: 07/05/22  2:39 PM    Specimen: Urine   Result Value Ref Range    Color, UA Yellow Straw/Yellow    Clarity, UA CLOUDY (A) Clear    Glucose, Ur Negative Negative mg/dL    Bilirubin Urine Negative Negative    Ketones, Urine TRACE (A) Negative mg/dL    Specific Gravity, UA 1.020 1.005 - 1.030    Blood, Urine TRACE (A) Negative    pH, UA 5.0 5.0 - 9.0    Protein, UA Negative Negative mg/dL    Urobilinogen, Urine 0.2 <2.0 E.U./dL    Nitrite, Urine Negative Negative    Leukocyte Esterase, Urine SMALL (A) Negative    Urine Reflex to Culture Yes    Microscopic Urinalysis    Collection Time: 07/05/22  2:39 PM   Result Value Ref Range    Hyaline Casts, UA 0-1 0 - 5 /LPF    RBC, UA 3-5 (A) 0 - 2 /HPF    Bacteria, UA FEW (A) Negative /HPF    Hyaline Casts, UA 10-20 0 - 5 /HPF    WBC, UA 10-20 (A) 0 - 5 /HPF    Epithelial Cells, UA 20-50 0 - 5 /HPF   Basic Metabolic Panel w/ Reflex to MG    Collection Time: 07/06/22  4:23 AM   Result Value Ref Range    Sodium 141 135 - 144 mEq/L    Potassium reflex Magnesium 4.2 3.4 - 4.9 mEq/L    Chloride 106 95 - 107 mEq/L    CO2 26 20 - 31 mEq/L    Anion Gap 9 9 - 15 mEq/L    Glucose 97 70 - 99 mg/dL    BUN 17 6 - 20 mg/dL    CREATININE 0.63 0.50 - 0.90 mg/dL    GFR Non-African American >60.0 >60    GFR  >60.0 >60    Calcium 8.8 8.5 - 9.9 mg/dL   CBC with Auto Differential    Collection Time: 07/06/22  4:24 AM   Result Value Ref Range    WBC 6.3 4.8 - 10.8 K/uL    RBC 3.14 (L) 4.20 - 5.40 M/uL    Hemoglobin 8.8 (L) 12.0 - 16.0 g/dL    Hematocrit 26.9 (L) 37.0 - 47.0 %    MCV 85.5 82.0 - 100.0 fL    MCH 28.0 27.0 - 31.3 pg    MCHC 32.8 (L) 33.0 - 37.0 %    RDW 17.9 (H) 11.5 - 14.5 %    Platelets 600 276 - 724 K/uL    Neutrophils % 69.5 %    Lymphocytes % 17.4 %    Monocytes % 9.8 %    Eosinophils % 2.8 %    Basophils % 0.5 %    Neutrophils Absolute 4.4 1.4 - 6.5 K/uL    Lymphocytes Absolute 1.1 1.0 - 4.8 K/uL    Monocytes Absolute 0.6 0.2 - 0.8 K/uL    Eosinophils Absolute 0.2 0.0 - 0.7 K/uL    Basophils Absolute 0.0 0.0 - 0.2 K/uL       XR CERVICAL SPINE   6/19/2022   NEGATIVE CERVICAL SPINE    XR HIP LEFT  6/19/2022  NEGATIVE PELVIS AND LEFT HIP     XR FEMUR RIGHT  6/19/2022: No fracture, dislocation, bone lesion. NEGATIVE RIGHT FEMUR    MRI LUMBAR SPINE : 6/20/2022: Degenerative facet changes of the lumbar spine visualized most prominent at L3-L4 and L5-S1 that demonstrate mild progression in comparison to the prior study    US ABDOMEN  6/20/2022:  NO SIGN OF CHOLELITHIASIS OR BILE DUCT DILATATION. SLUDGE WITHIN THE GALLBLADDER. NO DEFINITE FOCAL LIVER ABNORMALITY. US DUP LOWER EXTREMITIES BILATERAL VENOUS  6/20/2022   NO SONOGRAPHIC EVIDENCE OF DEEP VENOUS THROMBOSIS WITHIN THE BILATERAL LOWER EXTREMITIES. Patent foramen ovale with right-to-left shunt was visualized.    On agitated saline contrast injection- Large amount of bubbles LA and LV   nearly immediately    Previous extensive, complex labs, notes and diagnostics reviewed and analyzed. ALLERGIES:    Allergies as of 06/27/2022    (No Known Allergies)      (please also verify by checking MAR)       I reviewed her [facility] prescription monitoring service data sheets in hopes of eliminating polypharmacy and weaning to the lowest effective dose of pain medications and eliminating the concomitant use of benzodiazepines. I see no medications of concern. I see no habits of combining sedatives and narcotics. Complex Physical Medicine & Rehab Issues Assess & Plan:   1. Severe abnormality of gait and mobility and impaired self-care and ADL's secondary to progressive masturbation of multiple sclerosis. Functional and medical status reassessed regarding patients ability to participate in therapies and patient found to be able to participate in acute intensive comprehensive inpatient rehabilitation program including PT/OT to improve balance, ambulation, ADLs, and to improve the P/AROM. Therapeutic modifications regarding activities in therapies, place, amount of time per day and intensity of therapy made daily. In bed therapies or bedside therapies prn.   2. Bowel and Bladder dysfunction  , Neurogenic bowel and bladder:  frequent toileting, ambulate to bathroom with assistance, check post void residuals. Check for C.difficile x1 if >2 loose stools in 24 hours, continue bowel & bladder program.  Monitor bowel and bladder function. Lactinex 2 PO every AC. MOM prn, Brown Bomb prn, Glycerin suppository prn, enema prn. Encourage therapy and nursing to co-treat and problem solve re continence. Canby Ditropan. 3. Severe MS pain as well as generalized OA pain: reassess pain every shift and prior to and after each therapy session, give prn Tylenol and consider scheduled Tylenol, modalities prn in therapy, masage, Lidoderm, K-pad prn.  Consider scheduled AM pain meds. 4. Skin healing   and breakdown risk:  continue pressure relief program.  Daily skin exams and reports from nursing. 5. Fatigue due to nutritional and hydration deficiency: Add and titrate vitamin B12 vitamin D and CoQ10 continue to monitor I&Os, calorie counts prn, dietary consult prn. Add healthy snack at night. 6. Acute episodic insomnia with situational adjustment disorder:  prn Ambien, monitor for day time sedation. 7. Falls risk elevated:  patient to use call light to get nursing assistance to get up, bed and chair alarm. 8. Elevated DVT risk: progressive activities in PT, continue prophylaxis UMM hose, elevation and Lovenox. 9. Complex discharge planning:  DC 7/12/22--home with [facility]--try to get aide with waiver. Parents are elderly and son is only 5. Toward her final weekly team meeting   Thursday to re-assess progress towards goals, discuss and address social, psychological and medical comorbidities and to address difficulties they may be having progressing in therapy. Patient and family education is in progress. The patient is to follow-up with their family physician after discharge. Complex Active General Medical Issues that complicate care Assess & Plan:    1. Urinary urgency,   Neurogenic bladder-recheck stat UA  2.   MS (multiple sclerosis)-consult neurology transition to outpatient Ocrevus therapy if possible. 3.   Hyperglycemia-monitor for diabetes mellitus  4. Rhabdomyolysis-push fluids recheck BMP  5. Baseline mental handicap-just new learning and therapy. 6.   Posterior tibial tendon dysfunction -AFO  7. Malodorous urine-check stat UA check postvoid residual  8. Ataxic gait-focus on balance and therapy  9. Pain in joint, lower leg, Foot drop, left foot, Acquired deformity of left foot-consult podiatry as needed  10. Lumbar radiculopathy-post effective dose of pain medication  11.  Moderate sized PFO, asymptomatic sinus tachycardia with activity dt deconditioning- SP EKG stat-OK,  , add stat labs and cardiac consult-a.m. labs were essentially [id number]  Was OK.  cardiology. Continue telemetry for now. Cardiology and I agree to add aspirin and Plavix to the current dose and the Lovenox       Focus of today's plan-  Initiate and modify therapuetic plan to meet patients individual needs, add rest breaks as needed, and monitor heart rate on telemetry with cardiac work-up as above. Follow through with plans for PFO.     Electronically signed by Buck Ariza DO on 6/29/22 at 8:02 AM EDSALLY Santiago D.O., PM&R     Attending    [address]

## 2022-07-06 NOTE — PROGRESS NOTES
[facility]  Facility/Department: [facility]  Speech Language Pathology   Treatment Note  Johan Herrera  1980  [id number]  [x]   confirmed    Date: 2022    Rehab Diagnosis: Impaired mobility and activities of daily living dt exac of MS    Restrictions/Precautions: Fall Risk  Weight: 168 lb 3.2 oz (76.3 kg)   ADULT DIET; Regular; 3 carb choices (45 gm/meal); No Added Salt (3-4 gm)  SpO2: 100 % (22 0620)  No active isolations    Speech Dx: Cognitive Linguistic Impairment    Subjective:  Alert, Cooperative, Pleasant and Motivated        Interventions used this date:  Cognitive Skill Development    Objective/Assessment:  Patient progressing towards goals:  Short-term Goals for Cognition:  Goal 1: To increase safety awareness and judgment for safe completion of ADLs secondary to pt's cognitive deficits,  pt will complete mid to high level problem solving tasks related to ADLs (e.g. medication management, ADL safety) with 90% accuracy and superised assist.  Not addressed    Goal 2: To increase safety awareness and judgment for safe completion of ADLs secondary to pt's cognitive deficits, pt will sequence common activities of daily living with (verbal/written) steps with 90% accuracy and supervised assist.  75% acc with 4-picture sequencing task given mild verbal prompts. Goal 3: To decrease pt's cognitive deficits through the use of compensatory strategies, the pt will be educated on 3 different memory strategies and verbalize how he/she might use them at home in 3 ways with supervised assist.  Not addressed    Goal 4: To address pt's cognitive deficits and promote recall of personal and medical information, pt will answer questions addressing (remote, recent, delayed) recall with 90% accuracy and min cues. Math calculations with immediate recall: 70% acc given mild-mod verbal prompts    Goal 5:  To decrease cognitive deficits and improve attention to tasks for safe completion of ADLs, pt will complete structured tasks addressing (sustained, selective, alternating, divided) attention with 90% accuracy and supervised assist.  Alternating attention with immediate recall: 60% acc given moderate verbal prompts. Treatment/Activity Tolerance:  Patient tolerated treatment well    Plan:  Continue per POC    Pain Assessment:  Patient does not c/o pain. Pain Re-assessment:  Patient does not c/o pain. Patient/Caregiver Education:  Patient educated on session and progression towards goals. Patient stated verbal understanding of directions. Safety Devices:   All fall risk precautions in place and Chair alarm in place  PT Mary Jane Nath in room taking pt to therapy gym    Dysphagia Outcome Severity Scale    SWALLOWING  Rating:  DNT      Speech Therapy Level of Assistance Scale    AUDITORY COMPREHENSION  Rating:  Modified Independent    VERBAL EXPRESSION  Rating:  Modified Independent    MOTOR SPEECH  Rating: Independent    MEMORY  Rating:  Minimal Assistance      Therapy Time  SLP Individual Minutes  Time In: 5735  Time Out: 8555  Minutes: 22        Signature: Electronically signed by FIDELIA Morrison on 7/6/2022 at 10:19 AM

## 2022-07-06 NOTE — PROGRESS NOTES
Physical Therapy Rehab Treatment Note  Facility[facility]Department: [facility]  [facility]       NAME: Leila Butler  : 1980 (45 y.o.)  MRN: [id number]  CODE STATUS: Full Code    Date of Service: 2022       Restrictions:  Restrictions/Precautions: Fall Risk       SUBJECTIVE:   Subjective: \"I had a good shower this morning. \"    Pain  Pain: Pt reports no pain      OBJECTIVE:     Transfers  Surface: Wheelchair  Additional Factors: Verbal cues  Device: Walker  Sit to Stand  Assistance Level: Supervision  Skilled Clinical Factors: good technique  Stand to Sit  Assistance Level: Stand by assist  Skilled Clinical Factors: initial vc's for hand placement to improve safety and technique, technique improves throughout tx  Stand Pivot  Assistance Level: Stand by assist  Skilled Clinical Factors: improved carryover of technique with minimal cues    Ambulation  Surface: Carpet  Device: Rolling walker  Distance: 50' two turns  Assistance Level: Stand by assist  Gait Deviations: Decreased step length bilateral;Slow leonardo;Decreased heel strike right;Decreased heel strike left;Narrow base of support  Skilled Clinical Factors: improved awareness of foot clearance, decreased kaz knee ext, step to, initiating reciprocal pattern    Curb  Curb Height: 2''  Device: Rolling walker  Number of Curbs: 2  Additional Factors: Verbal cues  Assistance Level: Stand by assist  Skilled Clinical Factors: occ vc's for improving technique of ascending with ww, good follow through    Neuromuscular Education  Neuromuscular Comments: ant/post/lateral gait drills in // bars, improveed kaz foot clearance, emphasis on initiating reciprocal pattern with good follow through, improving posture with fwd ambulation    PT Exercises  Exercise Treatment: fwd stepping over SPC with ant weightshift x10 ea BLE, lateral step over SPC BLE x10 ea  A/AROM Exercises: Seated: Jamesetta Canavan, LAQ x15 ea  Functional Mobility Circuit Training: manuevering around obstacles and through tight spaces with [employer]  Static Standing Balance Exercises: multiple trials static standing no UE support, pt able to maintain without UE support x45 sec max       ASSESSMENT/PROGRESS TOWARDS GOALS:   Assessment  Assessment: Good progression toward trsf and ambulation goals this session. Pt improving overall safety and quality of trsf and gait techniques. Continue to work toward 5 min standing goal. Pt able to maintain standing x3 min during gait drills in // bars. Goals:  Long Term Goals  Long term goal 1: Pt to complete bed mobility with indep  Long term goal 2: Pt to complete functional transfers bed/chair/car with indep  Long term goal 3: Pt to ambulate 50-150ft with LRD and indep  Long term goal 4: Pt to tolerate 5min standing activities indep  Long term goal 5: Pt to manage 2\" curb step indep  Additional Goals?: Yes  Long term goal 6: Pt to complete HEP with indep    PLAN OF CARE/Safety:   Plan Comment: Cont.  POC      Therapy Time:   Individual   Time In 1000   Time Out 1100   Minutes 60     Minutes:  Transfer/Bed mobility training: 15  Gait training: 15  Neuro re education: 15  Therapeutic ex: Jakub Schmidt, PTA, 07/06/22 at 11:01 AM

## 2022-07-06 NOTE — PROGRESS NOTES
Physical Therapy Rehab Treatment Note  Facility/Department: [facility]  Room: [place] [address]       NAME: Nicol Oh  : 1980 (20 y.o.)  MRN: [id number]  CODE STATUS: Full Code    Date of Service: 2022       Restrictions:  Restrictions/Precautions: Fall Risk       SUBJECTIVE:   Subjective: \"I'm doing pretty good. \"    Pain  Pain: Pt reports no pain      OBJECTIVE:     Bed Mobility  Additional Factors: Without handrails; Head of bed flat (mat table)  Roll Left  Assistance Level: Supervision  Roll Right  Assistance Level: Supervision  Sit to Supine  Assistance Level: Supervision  Skilled Clinical Factors: improved technique  Supine to Sit  Assistance Level: Supervision  Skilled Clinical Factors: good technique maintained  Scooting  Assistance Level: Supervision  Skilled Clinical Factors: scooting to EOB    Transfers  Surface: Wheelchair  Additional Factors: Verbal cues  Device: Walker  Sit to Stand  Assistance Level: Supervision  Skilled Clinical Factors: good technique  Stand to Sit  Assistance Level: Supervision  Skilled Clinical Factors: maintains one UE back prior to sitting throughout tx  Bed To/From Chair  Technique: Stand pivot  Assistance Level: Contact guard assist  Skilled Clinical Factors: cues for improving safety and technique, mat table> w/c      Ambulation  Surface: Carpet  Device: Rolling walker  Distance: 50' two turns  Assistance Level: Stand by assist  Gait Deviations: Decreased step length bilateral;Slow leonardo;Decreased heel strike right;Decreased heel strike left;Narrow base of support  Skilled Clinical Factors: decreased foot clearance this PM secondary to fatigue, initiates reciprocal pattern but only maintains ~4'    PT Exercises  A/AROM Exercises: Supine: hooklying marches with abdominal iso x10 reps x2 sets, bridges x10, sidelying: clamshell/reverse clams, hip abd, hip ext x10 ea BLE ( pt requiring occ AAROM for technique reverse clam, hip abd&ext)  Static Standing Balance Exercises: multiple trials static standing no UE support, pt able to maintain without UE support x45 sec max     ASSESSMENT/PROGRESS TOWARDS GOALS:   Assessment  Assessment: Emphasis this session on improving hip/core strength. Pt requires multiple vc/tc's for maintaining proper technique throughout sidelying ther ex. Visual demo provided for SPT from mat table > w/c for improved safety. Good carryover. Goals:  Long Term Goals  Long term goal 1: Pt to complete bed mobility with indep  Long term goal 2: Pt to complete functional transfers bed/chair/car with indep  Long term goal 3: Pt to ambulate 50-150ft with LRD and indep  Long term goal 4: Pt to tolerate 5min standing activities indep  Long term goal 5: Pt to manage 2\" curb step indep  Additional Goals?: Yes  Long term goal 6: Pt to complete HEP with indep    PLAN OF CARE/Safety:   Plan Comment: Cont.  POC      Therapy Time:   Individual   Time In 3227   Time Out 1600   Minutes 30     Minutes:  Transfer/Bed mobility training: 10  Gait trainin  Neuro re education: 0  Therapeutic ex: Richard Gao PTA, 22 at 4:10 PM

## 2022-07-06 NOTE — PROGRESS NOTES
[id number] [facility] Neurology Daily Progress Note  Name: Lul Lee  Age: 35 y.o. Gender: female  CodeStatus: Full Code  Allergies: No Known Allergies    Chief Complaint:No chief complaint on file. Primary Care Provider: Erma Santa PA-C  InpatientTreatment Team: Treatment Team: Attending Provider: Héctor Blanchard DO; Consulting Physician: Isabela Recinos MD; Consulting Physician: Ghazal Thayer MD; Nursing Student: Melanie Fontenot; Consulting Physician: Blade Mooney MD; Registered Nurse: Ilene Temple RN; Occupational Therapist: Brian Leon, OT; Occupational Therapist Assistant: Shant Rehman; : Nicol Martins MSW, LSW; LPN: Wing Meyer LPN  Admission Date: 6/27/2022      HPI   Pt seen and examined on rehab unit for neurology follow-up for multiple sclerosis with bilateral lower extremity paraparesis and left foot drop. Patient alert and oriented x3, no acute distress, cooperative. Some improvement in mobility noted. Of note patient has had cardiac work-up that revealed moderate PFO. She has been initiated on aspirin and Plavix with plans for future PFO closure. Vitals:    07/06/22 0620   BP: (!) 102/52   Pulse: 82   Resp: 16   Temp: 98.4 °F (36.9 °C)   SpO2: 100%      Review of Systems   Constitutional: Negative for fatigue and fever. HENT: Negative for hearing loss and trouble swallowing. Eyes: Negative for visual disturbance. Respiratory: Negative for cough, chest tightness, shortness of breath and wheezing. Cardiovascular: Negative for chest pain, palpitations and leg swelling. Gastrointestinal: Negative for abdominal distention, abdominal pain, nausea and vomiting. Genitourinary: Negative for difficulty urinating. Musculoskeletal: Positive for gait problem. Skin: Negative for color change and rash. Neurological: Positive for weakness.  Negative for dizziness, tremors, seizures, syncope, facial asymmetry, speech difficulty, light-headedness, numbness and headaches. Psychiatric/Behavioral: Negative for agitation, confusion and hallucinations. The patient is not nervous/anxious. Physical Exam  Vitals and nursing note reviewed. Constitutional:       General: She is not in acute distress. Appearance: She is not diaphoretic. HENT:      Head: Normocephalic and atraumatic. Eyes:      Pupils: Pupils are equal, round, and reactive to light. Cardiovascular:      Rate and Rhythm: Normal rate and regular rhythm. Pulmonary:      Effort: Pulmonary effort is normal. No respiratory distress. Breath sounds: Normal breath sounds. Abdominal:      General: Bowel sounds are normal. There is no distension. Palpations: Abdomen is soft. Tenderness: There is no abdominal tenderness. Skin:     General: Skin is warm and dry. Neurological:      Mental Status: She is alert and oriented to person, place, and time. Cranial Nerves: No cranial nerve deficit. Motor: Weakness and abnormal muscle tone present. No tremor, atrophy or seizure activity.       Gait: Gait abnormal.      Deep Tendon Reflexes: Reflexes abnormal.               Medications:  Reviewed    Infusion Medications:   Scheduled Medications:    oxybutynin  5 mg Oral Nightly    clopidogrel  75 mg Oral Daily    aspirin  81 mg Oral Daily    Vitamin D  2,000 Units Oral Dinner    cyanocobalamin  1,000 mcg IntraMUSCular Weekly    coenzyme Q10  100 mg Oral Daily    lidocaine  3 patch TransDERmal Daily    enoxaparin  40 mg SubCUTAneous Daily    multivitamin  1 tablet Oral Daily     PRN Meds: acetaminophen, bisacodyl, fleet    Labs:   Recent Labs     07/04/22  0431 07/05/22  0432 07/06/22  0424   WBC 7.1 6.1 6.3   HGB 9.1* 8.7* 8.8*   HCT 28.3* 27.1* 26.9*    241 246     Recent Labs     07/04/22  0431 07/05/22  0432 07/06/22  0423    142 141   K 4.3 4.1 4.2   * 110* 106   CO2 27 26 26   BUN 19 17 17   CREATININE 0.67 0.66 0.63   CALCIUM 8.6 8.5 8.8     No results for input(s): AST, ALT, BILIDIR, BILITOT, ALKPHOS in the last 72 hours. No results for input(s): INR in the last 72 hours. No results for input(s): Nikki Parisian in the last 72 hours. Urinalysis:   Lab Results   Component Value Date/Time    NITRU Negative 07/05/2022 02:39 PM    WBCUA 10-20 07/05/2022 02:39 PM    BACTERIA FEW 07/05/2022 02:39 PM    RBCUA 3-5 07/05/2022 02:39 PM    BLOODU TRACE 07/05/2022 02:39 PM    SPECGRAV 1.020 07/05/2022 02:39 PM    GLUCOSEU Negative 07/05/2022 02:39 PM       Radiology:   Most recent    EEG No valid procedures specified. MRI of Brain Results for orders placed during the hospital encounter of 01/20/22    MRI BRAIN W WO CONTRAST    Narrative  EXAMINATION: MRI BRAIN W WO CONTRAST    CLINICAL HISTORY: G35 MS (multiple sclerosis) ([facility] [address] ICD10    COMPARISONS: Brain MRI from October 16, 2020    TECHNIQUE:    Multiplanar multisequence images of the brain were obtained before and after IV administration of contrast. Diffusion perfusion imaging was obtained. BRAIN MRI FINDINGS:    There are no extra-axial collections. There is no evidence of hemorrhage. There are no areas of perfusion diffusion signal abnormality to suggest ischemia. The susceptibility images do not demonstrate evidence of hemosiderin deposition within the brain  parenchyma or the leptomeninges. There is preservation of the gray-white matter differentiation. There are marked areas of T2/FLAIR increased signal in the subcortical and periventricular white matter of both hemispheres with areas of confluence. There is involvement of corpus callosum  and there are areas of signal dropout on T1 imaging. There are no areas of abnormal enhancement after IV contrast administration. There is prominence of the sulci and ventricles consistent with moderate global cerebral atrophy and chronic involutional changes out of proportion to the patient's age.       The midline structures are intact, the corpus callosum is within normal limits. The region of the pineal gland and the sella turcica are unremarkable. There are no space-occupying lesions in the posterior fossa. The basilar cisterns are patent. The craniocervical junction is unremarkable. The visualized portions of the orbits are within normal limits, the globes are intact. The visualized portions of the paranasal sinuses are within normal limits. The calvarium and soft tissues are unremarkable. Impression  There are extensive areas of signal abnormality in the subcortical and periventricular white matter and involving the corpus callosum without enhancement to suggest active inflammation. The findings are similar to the previous study and may reflect  severe chronic microangiopathy, vasculitis, MS or other demyelinating process. Results for orders placed during the hospital encounter of 10/16/20    MRI BRAIN WO CONTRAST    Narrative  EXAMINATION: MRI BRAIN WO CONTRAST, MRI CERVICAL SPINE WO CONTRAST    CLINICAL HISTORY: R26.0 Ataxic gait ICD10    COMPARISONS: Brain CT from August 29, 2014    TECHNIQUE:    Multiplanar multisequence images of the brain were obtained without contrast. Diffusion perfusion imaging was obtained. FINDINGS:    There are no extra-axial collections. There is no evidence of hemorrhage. There are no areas of signal abnormality on perfusion diffusion imaging to suggest ischemia. The susceptibility images do not demonstrate evidence of hemosiderin deposition within  the brain parenchyma or the leptomeninges. There is preservation of the gray-white matter differentiation. There are extensive areas of T2/FLAIR signal abnormality in the subcortical and periventricular white matter in the confluent distribution especially surrounding the atria of both lateral  ventricles. Some lesions are oriented perpendicular to the corpus callosum and there are other scattered discontiguous subcortical lesions.   There are no white matter lesions in the posterior fossa. There is prominence of the sulci and ventricles  consistent with moderate global cerebral atrophy and chronic involutional changes. The midline structures are intact, the corpus callosum is within normal limits. The region of the pineal gland and the sella turcica are unremarkable. There are no space-occupying lesions in the posterior fossa. The basilar cisterns are patent. The craniocervical junction is unremarkable. The visualized portions of the orbits are within normal limits, the globes are intact. The visualized portions of the paranasal sinuses are within normal limits. The calvarium and soft tissues are unremarkable. Impression  1. There are no acute intracranial changes, there is no evidence of hemorrhage or acute ischemia. 2. There are extensive areas of T2/FLAIR signal abnormality in the periventricular and subcortical white matter with a confluent distribution. Some lesions are oriented perpendicular to the corpus callosum. The findings are nonspecific but may be  associated with a demyelinating process such as multiple sclerosis versus chronic microangiopathy, vasculitis or other demyelinating process. EXAMINATION: MRI BRAIN WO CONTRAST, MRI CERVICAL SPINE WO CONTRAST    CLINICAL HISTORY: R26.0 Ataxic gait ICD10    COMPARISONS: NONE AVAILABLE    TECHNIQUE:    Multiplanar multisequence MRI images of the cervical spine were obtained without contrast.    FINDINGS:        There is no acute fracture. There is preservation of the vertebral body heights. There is intervertebral disc desiccation and disc space narrowing at every level. The bone marrow signal is within normal limits. The cord is normal in course and caliber. There are areas of signal abnormality noted within the cord to the right side at C3-4, also on the right side at C4, and on the left at C5-6. These may represent foci of demyelination.     There is no prevertebral soft tissue swelling. Anterior soft tissues are unremarkable. The craniocervical junction is unremarkable. C2-3: There is a  indenting the anterior thecal sac but not abutting the cord with mild central canal narrowing. The facet joints are unremarkable. There is no narrowing of the neural foramina. C3-4:There is a less than 2 mm disc bulge flattening the anterior portion of the thecal sac without narrowing of the central canal.  The facet joints are unremarkable. There is no narrowing of the neural foramina. C4-5:There is a less than 2 mm disc bulge flattening the anterior portion of the thecal sac without narrowing of the central canal.  The facet joints are unremarkable. There is no narrowing of the neural foramina. C5-6:There is no disc herniation or central canal narrowing. The facet joints are unremarkable. There is no narrowing of the neural foramina. C6-7:There is no disc herniation or central canal narrowing. The facet joints are unremarkable. There is no narrowing of the neural foramina. C7-T1:There is no disc herniation or central canal narrowing. The facet joints are unremarkable. There is no narrowing of the neural foramina. IMPRESSION:  1. There is no acute fracture or subluxation. 2. The cord is normal in course and caliber. There are foci of signal abnormality most likely between C3 and C6 on both the right and left sides of the cord which may represent areas of demyelination. The findings may be secondary to edema and process  such as multiple sclerosis versus prior ischemia or other etiologies. 3. There is mild multilevel spondylosis including intervertebral disc space narrowing at every level and very degrees of disc bulges as described in greater detail in each level above. MRA of the Head and Neck: No results found for this or any previous visit. No results found for this or any previous visit.   No results found for this or any previous visit.                            CT of the Head: No results found for this or any previous visit. No results found for this or any previous visit. No results found for this or any previous visit. Carotid duplex: No results found for this or any previous visit. No results found for this or any previous visit. No results found for this or any previous visit. Echo No results found for this or any previous visit. Assessment/Plan:    6/29/2022:  Multiple  sclerosis with exacerbation. Patient with bilateral lower extremity paraparesis and left foot drop at baseline but came in with increasing lower extremity weakness. Treated with IV methylprednisolone with some improvement. Patient on Sidumula 30 outpatient  but this will be discontinued due to her elevated LFTs. She will be initiated on Ocrevus after follow-up with Dr. Savanah Horton with therapies. We will monitor her progress    7/1/2022:  Multiple sclerosis with bilateral lower extremity paraparesis and left foot drop. Continue with therapies  Patient had tachycardia and cardiology was consulted. She had echocardiogram done that showed moderate PFO with right to left shunt. She has been started on aspirin and Plavix per cardiology. Bilateral lower extremity venous duplex was negative. Cardiology to arrange for PFO closure. I have personally performed a face to face diagnostic evaluation on this patient, reviewed all data and investigations, and am the sole provider of all clinical decisions on the neurological status of this patient. And had a exacerbation and her EDSS scores have changed now. She is feeling to the right and requires more help. She has been on Sidumula 30 and had a relapse. She may need to be considered for Vick Sarath as now she is becoming primary progressive multiple sclerosis. 7/6/22:  Multiple sclerosis with bilateral lower extremity paraparesis and left foot drop. Continue with therapies.   Sidumula 30 stopped due to elevated LFTs. Repeat lfts. Plans for Lucia Petersen outpatient. Patient had tachycardia and cardiology was consulted. She had echocardiogram done that showed moderate PFO with right to left shunt. She has been started on aspirin and Plavix per cardiology. Cardiology to arrange for PFO closure.     Collaborating physicians: Dr Heron Grace    Electronically signed by JACKI Miguel CNP on 7/6/2022 at 1:30 PM

## 2022-07-06 NOTE — PROGRESS NOTES
OCCUPATIONAL THERAPY  INPATIENT REHAB TREATMENT NOTE  [facility]      NAME: Leila Butler  : 1980 (35 y.o.)  MRN: [id number]  CODE STATUS: Full Code  [facility]    Date of Service: 2022    Referring Physician: Dr. Romana Rao  Rehab Diagnosis: Impaired mobility and ADLs D/T Exacerabation of MS    Restrictions  Restrictions/Precautions  Restrictions/Precautions: Fall Risk     Patient's date of birth confirmed: Yes    SAFETY:  Safety Devices  Safety Devices in place: Yes  Type of devices: All fall risk precautions in place    SUBJECTIVE:  Subjective: \" I used to do this for my grandma. \"     Pain at start of treatment:  No 0/10    Pain at end of treatment:   No 0/10    COGNITION:  Orientation  Overall Orientation Status: Within Normal Limits  Cognition  Overall Cognitive Status: WFL    OBJECTIVE:    Instrumental ADL's  Instrumental ADLs: Yes  Health Management  Health Management Level of Assistance: Minimal assistance  Health Management:   Medication Management Simulation Activity:  Patient completed simulation activity utilizing 7 provided medication bottles with written instruction to place a total of 74 beads into the provided weekly medication organizer. Patient with MIN difficulty opening medication bottles. Patient with 0 difficulty opening organizer boxes. Patient placed a total of 78 beads into organizer with MIN verbal cues and a total of 67 being correct. Patient with MIN difficulty manipulating beads. Patient able to sort beads back into correct bottles with 0 errors. Patient able to securely close 6/7 caps on medication bottles. ASSESSMENT: Patient worked at a steady pace. Activity Tolerance: Patient tolerated treatment well      PLAN OF CARE:  Strengthening,Balance training,Functional mobility training,Neuromuscular re-education,Self-Care / Carlton Glover training,Safety education & training,Home management training     continue with POC    Patient goals :  \"My dad can't take care of me now, so I need to get better. He has congestive heart failure. \"  Time Frame for Long term goals : Within 2 weeks, Pt to demonstrate progress in the following areas listed below to achieve specific LTG's as stated in the initial evaluation. Long Term Goal 1: Increase independence in ADLs  Long Term Goal 2: Increaese endurance to complete clothes management  Long Term Goal 3: Increase static/dynamic balance  Long Term Goal 4:  Increase independence to perform kitchen mobility        Therapy Time:   Individual Group Co-Treat   Time In 1300       Time Out 1330         Minutes 30                   ADL/IADL trainin minutes     Electronically signed by:    JAMAL oGmez,   2022, 1:52 PM

## 2022-07-06 NOTE — CARE COORDINATION
Spoke with the patient's mother Lisseth Crowley and she will come in for family training on Friday the 8th at 1pm. The patient has been referred to the waiver program, but they do not have the staff to meet the patient's need at this time. The patient is interested in home-care on discharge. When freedom of choice offered the patient would like [facility].   Electronically signed by Bridger Dunn RN on 7/6/22 at 11:31 AM EDT

## 2022-07-06 NOTE — PROGRESS NOTES
was noted on telemetry to have a heart rate up to 146 bpm.    Cardiology was asked to see in consultation. Patient History and Records, EMR reviewed. Patient Interviewed and examined. Good historian. States that she is feeling well overall. Denies CP, SOB, LH, Dizziness, TIA or CVA Symptoms. No Orthopnea, Edema or CHF symptoms. No Palpitations. No Syncope. No Fever, Chills or Cold symptoms. No GI,  or Bleeding complaints. Cardiac and general ROS otherwise negative. [address] otherwise negative other than noted. Past Medical History:   Diagnosis Date    Dislocated knee     Right knee    Intellectual disability 2022    Lumbar radiculopathy 2020    MS (multiple sclerosis) ([facility] [address] 2022    PTTD (posterior tibial tendon dysfunction)        Past Surgical History:   Procedure Laterality Date    CATARACT REMOVAL WITH IMPLANT Right     CATARACT REMOVAL WITH IMPLANT Left      SECTION      EYE SURGERY      FOOT SURGERY Left        Prior to Admission medications    Medication Sig Start Date End Date Taking?  Authorizing Provider   mirabegron (MYRBETRIQ) 25 MG TB24 Take 1 tablet by mouth daily  Patient not taking: Reported on 2022   Allison Mann MD   KESIMPTA 20 MG/0.4ML [facility]  12/15/21   Historical Provider, MD   vitamin D 25 MCG (1000 UT) CAPS Take by mouth    Historical Provider, MD   MULTIPLE VITAMINS-MINERALS ER PO Take 1 tablet by mouth    Historical Provider, MD       Scheduled Meds:   oxybutynin  5 mg Oral Nightly    clopidogrel  75 mg Oral Daily    aspirin  81 mg Oral Daily    Vitamin D  2,000 Units Oral Dinner    cyanocobalamin  1,000 mcg IntraMUSCular Weekly    coenzyme Q10  100 mg Oral Daily    lidocaine  3 patch TransDERmal Daily    enoxaparin  40 mg SubCUTAneous Daily    multivitamin  1 tablet Oral Daily     Continuous Infusions:  PRN Meds:acetaminophen, bisacodyl, fleet    No Known Allergies    Social History Socioeconomic History    Marital status:      Spouse name: Not on file    Number of children: 1    Years of education: Not on file    Highest education level: Not on file   Occupational History    Not on file   Tobacco Use    Smoking status: Never Smoker    Smokeless tobacco: Never Used   Substance and Sexual Activity    Alcohol use: No    Drug use: No    Sexual activity: Not Currently   Other Topics Concern    Not on file   Social History Narrative    Lives With: her parents (mom Ceferino Saldivar) father is sick  and her Son ([facility])    Type of Home: Apartment in [facility] [address]    Home Layout: One level    Home Access: Stairs to enter with rails - Number of Steps: 1    Bathroom Shower/Tub: Tub/Shower unit, Equipment: Shower chair,Hand-held shower    Home Equipment: Albino Hawks, rolling,Wheelchair-manual    Has the patient had two or more falls in the past year or any fall with injury in the past year?: Yes (one - fell on mothers floor and couldn't get up)    ADL Assistance: Independent    Homemaking Assistance: Independent    Homemaking Responsibilities: Yes    Ambulation Assistance: Independent    Transfer Assistance: Independent    Active : No    Patient's  Info: mother    She is on disability from her MS-Had been a [facility] prior to her MS. Was in special classes in school. Pending Waiver services for Aide services. Social Determinants of Health     Financial Resource Strain:     Difficulty of Paying Living Expenses: Not on file   Food Insecurity:     Worried About Running Out of Food in the Last Year: Not on file    Gege of Food in the Last Year: Not on file   Transportation Needs:     Lack of Transportation (Medical): Not on file    Lack of Transportation (Non-Medical):  Not on file   Physical Activity:     Days of Exercise per Week: Not on file    Minutes of Exercise per Session: Not on file   Stress:     Feeling of Stress : Not on file Social Connections:     Frequency of Communication with Friends and Family: Not on file    Frequency of Social Gatherings with Friends and Family: Not on file    Attends Christianity Services: Not on file    Active Member of Clubs or Organizations: Not on file    Attends Club or Organization Meetings: Not on file    Marital Status: Not on file   Intimate Partner Violence:     Fear of Current or Ex-Partner: Not on file    Emotionally Abused: Not on file    Physically Abused: Not on file    Sexually Abused: Not on file   Housing Stability:     Unable to Pay for Housing in the Last Year: Not on file    Number of [place] in the Last Year: Not on file    Unstable Housing in the Last Year: Not on file       Family History   Problem Relation Age of Onset    Hypertension Mother     Diabetes Maternal Uncle     Cancer Maternal Grandmother         stomach cancer       Review Of Systems:    14 point ROS negative other than mentioned. Physical Exam:    CURRENT VITALS: BP (!) 102/52   Pulse 82   Temp 98.4 °F (36.9 °C)   Resp 16   Ht 5' 1\" (1.549 m)   Wt 168 lb 3.2 oz (76.3 kg)   SpO2 100%   BMI 31.78 kg/m²     CONSTITUTIONAL:  awake, alert, cooperative, no apparent distress,   ENT:  Normocephalic, without obvious abnormality, atraumatic, sinuses nontender on palpation, external ears without lesions,  NECK:  Supple, symmetrical, trachea midline, no adenopathy, thyroid symmetric, not enlarged and no tenderness, skin normal, No bruits. LUNGS:  No increased work of breathing, good air exchange, clear to auscultation bilaterally, no crackles, no wheezing  CARDIOVASCULAR:  Normal apical impulse, regular rate and rhythm, normal S1 and S2,  1/6 Systolic murmur noted. ABDOMEN:  Obese, normal bowel sounds, soft, non-distended, non-tender, no masses palpated, no hepatosplenomegally  EXTREMETIES: No edema, Pulses Strong Thruout. No ulcers. NEUROLOGIC:  Awake, alert, oriented to name, place and time. Following all commands and moving all extremties.   SKIN:  no bruising or bleeding, normal skin color, texture, turgor and no rashes    Labs:  Recent Results (from the past 24 hour(s))   Urinalysis with Reflex to Culture    Collection Time: 07/05/22  2:39 PM    Specimen: Urine   Result Value Ref Range    Color, UA Yellow Straw/Yellow    Clarity, UA CLOUDY (A) Clear    Glucose, Ur Negative Negative mg/dL    Bilirubin Urine Negative Negative    Ketones, Urine TRACE (A) Negative mg/dL    Specific Gravity, UA 1.020 1.005 - 1.030    Blood, Urine TRACE (A) Negative    pH, UA 5.0 5.0 - 9.0    Protein, UA Negative Negative mg/dL    Urobilinogen, Urine 0.2 <2.0 E.U./dL    Nitrite, Urine Negative Negative    Leukocyte Esterase, Urine SMALL (A) Negative    Urine Reflex to Culture Yes    Microscopic Urinalysis    Collection Time: 07/05/22  2:39 PM   Result Value Ref Range    Hyaline Casts, UA 0-1 0 - 5 /LPF    RBC, UA 3-5 (A) 0 - 2 /HPF    Bacteria, UA FEW (A) Negative /HPF    Hyaline Casts, UA 10-20 0 - 5 /HPF    WBC, UA 10-20 (A) 0 - 5 /HPF    Epithelial Cells, UA 20-50 0 - 5 /HPF   Basic Metabolic Panel w/ Reflex to MG    Collection Time: 07/06/22  4:23 AM   Result Value Ref Range    Sodium 141 135 - 144 mEq/L    Potassium reflex Magnesium 4.2 3.4 - 4.9 mEq/L    Chloride 106 95 - 107 mEq/L    CO2 26 20 - 31 mEq/L    Anion Gap 9 9 - 15 mEq/L    Glucose 97 70 - 99 mg/dL    BUN 17 6 - 20 mg/dL    CREATININE 0.63 0.50 - 0.90 mg/dL    GFR Non-African American >60.0 >60    GFR  >60.0 >60    Calcium 8.8 8.5 - 9.9 mg/dL   CBC with Auto Differential    Collection Time: 07/06/22  4:24 AM   Result Value Ref Range    WBC 6.3 4.8 - 10.8 K/uL    RBC 3.14 (L) 4.20 - 5.40 M/uL    Hemoglobin 8.8 (L) 12.0 - 16.0 g/dL    Hematocrit 26.9 (L) 37.0 - 47.0 %    MCV 85.5 82.0 - 100.0 fL    MCH 28.0 27.0 - 31.3 pg    MCHC 32.8 (L) 33.0 - 37.0 %    RDW 17.9 (H) 11.5 - 14.5 %    Platelets 461 197 - 927 K/uL    Neutrophils % 69.5 % Lymphocytes % 17.4 %    Monocytes % 9.8 %    Eosinophils % 2.8 %    Basophils % 0.5 %    Neutrophils Absolute 4.4 1.4 - 6.5 K/uL    Lymphocytes Absolute 1.1 1.0 - 4.8 K/uL    Monocytes Absolute 0.6 0.2 - 0.8 K/uL    Eosinophils Absolute 0.2 0.0 - 0.7 K/uL    Basophils Absolute 0.0 0.0 - 0.2 K/uL       ECG:     SR, RBBB, NSSTs    TELEMETRY:  Sinus rhythm     ECHO:    LVEF 65%  Moderate PFO, with R>L shunts.         Ghazal Thayer MD  [facility] Cardiologist          -----

## 2022-07-07 LAB
ALBUMIN SERPL-MCNC: 3.2 G/DL (ref 3.5–4.6)
ALP BLD-CCNC: 61 U/L (ref 40–130)
ALT SERPL-CCNC: 42 U/L (ref 0–33)
ANION GAP SERPL CALCULATED.3IONS-SCNC: 12 MEQ/L (ref 9–15)
AST SERPL-CCNC: 29 U/L (ref 0–35)
BASOPHILS ABSOLUTE: 0 K/UL (ref 0–0.2)
BASOPHILS RELATIVE PERCENT: 0.8 %
BILIRUB SERPL-MCNC: <0.2 MG/DL (ref 0.2–0.7)
BILIRUBIN DIRECT: <0.2 MG/DL (ref 0–0.4)
BILIRUBIN, INDIRECT: ABNORMAL MG/DL (ref 0–0.6)
BUN BLDV-MCNC: 21 MG/DL (ref 6–20)
CALCIUM SERPL-MCNC: 8.7 MG/DL (ref 8.5–9.9)
CHLORIDE BLD-SCNC: 104 MEQ/L (ref 95–107)
CO2: 21 MEQ/L (ref 20–31)
CREAT SERPL-MCNC: 0.54 MG/DL (ref 0.5–0.9)
EOSINOPHILS ABSOLUTE: 0.2 K/UL (ref 0–0.7)
EOSINOPHILS RELATIVE PERCENT: 3.1 %
GFR AFRICAN AMERICAN: >60
GFR NON-AFRICAN AMERICAN: >60
GLUCOSE BLD-MCNC: 92 MG/DL (ref 70–99)
HCT VFR BLD CALC: 29.4 % (ref 37–47)
HEMOGLOBIN: 9.4 G/DL (ref 12–16)
LYMPHOCYTES ABSOLUTE: 0.9 K/UL (ref 1–4.8)
LYMPHOCYTES RELATIVE PERCENT: 14 %
MCH RBC QN AUTO: 28.2 PG (ref 27–31.3)
MCHC RBC AUTO-ENTMCNC: 32.1 % (ref 33–37)
MCV RBC AUTO: 88 FL (ref 82–100)
MONOCYTES ABSOLUTE: 0.6 K/UL (ref 0.2–0.8)
MONOCYTES RELATIVE PERCENT: 8.7 %
NEUTROPHILS ABSOLUTE: 4.8 K/UL (ref 1.4–6.5)
NEUTROPHILS RELATIVE PERCENT: 73.4 %
ORGANISM: ABNORMAL
PDW BLD-RTO: 18.3 % (ref 11.5–14.5)
PLATELET # BLD: 238 K/UL (ref 130–400)
POTASSIUM REFLEX MAGNESIUM: 4.6 MEQ/L (ref 3.4–4.9)
RBC # BLD: 3.34 M/UL (ref 4.2–5.4)
SODIUM BLD-SCNC: 137 MEQ/L (ref 135–144)
TOTAL PROTEIN: 6.3 G/DL (ref 6.3–8)
URINE CULTURE, ROUTINE: ABNORMAL
URINE CULTURE, ROUTINE: ABNORMAL
WBC # BLD: 6.5 K/UL (ref 4.8–10.8)

## 2022-07-07 PROCEDURE — 97110 THERAPEUTIC EXERCISES: CPT

## 2022-07-07 PROCEDURE — 80048 BASIC METABOLIC PNL TOTAL CA: CPT

## 2022-07-07 PROCEDURE — 36415 COLL VENOUS BLD VENIPUNCTURE: CPT

## 2022-07-07 PROCEDURE — 85025 COMPLETE CBC W/AUTO DIFF WBC: CPT

## 2022-07-07 PROCEDURE — 97535 SELF CARE MNGMENT TRAINING: CPT

## 2022-07-07 PROCEDURE — 6360000002 HC RX W HCPCS: Performed by: INTERNAL MEDICINE

## 2022-07-07 PROCEDURE — 80076 HEPATIC FUNCTION PANEL: CPT

## 2022-07-07 PROCEDURE — 97530 THERAPEUTIC ACTIVITIES: CPT

## 2022-07-07 PROCEDURE — 6370000000 HC RX 637 (ALT 250 FOR IP): Performed by: PHYSICAL MEDICINE & REHABILITATION

## 2022-07-07 PROCEDURE — 97116 GAIT TRAINING THERAPY: CPT

## 2022-07-07 PROCEDURE — 1180000000 HC REHAB R&B

## 2022-07-07 PROCEDURE — 97112 NEUROMUSCULAR REEDUCATION: CPT

## 2022-07-07 PROCEDURE — 6370000000 HC RX 637 (ALT 250 FOR IP): Performed by: INTERNAL MEDICINE

## 2022-07-07 PROCEDURE — 97130 THER IVNTJ EA ADDL 15 MIN: CPT

## 2022-07-07 PROCEDURE — 97129 THER IVNTJ 1ST 15 MIN: CPT

## 2022-07-07 PROCEDURE — 99233 SBSQ HOSP IP/OBS HIGH 50: CPT | Performed by: PHYSICAL MEDICINE & REHABILITATION

## 2022-07-07 RX ORDER — MULTIVITAMIN WITH IRON
1 TABLET ORAL
Status: DISCONTINUED | OUTPATIENT
Start: 2022-07-08 | End: 2022-07-12 | Stop reason: HOSPADM

## 2022-07-07 RX ADMIN — OXYBUTYNIN CHLORIDE 5 MG: 5 TABLET, EXTENDED RELEASE ORAL at 20:41

## 2022-07-07 RX ADMIN — ASPIRIN 81 MG: 81 TABLET, COATED ORAL at 10:03

## 2022-07-07 RX ADMIN — CLOPIDOGREL BISULFATE 75 MG: 75 TABLET ORAL at 10:03

## 2022-07-07 RX ADMIN — Medication 100 MG: at 10:03

## 2022-07-07 RX ADMIN — ENOXAPARIN SODIUM 40 MG: 100 INJECTION SUBCUTANEOUS at 10:04

## 2022-07-07 RX ADMIN — Medication 2000 UNITS: at 16:06

## 2022-07-07 RX ADMIN — THERA TABS 1 TABLET: TAB at 10:04

## 2022-07-07 ASSESSMENT — PAIN SCALES - GENERAL
PAINLEVEL_OUTOF10: 0
PAINLEVEL_OUTOF10: 0

## 2022-07-07 NOTE — PROGRESS NOTES
INDIVIDUALIZED OVERALL REHAB PLAN OF CARE  ADDENDUM TO REHAB PROGRESS NOTE-for audit purposes must also refer to this day's clinical note and combine the information      Date: 2022  Patient Name: Carlene Jimenez   Room: D670/P514-66    MRN: [id number]    : 1980  (42 y.o.)  Gender: female       Today 2022 during weekly team meeting, I reviewed the patient Carlene Jimenez in detail with the therapists and nurses involved in patient's care gathering complex physiatric data regarding current medical issues, progress in therapies, factors limiting progress, social issues, psychological issues, ongoing therapeutic plans and discharge planning. Legend:  I= independent Im =Modified independent  S=Supervised SB=stand by BARRERA=set up CG=contact betina Min= minimal Mod=Moderate Max=maximal Max of 2 =maximal assist of 2 people      CURRENT FUNCTIONAL STATUS:    Barriers to progress and discharge complex medical conditions, complex social situations and bowel/bladder dysfunction      NURSING ISSUES:    Family Tx is set up--we are trying to get her waiver services--they don't have staffing. Wants a tub bench at home. Patient is from home with her mother; however, the patient plans on going to her apartment at discharge with her son (10yo). She says there are less stairs into her apartment. The patient states that her mother is nearby and can help. Her mother helps with her son and with shopping. 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 The patient stated her dad used to do her housework, but is no longer able to because he is not able, and now she wants an aid through [address] to help her at home. The patient is also interested in [facility] at discharge. The patient has a wheelchair and a walker at home, and she has a shower chair. The patient gets her medication from [facility] on [address] in [place]. She has not had any problem affording her medication.   Nursing will continue to focus on bowel and bladder continence transitioning toward independence by time of discharge. Monitoring post void residuals monitoring for severe constipation and bowel obstruction. Bowel function- incontinent   But now continent Plans to address- scheduled voids     Bladder function-  incontinent    Plans to address- schedule voids before bed and therapy     Skin deficits- dressing changes   Plans to address- pressure relief     Hydration/Nutritional deficits- monitoring for dysphagia  Plans to address-Push PO, assist with feeds as needed     high HR--Low BP- poor PO intake  Plans to address- check ortho BPs before therapies-and use abdominal binder and TEDs as needed    Pt and Family training goals-  teach home tecnology options like Rosalie use and home assistive devices      Focus on achieving ADL goals with co-treating with OT when possible. Focus on cognition and co treat with SLP when possible. PHYSICAL THERAPY  Bed mobility:  Supine to Sit: Minimal assistance (06/28/22 1143)  Sit to Supine: Stand by assistance (07/04/22 0998)  Transfers:  Sit to Stand: Stand by assistance (07/04/22 0934)  Bed to Chair: Stand by assistance (07/04/22 0934)  Gait:   Device: Rolling Walker (07/04/22 1405)  Other Apparatus: Wheelchair follow (06/28/22 1149)  Assistance: Contact guard assistance (07/04/22 1405)  Distance: 44ft x 2 (07/04/22 1405)  Quality of Gait: difficulty with RLE foot clearance, FF posture, downward gaze, short BLE step length.  Step to pattern<> occ step strough (07/04/22 1405)  Comments: decreased stride and step length with fatigue after  2nd Gait (07/04/22 1405)  Stairs:  Curbs: 2\" (07/04/22 0906)  Assistance: Stand by assistance;Contact guard assistance (07/04/22 0906)  Comment: Vcs to not whip WW out when placing from curb to floor (07/04/22 0906)  W/C mobility:           Pt and Family training goals-  Focus on sequencing and endurance        OCCUPATIONAL THERAPY  Hand Dominance: Right  ADL  Feeding: Modified independent  (06/28/22 1049)  Grooming: Setup (06/29/22 1341)  Grooming Skilled Clinical Factors: for oral care and grooming (06/29/22 1341)  UE Bathing: Stand by assistance; Increased time to complete (06/29/22 1341)  LE Bathing: Dependent/Total (06/29/22 1341)  LE Bathing Skilled Clinical Factors: +2 assistance. Pt able to bend down in order to wash legs with SBA for safe sitting. +2 to wash posterior lillie area. 1 person for washing 1 person to maintain standing balance (06/29/22 1341)  UE Dressing: Stand by assistance (06/29/22 1341)  LE Dressing: Increased time to complete;Maximum assistance (06/29/22 1341)  LE Dressing Skilled Clinical Factors: to don pants and depends (06/29/22 1341)  Toileting: Dependent/Total (06/29/22 1341)  Toileting Skilled Clinical Factors: Pt incontinent of feces and urine (06/29/22 1341)  Additional Comments: Pt displaying lack of endurance and fatigue throughout session, requiring increased assistance throughout progression of evaluation (06/28/22 1049)  Toilet Transfers  Toilet - Technique: Stand pivot (06/29/22 1339)  Equipment Used: Standard toilet (06/29/22 1339)  Toilet Transfer: Minimal assistance (verbal cues for hand placement) (06/29/22 1339)  Toilet Transfers Comments: Twards end of session, initially Min A for transfers (06/28/22 1054)     [address] - Transfer Type: To and From (06/28/22 1054)  Shower - Transfer To: Shower seat with back (06/28/22 1054)  Shower - Technique: Stand pivot (06/28/22 1054)  Shower Transfers: Moderate assistance (06/28/22 1054)  Shower Transfers Comments: Patient did not complete shower transfer as patient washed up at sink in wheelchair. (06/29/22 1339)      Plans for addressing ADL deficits affecting: Focus on sequencing.         Bladder function disrupting functional progress- PureWik      Plans to address- wean off use of PureWik     Skin deficits- skin tears   Plans to address- coordinate patient dressing changes education with nursing as part of ADL training                SPEECH THERAPY/SLP     Comprehension: Within Functional Limits (Patient answered simple yes/no questions, basic questions and participated in conversation with good comprehension. no repetition was required)  Verbal Expression:  [facility] with meeting wants and needs.  No word finding deficits have been noted during conversation)    Plans for cognitive and communication issues affecting addressing:   Pt and Family training goals-  teach patient family memory strategies      Diet/Swallow:        Dysphagia Outcome Severity Scale: Level 7: Normal in all situations    Compensatory Swallowing Strategies : Upright as possible for all oral intake             COGNITION  OT: Cognition Comment: cues needed to problem solve and motor plan BUE exercises using weighted dowel  SP:        Social History     Socioeconomic History    Marital status:      Spouse name: Not on file    Number of children: 1    Years of education: Not on file    Highest education level: Not on file   Occupational History    Not on file   Tobacco Use    Smoking status: Never Smoker    Smokeless tobacco: Never Used   Substance and Sexual Activity    Alcohol use: No    Drug use: No    Sexual activity: Not Currently   Other Topics Concern    Not on file   Social History Narrative    Lives With: her parents (mom Mayank Mendoza) father is sick  and her Son (5 y.o)    Type of Home: Apartment in [facility] [address]    Home Layout: One level    Home Access: Stairs to enter with rails - Number of Steps: 1    Bathroom Shower/Tub: Tub/Shower unit, Equipment: Shower chair,Hand-held shower    Home Equipment: judd Agarwal,Wheelchair-manual    Has the patient had two or more falls in the past year or any fall with injury in the past year?: Yes (one - fell on mothers floor and couldn't get up)    ADL Assistance: Independent    Homemaking Assistance: Independent    Homemaking Responsibilities: Yes Ambulation Assistance: Independent    Transfer Assistance: Independent    Active : No    Patient's  Info: mother    She is on disability from her MS-Had been a [place] prior to her MS. Was in special classes in school. Pending Waiver services for Aide services. Social Determinants of Health     Financial Resource Strain:     Difficulty of Paying Living Expenses: Not on file   Food Insecurity:     Worried About Running Out of Food in the Last Year: Not on file    Gege of Food in the Last Year: Not on file   Transportation Needs:     Lack of Transportation (Medical): Not on file    Lack of Transportation (Non-Medical):  Not on file   Physical Activity:     Days of Exercise per Week: Not on file    Minutes of Exercise per Session: Not on file   Stress:     Feeling of Stress : Not on file   Social Connections:     Frequency of Communication with Friends and Family: Not on file    Frequency of Social Gatherings with Friends and Family: Not on file    Attends Mormonism Services: Not on file    Active Member of [address] [place] [address] or Organizations: Not on file    Attends Club or Organization Meetings: Not on file    Marital Status: Not on file   Intimate Partner Violence:     Fear of Current or Ex-Partner: Not on file    Emotionally Abused: Not on file    Physically Abused: Not on file    Sexually Abused: Not on file   Housing Stability:     Unable to Pay for Housing in the Last Year: Not on file    Number of [place] in the Last Year: Not on file    Unstable Housing in the Last Year: Not on file           THERAPY, MEDICAL AND NURSING COORDINATION:    [x]  Pain medication before therapies     [x]  Check orthostatic BP and monitor heart rate and medications effects with therapy      [x]  Ambulate to the bathroom in room    [x]  Add scheduled rest beaks     [x]  In room therapies as needed      Discharge date set for:               7/12/22      Home with:   Parents and 6 yo son  with help from   Mom and dad (he has been ill)          And:     [address]:     [x]  PT    [x]  OT    [x]  Aide   []  SW    [x]  RN                           Equipment:  [employer], tub bench, [employer]      At D/C their function is goaled at:   PT:Long term goal 1: Pt to complete bed mobility with indep  Long term goal 2: Pt to complete functional transfers bed/chair/car with indep  Long term goal 3: Pt to ambulate 50-150ft with LRD and indep  Long term goal 4: Pt to tolerate 5min standing activities indep  Long term goal 5: Pt to manage 2\" curb step indep  OT:Eating  Assistance Needed: Independent  CARE Score: 6  Discharge Goal: Independent, Oral Hygiene  Assistance Needed: Setup or clean-up assistance  CARE Score: 5  Discharge Goal: Independent, [address] needed: Dependent  CARE Score: 1  Discharge Goal: Supervision or touching assistance, Shower/Bathe Self  Assistance Needed: Substantial/maximal assistance  CARE Score: 2  Discharge Goal: Supervision or touching assistance  Upper Body Dressing  Assistance Needed: Partial/moderate assistance  CARE Score: 3  Discharge Goal: Independent, Lower Body Dressing  Assistance Needed: Substantial/maximal assistance  CARE Score: 2  Discharge Goal: Supervision or touching assistance, Putting On/Taking Off Footwear  Assistance Needed: Dependent  CARE Score: 1  Discharge Goal: Supervision or touching assistance, Toilet Transfer  Assistance needed: Substantial/maximal assistance  CARE Score: 2  Discharge Goal: Supervision or touching assistance  SP:Long-term Goals  Timeframe for Long-term Goals: 2-3 weeks  Goal 1: Pt will improve his Cognition from mod assist to supervised to mod I level for adequate functional recall and safety awareness for (home, community).   Long-term Goals  Timeframe for Long-term Goals: N/A           From a cognitive standpoint they will need:        24 hr supervision  --progress to occasional           Significant problems/ barriers to functional progress include: Pt is at a high risk for functional loss,      [x]   SP infection/UTI        [x]  poor endurance-anemia improving    [x]  Severe pain exacerbation     [x]  Impaired mental status    [x]  Urinary incontinence    []  Bowel incontinence           Plan to correct barriers to functional progress: Add scheduled rest breaks, CoQ 10 and Vit B 12, control pain by using ice Lidoderm rest and massage as well as pain medications prior to therapy. Spread therapy of 15 hours out over a 7 day window to accommodate rest breaks and medical interventions. Patient seems to be making fair to good response to these interventions. Based on a comprehensive evaluation of the above, the individualized therapy and Discharge plan will be:    -Times stated are an average that will be varied based on the patient's daily need. PT    1 1/2  hrs/day 5-7 days per week      OT    1 1/2 hrs per day 5-7 days per week     ST     1/2    hrs /day 3-5 days per week       Estimated LOS   2 week(s)    - Overall functional prognosis:     [x]  Good to   [x]  Fair    []  Poor -Medical Prognosis:   [x]  Good    []  Fair    []  Poor    This patient was made aware of the discussion of Plan of Care, their projected dicharge date and their projected function at discharge.          Rizwan Fragoso DO

## 2022-07-07 NOTE — PROGRESS NOTES
Physical Therapy Rehab Treatment Note  Facility/Department: [facility]  Room: R255/R255-01       NAME: Chandu Andres  : 1980 (29 y.o.)  MRN: [id number]  CODE STATUS: Full Code    Date of Service: 2022       Restrictions:  Restrictions/Precautions: Fall Risk       SUBJECTIVE:   Subjective: \"I slept pretty good. \"    Pain  Pain: Pt reports no pain      OBJECTIVE:     Bed Mobility  Additional Factors: Without handrails; Head of bed flat  Roll Left  Assistance Level: Supervision  Roll Right  Assistance Level: Supervision  Sit to Supine  Assistance Level: Supervision  Supine to Sit  Assistance Level: Supervision  Scooting  Assistance Level: Supervision    Transfers  Surface: Wheelchair;From bed  Additional Factors: Verbal cues  Device: Walker  Sit to Stand  Assistance Level: Supervision  Skilled Clinical Factors: good technique  Stand to Sit  Assistance Level: Supervision  Skilled Clinical Factors: maintains one UE back prior to sitting throughout tx  Bed To/From Chair  Technique: Stand pivot  Assistance Level: Stand by assist  Skilled Clinical Factors: no cues for hand placement or technique required this session, turns appropriately for best quality  Stand Pivot  Assistance Level: Supervision  Skilled Clinical Factors: good technique and sequencing with no cues from this PTA    Ambulation  Surface: Carpet  Device: Rolling walker  Distance: 61' two turns.  12' x 2  Assistance Level: Stand by assist;Supervision  Gait Deviations: Decreased step length bilateral;Slow leonardo;Decreased heel strike right;Decreased heel strike left;Narrow base of support  Skilled Clinical Factors: improved awareness of kaz foot clearance, intermittent reciprocal pattern, mild pelvic instability    Curb  Curb Height: 2''  Device: Rolling walker  Number of Curbs: 2  Additional Factors: Verbal cues  Assistance Level: Supervision  Skilled Clinical Factors: vc's for improving quality and safety, no LOB or instability    Neuromuscular Education  Facilitation techniques: TUG test with [employer] SBA 61 seconds    PT Exercises  Exercise Treatment: 5x STS in 29 seconds  A/AROM Exercises: Standing: marching, heel raises (limited ROM), mini squats x10 Supine: hip abd RTB, hooklying marching with abdominal iso x10 reps x2 sets, bridges x5, trunk rotation with PB in hooklying x5 reps x2 sets  Dynamic Sitting Balance Exercises: seated EOB trunk rotation with PB L/R, flx/ext EOB with PB to initiate core activation x10 reps ea x2 sets  Static Standing Balance Exercises: multiple trials static standing no UE support, pt able to maintain without UE support x1 min       ASSESSMENT/PROGRESS TOWARDS GOALS:   Assessment  Assessment: Continue to work toward standing 5 min goal this session. Pt able to maintain standing x3 min before requiring RB. Pt continues to progress toward bed mobility, trsf, gait and curb step goal. Ther ex emphasis on hip/core strength this session. Goals:  Long Term Goals  Long term goal 1: Pt to complete bed mobility with indep  Long term goal 2: Pt to complete functional transfers bed/chair/car with indep  Long term goal 3: Pt to ambulate 50-150ft with LRD and indep  Long term goal 4: Pt to tolerate 5min standing activities indep  Long term goal 5: Pt to manage 2\" curb step indep  Additional Goals?: Yes  Long term goal 6: Pt to complete HEP with indep    PLAN OF CARE/Safety:   Plan Comment: Cont.  POC      Therapy Time:   Individual   Time In 0900   Time Out 1000   Minutes 60     Minutes:  Transfer/Bed mobility trainin  Gait trainin  Neuro re education: 5  Therapeutic ex: Denilson Pollard PTA, 22 at 11:18 AM

## 2022-07-07 NOTE — PROGRESS NOTES
Physical Therapy Rehab Treatment Note  Facility/Department: [facility]  [facility]       NAME: Rafael Turcios  : 1980 (46 y.o.)  MRN: [id number]  CODE STATUS: Full Code    Date of Service: 2022       Restrictions:  Restrictions/Precautions: Fall Risk       SUBJECTIVE:   Subjective: \"I'm doing good. \"    Pain  Pain: Pt reports no pain      OBJECTIVE:     Bed Mobility  Additional Factors: Without handrails; Head of bed flat (mat table)  Roll Left  Assistance Level: Supervision  Roll Right  Assistance Level: Supervision  Sit to Supine  Assistance Level: Supervision  Supine to Sit  Assistance Level: Supervision  Scooting  Assistance Level: Supervision    Transfers  Surface: Wheelchair; To mat;From mat  Additional Factors: Verbal cues  Device: Walker  Sit to Stand  Assistance Level: Supervision  Skilled Clinical Factors: good technique  Stand to Sit  Assistance Level: Supervision  Skilled Clinical Factors: maintains one UE back prior to sitting throughout tx      Ambulation  Surface: Carpet; Uneven surface; Level surface  Device: Rolling walker  Distance: 61' two turns, 10' x 2  Assistance Level: Stand by assist;Supervision  Gait Deviations: Decreased step length bilateral;Slow leonardo;Decreased heel strike right;Decreased heel strike left;Narrow base of support  Skilled Clinical Factors: improved awareness of kaz foot clearance, intermittent reciprocal pattern, mild pelvic instability, good stability with changes in surface    PT Exercises  Exercise Treatment: bridges x5 reps x 2 sets, hooklying marches x10, SLR BLE x5 reps x 2 sets ea  A/AROM Exercises: hip series x10 BLE pt requires assist with maintaining proper technique throughout        ASSESSMENT/PROGRESS TOWARDS GOALS:   Assessment  Assessment: Good progression toward ambulation goal this session. Pt able to perform surface change and over uneven threshold with no instability or LOB.  Supine ther ex utilized to continue to improve hip/core strengthening. Goals:  Long Term Goals  Long term goal 1: Pt to complete bed mobility with indep  Long term goal 2: Pt to complete functional transfers bed/chair/car with indep  Long term goal 3: Pt to ambulate 50-150ft with LRD and indep  Long term goal 4: Pt to tolerate 5min standing activities indep  Long term goal 5: Pt to manage 2\" curb step indep  Additional Goals?: Yes  Long term goal 6: Pt to complete HEP with indep    PLAN OF CARE/Safety:   Plan Comment: Cont.  POC      Therapy Time:   Individual   Time In [address] [place] [address]   Time Out 1555   Minutes 30     Minutes:  Transfer/Bed mobility trainin  Gait training: 10  Neuro re education: 0  Therapeutic ex: 12      Luther Marie PTA, 22 at 3:59 PM

## 2022-07-07 NOTE — PROGRESS NOTES
RN agrees w/LPN assessment. Pt is here R/T MS, pain and for rehab. Pt has been working w/therapy throughout the day. I have been working w/the LPN and monitoring the pt throughout the shift.  Electronically signed by Elia Ho RN on 7/7/2022 at 5:06 PM

## 2022-07-07 NOTE — PROGRESS NOTES
Nutrition Assessment     Type and Reason for Visit: Reassess    Nutrition Recommendations/Plan:   Continue Current Diet     Malnutrition Assessment:  Malnutrition Status: No malnutrition    Nutrition Assessment:  Pt remains stable from a nutritional standpoint, with verbalized continued good appetite, and noted good intake at meals, with no nutritional complaints. Pt remains agreeable to a Carb 3 Control to promote weight control and JOHN diet. Nutrition Related Findings:   PMH-MS. Labs/meds reviewed. +2 LUE and non-pitting BLE edema noted. Last BM noted 7/4. Wound Type: None    Current Nutrition Therapies:    ADULT DIET;  Regular; 3 carb choices (45 gm/meal)    Anthropometric Measures:  · Height: 5' 1\" (154.9 cm)  · Current Body Wt: 168 lb (76.2 kg) (7/6)   · BMI: 31.8    Nutrition Diagnosis:   · Overweight/Obese related to excessive energy intake (limited physical activity) as evidenced by BMI    Nutrition Interventions:   Food and/or Nutrient Delivery: Continue Current Diet  Nutrition Education/Counseling: No recommendation at this time  Coordination of Nutrition Care: Continue to monitor while inpatient       Goals:  Previous Goal Met: Goal(s) Achieved  Goals: PO intake 75% or greater (weight control)       Nutrition Monitoring and Evaluation:      Food/Nutrient Intake Outcomes: Food and Nutrient Intake  Physical Signs/Symptoms Outcomes: [address] Praneeth Quinonez, RD, LD

## 2022-07-07 NOTE — PROGRESS NOTES
[facility] Occupational Therapy      Date: 2022  Patient Name: Leila Butler        MRN: [id number]  Account: [de-identified]   : 1980  (35 y.o.)  Room: [place] [address]    Chart reviewed, attempted OT at 11:30 for 30 minuutes. Patient not seen 2° to:    Pt. declined, stating: I just got my lunch and I don't want to reheat it. Spoke to [employer]. Will attempt again when able.     Electronically signed by JAMAL Marie on 2022 at 12:34 PM

## 2022-07-07 NOTE — PROGRESS NOTES
[facility]  INPATIENT REHABILITATION  UPDATE NOTE  Room: R255/R255-01  Admit Date: 2022       Date: 2022  Patient Name: Sherri Thorpe        MRN: [id number]    : 1980  (35 y.o.)  Gender: female        Anticipated Discharge Plan: Patient plans to discharge  home with 5year old son. Patient is being followed by Neurology, Sarah Pap, Cardiology, PT, OT, ST, RN, LSW, and RN Case Manager. We believe that patient would benefit from a continued stay in order to maximize overall function and to ensure a safe discharge. Family training is scheduled for . Barriers to discharge: stoop to enter apartment, fatigues quickly, OT does not have pt goaled to be independent at this time, pt cannot live w/ mother dt stairs in the home, pt's mother cannot live with pt dt apartment being only one bedroom. Referral was made for waiver services on 22 via the [address] on [address].     REHAB DIAGNOSIS:   Diagnosis: Impaired mobility and activities of daily living dt exac of MS    CO MORBIDITIES:      Past Medical History:   Diagnosis Date    Dislocated knee     Right knee    Intellectual disability 2022    Lumbar radiculopathy 2020    MS (multiple sclerosis) ([facility] [address] 2022    PTTD (posterior tibial tendon dysfunction)      Past Surgical History:   Procedure Laterality Date    CATARACT REMOVAL WITH IMPLANT Right     CATARACT REMOVAL WITH IMPLANT Left      SECTION  2013    EYE SURGERY      FOOT SURGERY Left         Last set of vitals:  Vital Signs  Temp: 98.2 °F (36.8 °C)  Temp Source: Oral  Heart Rate: 98  Heart Rate Source: Monitor  Resp: 16  BP: 116/70  BP Location: Left upper arm  BP Method: Automatic  MAP (Calculated): 85.33  Patient Position: Sitting  Level of Consciousness: Alert (0)  MEWS Score: 1    Results/Findings:   Labs:   Recent Labs     22  0432 22  0424 22  06   WBC 6.1 6.3 6.5   HGB 8. 7* 8.8* 9.4*   HCT 27.1* 26.9* 29.4*    246 238     Recent Labs     07/05/22  0432 07/06/22  0423 07/07/22  0622    141 137   K 4.1 4.2 4.6   * 106 104   CO2 26 26 21   BUN 17 17 21*   CREATININE 0.66 0.63 0.54   CALCIUM 8.5 8.8 8.7     Recent Labs     07/07/22  0622   AST 29   ALT 42*   BILIDIR <0.2   BILITOT <0.2   ALKPHOS 61     No results for input(s): INR in the last 72 hours. No results for input(s): Rhae Childes in the last 72 hours. Urinalysis:      Lab Results   Component Value Date/Time    NITRU Negative 07/05/2022 02:39 PM    WBCUA 10-20 07/05/2022 02:39 PM    BACTERIA FEW 07/05/2022 02:39 PM    RBCUA 3-5 07/05/2022 02:39 PM    BLOODU TRACE 07/05/2022 02:39 PM    SPECGRAV 1.020 07/05/2022 02:39 PM    GLUCOSEU Negative 07/05/2022 02:39 PM       Radiology:  US DUP LOWER EXTREMITIES BILATERAL VENOUS   Final Result   NO FINDINGS OF  DEEP VENOUS THROMBUS IN THE VISUALIZED VESSELS OF THE LEFT OR RIGHT  LOWER EXTREMITY.          Restrictions  Restrictions/Precautions: Fall Risk  CASE MANAGEMENT    Social/Functional History  Social/Functional History  Lives With: Son (7 y/o)  Type of Home: Apartment  Home Layout: One level  Home Access: Stairs to enter without rails  Entrance Stairs - Number of Steps: 1 (2\" threshold)  Bathroom Shower/Tub: Tub/Shower unit  Bathroom Toilet: Standard  Bathroom Equipment: Shower chair,Hand-held shower  Bathroom Accessibility: Accessible  Home Equipment: Cricket Melecio, rolling,Wheelchair-manual,Reacher,Long-handled shoehorn  Has the patient had two or more falls in the past year or any fall with injury in the past year?: Yes  Receives Help From: Family  ADL Assistance: Independent (later reports that son helps put on socks when needed)  Homemaking Assistance: Independent  Homemaking Responsibilities: Yes  Ambulation Assistance: Independent [employer])  Transfer Assistance: Independent  Active : No  Patient's  Info: mother  Occupation: On disability  Type of Occupation: [employer] 53: watch tv, bowling with son       COVERAGE INFORMATION:Payor: Thad Laurita / Plan: Chay Ser / Product Type: *No Product type* /       NURSING  Weight: 168 lb 3.2 oz (76.3 kg) / Body mass index is 31.78 kg/m². ADULT DIET; Regular; 3 carb choices (45 gm/meal); No Added Salt (3-4 gm)    SpO2: 100 % (07/06/22 1852)  No active isolations    Skin Issues: Yes; blister from depends is open to air    Pain Managed: Yes    Bladder continence: No; purewick at night    Bowel continence: Yes      Other: Plavix, Lovenox, and Aspirin  Family training scheduled for 7/8      PHYSICAL THERAPY  Initial status:           Bed mobility:  Bed mobility  Bridging: Minimal assistance  Rolling to Left: Minimal assistance  Rolling to Right: Minimal assistance  Supine to Sit: Minimal assistance  Sit to Supine: Unable to assess  Scooting: Minimal assistance (latearlly in supine)  Bed Mobility Comments: HOB slightly elevated; heavy use of hand rails; vc's for hand placement and sequencing with LE's. Transfers:  Transfers  Sit to Stand: Contact guard assistance  Stand to sit: Contact guard assistance  Bed to Chair: Contact guard assistance  Lateral Transfers: Minimal Assistance;Contact guard assistance (Instruction provided on scooting laterally at EOB emphasizing forward weight shift)  Car Transfer: Stand by assistance  Comment: Slow to complete. Heavy reliance on UEs for power. Verbal cues for sequencing and ergonomics. Gait:   Ambulation  Surface: level tile  Device: Rolling Walker  Other Apparatus: Wheelchair follow  Assistance: Contact guard assistance;Minimal assistance  Quality of Gait: Steadying throughout. increased reliance on [employer] for support with FF posture. Step to pattern with L LE lagging.   Gait Deviations: Slow Marlys;Decreased step length;Decreased step height;Shuffles  Distance: 8ft  Comments: Limited by fatigue  Stairs:  Stairs  Curbs: 2\"  Device: Rolling walker  Assistance: Stand by assistance;Contact guard assistance  Comment: Vcs to not whip WW out when placing from curb to floor  W/C mobility:             Current status:   Bed mobility:  Bed Mobility  Additional Factors: Without handrails; Head of bed flat  Additional Factors: Without handrails; Head of bed flat  Roll Left  Assistance Level: Supervision  Roll Right  Assistance Level: Supervision  Sit to Supine  Assistance Level: Supervision  Supine to Sit  Assistance Level: Supervision  Scooting  Assistance Level: Supervision  Transfers:  Transfers  Surface: Wheelchair;From bed  Additional Factors: Verbal cues  Device: Walker  Sit to Stand  Assistance Level: Supervision  Skilled Clinical Factors: good technique  Stand to Sit  Assistance Level: Supervision  Skilled Clinical Factors: maintains one UE back prior to sitting throughout tx  Bed To/From Chair  Technique: Stand pivot  Assistance Level: Stand by assist  Skilled Clinical Factors: no cues for hand placement or technique required this session, turns appropriately for best quality  Stand Pivot  Assistance Level: Supervision  Skilled Clinical Factors: good technique and sequencing with no cues from this PTA  Gait:   Ambulation  Surface: Carpet  Device: Rolling walker  Distance: 60' two turns  Assistance Level: Stand by assist;Supervision  Gait Deviations: Decreased step length bilateral;Slow leonardo;Decreased heel strike right;Decreased heel strike left;Narrow base of support  Skilled Clinical Factors: improved awareness of kaz foot clearance, intermittent reciprocal pattern, mild pelvic instability  Stairs:  Curb  Curb Height: 2''  Device: Rolling walker  Number of Curbs: 2  Additional Factors: Verbal cues  Assistance Level: Supervision  Skilled Clinical Factors: vc's for improving quality and safety, no LOB or instability  W/C mobility:       Assessment: Pt progressing towards goal achievement however fluctuates daily in performance.  Continued PT indicated for coaching in safety and consistency. LTG:  Long term goal 1: Pt to complete bed mobility with indep  Long term goal 2: Pt to complete functional transfers bed/chair/car with indep  Long term goal 3: Pt to ambulate 50-150ft with LRD and indep  Long term goal 4: Pt to tolerate 5min standing activities indep  Long term goal 5: Pt to manage 2\" curb step indep    Signature: Electronically signed by Jimbo Arvizu, PT on 7/7/22 at 12:35 PM EDT        OCCUPATIONAL THERAPY   Initial Self Care Status:  ADL  Feeding: Modified independent   Grooming: Setup  Grooming Skilled Clinical Factors: for oral care and grooming  UE Bathing: Stand by assistance,Increased time to complete,Verbal cueing  LE Bathing: Dependent/Total  LE Bathing Skilled Clinical Factors: +2 assistance. Pt able to bend down in order to wash legs with SBA for safe sitting. +2 to wash posterior lillie area. 1 person for washing 1 person to maintain standing balance  UE Dressing: Minimal assistance  LE Dressing: Maximum assistance,Increased time to complete  LE Dressing Skilled Clinical Factors: to don pants and depends  Toileting: Dependent/Total  Toileting Skilled Clinical Factors: Pt incontinent of feces  Additional Comments: Pt displaying lack of endurance and fatigue throughout session, requiring increased assistance throughout progression of evaluation  Toilet Transfers  Toilet - Technique: Stand pivot  Equipment Used: Grab bars  Toilet Transfer: Dependent/Total  Toilet Transfers Comments: Twards end of session, initially Min A for transfers  Shower Transfers  Shower - Transfer Type: To and From  Shower - Transfer To: Shower seat with back  Shower - Technique: Stand pivot  Shower Transfers:  Moderate assistance  Shower Transfers Comments: Pt with decreased endurance and fatigued post shower, requiring more assistance      Current Self Care Status:  Feeding  Assistance Level: Set-up (07/06/22 7835)  Grooming/Oral Hygiene  Assistance Level: Modified independent (22)  Upper Extremity Bathing  Assistance Level: Set-up (22)  Lower Extremity Bathing  Assistance Level: Supervision (22)  Upper Extremity Dressing  Assistance Level: Supervision;Set-up (22)  Lower Extremity Dressing  Assistance Level: Moderate assistance (22)  Putting On/Taking Off Footwear  Assistance Level: Minimal assistance (22)  Toileting  Assistance Level: Supervision (22)  Toilet Transfers  Technique: Stand step (22)  Equipment: Standard toilet;Grab bars (22)  Assistance Level: Stand by assist (22)  Tub/Shower Transfers  Type: Shower (22)  Transfer From: Wheelchair (22)  Transfer To: Shower chair with back (22)  Assistance Level: Contact guard assist (22)    Pt is progressing well with ADL goals and remains on track for goals. Pt still demo's decreased overall endurance and standing tolerance which does limit function at times. Cognitive deficits and problem solving has been noted with higher level tasks-> plan to continue to address ADLs and IADLs as patient tolerance to plan for safe d/c. Recommend continued OT at this time. Within 1 Weeks Pt. will be: Bathing: Mod I  UE Dressing: Mod I  LE Dressing: Supervision  Toileting: Mod I  Toilet Transfer: Supervision  Shower/Tub Transfer: Supervision    Signature: Electronically signed by Mary Alice Elena OT on 22 at 9:50 AM EDT        SPEECH THERAPY    Initial Status:  Diet:   Regular solids with thin liquids  Dysphagia Outcome Severity Scale:  Ratin    Speech Therapy Level of Assistance Scale: Auditory Comprehension:  Rating: Modified Independent  Verbal Expression:  Rating:Modified Independent  Motor Speech:  Rating: Independent  Problem Solving:  Rating: Moderate Assistance  Memory:  Rating:  Moderate Assistance      Long Term Goals:  Long-term Goals  Timeframe for Long-term Goals: 2-3 weeks  Goal 1: Pt will improve his Cognition from mod assist to supervised to mod I level for adequate functional recall and safety awareness for (home, community). Long-term Goals  Timeframe for Long-term Goals: N/A        Patient's Response to Therapy:  Patient has presented with improved memory and problem solving skills compared to the initial eval. Patient continues to require min assist with STM/working memory tasks and mid to high level problem solving. It is expected that patient would benefit from ongoing speech therapy, as she has responded positively to skilled intervention, thus far. Current Status:  Diet:   ADULT DIET; Regular; 3 carb choices (45 gm/meal); No Added Salt (3-4 gm)  Compensatory Swallowing Strategies : Upright as possible for all oral intake  Dysphagia Outcome Severity Scale:  Ratin    Speech Therapy Level of Assistance Scale:   Auditory Comprehension:  Rating: Modified Independent  Verbal Expression:  Rating:Modified Independent  Motor Speech:  Rating: Independent  Problem Solving:  Rating: Minimal Assistance  Memory:  Rating: Minimal Assistance            Signature: Electronically signed by Stephanie Mcallister SLP on 22 at 12:37 PM EDT    Electronically signed by Ne Winston MSW, LSW on 2022 at 12:41 PM

## 2022-07-07 NOTE — PROGRESS NOTES
[facility]  Facility/Department: [facility]  Speech Language Pathology   Treatment Note  Abraham Beatty  1980  R255/R255-01  [x]   confirmed    Date: 2022    Rehab Diagnosis: Impaired mobility and activities of daily living dt exac of MS      Restrictions/Precautions: Fall Risk  Weight: 168 lb 3.2 oz (76.3 kg)   ADULT DIET; Regular; 3 carb choices (45 gm/meal); No Added Salt (3-4 gm)  SpO2: 100 % (22)  No active isolations    Speech Dx: Cognitive Linguistic Impairment    Subjective:  Alert, Cooperative, Pleasant and Motivated        Interventions used this date:  Cognitive Skill Development and Instruction in Compensatory Strategies    Objective/Assessment:  Patient progressing towards goals:  Short-term Goals  Goal 1: To increase safety awareness and judgment for safe completion of ADLs secondary to pt's cognitive deficits,  pt will complete mid to high level problem solving tasks related to ADLs (e.g. medication management, ADL safety) with 90% accuracy and superised assist.  Not addressed. Goal 2: To increase safety awareness and judgment for safe completion of ADLs secondary to pt's cognitive deficits, pt will sequence common activities of daily living with (verbal/written) steps with 90% accuracy and supervised assist.  Not addressed. Goal 3: To decrease pt's cognitive deficits through the use of compensatory strategies, the pt will be educated on 3 different memory strategies and verbalize how he/she might use them at home in 3 ways with supervised assist.  Pt was educated on use of calendars, alarms, lists, and central location. Pt reports that she already implements calendars, alarms, and lists at home. Goal 4: To address pt's cognitive deficits and promote recall of personal and medical information, pt will answer questions addressing (remote, recent, delayed) recall with 90% accuracy and min cues. Not addressed. Goal 5:  To decrease cognitive deficits and improve attention to tasks for safe completion of ADLs, pt will complete structured tasks addressing (sustained, selective, alternating, divided) attention with 90% accuracy and supervised assist.  Divided attention with calculations: Rule of 10 card game - 64% acc requiring mod verbal prompts    Treatment/Activity Tolerance:  Patient tolerated treatment well    Plan:  Continue per POC    Pain Assessment:  Patient does not c/o pain. Pain Re-assessment:  Patient does not c/o pain. Patient/Caregiver Education:  Patient educated on session and progression towards goals. Patient stated verbal understanding of directions.     Safety Devices:  Chair alarm in place      Dysphagia Outcome Severity Scale    Speech Therapy Level of Assistance Scale    PROBLEM SOLVING  Rating: Minimal Assistance    MEMORY  Rating:  Minimal Assistance      Therapy Time  SLP Individual Minutes  Time In: 1000  Time Out: 21   Minutes: 30        Signature: Electronically signed by FIDELIA Grimm on 7/7/2022 at 1:46 PM

## 2022-07-07 NOTE — PROGRESS NOTES
OCCUPATIONAL THERAPY  INPATIENT REHAB TREATMENT NOTE  [facility]      NAME: Zoe Gray  : 1980 (48 y.o.)  MRN: [id number]  CODE STATUS: Full Code  Room: R255/R255-01    Date of Service: 2022    Referring Physician: Dr. Luis M Duque  Rehab Diagnosis: Impaired mobility and ADLs D/T Exacerabation of MS    Restrictions  Restrictions/Precautions  Restrictions/Precautions: Fall Risk            Patient's date of birth confirmed: Yes    SAFETY:  Safety Devices  Type of devices: All fall risk precautions in place    SUBJECTIVE:  Subjective: \"Lunch was great\"  Pain: denies    OBJECTIVE:    While standing, completed fine motor task with focus on coordination, activity tolerance, and standing balance/tolerance in order to improve general function. Pt challenged to place and remove ping-pong balls form the top of tall cones using tongs. Pt completed task - switching between both hands. Min difficulty noted with the L hand. Cues mainly needed for slow pace. Pt impressed with how well her LUE was performing today    Transfers: SBA  Standing tolerance: 4.5 mins max   Standing balance: F-      Coordination tasks also completed in seated position    Pt challenged with  small pegs by color into different piles. Wrist weights placed onto patients arms to further address UE strength/endurance. Pt then tasked with placing like-colored pegs into tall cone to challenge her ability to complete vertical and forward reaches. Shoulder flexion required to shoulder level.  Good ability to complete this task with rest brakes     ADL  Transfer training   Tub transfer: Instruction prior- then CGA-SBA to complete transfer using tub transfer bench  STS: CGA  Fx mobility: CGA-SBA across the bathroom using FWW    Equipment recommendation:  Pt reports her mom already bought a tub transfer bench  Pt could also benefit from 3 in 1 / raised commode w/ rails    Education:   transfer techniques, POC, general

## 2022-07-08 PROCEDURE — 97530 THERAPEUTIC ACTIVITIES: CPT

## 2022-07-08 PROCEDURE — 1180000000 HC REHAB R&B

## 2022-07-08 PROCEDURE — 97116 GAIT TRAINING THERAPY: CPT

## 2022-07-08 PROCEDURE — 97110 THERAPEUTIC EXERCISES: CPT

## 2022-07-08 PROCEDURE — 97112 NEUROMUSCULAR REEDUCATION: CPT

## 2022-07-08 PROCEDURE — 99232 SBSQ HOSP IP/OBS MODERATE 35: CPT | Performed by: PHYSICAL MEDICINE & REHABILITATION

## 2022-07-08 PROCEDURE — APPSS15 APP SPLIT SHARED TIME 0-15 MINUTES: Performed by: NURSE PRACTITIONER

## 2022-07-08 PROCEDURE — 97129 THER IVNTJ 1ST 15 MIN: CPT

## 2022-07-08 PROCEDURE — 97130 THER IVNTJ EA ADDL 15 MIN: CPT

## 2022-07-08 PROCEDURE — 97535 SELF CARE MNGMENT TRAINING: CPT

## 2022-07-08 PROCEDURE — 99233 SBSQ HOSP IP/OBS HIGH 50: CPT | Performed by: PSYCHIATRY & NEUROLOGY

## 2022-07-08 PROCEDURE — 6370000000 HC RX 637 (ALT 250 FOR IP): Performed by: INTERNAL MEDICINE

## 2022-07-08 PROCEDURE — 6370000000 HC RX 637 (ALT 250 FOR IP): Performed by: PHYSICAL MEDICINE & REHABILITATION

## 2022-07-08 RX ADMIN — CLOPIDOGREL BISULFATE 75 MG: 75 TABLET ORAL at 08:20

## 2022-07-08 RX ADMIN — OXYBUTYNIN CHLORIDE 5 MG: 5 TABLET, EXTENDED RELEASE ORAL at 22:21

## 2022-07-08 RX ADMIN — THERA TABS 1 TABLET: TAB at 12:12

## 2022-07-08 RX ADMIN — Medication 100 MG: at 08:20

## 2022-07-08 RX ADMIN — Medication 2000 UNITS: at 17:08

## 2022-07-08 RX ADMIN — ASPIRIN 81 MG: 81 TABLET, COATED ORAL at 08:20

## 2022-07-08 ASSESSMENT — ENCOUNTER SYMPTOMS
WHEEZING: 0
ABDOMINAL PAIN: 0
COUGH: 0
ABDOMINAL DISTENTION: 0
NAUSEA: 0
VOMITING: 0
SHORTNESS OF BREATH: 0
CHEST TIGHTNESS: 0
COLOR CHANGE: 0
TROUBLE SWALLOWING: 0

## 2022-07-08 NOTE — PROGRESS NOTES
Physical Therapy Rehab Treatment Note  Facility/Department: [facility]  [facility]       NAME: Dayna Meza  : 1980 (60 y.o.)  MRN: [id number]  CODE STATUS: Full Code    Date of Service: 2022       Restrictions:  Restrictions/Precautions: Fall Risk       SUBJECTIVE:   Subjective: \"I'm doing good. \" Pt's mother in for family training    Pain  Pain: Pt reports no pain      OBJECTIVE:     Roll Left  Assistance Level: Modified independent  Roll Right  Assistance Level: Modified independent  Sit to Supine  Assistance Level: Supervision  Skilled Clinical Factors: requiring to use BUE to get BLE on to mat table  Supine to Sit  Assistance Level: Supervision  Skilled Clinical Factors: cues for improving technique with fair follow through  Scooting  Assistance Level: Supervision  Skilled Clinical Factors: lateral scooting EOB    Transfers  Surface: Wheelchair  Device: Walker  Sit to [employer] Level: Modified independent  Skilled Clinical Factors: maintains good STS technique throughout tx  Stand to Sit  Assistance Level: Modified independent  Skilled Clinical Factors: maintains one UE back prior to sitting throughout tx  Bed To/From Chair  Technique: Stand pivot  Assistance Level: Stand by assist  Skilled Clinical Factors: improved technique, good carryover from AM tx  Stand Pivot  Assistance Level: Supervision  Skilled Clinical Factors: increased cues when pt fatigued for improved safety with approach to chair  Car Transfer  Assistance Level: Modified independent  Skilled Clinical Factors: good technique without cues from this PTA    Ambulation  Surface: Carpet  Device: Rolling walker  Distance: 50', 30' x 2  Assistance Level: Supervision  Gait Deviations: Decreased step length bilateral;Slow leonardo;Decreased heel strike right;Decreased heel strike left;Narrow base of support  Skilled Clinical Factors: improved awareness of kaz foot clearance, intermittent reciprocal pattern, mild pelvic instability    Curb  Curb Height: 4''  Device: Rolling walker  Number of Curbs: 2  Additional Factors: Verbal cues (for technique to improve quality)  Assistance Level: Supervision  Skilled Clinical Factors: no LOB or instability, 4\" performed per pt's mother confirming pt's home has 4\" curb, not 2\"    Education  Education Given to: Patient; Family  Education Provided: Safety; Trsf training; Energy Conservation; Mobility Training; Family Education  Education Provided Comments: Family training performed this session with pt's mother. Education provided to pt's mother on CLOF. Demonstrated pt's ability to perform bed mobility, trsfs bed <> chair, STS and car trsfs, gait, and curb steps. All questions answered for pt and mother regarding PT needs. Pt and mother confirm they feel safe returning home at [id number] [facility]. No further questions regarding PT at this time. Education Method: Demo; Verbal  Education Outcome: Verbalized Understanding    ASSESSMENT/PROGRESS TOWARDS GOALS:   Assessment  Assessment: Family training performed this date. Pt and mother express they are comfortable with returning home next tuesday, 7/12/22. Pt demonstrates ability to perform consistent 50' ambulation, car trsf, chair trsf, STS and curb step ascending/descending. Pt exhibits no LOB throughout tx. Occ VC's for posture/foot clearance during gait training when fatigued. Goals:  Long Term Goals  Long term goal 1: Pt to complete bed mobility with indep  Long term goal 2: Pt to complete functional transfers bed/chair/car with indep  Long term goal 3: Pt to ambulate 50-150ft with LRD and indep  Long term goal 4: Pt to tolerate 5min standing activities indep  Long term goal 5: Pt to manage 2\" curb step indep  Additional Goals?: Yes  Long term goal 6: Pt to complete HEP with indep    PLAN OF CARE/Safety:   Plan Comment: Cont.  POC      Therapy Time:   Individual   Time In 1300   Time Out 1330   Minutes 30     Minutes:  Transfer/Bed mobility training: 15  Gait training: 15  Neuro re education: 0  Therapeutic ex: 0      Steph Lorenz PTA, 07/08/22 at 2:28 PM

## 2022-07-08 NOTE — PROGRESS NOTES
OCCUPATIONAL THERAPY  INPATIENT REHAB TREATMENT NOTE  [facility]      NAME: Zackery Paniagua  : 1980 (25 y.o.)  MRN: [id number]  CODE STATUS: Full Code  Room: R255/R255-01    Date of Service: 2022    Referring Physician: Dr. Mak Zayas  Rehab Diagnosis: Impaired mobility and ADLs D/T Exacerabation of MS    Restrictions  Restrictions/Precautions  Restrictions/Precautions: Fall Risk        Patient's date of birth confirmed: Yes    SAFETY:  Safety Devices  Type of devices: All fall risk precautions in place    SUBJECTIVE:  Subjective: \"I think I'll just wash up today\"    Pain at start of treatment: Denies    Pain at end of treatment: Denies      COGNITION:    Pt's current cognitive status is:  Comprehension: Mod I  Expression: Mod I  Social Interaction: Mod I  Problem Solving: Mod I  Memory: Mod I    OBJECTIVE:    Patient completes morning ADL as follows:    Feeding  Assistance Level: Set-up  Grooming/Oral Hygiene  Assistance Level: Set-up  Upper Extremity Bathing  Assistance Level: Set-up  Lower Extremity Bathing  Assistance Level: Minimal assistance  Skilled Clinical Factors: assist for bilateral feet  Upper Extremity Dressing  Assistance Level: Set-up  Lower Extremity Dressing  Assistance Level: Moderate assistance  Skilled Clinical Factors: assist to thread all clothing through RLE  Putting On/Taking Off Footwear  Assistance Level: Moderate assistance  Skilled Clinical Factors: assist to don bilateral socks; patient able to slip on bilateral shoes  Toileting  Assistance Level: Supervision  Toilet Transfers  Technique: Stand step  Equipment: Standard toilet;Grab bars  Assistance Level: Stand by assist  Tub/Shower Transfers  Skilled Clinical Factors: Patient requesting sponge bath at sink      Following ADL, patient performs the following UE activities. OT Exercises  A/AROM Exercises: Patient completed bilateral UE endurance exercises using pillowcase.  Patient performs 1 set x 10 reps of the following: shoulder flexion, punch outs, bicep curls, and horizontal ab/adduction. Patient also engages in laundry task- folding pants and placing t-shirts onto hangers with good tolerance. Equipment recommendations:  OT Equipment Recommendations  Other: Continue to assess      ASSESSMENT:  Assessment: Abiola tolerated ADL well, requesting assistance for lower body dressing and bathing. Patient also tolerates bilateral UE endurance activities well with rest breaks provided prn. Activity Tolerance: Patient tolerated treatment well      PLAN OF CARE:  Strengthening,Balance training,Functional mobility training,Neuromuscular re-education,Self-Care / Carvel Punt training,Safety education & training,Home management training  continue with POC    Patient goals : \"My dad can't take care of me now, so I need to get better. He has congestive heart failure. \"  Time Frame for Long term goals : Within 2 weeks, Pt to demonstrate progress in the following areas listed below to achieve specific LTG's as stated in the initial evaluation. Long Term Goal 1: Increase independence in ADLs  Long Term Goal 2: Increaese endurance to complete clothes management  Long Term Goal 3: Increase static/dynamic balance  Long Term Goal 4:  Increase independence to perform kitchen mobility        Therapy Time:   Individual Group Co-Treat   Time In 0830       Time Out 0930         Minutes 60             ADL/IADL trainin minutes  Therapeutic activities: 20 minutes     Electronically signed by:    FRANCHESKA Hernandez,   2022, 9:58 AM

## 2022-07-08 NOTE — PROGRESS NOTES
Subjective: The patient complains of severe acute on chronic progressive fatigue and paresis and quadrat esthesia secondary to MS flareup partially relieved by rest, medications, PT,  OT, steroids, SLP and rest and exacerbated by recent illness -we will sclerosis flareup. I am concerned about patients medical complexities and barriers to advancing in rehab goals including -her tachycardia-initially presented through the emergency room with severe hip pain status post a fall at home. She was diagnosed with an exacerbation of her multiple sclerosis and rhabdomyolysis secondary to elevated CK levels. Fortunately her x-rays of her hips were unremarkable. She was prescribed IV fluids and IV methylprednisolone. She is hoping to transition to [facility] therapy as an outpatient for her MS.     Patient has had difficulty with endurance, balance, gait ataxia, generalized weakness and numbness. Medically complicated by tachycardia especially with activity. She will need telemetry monitoring while on the unit and to be followed by cardiology and or medical..  I reviewed and discussed cardiac note-Cardiac Supportive Care including:IV and PO hydration for Rhabdomyolysis care. Serial cardiac enzymes. Telemetry for 48 hours. Echocardiogram-sofar work-up of his been unremarkable and cardiology feels that it is a tachycardia always sinus in response to debility and deconditioning. Have okayed her for therapy. I reviewed current care and plans for further care with other rehab providers including nursing and case management. According to recent nursing note, \"  RN agrees w/LPN assessment. Pt is here R/T MS, pain and for rehab. Pt has been working w/therapy throughout the day. I have been working w/the LPN and monitoring the pt throughout the shift. \".    I am concerned about the moderate sized PFO that they found on recent echocardiogram.  Cardiology will follow up with her as an outpatient.   She no longer needs daily labs I have discontinued them. We discussed possibly getting her a pure wick catheter at night for home use. ROS x10: The patient also complains of severely impaired mobility and activities of daily living. Otherwise no new problems with vision, hearing, nose, mouth, throat, dermal, cardiovascular, GI, , pulmonary, musculoskeletal, psychiatric or neurological. See also Acute Rehab PM&R H&P. Vital signs:  /68   Pulse 98   Temp 98.1 °F (36.7 °C) (Oral)   Resp 16   Ht 5' 1\" (1.549 m)   Wt 168 lb 3.2 oz (76.3 kg)   SpO2 100%   BMI 31.78 kg/m²   I/O:   PO/Intake:  fair PO intake, regular 3 carb per meal diet    Bowel:   continent LBM 7/3  Bladder: continent pure wick catheter at night urine is clear  General:  Patient is well developed,   adequately nourished, and    well kempt. HEENT:    Pupils equal, hearing intact to loud voice, external inspection of ear and nose benign. Inspection of lips, tongue and gums benign    Musculoskeletal: No significant change in strength or tone. All joints stable. Inspection and palpation of digits and nails show no clubbing, cyanosis or inflammatory conditions. Neuro/Psychiatric: Affect: flat but pleasant. Alert and oriented to person, place and situation with  min cues. No significant change in deep tendon reflexes or sensation  Lungs:  Diminished, CTA-B. Respiration effort is   normal at rest.     Heart:   S1 = S2,   RRR-     Abdomen:  Soft, non-tender, no enlargement of liver or spleen.   Extremities:    lower extremity edema    Skin:   Intact to general survey,      Rehabilitation:  Physical Therapy:   Bed mobility:  Bed mobility  Bridging: Stand by assistance (07/04/22 9956)  Rolling to Left: Stand by assistance (07/04/22 0085)  Rolling to Right: Stand by assistance (07/04/22 8236)  Supine to Sit: Minimal assistance (06/28/22 9723)  Sit to Supine: Stand by assistance (07/04/22 0888)  Scooting: Minimal assistance (latearlly in supine) quality (07/07/22 0909)  Stand Pivot  Assistance Level: Supervision (07/07/22 0909)  Skilled Clinical Factors: good technique and sequencing with no cues from this PTA (07/07/22 0909)  Car Transfer  Assistance Level: Stand by assist (07/05/22 1340)  Skilled Clinical Factors: VC's for improving approach and technique, good follow through (07/05/22 1340)  Gait:   Ambulation  Surface: carpet (07/04/22 1405)  Device: [address] [facility] (07/04/22 1405)  Other Apparatus: Wheelchair follow (06/28/22 1149)  Assistance: Contact guard assistance (07/04/22 1405)  Quality of Gait: difficulty with RLE foot clearance, FF posture, downward gaze, short BLE step length. Step to pattern<> occ step strough (07/04/22 1405)  Gait Deviations: Slow Leonardo;Decreased step length;Decreased step height;Shuffles (07/04/22 1405)  Distance: 44ft x 2 (07/04/22 1405)  Comments: decreased stride and step length with fatigue after  2nd Gait (07/04/22 1405)  Ambulation  Surface: Carpet; Uneven surface; Level surface (07/07/22 1529)  Device: Rolling walker (07/07/22 1529)  Distance: 61' two turns, 10' x 2 (07/07/22 1529)  Activity Comments:  BPM post ambulation (06/30/22 1342)  Assistance Level: Stand by assist;Supervision (07/07/22 1529)  Gait Deviations: Decreased step length bilateral;Slow leonardo;Decreased heel strike right;Decreased heel strike left;Narrow base of support (07/07/22 1529)  Skilled Clinical Factors: improved awareness of kaz foot clearance, intermittent reciprocal pattern, mild pelvic instability, good stability with changes in surface (07/07/22 1529)  Stairs:  Stairs/Curb  Stairs?: Yes (07/04/22 0906)  Stairs  Curbs: 2\" (07/04/22 0906)  Device: Rolling walker (07/04/22 0906)  Assistance: Stand by assistance;Contact guard assistance (07/04/22 0051)  Comment: Vcs to not whip WW out when placing from curb to floor (07/04/22 0906)  Curb  Curb Height: 2'' (07/07/22 8604)  Device: Rolling walker (07/07/22 0921)  Number of Curbs: 2 (07/07/22 9430)  Additional Factors: Verbal cues (07/07/22 0921)  Assistance Level: Supervision (07/07/22 3966)  Skilled Clinical Factors: vc's for improving quality and safety, no LOB or instability (07/07/22 0921)  W/C mobility:       Occupational Therapy:   Hand Dominance: Right  ADL  Feeding: Modified independent  (06/28/22 1049)  Grooming: Setup (06/29/22 1341)  Grooming Skilled Clinical Factors: for oral care and grooming (06/29/22 1341)  UE Bathing: Stand by assistance; Increased time to complete (06/29/22 1341)  LE Bathing: Dependent/Total (06/29/22 1341)  LE Bathing Skilled Clinical Factors: +2 assistance. Pt able to bend down in order to wash legs with SBA for safe sitting. +2 to wash posterior lillie area. 1 person for washing 1 person to maintain standing balance (06/29/22 1341)  UE Dressing: Stand by assistance (06/29/22 1341)  LE Dressing: Increased time to complete;Maximum assistance (06/29/22 1341)  LE Dressing Skilled Clinical Factors: to don pants and depends (06/29/22 1341)  Toileting: Dependent/Total (06/29/22 1341)  Toileting Skilled Clinical Factors: Pt incontinent of feces and urine (06/29/22 1341)  Additional Comments: Pt displaying lack of endurance and fatigue throughout session, requiring increased assistance throughout progression of evaluation (06/28/22 1049)  Toilet Transfers  Toilet - Technique: Stand pivot (06/29/22 1339)  Equipment Used: Standard toilet (06/29/22 1339)  Toilet Transfer: Minimal assistance (verbal cues for hand placement) (06/29/22 1339)  Toilet Transfers Comments: Twards end of session, initially Min A for transfers (06/28/22 1054)     [address] - Transfer Type: To and From (06/28/22 1054)  Shower - Transfer To: Shower seat with back (06/28/22 1054)  Shower - Technique: Stand pivot (06/28/22 1054)  Shower Transfers:  Moderate assistance (06/28/22 1054)  Shower Transfers Comments: Patient did not complete shower transfer as patient washed up at sink in wheelchair. (06/29/22 7114)    Speech Therapy:      Comprehension: Within Functional Limits (Patient answered simple yes/no questions, basic questions and participated in conversation with good comprehension. no repetition was required)  Verbal Expression:  [facility] with meeting wants and needs. No word finding deficits have been noted during conversation)  Diet/Swallow:        Dysphagia Outcome Severity Scale: Level 7: Normal in all situations  Compensatory Swallowing Strategies : Upright as possible for all oral intake          Lab/X-ray studies reviewed, analyzed and discussed with patient and staff:   No results found for this or any previous visit (from the past 24 hour(s)). XR CERVICAL SPINE   6/19/2022   NEGATIVE CERVICAL SPINE    XR HIP LEFT  6/19/2022  NEGATIVE PELVIS AND LEFT HIP     XR FEMUR RIGHT  6/19/2022: No fracture, dislocation, bone lesion. NEGATIVE RIGHT FEMUR    MRI LUMBAR SPINE : 6/20/2022: Degenerative facet changes of the lumbar spine visualized most prominent at L3-L4 and L5-S1 that demonstrate mild progression in comparison to the prior study    US ABDOMEN  6/20/2022:  NO SIGN OF CHOLELITHIASIS OR BILE DUCT DILATATION. SLUDGE WITHIN THE GALLBLADDER. NO DEFINITE FOCAL LIVER ABNORMALITY. US DUP LOWER EXTREMITIES BILATERAL VENOUS  6/20/2022   NO SONOGRAPHIC EVIDENCE OF DEEP VENOUS THROMBOSIS WITHIN THE BILATERAL LOWER EXTREMITIES. Patent foramen ovale with right-to-left shunt was visualized. On agitated saline contrast injection- Large amount of bubbles LA and LV   nearly immediately    Previous extensive, complex labs, notes and diagnostics reviewed and analyzed. ALLERGIES:    Allergies as of 06/27/2022    (No Known Allergies)      (please also verify by checking MAR)      Recently, I evaluated this patient for periodic reassessment of medical and functional status.   The patient was discussed in detail at the treatment team meeting focusing on current medical issues, progress in therapies, social issues, psychological issues, barriers to progress and strategies to address these barriers, and discharge planning. See the hand written addendum to rehab progress note. The patient continues to be high risk for future disability and their medical and rehabilitation prognosis continue to be good and therefore, we will continue the patient's rehabilitation course as planned. The patient's tentative discharge date was set. Patient and family education was discussed. The patient was made aware of the team discussion regarding their progress. Discharge plans were discussed along with barriers to progress and strategies to address these barriers, patient encouraged to continue to discuss discharge plans with . Complex Physical Medicine & Rehab Issues Assess & Plan:   1. Severe abnormality of gait and mobility and impaired self-care and ADL's secondary to progressive masturbation of multiple sclerosis. Functional and medical status reassessed regarding patients ability to participate in therapies and patient found to be able to participate in acute intensive comprehensive inpatient rehabilitation program including PT/OT to improve balance, ambulation, ADLs, and to improve the P/AROM. Therapeutic modifications regarding activities in therapies, place, amount of time per day and intensity of therapy made daily. In bed therapies or bedside therapies prn.   2. Bowel and Bladder dysfunction  , Neurogenic bowel and bladder:  frequent toileting, ambulate to bathroom with assistance, check post void residuals. Check for C.difficile x1 if >2 loose stools in 24 hours, continue bowel & bladder program.  Monitor bowel and bladder function. Lactinex 2 PO every AC. MOM prn, Brown Bomb prn, Glycerin suppository prn, enema prn. Encourage therapy and nursing to co-treat and problem solve re continence. [place] Ditropan.   3. Severe MS pain as well as generalized OA pain: reassess pain Posterior tibial tendon dysfunction -AFO  7. Malodorous urine-check stat UA check postvoid residual  8. Ataxic gait-focus on balance and therapy  9. Pain in joint, lower leg, Foot drop, left foot, Acquired deformity of left foot-consult podiatry as needed  10. Lumbar radiculopathy-post effective dose of pain medication  11. Moderate sized PFO, asymptomatic sinus tachycardia with activity dt deconditioning- SP EKG stat-OK,  , add stat labs and cardiac consult-a.m. labs were essentially [id number]  Was OK.  cardiology. Continue telemetry for now. Cardiology and I agree to add aspirin and Plavix to the current dose and the Lovenox       Focus of today's plan-  Initiate and modify therapuetic plan to meet patients individual needs, add rest breaks as needed, and monitor heart rate on telemetry with cardiac work-up as above. Follow through with plans for PFO.     Electronically signed by Meseret Messina DO on 6/29/22 at 8:02 AM YAMILT       Zina Ash D.O., PM&R     Attending    [facility]oke Aaron

## 2022-07-08 NOTE — PROGRESS NOTES
Physical Therapy Rehab Treatment Note  [facility]  [facility]       NAME: Alonzo Hamilton  : 1980 (30 y.o.)  MRN: [id number]  CODE STATUS: Full Code    Date of Service: 2022       Restrictions:  Restrictions/Precautions: Fall Risk       SUBJECTIVE:   Subjective: \"I slept pretty good. \"    Pain  Pain: Pt reports no pain      OBJECTIVE:     Roll Left  Assistance Level: Modified independent  Roll Right  Assistance Level: Modified independent  Sit to Supine  Assistance Level: Supervision  Skilled Clinical Factors: requiring to use BUE to get BLE on to mat table  Supine to Sit  Assistance Level: Supervision  Skilled Clinical Factors: cues for improving technique with fair follow through  Scooting  Assistance Level: Supervision  Skilled Clinical Factors: lateral scooting EOB    Transfers  Surface: Wheelchair  Device: Walker  Sit to [employer] Level: Modified independent  Skilled Clinical Factors: maintains good STS technique throughout tx  Stand to Sit  Assistance Level: Modified independent  Bed To/From Chair  Technique: Stand pivot  Assistance Level: Stand by assist  Skilled Clinical Factors: cues for technique and appropriate turning direction with visual demo to improve safety and overall quality  Stand Pivot  Assistance Level: Supervision  Skilled Clinical Factors: increased cues when pt fatigued for improved safety with approach to chair    Ambulation  Surface: Carpet  Device: Rolling walker  Distance: 80', 20'  Assistance Level: Supervision  Gait Deviations: Decreased step length bilateral;Slow leonardo;Decreased heel strike right;Decreased heel strike left;Narrow base of support  Skilled Clinical Factors: improved awareness of kaz foot clearance, intermittent reciprocal pattern, mild pelvic instability    Neuromuscular Education  Facilitation techniques: manuevering around multiple obstacles of variety with ww in busy environment, no LOB or instability demonstrated, good

## 2022-07-08 NOTE — PROGRESS NOTES
Occupational Therapy Get up and Go Note            Date: 2022  Patient Name: Ivan Sheikh        MRN: [id number]    Account #: [de-identified]  : 1980  (35 y.o.)      Subjective:  Patient states:  \"okay. \"  Pain:  Pain at start of treatment: No    Pain at end of treatment: No    Location:   Nursing notified: Not Applicable      Objective:  ADL:  Grooming: Mod Ind  Pt completed oral care with self s/u at sink and washed face at sink      Treatment consisted of:   [x] ADL Training  [] Strengthening   [] Transfer Training    [] DME Education  [] HEP   [] Patient Education  [] Other:    Safety:  Safety Devices  Safety Devices in place:   Type of devices: All fall risk precautions in place      Therapy Time:   Individual Group Co-Treat   Time In 0750       Time Out 0805         Minutes 15             ADL/IADL training: 15 minutes         Electronically signed by:     JAMAL Lizama    2022, 10:07 AM

## 2022-07-08 NOTE — PLAN OF CARE
Problem: Discharge Planning  Goal: Discharge to home or other facility with appropriate resources  Outcome: Progressing     Problem: ABCDS Injury Assessment  Goal: Absence of physical injury  Outcome: Progressing     Problem: Safety - Adult  Goal: Free from fall injury  Outcome: Progressing     Problem: Skin/Tissue Integrity  Goal: Absence of new skin breakdown  Description: 1. Monitor for areas of redness and/or skin breakdown  2. Assess vascular access sites hourly  3. Every 4-6 hours minimum:  Change oxygen saturation probe site  4. Every 4-6 hours:  If on nasal continuous positive airway pressure, respiratory therapy assess nares and determine need for appliance change or resting period.   Outcome: Progressing     Problem: Pain  Goal: Verbalizes/displays adequate comfort level or baseline comfort level  Outcome: Progressing     Problem: Respiratory - Adult  Goal: Achieves optimal ventilation and oxygenation  Outcome: Progressing     Problem: Cardiovascular - Adult  Goal: Maintains optimal cardiac output and hemodynamic stability  Outcome: Progressing     Problem: Skin/Tissue Integrity - Adult  Goal: Skin integrity remains intact  Outcome: Progressing     Problem: Musculoskeletal - Adult  Goal: Return mobility to safest level of function  Outcome: Progressing     Problem: Gastrointestinal - Adult  Goal: Maintains adequate nutritional intake  Outcome: Progressing     Problem: Genitourinary - Adult  Goal: Absence of urinary retention  Outcome: Progressing     Problem: Infection - Adult  Goal: Absence of infection at discharge  Outcome: Progressing     Problem: Metabolic/Fluid and Electrolytes - Adult  Goal: Electrolytes maintained within normal limits  Outcome: Progressing     Problem: Hematologic - Adult  Goal: Maintains hematologic stability  Outcome: Progressing

## 2022-07-08 NOTE — PROGRESS NOTES
Assessment completed. A&O x4. Denies pain at this time. In chair with alarm activated. Call light in reach.  Electronically signed by Osa Harada, LPN on 6/5/8441 at 48:57 AM

## 2022-07-08 NOTE — PROGRESS NOTES
Occupational Therapy Get up and Go Note            Date: 2022  Patient Name: Dayna Meza        MRN: [id number]    Account #: [de-identified]  : 1980  (35 y.o.)      Subjective:  Patient states:  \" They are bringing my tray. \"  Pain:  Pain at start of treatment: No    Pain at end of treatment: No    Location:   Nursing notified: Not Applicable      Objective:  ADL:  LE Self-Care: Mod Ind  -socks and shoes only at EOB    Functional Mobility  Bed Mobility: Mod Ind from supine to sit  Bed to w/c: Sup/SBA    Self Feed: Ind             Treatment consisted of:   [x] ADL Training  [] Strengthening   [] Transfer Training    [] DME Education  [] HEP   [] Patient Education  [] Other:    Safety:  Safety Devices  Safety Devices in place:   Type of devices: All fall risk precautions in place      Therapy Time:   Individual Group Co-Treat   Time In 705       Time Out 0720         Minutes 15             ADL/IADL training: 15 minutes         Electronically signed by:     JAMAL Kamara    2022, 9:53 AM

## 2022-07-08 NOTE — PROGRESS NOTES
[facility]  Facility/Department: [facility]  Speech Language Pathology   Treatment Note          Leila Butler  1980  [id number]/[id number]  [x]   confirmed    Date: 2022    Rehab Diagnosis: Impaired mobility and activities of daily living dt exac of MS      Restrictions/Precautions: Fall Risk    Weight: 168 lb 3.2 oz (76.3 kg)     ADULT DIET; Regular; 3 carb choices (45 gm/meal)    SpO2: 100 % (22)  No active isolations    Speech Dx: Cognitive Linguistic Impairment    Subjective:  Alert, Cooperative and Pleasant        Interventions used this date:  Caregiver education    Objective/Assessment:  Patient progressing towards goals:  Patient and mother were present for a family training session. ST discussed cognitive test results and progress the patient has made. ST continued mild deficits noted in the areas of memory, problem solving and attention. ST provided patient with memory strategy sheet and discussed how to utilize strategy during ADLs. ST recommended that mother assist patient with medication management upon home going. ST also discussed recommended intermittent supervision. ST answered all the patient's questions. Treatment/Activity Tolerance:  Patient tolerated treatment well    Plan:  Continue per POC    Pain Assessment:  Patient does not c/o pain. Pain Re-assessment:  Patient does not c/o pain. Patient/Caregiver Education:  Patient educated on session and progression towards goals. Caregiver education on session and progress towards goals.     Safety Devices:  Chair alarm in place      Therapy Time  SLP Individual Minutes  Time In: 1400  Time Out: 8466  Minutes: 25              Signature: Electronically signed by FIDELIA Teixeira on 2022 at 3:38 PM

## 2022-07-08 NOTE — PROGRESS NOTES
was noted on telemetry to have a heart rate up to 146 bpm.    Cardiology was asked to see in consultation. Patient History and Records, EMR reviewed. Patient Interviewed and examined. Good historian. States that she is feeling well overall. Denies CP, SOB, LH, Dizziness, TIA or CVA Symptoms. No Orthopnea, Edema or CHF symptoms. No Palpitations. No Syncope. No Fever, Chills or Cold symptoms. No GI,  or Bleeding complaints. Cardiac and general ROS otherwise negative. [address] otherwise negative other than noted. Past Medical History:   Diagnosis Date    Dislocated knee     Right knee    Intellectual disability 2022    Lumbar radiculopathy 2020    MS (multiple sclerosis) ([facility] [address] 2022    PTTD (posterior tibial tendon dysfunction)        Past Surgical History:   Procedure Laterality Date    CATARACT REMOVAL WITH IMPLANT Right     CATARACT REMOVAL WITH IMPLANT Left      SECTION      EYE SURGERY      FOOT SURGERY Left        Prior to Admission medications    Medication Sig Start Date End Date Taking?  Authorizing Provider   mirabegron (MYRBETRIQ) 25 MG TB24 Take 1 tablet by mouth daily  Patient not taking: Reported on 2022   Aston Burnett MD   KESIMPTA 20 MG/0.4ML [facility]  12/15/21   Historical Provider, MD   vitamin D 25 MCG (1000 UT) CAPS Take by mouth    Historical Provider, MD   MULTIPLE VITAMINS-MINERALS ER PO Take 1 tablet by mouth    Historical Provider, MD       Scheduled Meds:   multivitamin  1 tablet Oral Lunch    oxybutynin  5 mg Oral Nightly    clopidogrel  75 mg Oral Daily    aspirin  81 mg Oral Daily    Vitamin D  2,000 Units Oral Dinner    cyanocobalamin  1,000 mcg IntraMUSCular Weekly    coenzyme Q10  100 mg Oral Daily    lidocaine  3 patch TransDERmal Daily     Continuous Infusions:  PRN Meds:acetaminophen, bisacodyl, fleet    No Known Allergies    Social History     Socioeconomic History    Marital status:  Spouse name: Not on file    Number of children: 1    Years of education: Not on file    Highest education level: Not on file   Occupational History    Not on file   Tobacco Use    Smoking status: Never Smoker    Smokeless tobacco: Never Used   Substance and Sexual Activity    Alcohol use: No    Drug use: No    Sexual activity: Not Currently   Other Topics Concern    Not on file   Social History Narrative    Lives With: her parents (mom Peng David) father is sick  and her Son (5 y.o)    Type of Home: Apartment in [address] [place] [address] Nw: One level    Home Access: Stairs to enter with rails - Number of Steps: 1    Bathroom Shower/Tub: Tub/Shower unit, Equipment: Shower chair,Hand-held shower    Home Equipment: Caye Orn, rolling,Wheelchair-manual    Has the patient had two or more falls in the past year or any fall with injury in the past year?: Yes (one - fell on mothers floor and couldn't get up)    ADL Assistance: Independent    Homemaking Assistance: Independent    Homemaking Responsibilities: Yes    Ambulation Assistance: Independent    Transfer Assistance: Independent    Active : No    Patient's  Info: mother    She is on disability from her MS-Had been a [place] prior to her MS. Was in special classes in school. Pending Waiver services for Aide services. Social Determinants of Health     Financial Resource Strain:     Difficulty of Paying Living Expenses: Not on file   Food Insecurity:     Worried About Running Out of Food in the Last Year: Not on file    Gege of Food in the Last Year: Not on file   Transportation Needs:     Lack of Transportation (Medical): Not on file    Lack of Transportation (Non-Medical):  Not on file   Physical Activity:     Days of Exercise per Week: Not on file    Minutes of Exercise per Session: Not on file   Stress:     Feeling of Stress : Not on file   Social Connections:     Frequency of Communication with Friends and Family: Not on file    Frequency of Social Gatherings with Friends and Family: Not on file    Attends Zoroastrian Services: Not on file    Active Member of Clubs or Organizations: Not on file    Attends Club or Organization Meetings: Not on file    Marital Status: Not on file   Intimate Partner Violence:     Fear of Current or Ex-Partner: Not on file    Emotionally Abused: Not on file    Physically Abused: Not on file    Sexually Abused: Not on file   Housing Stability:     Unable to Pay for Housing in the Last Year: Not on file    Number of [place] in the Last Year: Not on file    Unstable Housing in the Last Year: Not on file       Family History   Problem Relation Age of Onset    Hypertension Mother     Diabetes Maternal Uncle     Cancer Maternal Grandmother         stomach cancer       Review Of Systems:    14 point ROS negative other than mentioned. Physical Exam:    CURRENT VITALS: /68   Pulse 98   Temp 98.1 °F (36.7 °C) (Oral)   Resp 16   Ht 5' 1\" (1.549 m)   Wt 168 lb 3.2 oz (76.3 kg)   SpO2 100%   BMI 31.78 kg/m²     CONSTITUTIONAL:  awake, alert, cooperative, no apparent distress,   ENT:  Normocephalic, without obvious abnormality, atraumatic, sinuses nontender on palpation, external ears without lesions,  NECK:  Supple, symmetrical, trachea midline, no adenopathy, thyroid symmetric, not enlarged and no tenderness, skin normal, No bruits. LUNGS:  No increased work of breathing, good air exchange, clear to auscultation bilaterally, no crackles, no wheezing  CARDIOVASCULAR:  Normal apical impulse, regular rate and rhythm, normal S1 and S2,  1/6 Systolic murmur noted. ABDOMEN:  Obese, normal bowel sounds, soft, non-distended, non-tender, no masses palpated, no hepatosplenomegally  EXTREMETIES: No edema, Pulses Strong Thruout. No ulcers. NEUROLOGIC:  Awake, alert, oriented to name, place and time. Following all commands and moving all extremties.   SKIN:  no bruising or bleeding, normal skin color, texture, turgor and no rashes    Labs:  No results found for this or any previous visit (from the past 24 hour(s)). ECG:     SR, RBBB, NSSTs    TELEMETRY:  Sinus rhythm     ECHO:    LVEF 65%  Moderate PFO, with R>L shunts.         Clinton Duffy MD  [facility] Cardiologist          -----

## 2022-07-08 NOTE — PROGRESS NOTES
[facility] Neurology Daily Progress Note  Name: Cody Montgomery  Age: 35 y.o. Gender: female  CodeStatus: Full Code  Allergies: No Known Allergies    Chief Complaint:No chief complaint on file. Primary Care Provider: Sabi Cheatham PA-C  InpatientTreatment Team: Treatment Team: Attending Provider: Benson Morales DO; Consulting Physician: Kendell Ramos MD; Consulting Physician: Paula Montgomery MD; Nursing Student: Sanjuana Malik; Consulting Physician: Sushma Mason MD; Patient Care Tech: Elvira Torres; Registered Nurse: Alex Gabriel RN; LPN: Ramon Hargrove LPN; Occupational Therapist Assistant: JAMAL Riddle; : MEL Park LSW  Admission Date: 6/27/2022      HPI   Pt seen and examined on rehab unit for neurology follow-up for multiple sclerosis with bilateral lower extremity paraparesis and left foot drop. Patient alert and oriented x3, no acute distress, cooperative. Some improvement in mobility noted. Of note patient has had cardiac work-up that revealed moderate PFO. She has been initiated on aspirin and Plavix with plans for future PFO closure. Plans for DC next week. Patient seen and weekly rehab rounds done. She is making some gains but she is still quite weak on the right side. Vitals:    07/07/22 2003   BP: 125/68   Pulse: 98   Resp: 16   Temp: 98.1 °F (36.7 °C)   SpO2: 100%      Review of Systems   Constitutional: Negative for fatigue and fever. HENT: Negative for hearing loss and trouble swallowing. Eyes: Negative for visual disturbance. Respiratory: Negative for cough, chest tightness, shortness of breath and wheezing. Cardiovascular: Negative for chest pain, palpitations and leg swelling. Gastrointestinal: Negative for abdominal distention, abdominal pain, nausea and vomiting. Genitourinary: Negative for difficulty urinating. Musculoskeletal: Positive for gait problem. Skin: Negative for color change and rash. Neurological: Positive for weakness. Negative for dizziness, tremors, seizures, syncope, facial asymmetry, speech difficulty, light-headedness, numbness and headaches. Psychiatric/Behavioral: Negative for agitation, confusion and hallucinations. The patient is not nervous/anxious. Physical Exam  Vitals and nursing note reviewed. Constitutional:       General: She is not in acute distress. Appearance: She is not diaphoretic. HENT:      Head: Normocephalic and atraumatic. Eyes:      Pupils: Pupils are equal, round, and reactive to light. Cardiovascular:      Rate and Rhythm: Normal rate and regular rhythm. Pulmonary:      Effort: Pulmonary effort is normal. No respiratory distress. Breath sounds: Normal breath sounds. Abdominal:      General: Bowel sounds are normal. There is no distension. Palpations: Abdomen is soft. Tenderness: There is no abdominal tenderness. Skin:     General: Skin is warm and dry. Neurological:      Mental Status: She is alert and oriented to person, place, and time. Cranial Nerves: No cranial nerve deficit. Motor: Weakness and abnormal muscle tone present. No tremor, atrophy or seizure activity. Gait: Gait abnormal.      Deep Tendon Reflexes: Reflexes abnormal.       Exam as noted above with weakness on the right more than left.         Medications:  Reviewed    Infusion Medications:    Scheduled Medications:    multivitamin  1 tablet Oral Lunch    oxybutynin  5 mg Oral Nightly    clopidogrel  75 mg Oral Daily    aspirin  81 mg Oral Daily    Vitamin D  2,000 Units Oral Dinner    cyanocobalamin  1,000 mcg IntraMUSCular Weekly    coenzyme Q10  100 mg Oral Daily    lidocaine  3 patch TransDERmal Daily     PRN Meds: acetaminophen, bisacodyl, fleet    Labs:   Recent Labs     07/06/22  0424 07/07/22 0622   WBC 6.3 6.5   HGB 8.8* 9.4*   HCT 26.9* 29.4*    238     Recent Labs     07/06/22  0423 07/07/22 0622    137   K 4.2 4.6  104   CO2 26 21   BUN 17 21*   CREATININE 0.63 0.54   CALCIUM 8.8 8.7     Recent Labs     07/07/22  0622   AST 29   ALT 42*   BILIDIR <0.2   BILITOT <0.2   ALKPHOS 61     No results for input(s): INR in the last 72 hours. No results for input(s): Huma Comber in the last 72 hours. Urinalysis:   Lab Results   Component Value Date/Time    NITRU Negative 07/05/2022 02:39 PM    WBCUA 10-20 07/05/2022 02:39 PM    BACTERIA FEW 07/05/2022 02:39 PM    RBCUA 3-5 07/05/2022 02:39 PM    BLOODU TRACE 07/05/2022 02:39 PM    SPECGRAV 1.020 07/05/2022 02:39 PM    GLUCOSEU Negative 07/05/2022 02:39 PM       Radiology:   Most recent    EEG No valid procedures specified. MRI of Brain Results for orders placed during the hospital encounter of 01/20/22    MRI BRAIN W WO CONTRAST    Narrative  EXAMINATION: MRI BRAIN W WO CONTRAST    CLINICAL HISTORY: G35 MS (multiple sclerosis) ([facility] [address] ICD10    COMPARISONS: Brain MRI from October 16, 2020    TECHNIQUE:    Multiplanar multisequence images of the brain were obtained before and after IV administration of contrast. Diffusion perfusion imaging was obtained. BRAIN MRI FINDINGS:    There are no extra-axial collections. There is no evidence of hemorrhage. There are no areas of perfusion diffusion signal abnormality to suggest ischemia. The susceptibility images do not demonstrate evidence of hemosiderin deposition within the brain  parenchyma or the leptomeninges. There is preservation of the gray-white matter differentiation. There are marked areas of T2/FLAIR increased signal in the subcortical and periventricular white matter of both hemispheres with areas of confluence. There is involvement of corpus callosum  and there are areas of signal dropout on T1 imaging. There are no areas of abnormal enhancement after IV contrast administration.  There is prominence of the sulci and ventricles consistent with moderate global cerebral atrophy and chronic involutional changes out of proportion to the patient's age. The midline structures are intact, the corpus callosum is within normal limits. The region of the pineal gland and the sella turcica are unremarkable. There are no space-occupying lesions in the posterior fossa. The basilar cisterns are patent. The craniocervical junction is unremarkable. The visualized portions of the orbits are within normal limits, the globes are intact. The visualized portions of the paranasal sinuses are within normal limits. The calvarium and soft tissues are unremarkable. Impression  There are extensive areas of signal abnormality in the subcortical and periventricular white matter and involving the corpus callosum without enhancement to suggest active inflammation. The findings are similar to the previous study and may reflect  severe chronic microangiopathy, vasculitis, MS or other demyelinating process. Results for orders placed during the hospital encounter of 10/16/20    MRI BRAIN WO CONTRAST    Narrative  EXAMINATION: MRI BRAIN WO CONTRAST, MRI CERVICAL SPINE WO CONTRAST    CLINICAL HISTORY: R26.0 Ataxic gait ICD10    COMPARISONS: Brain CT from August 29, 2014    TECHNIQUE:    Multiplanar multisequence images of the brain were obtained without contrast. Diffusion perfusion imaging was obtained. FINDINGS:    There are no extra-axial collections. There is no evidence of hemorrhage. There are no areas of signal abnormality on perfusion diffusion imaging to suggest ischemia. The susceptibility images do not demonstrate evidence of hemosiderin deposition within  the brain parenchyma or the leptomeninges. There is preservation of the gray-white matter differentiation. There are extensive areas of T2/FLAIR signal abnormality in the subcortical and periventricular white matter in the confluent distribution especially surrounding the atria of both lateral  ventricles.  Some lesions are oriented perpendicular to the corpus at C5-6. These may represent foci of demyelination. There is no prevertebral soft tissue swelling. Anterior soft tissues are unremarkable. The craniocervical junction is unremarkable. C2-3: There is a  indenting the anterior thecal sac but not abutting the cord with mild central canal narrowing. The facet joints are unremarkable. There is no narrowing of the neural foramina. C3-4:There is a less than 2 mm disc bulge flattening the anterior portion of the thecal sac without narrowing of the central canal.  The facet joints are unremarkable. There is no narrowing of the neural foramina. C4-5:There is a less than 2 mm disc bulge flattening the anterior portion of the thecal sac without narrowing of the central canal.  The facet joints are unremarkable. There is no narrowing of the neural foramina. C5-6:There is no disc herniation or central canal narrowing. The facet joints are unremarkable. There is no narrowing of the neural foramina. C6-7:There is no disc herniation or central canal narrowing. The facet joints are unremarkable. There is no narrowing of the neural foramina. C7-T1:There is no disc herniation or central canal narrowing. The facet joints are unremarkable. There is no narrowing of the neural foramina. IMPRESSION:  1. There is no acute fracture or subluxation. 2. The cord is normal in course and caliber. There are foci of signal abnormality most likely between C3 and C6 on both the right and left sides of the cord which may represent areas of demyelination. The findings may be secondary to edema and process  such as multiple sclerosis versus prior ischemia or other etiologies. 3. There is mild multilevel spondylosis including intervertebral disc space narrowing at every level and very degrees of disc bulges as described in greater detail in each level above. MRA of the Head and Neck: No results found for this or any previous visit.   No results found for this or any previous visit. No results found for this or any previous visit. CT of the Head: No results found for this or any previous visit. No results found for this or any previous visit. No results found for this or any previous visit. Carotid duplex: No results found for this or any previous visit. No results found for this or any previous visit. No results found for this or any previous visit. Echo No results found for this or any previous visit. Assessment/Plan:    6/29/2022:  Multiple  sclerosis with exacerbation. Patient with bilateral lower extremity paraparesis and left foot drop at baseline but came in with increasing lower extremity weakness. Treated with IV methylprednisolone with some improvement. Patient on Sidum[address] 30 outpatient  but this will be discontinued due to her elevated LFTs. She will be initiated on Ocrevus after follow-up with Dr. Enrico Corona with therapies. We will monitor her progress    7/1/2022:  Multiple sclerosis with bilateral lower extremity paraparesis and left foot drop. Continue with therapies  Patient had tachycardia and cardiology was consulted. She had echocardiogram done that showed moderate PFO with right to left shunt. She has been started on aspirin and Plavix per cardiology. Bilateral lower extremity venous duplex was negative. Cardiology to arrange for PFO closure. I have personally performed a face to face diagnostic evaluation on this patient, reviewed all data and investigations, and am the sole provider of all clinical decisions on the neurological status of this patient. And had a exacerbation and her EDSS scores have changed now. She is feeling to the right and requires more help. She has been on Sidumula 30 and had a relapse. She may need to be considered for Vallery Aschoff as now she is becoming primary progressive multiple sclerosis.       7/6/22:  Multiple sclerosis with bilateral lower extremity paraparesis and left foot drop. Continue with therapies. [place]oxine [place] stopped due to elevated LFTs. Repeat lfts. Plans for [facility] outpatient. Patient had tachycardia and cardiology was consulted. She had echocardiogram done that showed moderate PFO with right to left shunt. She has been started on aspirin and Plavix per cardiology. Cardiology to arrange for PFO closure. 7/8/22:  Multiple sclerosis with bilateral lower extremity paraparesis and left foot drop. Continue with therapies. Roxine [place] stopped due to elevated LFTs. Repeat lfts improved yesterday, almost normalized. Plans for [facility] outpatient. Patient had tachycardia and cardiology was consulted. She had echocardiogram done that showed moderate PFO with right to left shunt. She has been started on aspirin and Plavix per cardiology. Cardiology to arrange for PFO closure. Mother at bedside and the above was explained to her in detail. I have personally performed a face to face diagnostic evaluation on this patient, reviewed all data and investigations, and am the sole provider of all clinical decisions on the neurological status of this patient. Patient has asymptomatic PFO and I do not see that she has had any new strokes though we will make sure she has not thrown any clots. She is on dual antiplatelet therapy. She has right-sided weakness which is new and an exacerbation of her MS. Her liver function tests are now normal and we will consider Ocrevus as an outpatient. More than 60% time spent on evaluating and managing this patient      Facundo Benavides MD, [address], American Board of Psychiatry & Neurology  Board Certified in Vascular Neurology  Board Certified in Neuromuscular Medicine  Certified in Neurorehabilitation       Collaborating physicians: Dr Karishma Benavides    Electronically signed by JACKI Eason CNP on 7/8/2022 at 12:58 PM

## 2022-07-08 NOTE — PROGRESS NOTES
Occupational Therapy  OCCUPATIONAL THERAPY  INPATIENT REHAB TREATMENT NOTE  [facility]      NAME: Abraham Beatty  : 1980 (35 y.o.)  MRN: [id number]  CODE STATUS: Full Code  [facility]    Date of Service: 2022    Referring Physician: Dr. Isabela Gurrola  Rehab Diagnosis: Impaired mobility and ADLs D/T Exacerabation of MS    Restrictions  Restrictions/Precautions  Restrictions/Precautions: Fall Risk              Patient's date of birth confirmed: Yes    SAFETY:  Safety Devices  Safety Devices in place: Yes  Type of devices: All fall risk precautions in place    SUBJECTIVE:  Subjective: \" I feel like I am back to where I was at mostly. \"    Pain at start of treatment: No    Pain at end of treatment: No    Location:   Nursing notified: Not Applicable  RN:   Intervention: None    COGNITION:  Orientation  Overall Orientation Status: Within Normal Limits  Orientation Level: Oriented X4  Cognition  Overall Cognitive Status: WFL          OBJECTIVE:         Tub/Shower Transfers  Type: Tub  Transfer From: Rolling walker  Transfer To: Tub transfer bench  Additional Factors: Verbal cues  Assistance Level: Stand by assist         Functional Mobility  Device: Rolling walker  Activity: To/From bathroom  Assistance Level: Stand by assist  Sit to Stand  Assistance Level: Stand by assist  Stand to Sit  Assistance Level: Stand by assist  Skilled Clinical Factors: from w/c to standing using rolling walker to bathtub, sitting on extended tub bench,then completing mobility back to w/c               OT Exercises  Exercise Treatment: Pt completed BUE ex bike x 8 min wtih 4 min forward and 4 min backwards with steady good pace on Mod resistance with Sup. Pt completed task without difficulty but demonstrated fatigue and SOB slightly after 4 min into activity. . Provided task to increase strength and functional act tolerance to improve ability to complete functional transfers, safety with mobility and ADL tasks. Education:  Education  Education Given To: Patient; Family  Education Provided: ADL Function;Transfer Training  Education Provided Comments: provided training with pt/pt mother on tub benc transfer with pt demo'ing difficulty getting on/off of bench d/t decreased ability to scoot backward onto bench, demo difficulty with hip shifting. Pt was educated if needed, she could get a bed rail to assist herself in/out of bed as leverage for BLE. Education Method: Verbal;Demonstration  Barriers to Learning: Cognition  Education Outcome: Verbalized understanding;Demonstrated understanding;Continued education needed             ASSESSMENT:  Assessment: Pt appears to not be completely aware of deficits but does not vocalize defecits and this may cause an increased risk of falls. Activity Tolerance: Patient tolerated treatment well      PLAN OF CARE:  Strengthening,Balance training,Functional mobility training,Neuromuscular re-education,Self-Care / Aj Spivey training,Safety education & training,Home management training  continue with POC    Patient goals : \"My dad can't take care of me now, so I need to get better. He has congestive heart failure. \"  Time Frame for Long term goals : Within 2 weeks, Pt to demonstrate progress in the following areas listed below to achieve specific LTG's as stated in the initial evaluation. Long Term Goal 1: Increase independence in ADLs  Long Term Goal 2: Increaese endurance to complete clothes management  Long Term Goal 3: Increase static/dynamic balance  Long Term Goal 4: Increase independence to perform kitchen mobility        Therapy Time:   Individual Group Co-Treat   Time In 1330       Time Out 1400         Minutes 30                   ADL/IADL trainin minutes  Therapeutic activities: 8 minutes     Electronically signed by:     JAMAL Jennings,   2022, 3:14 PM

## 2022-07-09 PROCEDURE — 6370000000 HC RX 637 (ALT 250 FOR IP): Performed by: INTERNAL MEDICINE

## 2022-07-09 PROCEDURE — 97535 SELF CARE MNGMENT TRAINING: CPT

## 2022-07-09 PROCEDURE — 97116 GAIT TRAINING THERAPY: CPT

## 2022-07-09 PROCEDURE — 1180000000 HC REHAB R&B

## 2022-07-09 PROCEDURE — 6370000000 HC RX 637 (ALT 250 FOR IP): Performed by: PHYSICAL MEDICINE & REHABILITATION

## 2022-07-09 RX ADMIN — Medication 100 MG: at 09:07

## 2022-07-09 RX ADMIN — CLOPIDOGREL BISULFATE 75 MG: 75 TABLET ORAL at 09:07

## 2022-07-09 RX ADMIN — ASPIRIN 81 MG: 81 TABLET, COATED ORAL at 09:07

## 2022-07-09 RX ADMIN — OXYBUTYNIN CHLORIDE 5 MG: 5 TABLET, EXTENDED RELEASE ORAL at 20:04

## 2022-07-09 RX ADMIN — THERA TABS 1 TABLET: TAB at 11:45

## 2022-07-09 RX ADMIN — Medication 2000 UNITS: at 16:52

## 2022-07-09 ASSESSMENT — PAIN SCALES - GENERAL
PAINLEVEL_OUTOF10: 0

## 2022-07-09 NOTE — PROGRESS NOTES
[facility] Heart Progress Note      Patient: Dominik Salazar    Unit/Bed: P976/R737-91  YOB: 1980  MRN: [id number]  [facility] Date:  6/27/2022  Hospital Day: 12    Rounding Date: 7/9/2022    Rounding Cardiologist:  DEE DEE Stratton MD    PRIMARY Cardiologist: Giuseppe Alvarez    Subjective Complaint:   Denies any chest pain with exertion or at rest, palpitations, syncope, or edema. .     Physical Examination:     /69   Pulse (!) 103   Temp 97.7 °F (36.5 °C) (Oral)   Resp 20   Ht 5' 1\" (1.549 m)   Wt 168 lb 3.2 oz (76.3 kg)   SpO2 98%   BMI 31.78 kg/m²     No intake or output data in the 24 hours ending 07/09/22 [address] examined at bedside in in no apparent distress, cooperative and improved. Focused exam reveals:     Cardiac: Heart regular rate and rhythm     Lungs:  clear to auscultation bilaterally- no wheezes, rales or rhonchi, normal air movement, no respiratory distress    Extremities:   negative    Telemetry:      Not on telemetry         LABS:   CBC: Recent Labs     07/07/22  0622   WBC 6.5   HGB 9.4*         BMP:    Recent Labs     07/07/22  0622      K 4.6      CO2 21   BUN 21*   CREATININE 0.54   GLUCOSE 92              Troponin: No results for input(s): TROPONINT in the last 72 hours. BNP: No results for input(s): PROBNP in the last 72 hours. INR: No results for input(s): INR in the last 72 hours. Mg: No results for input(s): MG in the last 72 hours. Cardiac Testing:         Summary   Normal left atrium. Patent foramen ovale with right-to-left shunt was visualized. On agitated saline contrast injection- Large amount of bubbles LA and LV   nearly immediately   Normal left ventricle structure and function. Left ventricular ejection fraction is visually estimated at 65%. Borderline concentric LVH   Normal right ventricle structure and function. RVSP could not be calculated   Normal mitral valve structure and function. Trivial MR   Normal aortic valve structure and function. Normal tricuspid valve structure and function. Normal pulmonic valve structure and function. Normal right atrium. No evidence of pericardial effusion. Fat pad present. Signature      ----------------------------------------------------------------   Electronically signed by Melita Bundy MD(Interpreting   physician) on 06/29/2022 04:58 PM    Assessment:  See Dr. Nellie Craft note  Outpatient work-up for PFO  In the interim, on aspirin and Plavix  Recent exacerbation of multiple sclerosis  No cardiac complaints  Plan:  1.  No medication changes at this time--follow-up to be arranged on an outpatient basis  Electronically signed by Luan Gibson MD on 7/9/2022 at 11:30 AM

## 2022-07-09 NOTE — PROGRESS NOTES
OCCUPATIONAL THERAPY  INPATIENT REHAB TREATMENT NOTE  [facility]      NAME: Leila Butler  : 1980 (35 y.o.)  MRN: [id number]  CODE STATUS: Full Code  Room: R255R255-01    Date of Service: 2022    Referring Physician: Dr. Romana Rao  Rehab Diagnosis: Impaired mobility and ADLs D/T Exacerabation of MS    Restrictions  Restrictions/Precautions  Restrictions/Precautions: Fall Risk     Patient's date of birth confirmed: Yes    SAFETY:       SUBJECTIVE:  Subjective: \"I'm getting better\"    Pain at start of treatment: No    Pain at end of treatment: No    Location:   Nursing notified: Not Applicable  RN:   Intervention: None    COGNITION:  Orientation  Overall Orientation Status: Within Normal Limits  Orientation Level: Oriented X4  Cognition  Overall Cognitive Status: WFL      Pt's current cognitive status is:  Comprehension: Independent  Expression: Independent  Social Interaction: Independent  Problem Solving: Supervision  Memory: Supervision    OBJECTIVE:    Feeding  Assistance Level: Independent  Grooming/Oral Hygiene  Assistance Level: Independent  Upper Extremity Bathing  Assistance Level: Set-up  Lower Extremity Bathing  Assistance Level: Set-up  Skilled Clinical Factors: Declined to wash feet  Upper Extremity Dressing  Assistance Level: Set-up  Lower Extremity Dressing  Assistance Level: Moderate assistance  Skilled Clinical Factors: Assist threading BLEs d/t increased fatigue  Putting On/Taking Off Footwear  Assistance Level:  Moderate assistance  Skilled Clinical Factors: Assist with heels of B shoes, was able to don independently one trial (to ambulate to bathroom)  Toileting  Assistance Level: Supervision  Toilet Transfers  Technique: Stand step  Equipment: Standard toilet;Grab bars  Assistance Level: Supervision  Tub/Shower Transfers  Skilled Clinical Factors: Patient requesting sponge bath at sink         Functional Mobility  Device: Rolling walker  Activity: To/From bathroom  Assistance Level: Stand by assist  Skilled Clinical Factors: Increased time for ambulation  Sit to Supine  Assistance Level: Stand by assist  Supine to Sit  Skilled Clinical Factors: NT up to w/c  Sit to Stand  Assistance Level: Stand by assist  Stand to Sit  Assistance Level: Stand by assist         Education:  Education  Education Given To: Patient  Education Provided: ADL Function;Transfer Training  Education Provided Comments: Ecducated pt. on techniques for LE dressing and reaching feet; pt. states she has not seen AE for LE dressing but is amenable to education on Monday. Education Method: Verbal  Barriers to Learning: None  Education Outcome: Verbalized understanding    Equipment recommendations:  OT Equipment Recommendations  Other: May benefit from hip kit      ASSESSMENT:  Assessment: Pt demonstrates G attention to task and engagement in ADLs. Continues to be limited by weakness during mobility tasks. Activity Tolerance: Patient tolerated treatment well      PLAN OF CARE:  Strengthening,Balance training,Functional mobility training,Neuromuscular re-education,Self-Care / Daphane Falling training,Safety education & training,Home management training  continue with POC    Patient goals : \"My dad can't take care of me now, so I need to get better. He has congestive heart failure. \"  Time Frame for Long term goals : Within 2 weeks, Pt to demonstrate progress in the following areas listed below to achieve specific LTG's as stated in the initial evaluation. Long Term Goal 1: Increase independence in ADLs  Long Term Goal 2: Increaese endurance to complete clothes management  Long Term Goal 3: Increase static/dynamic balance  Long Term Goal 4: Increase independence to perform kitchen mobility        Therapy Time:   Individual Group Co-Treat   Time In 0800       Time Out 0830         Minutes 30         ADL/IADL trainin minutes     Electronically signed by:     FRANCHESKA Castro,   2022, 8:48 AM

## 2022-07-09 NOTE — PROGRESS NOTES
Physical Therapy Rehab Treatment Note  Facility/Department: [facility]  Room: R255/R255-01       NAME: Dominik Salazar  : 1980 (35 y.o.)  MRN: [id number]  CODE STATUS: Full Code    Date of Service: 2022       Restrictions:fall risk          SUBJECTIVE:   Subjective: \"i'm going home Tuesday, the time is flying by'    Pain  Pain: Pt reports no pain      OBJECTIVE:   Orientation  Overall Orientation Status: Within Functional Limits  Cognition  Overall Cognitive Status: WFL  Arousal/Alertness: Appropriate responses to stimuli              Transfer Training  Transfer Training: Yes  Sit to Stand: Supervision  Stand to Sit: Supervision    Gait Training: Yes  Overall Level of Assistance: Stand-by assistance  Distance (ft): 50 Feet  Assistive Device: Walker, rolling  Interventions: Tactile cues  Base of Support: Widened  Speed/Marlys: Fluctuations  Step Length: Right shortened;Left shortened  Gait Abnormalities: Step to gait; Decreased step clearance  Right Side Weight Bearing: As tolerated  Left Side Weight Bearing: As tolerated              Neuromuscular Education  Neuromuscular Comments: sit to stand x 3. static standing at ww. able to weight shift in all directions. ASSESSMENT/PROGRESS TOWARDS GOALS: progressing daily. Pt declined to practice curb step at this time due to fatigue.         Goals:  Long Term Goals  Long term goal 1: Pt to complete bed mobility with indep  Long term goal 2: Pt to complete functional transfers bed/chair/car with indep  Long term goal 3: Pt to ambulate 50-150ft with LRD and indep  Long term goal 4: Pt to tolerate 5min standing activities indep  Long term goal 5: Pt to manage 2\" curb step indep    PLAN OF CARE/Safety: ongoing         Therapy Time:   Individual   Time In 830   Time Out 0900   Minutes 30     Minutes:30  Transfer/Bed mobility training:10  Gait training:10  Neuro re education:10  Therapeutic ex:0      [facility] LAUREN MCMILLAN PTA, 22 at 8:58 AM

## 2022-07-10 PROCEDURE — 99232 SBSQ HOSP IP/OBS MODERATE 35: CPT | Performed by: PHYSICAL MEDICINE & REHABILITATION

## 2022-07-10 PROCEDURE — 6370000000 HC RX 637 (ALT 250 FOR IP): Performed by: PHYSICAL MEDICINE & REHABILITATION

## 2022-07-10 PROCEDURE — 6370000000 HC RX 637 (ALT 250 FOR IP): Performed by: INTERNAL MEDICINE

## 2022-07-10 PROCEDURE — 1180000000 HC REHAB R&B

## 2022-07-10 RX ORDER — ASPIRIN 81 MG/1
81 TABLET ORAL DAILY
Qty: 30 TABLET | Refills: 3 | Status: SHIPPED | OUTPATIENT
Start: 2022-07-11 | End: 2022-09-18 | Stop reason: SDUPTHER

## 2022-07-10 RX ORDER — UBIDECARENONE 100 MG
100 CAPSULE ORAL DAILY
Qty: 120 CAPSULE | Refills: 5 | Status: SHIPPED | OUTPATIENT
Start: 2022-07-11

## 2022-07-10 RX ADMIN — Medication 2000 UNITS: at 16:28

## 2022-07-10 RX ADMIN — OXYBUTYNIN CHLORIDE 5 MG: 5 TABLET, EXTENDED RELEASE ORAL at 20:14

## 2022-07-10 RX ADMIN — Medication 100 MG: at 08:06

## 2022-07-10 RX ADMIN — CLOPIDOGREL BISULFATE 75 MG: 75 TABLET ORAL at 08:06

## 2022-07-10 RX ADMIN — ASPIRIN 81 MG: 81 TABLET, COATED ORAL at 08:06

## 2022-07-10 RX ADMIN — THERA TABS 1 TABLET: TAB at 11:39

## 2022-07-10 NOTE — PROGRESS NOTES
Pt alert and oriented x 4. Denies any pain at this time. Incontinent of urine. Continent of bowel. LBM 7/9/22. External female catheter placed. BLE edema-feet elevated in bed. SBA with walker. Pt currently sleeping in bed. Call light within reach. Bed alarm on, bed locked and in lowest position.  Electronically signed by Tian Díaz RN on 7/9/2022 at 11:09 PM

## 2022-07-10 NOTE — PROGRESS NOTES
recent echocardiogram.  Cardiology will follow up with her as an outpatient. She no longer needs daily labs I have discontinued them. We discussed possibly getting her a pure wick catheter at night for home use. ROS x10: The patient also complains of severely impaired mobility and activities of daily living. Otherwise no new problems with vision, hearing, nose, mouth, throat, dermal, cardiovascular, GI, , pulmonary, musculoskeletal, psychiatric or neurological. See also Acute Rehab PM&R H&P. Vital signs:  BP (!) 111/56   Pulse 84   Temp 98.2 °F (36.8 °C) (Oral)   Resp 14   Ht 5' 1\" (1.549 m)   Wt 174 lb 4 oz (79 kg)   SpO2 100%   BMI 32.92 kg/m²   I/O:   PO/Intake:  fair PO intake, regular 3 carb per meal diet    Bowel:   continent LBM 7/3  Bladder: continent pure wick catheter at night urine is clear  General:  Patient is well developed,   adequately nourished, and    well kempt. HEENT:    Pupils equal, hearing intact to loud voice, external inspection of ear and nose benign. Inspection of lips, tongue and gums benign    Musculoskeletal: No significant change in strength or tone. All joints stable. Inspection and palpation of digits and nails show no clubbing, cyanosis or inflammatory conditions. Neuro/Psychiatric: Affect: flat but pleasant. Alert and oriented to person, place and situation with  min cues. No significant change in deep tendon reflexes or sensation  Lungs:  Diminished, CTA-B. Respiration effort is   normal at rest.     Heart:   S1 = S2,   RRR-     Abdomen:  Soft, non-tender, no enlargement of liver or spleen.   Extremities:    lower extremity edema    Skin:   Intact to general survey,      Rehabilitation:  Physical Therapy:   Bed mobility:  Bed mobility  Bridging: Stand by assistance (07/04/22 6377)  Rolling to Left: Stand by assistance (07/04/22 7497)  Rolling to Right: Stand by assistance (07/04/22 7288)  Supine to Sit: Minimal assistance (06/28/22 4413)  Sit to Supine: Stand by assistance (07/04/22 8223)  Scooting: Minimal assistance (latearlly in supine) (06/28/22 1143)  Bed Mobility Comments: Mat mobility (07/04/22 1845)  Bed Mobility  Additional Factors: Without handrails; Head of bed flat (mat table) (07/07/22 1528)  Additional Factors: Without handrails; Head of bed flat (mat table) (07/07/22 1528)  Roll Left  Assistance Level: Modified independent (07/08/22 1328)  Skilled Clinical Factors: with HR (06/28/22 1342)  Roll Right  Assistance Level: Modified independent (07/08/22 1328)  Skilled Clinical Factors: with HR (06/28/22 1342)  Sit to Supine  Assistance Level: Supervision (07/08/22 1328)  Skilled Clinical Factors: requiring to use BUE to get BLE on to mat table (07/08/22 1328)  Supine to Sit  Assistance Level: Supervision (07/08/22 1328)  Skilled Clinical Factors: cues for improving technique with fair follow through (07/08/22 1328)  Scooting  Assistance Level: Supervision (07/08/22 1328)  Skilled Clinical Factors: lateral scooting EOB (07/08/22 1328)  Transfers:  Transfers  Sit to Stand: Stand by assistance (07/04/22 0934)  Stand to sit: Stand by assistance (07/04/22 0934)  Bed to Chair: Stand by assistance (07/04/22 0934)  Lateral Transfers: Minimal Assistance;Contact guard assistance (Instruction provided on scooting laterally at EOB emphasizing forward weight shift) (06/28/22 1145)  Car Transfer: Stand by assistance (07/04/22 0934)  Comment: VC for hand placement when sitting down (07/04/22 0934)  Transfer Training  Transfer Training: Yes (07/09/22 0844)  Sit to Stand: Supervision (07/09/22 0844)  Stand to Sit: Supervision (07/09/22 0844)  Transfers  Surface: Wheelchair (07/08/22 1328)  Additional Factors: Verbal cues (07/07/22 1528)  Device: [employer] Jakub (07/08/22 1328)  Sit to Stand  Assistance Level: Modified independent (07/08/22 1328)  Skilled Clinical Factors: maintains good STS technique throughout tx (07/08/22 9439)  Stand to Sit  Assistance Level: Modified independent (07/08/22 1328)  Skilled Clinical Factors: maintains one UE back prior to sitting throughout tx (07/08/22 1328)  Bed To/From Chair  Technique: Stand pivot (07/08/22 1328)  Assistance Level: Stand by assist (07/08/22 1328)  Skilled Clinical Factors: improved technique, good carryover from AM tx (07/08/22 1328)  Stand Pivot  Assistance Level: Supervision (07/08/22 1328)  Skilled Clinical Factors: increased cues when pt fatigued for improved safety with approach to chair (07/08/22 1328)  Car Transfer  Assistance Level: Modified independent (07/08/22 1328)  Skilled Clinical Factors: good technique without cues from this PTA (07/08/22 1328)  Gait:   Ambulation  Surface: carpet (07/04/22 1405)  Device: [address] [employer] [place] [employer] (07/04/22 1405)  Other Apparatus: Wheelchair follow (06/28/22 1149)  Assistance: Contact guard assistance (07/04/22 1405)  Quality of Gait: difficulty with RLE foot clearance, FF posture, downward gaze, short BLE step length. Step to pattern<> occ step strough (07/04/22 1405)  Gait Deviations: Slow Marlys;Decreased step length;Decreased step height;Shuffles (07/04/22 1405)  Distance: 44ft x 2 (07/04/22 1405)  Comments: decreased stride and step length with fatigue after  2nd Gait (07/04/22 1405)  Gait Training: Yes (07/09/22 0844)  Overall Level of Assistance: Stand-by assistance (07/09/22 0844)  Distance (ft): 50 Feet (07/09/22 0844)  Assistive Device: Walker, rolling (07/09/22 0844)  Interventions: Tactile cues (07/09/22 0844)  Base of Support: Widened (07/09/22 0844)  Speed/Marlys: Fluctuations (07/09/22 0844)  Step Length: Right shortened;Left shortened (07/09/22 0844)  Gait Abnormalities: Step to gait; Decreased step clearance (07/09/22 0844)  Right Side Weight Bearing: As tolerated (07/09/22 0844)  Left Side Weight Bearing: As tolerated (07/09/22 0844)  Ambulation  Surface: Carpet (07/08/22 1331)  Device: Rolling walker (07/08/22 1331)  Distance: 50', 30' x 2 (07/08/22 1331)  Activity Comments:  BPM post ambulation (06/30/22 1342)  Assistance Level: Supervision (07/08/22 1331)  Gait Deviations: Decreased step length bilateral;Slow leonardo;Decreased heel strike right;Decreased heel strike left;Narrow base of support (07/08/22 1331)  Skilled Clinical Factors: improved awareness of kaz foot clearance, intermittent reciprocal pattern, mild pelvic instability (07/08/22 1331)  Stairs:  Stairs/Curb  Stairs?: Yes (07/04/22 0906)  Stairs  Curbs: 2\" (07/04/22 0906)  Device: Rolling walker (07/04/22 0906)  Assistance: Stand by assistance;Contact guard assistance (07/04/22 1594)  Comment: Vcs to not whip WW out when placing from curb to floor (07/04/22 0906)  Curb  Curb Height: 4'' (07/08/22 1331)  Device: Rolling walker (07/08/22 1331)  Number of Curbs: 2 (07/08/22 1331)  Additional Factors: Verbal cues (for technique to improve quality) (07/08/22 1331)  Assistance Level: Supervision (07/08/22 1331)  Skilled Clinical Factors: no LOB or instability, 4\" performed per pt's mother confirming pt's home has 4\" curb, not 2\" (07/08/22 1331)  W/C mobility:       Occupational Therapy:   Hand Dominance: Right  ADL  Feeding: Modified independent  (06/28/22 1049)  Grooming: Setup (06/29/22 1341)  Grooming Skilled Clinical Factors: for oral care and grooming (06/29/22 1341)  UE Bathing: Stand by assistance; Increased time to complete (06/29/22 1341)  LE Bathing: Dependent/Total (06/29/22 1341)  LE Bathing Skilled Clinical Factors: +2 assistance. Pt able to bend down in order to wash legs with SBA for safe sitting. +2 to wash posterior lillie area.  1 person for washing 1 person to maintain standing balance (06/29/22 1341)  UE Dressing: Stand by assistance (06/29/22 1341)  LE Dressing: Increased time to complete;Maximum assistance (06/29/22 1341)  LE Dressing Skilled Clinical Factors: to don pants and depends (06/29/22 1341)  Toileting: Dependent/Total (06/29/22 1341)  Toileting Skilled Clinical Factors: Pt incontinent of feces and urine (06/29/22 1341)  Additional Comments: Pt displaying lack of endurance and fatigue throughout session, requiring increased assistance throughout progression of evaluation (06/28/22 1049)  Toilet Transfers  Toilet - Technique: Stand pivot (06/29/22 1339)  Equipment Used: Standard toilet (06/29/22 1339)  Toilet Transfer: Minimal assistance (verbal cues for hand placement) (06/29/22 1339)  Toilet Transfers Comments: Shemarjohn end of session, initially Min A for transfers (06/28/22 1054)     [address] - Transfer Type: To and From (06/28/22 1054)  Shower - Transfer To: Shower seat with back (06/28/22 1054)  Shower - Technique: Stand pivot (06/28/22 1054)  Shower Transfers: Moderate assistance (06/28/22 1054)  Shower Transfers Comments: Patient did not complete shower transfer as patient washed up at sink in wheelchair. (06/29/22 1339)    Speech Therapy:      Comprehension: Within Functional Limits (Patient answered simple yes/no questions, basic questions and participated in conversation with good comprehension. no repetition was required)  Verbal Expression:  [facility] with meeting wants and needs. No word finding deficits have been noted during conversation)  Diet/Swallow:        Dysphagia Outcome Severity Scale: Level 7: Normal in all situations  Compensatory Swallowing Strategies : Upright as possible for all oral intake          Lab/X-ray studies reviewed, analyzed and discussed with patient and staff:   No results found for this or any previous visit (from the past 24 hour(s)). XR CERVICAL SPINE   6/19/2022   NEGATIVE CERVICAL SPINE    XR HIP LEFT  6/19/2022  NEGATIVE PELVIS AND LEFT HIP     XR FEMUR RIGHT  6/19/2022: No fracture, dislocation, bone lesion.     NEGATIVE RIGHT FEMUR    MRI LUMBAR SPINE : 6/20/2022: Degenerative facet changes of the lumbar spine visualized most prominent at L3-L4 and L5-S1 that demonstrate mild progression in comparison to the prior study    US ABDOMEN 6/20/2022:  NO SIGN OF CHOLELITHIASIS OR BILE DUCT DILATATION. SLUDGE WITHIN THE GALLBLADDER. NO DEFINITE FOCAL LIVER ABNORMALITY. US DUP LOWER EXTREMITIES BILATERAL VENOUS  6/20/2022   NO SONOGRAPHIC EVIDENCE OF DEEP VENOUS THROMBOSIS WITHIN THE BILATERAL LOWER EXTREMITIES. Patent foramen ovale with right-to-left shunt was visualized. On agitated saline contrast injection- Large amount of bubbles LA and LV   nearly immediately    Previous extensive, complex labs, notes and diagnostics reviewed and analyzed. ALLERGIES:    Allergies as of 06/27/2022    (No Known Allergies)      (please also verify by checking MAR)      I have encouraged patient to attend their adjustment to rehabilitation support group with rec therapy and rehabilitation psychology in order to improve their adjustments, well-being, and help their spiritual and cognitive recovery. Complex Physical Medicine & Rehab Issues Assess & Plan:   1. Severe abnormality of gait and mobility and impaired self-care and ADL's secondary to progressive masturbation of multiple sclerosis. Functional and medical status reassessed regarding patients ability to participate in therapies and patient found to be able to participate in acute intensive comprehensive inpatient rehabilitation program including PT/OT to improve balance, ambulation, ADLs, and to improve the P/AROM. Therapeutic modifications regarding activities in therapies, place, amount of time per day and intensity of therapy made daily. In bed therapies or bedside therapies prn.   2. Bowel and Bladder dysfunction  , Neurogenic bowel and bladder:  frequent toileting, ambulate to bathroom with assistance, check post void residuals. Check for C.difficile x1 if >2 loose stools in 24 hours, continue bowel & bladder program.  Monitor bowel and bladder function. Lactinex 2 PO every AC. MOM prn, Brown Bomb prn, Glycerin suppository prn, enema prn.   Encourage therapy and nursing to co-treat and problem solve re continence. [place] Ditropan. 3. Severe MS pain as well as generalized OA pain: reassess pain every shift and prior to and after each therapy session, give prn Tylenol and consider scheduled Tylenol, modalities prn in therapy, masage, Lidoderm, K-pad prn. Consider scheduled AM pain meds. 4. Skin healing   and breakdown risk:  continue pressure relief program.  Daily skin exams and reports from nursing. Discontinue daily labs. 5. Fatigue due to nutritional and hydration deficiency: Add and titrate vitamin B12 vitamin D and CoQ10 continue to monitor I&Os, calorie counts prn, dietary consult prn. Add healthy snack at night. 6. Acute episodic insomnia with situational adjustment disorder:  prn Ambien, monitor for day time sedation. 7. Falls risk elevated:  patient to use call light to get nursing assistance to get up, bed and chair alarm. 8. Elevated DVT risk: progressive activities in PT, continue prophylaxis UMM hose, elevation and Lovenox discontinued. 9. Complex discharge planning:  Complete medication simplification patient and family education as we progressed toward her plan pending DC 7/12/22--home with [facility]--try to get aide with waiver. Parents are elderly and son is only 5. Toward her final weekly team meeting   Thursday to re-assess progress towards goals, discuss and address social, psychological and medical comorbidities and to address difficulties they may be having progressing in therapy. Patient and family education is in progress. The patient is to follow-up with their family physician after discharge. We will try to get pure wick catheter at night and extend waiver services at home. Complex Active General Medical Issues that complicate care Assess & Plan:    1. Urinary urgency,   Neurogenic bladder-recheck stat UA  2.   MS (multiple sclerosis)-consult neurology transition to outpatient Ocrevus therapy if possible.   3.   Hyperglycemia-monitor for diabetes mellitus  4. Rhabdomyolysis-push fluids recheck BMP  5. Baseline mental handicap-just new learning and therapy. 6.   Posterior tibial tendon dysfunction -AFO  7. Malodorous urine-check stat UA check postvoid residual  8. Ataxic gait-focus on balance and therapy  9. Pain in joint, lower leg, Foot drop, left foot, Acquired deformity of left foot-consult podiatry as needed  10. Lumbar radiculopathy-post effective dose of pain medication  11. Moderate sized PFO, asymptomatic sinus tachycardia with activity dt deconditioning- SP EKG stat-OK,  , add stat labs and cardiac consult-a.m. labs were essentially [id number]  Was OK.  cardiology. Continue telemetry for now. Cardiology and I agree to add aspirin and Plavix to the current dose and the Lovenox       Focus of today's plan-  Initiate and modify therapuetic plan to meet patients individual needs, add rest breaks as needed, and monitor heart rate on telemetry with cardiac work-up as above. Follow through with plans for PFO. Attempt to get a pure wick for her for home use.     Electronically signed by Marely Turner DO on 6/29/22 at 8:02 AM AARON Carlisle D.O., PM&R     Attending    [address]

## 2022-07-10 NOTE — PROGRESS NOTES
Rehabilitation:  Physical Therapy:   Bed mobility:  Bed mobility  Bridging: Stand by assistance (07/04/22 1719)  Rolling to Left: Stand by assistance (07/04/22 8744)  Rolling to Right: Stand by assistance (07/04/22 1909)  Supine to Sit: Minimal assistance (06/28/22 1143)  Sit to Supine: Stand by assistance (07/04/22 4217)  Scooting: Minimal assistance (latearlly in supine) (06/28/22 1143)  Bed Mobility Comments: Mat mobility (07/04/22 9391)  Bed Mobility  Additional Factors: Without handrails; Head of bed flat (mat table) (07/07/22 1528)  Additional Factors: Without handrails; Head of bed flat (mat table) (07/07/22 1528)  Roll Left  Assistance Level: Modified independent (07/08/22 1328)  Skilled Clinical Factors: with HR (06/28/22 1342)  Roll Right  Assistance Level: Modified independent (07/08/22 1328)  Skilled Clinical Factors: with HR (06/28/22 1342)  Sit to Supine  Assistance Level: Supervision (07/08/22 1328)  Skilled Clinical Factors: requiring to use BUE to get BLE on to mat table (07/08/22 1328)  Supine to Sit  Assistance Level: Supervision (07/08/22 1328)  Skilled Clinical Factors: cues for improving technique with fair follow through (07/08/22 1328)  Scooting  Assistance Level: Supervision (07/08/22 1328)  Skilled Clinical Factors: lateral scooting EOB (07/08/22 1328)  Transfers:  Transfers  Sit to Stand: Stand by assistance (07/04/22 0934)  Stand to sit: Stand by assistance (07/04/22 0934)  Bed to Chair: Stand by assistance (07/04/22 0934)  Lateral Transfers: Minimal Assistance;Contact guard assistance (Instruction provided on scooting laterally at EOB emphasizing forward weight shift) (06/28/22 1145)  Car Transfer: Stand by assistance (07/04/22 0934)  Comment: VC for hand placement when sitting down (07/04/22 0934)  Transfer Training  Transfer Training: Yes (07/09/22 0844)  Sit to Stand: Supervision (07/09/22 0844)  Stand to Sit: Supervision (07/09/22 0844)  Transfers  Surface: Wheelchair (07/08/22 1328)  Additional Factors: Verbal cues (07/07/22 1528)  Device: Go Highman (07/08/22 1328)  Sit to Stand  Assistance Level: Modified independent (07/08/22 1328)  Skilled Clinical Factors: maintains good STS technique throughout tx (07/08/22 1328)  Stand to Sit  Assistance Level: Modified independent (07/08/22 1328)  Skilled Clinical Factors: maintains one UE back prior to sitting throughout tx (07/08/22 1328)  Bed To/From Chair  Technique: Stand pivot (07/08/22 1328)  Assistance Level: Stand by assist (07/08/22 1328)  Skilled Clinical Factors: improved technique, good carryover from AM tx (07/08/22 1328)  Stand Pivot  Assistance Level: Supervision (07/08/22 1328)  Skilled Clinical Factors: increased cues when pt fatigued for improved safety with approach to chair (07/08/22 1328)  Car Transfer  Assistance Level: Modified independent (07/08/22 1328)  Skilled Clinical Factors: good technique without cues from this PTA (07/08/22 1328)  Gait:   Ambulation  Surface: carpet (07/04/22 1405)  Device: Rolling Walker (07/04/22 1405)  Other Apparatus: Wheelchair follow (06/28/22 1149)  Assistance: Contact guard assistance (07/04/22 1405)  Quality of Gait: difficulty with RLE foot clearance, FF posture, downward gaze, short BLE step length. Step to pattern<> occ step strough (07/04/22 1405)  Gait Deviations: Slow Marlys;Decreased step length;Decreased step height;Shuffles (07/04/22 1405)  Distance: 44ft x 2 (07/04/22 1405)  Comments: decreased stride and step length with fatigue after  2nd Gait (07/04/22 1405)  Gait Training: Yes (07/09/22 0844)  Overall Level of Assistance: Stand-by assistance (07/09/22 0844)  Distance (ft): 50 Feet (07/09/22 0844)  Assistive Device: Walker, rolling (07/09/22 0844)  Interventions: Tactile cues (07/09/22 0844)  Base of Support: Widened (07/09/22 0844)  Speed/Marlys: Fluctuations (07/09/22 0844)  Step Length: Right shortened;Left shortened (07/09/22 0844)  Gait Abnormalities: Step to gait; Decreased step clearance (07/09/22 0844)  Right Side Weight Bearing: As tolerated (07/09/22 0844)  Left Side Weight Bearing: As tolerated (07/09/22 0844)  Ambulation  Surface: Carpet (07/08/22 1331)  Device: Rolling walker (07/08/22 1331)  Distance: 50', 30' x 2 (07/08/22 1331)  Activity Comments:  BPM post ambulation (06/30/22 1342)  Assistance Level: Supervision (07/08/22 1331)  Gait Deviations: Decreased step length bilateral;Slow leonardo;Decreased heel strike right;Decreased heel strike left;Narrow base of support (07/08/22 1331)  Skilled Clinical Factors: improved awareness of kaz foot clearance, intermittent reciprocal pattern, mild pelvic instability (07/08/22 1331)  Stairs:  Stairs/Curb  Stairs?: Yes (07/04/22 0906)  Stairs  Curbs: 2\" (07/04/22 0906)  Device: Rolling walker (07/04/22 0906)  Assistance: Stand by assistance;Contact guard assistance (07/04/22 0906)  Comment: Vcs to not whip WW out when placing from curb to floor (07/04/22 0906)  Curb  Curb Height: 4'' (07/08/22 1331)  Device: Rolling walker (07/08/22 1331)  Number of Curbs: 2 (07/08/22 1331)  Additional Factors: Verbal cues (for technique to improve quality) (07/08/22 1331)  Assistance Level: Supervision (07/08/22 1331)  Skilled Clinical Factors: no LOB or instability, 4\" performed per pt's mother confirming pt's home has 4\" curb, not 2\" (07/08/22 1331)  W/C mobility:       Occupational Therapy:   Hand Dominance: Right  ADL  Feeding: Modified independent  (06/28/22 1049)  Grooming: Setup (06/29/22 1341)  Grooming Skilled Clinical Factors: for oral care and grooming (06/29/22 1341)  UE Bathing: Stand by assistance; Increased time to complete (06/29/22 1341)  LE Bathing: Dependent/Total (06/29/22 1341)  LE Bathing Skilled Clinical Factors: +2 assistance. Pt able to bend down in order to wash legs with SBA for safe sitting. +2 to wash posterior lillie area.  1 person for washing 1 person to maintain standing balance (06/29/22 1341)  UE Dressing: Stand by assistance (06/29/22 1341)  LE Dressing: Increased time to complete;Maximum assistance (06/29/22 1341)  LE Dressing Skilled Clinical Factors: to don pants and depends (06/29/22 1341)  Toileting: Dependent/Total (06/29/22 1341)  Toileting Skilled Clinical Factors: Pt incontinent of feces and urine (06/29/22 1341)  Additional Comments: Pt displaying lack of endurance and fatigue throughout session, requiring increased assistance throughout progression of evaluation (06/28/22 1049)  Toilet Transfers  Toilet - Technique: Stand pivot (06/29/22 1339)  Equipment Used: Standard toilet (06/29/22 1339)  Toilet Transfer: Minimal assistance (verbal cues for hand placement) (06/29/22 1339)  Toilet Transfers Comments: Twards end of session, initially Min A for transfers (06/28/22 1054)     [address] - Transfer Type: To and From (06/28/22 1054)  Shower - Transfer To: Shower seat with back (06/28/22 1054)  Shower - Technique: Stand pivot (06/28/22 1054)  Shower Transfers: Moderate assistance (06/28/22 1054)  Shower Transfers Comments: Patient did not complete shower transfer as patient washed up at sink in wheelchair. (06/29/22 1339)    Speech Therapy:      Comprehension: Within Functional Limits (Patient answered simple yes/no questions, basic questions and participated in conversation with good comprehension. no repetition was required)  Verbal Expression:  [facility] with meeting wants and needs. No word finding deficits have been noted during conversation)  Diet/Swallow:        Dysphagia Outcome Severity Scale: Level 7: Normal in all situations  Compensatory Swallowing Strategies : Upright as possible for all oral intake          Lab/X-ray studies reviewed, analyzed and discussed with patient and staff:   No results found for this or any previous visit (from the past 24 hour(s)).     XR CERVICAL SPINE   6/19/2022   NEGATIVE CERVICAL SPINE    XR HIP LEFT  6/19/2022  NEGATIVE PELVIS AND LEFT HIP     XR FEMUR RIGHT 6/19/2022: No fracture, dislocation, bone lesion. NEGATIVE RIGHT FEMUR    MRI LUMBAR SPINE : 6/20/2022: Degenerative facet changes of the lumbar spine visualized most prominent at L3-L4 and L5-S1 that demonstrate mild progression in comparison to the prior study    US ABDOMEN  6/20/2022:  NO SIGN OF CHOLELITHIASIS OR BILE DUCT DILATATION. SLUDGE WITHIN THE GALLBLADDER. NO DEFINITE FOCAL LIVER ABNORMALITY. US DUP LOWER EXTREMITIES BILATERAL VENOUS  6/20/2022   NO SONOGRAPHIC EVIDENCE OF DEEP VENOUS THROMBOSIS WITHIN THE BILATERAL LOWER EXTREMITIES. Patent foramen ovale with right-to-left shunt was visualized. On agitated saline contrast injection- Large amount of bubbles LA and LV   nearly immediately    Previous extensive, complex labs, notes and diagnostics reviewed and analyzed. ALLERGIES:    Allergies as of 06/27/2022    (No Known Allergies)      (please also verify by checking MAR)      Recently, I evaluated this patient for periodic reassessment of medical and functional status. The patient was discussed in detail at the treatment team meeting focusing on current medical issues, progress in therapies, social issues, psychological issues, barriers to progress and strategies to address these barriers, and discharge planning. See the hand written addendum to rehab progress note. The patient continues to be high risk for future disability and their medical and rehabilitation prognosis continue to be good and therefore, we will continue the patient's rehabilitation course as planned. The patient's tentative discharge date was set. Patient and family education was discussed. The patient was made aware of the team discussion regarding their progress. Discharge plans were discussed along with barriers to progress and strategies to address these barriers, patient encouraged to continue to discuss discharge plans with .        Complex Physical Medicine & Rehab Issues Assess & Plan:   1. Severe abnormality of gait and mobility and impaired self-care and ADL's secondary to progressive masturbation of multiple sclerosis. Functional and medical status reassessed regarding patients ability to participate in therapies and patient found to be able to participate in acute intensive comprehensive inpatient rehabilitation program including PT/OT to improve balance, ambulation, ADLs, and to improve the P/AROM. Therapeutic modifications regarding activities in therapies, place, amount of time per day and intensity of therapy made daily. In bed therapies or bedside therapies prn.   2. Bowel and Bladder dysfunction  , Neurogenic bowel and bladder:  frequent toileting, ambulate to bathroom with assistance, check post void residuals. Check for C.difficile x1 if >2 loose stools in 24 hours, continue bowel & bladder program.  Monitor bowel and bladder function. Lactinex 2 PO every AC. MOM prn, Brown Bomb prn, Glycerin suppository prn, enema prn. Encourage therapy and nursing to co-treat and problem solve re continence. [place] Ditropan. 3. Severe MS pain as well as generalized OA pain: reassess pain every shift and prior to and after each therapy session, give prn Tylenol and consider scheduled Tylenol, modalities prn in therapy, masage, Lidoderm, K-pad prn. Consider scheduled AM pain meds. 4. Skin healing   and breakdown risk:  continue pressure relief program.  Daily skin exams and reports from nursing. Discontinue daily labs. 5. Fatigue due to nutritional and hydration deficiency: Add and titrate vitamin B12 vitamin D and CoQ10 continue to monitor I&Os, calorie counts prn, dietary consult prn. Add healthy snack at night. 6. Acute episodic insomnia with situational adjustment disorder:  prn Ambien, monitor for day time sedation. 7. Falls risk elevated:  patient to use call light to get nursing assistance to get up, bed and chair alarm.   8. Elevated DVT risk: progressive activities in PT, continue prophylaxis UMM hose, elevation and Lovenox discontinued. 9. Complex discharge planning: Begin medication simplification patient and family education as we progressed toward her plan pending DC 7/12/22--home with [facility]--try to get aide with waiver. Parents are elderly and son is only 5. Toward her final weekly team meeting   Thursday to re-assess progress towards goals, discuss and address social, psychological and medical comorbidities and to address difficulties they may be having progressing in therapy. Patient and family education is in progress. The patient is to follow-up with their family physician after discharge. We will try to get pure wick catheter at night and extend waiver services at home. Complex Active General Medical Issues that complicate care Assess & Plan:    1. Urinary urgency,   Neurogenic bladder-recheck stat UA  2.   MS (multiple sclerosis)-consult neurology transition to outpatient Ocrevus therapy if possible. 3.   Hyperglycemia-monitor for diabetes mellitus  4. Rhabdomyolysis-push fluids recheck BMP  5. Baseline mental handicap-just new learning and therapy. 6.   Posterior tibial tendon dysfunction -AFO  7. Malodorous urine-check stat UA check postvoid residual  8. Ataxic gait-focus on balance and therapy  9. Pain in joint, lower leg, Foot drop, left foot, Acquired deformity of left foot-consult podiatry as needed  10. Lumbar radiculopathy-post effective dose of pain medication  11. Moderate sized PFO, asymptomatic sinus tachycardia with activity dt deconditioning- SP EKG stat-OK,  , add stat labs and cardiac consult-a.m. labs were essentially [id number]  Was OK.  cardiology. Continue telemetry for now. Cardiology and I agree to add aspirin and Plavix to the current dose and the Lovenox       Focus of today's plan-     Appreciate neuro input and care of MS exacerbation  Persistent anemia  GFR above 60  M.  MD Maykel Perez D.O., PM&R     Attending    [address]

## 2022-07-10 NOTE — PLAN OF CARE
Problem: Discharge Planning  Goal: Discharge to home or other facility with appropriate resources  Outcome: Progressing     Problem: ABCDS Injury Assessment  Goal: Absence of physical injury  Outcome: Progressing     Problem: Safety - Adult  Goal: Free from fall injury  Outcome: Progressing     Problem: Skin/Tissue Integrity  Goal: Absence of new skin breakdown  Description: 1. Monitor for areas of redness and/or skin breakdown  2. Assess vascular access sites hourly  3. Every 4-6 hours minimum:  Change oxygen saturation probe site  4. Every 4-6 hours:  If on nasal continuous positive airway pressure, respiratory therapy assess nares and determine need for appliance change or resting period.   Outcome: Progressing     Problem: Pain  Goal: Verbalizes/displays adequate comfort level or baseline comfort level  Outcome: Progressing     Problem: Neurosensory - Adult  Goal: Achieves stable or improved neurological status  Outcome: Progressing  Goal: Achieves maximal functionality and self care  Outcome: Progressing     Problem: Respiratory - Adult  Goal: Achieves optimal ventilation and oxygenation  Outcome: Progressing     Problem: Cardiovascular - Adult  Goal: Maintains optimal cardiac output and hemodynamic stability  Outcome: Progressing     Problem: Skin/Tissue Integrity - Adult  Goal: Skin integrity remains intact  Outcome: Progressing  Flowsheets (Taken 7/9/2022 1930)  Skin Integrity Remains Intact: Monitor for areas of redness and/or skin breakdown     Problem: Musculoskeletal - Adult  Goal: Return mobility to safest level of function  Outcome: Progressing     Problem: Gastrointestinal - Adult  Goal: Maintains adequate nutritional intake  Outcome: Progressing     Problem: Genitourinary - Adult  Goal: Absence of urinary retention  Outcome: Progressing     Problem: Infection - Adult  Goal: Absence of infection at discharge  Outcome: Progressing     Problem: Metabolic/Fluid and Electrolytes - Adult  Goal: Electrolytes maintained within normal limits  Outcome: Progressing     Problem: Hematologic - Adult  Goal: Maintains hematologic stability  Outcome: Progressing

## 2022-07-10 NOTE — PROGRESS NOTES
Hospitalist Consult/Progress Note  7/10/2022 11:53 AM    Assessment and Plan:   1. Generalized weakness, Gait instability and Decreased Functional Status secondary to MS exacerbation: Followed by neurology. Received IV steroids. Fall precautions. PT OT to evaluate. Maximize nutrition status. Assessing if needs DME at home. SW on board. 2. Rhabdomyolysis: Secondary to being found on floor. CK was in the 4000s. Trending down <1000. Received IVF. Resolved. 3. LE edema: US negative for DVT. In the setting of immobility. 4. UTI: Gram negative rods in urine. Received 3 days of antibiotics. 5. Normocytic anemia: H/H stable. No overt bleeding. Follow trend. H/H stable. 6. PFO: Moderate per ECHO. Cardiology following and managing. Will need outpatient cardiac MRI and PFO closure. 7. Bowel Regimen and GI PPx: stool softners PRN ordered with hold parameters for loose stools or diarrhea. On antiacid  8. Diet: ADULT DIET; Regular; 3 carb choices (45 gm/meal)  9. Advance Directive: Full Code   10. Nutrition status: Supplemental Vitamins ordered. Dietitian assessment  11. Vaccinations: Immunization records reviewed. If has not received appropriate vaccinations, will order to be given prior to discharge. 12. DVT prophylaxis: SCD  13. Discharge planning: ALEX on board. Tentative discharge date 7/12/22  14. High Risk Readmission Screening Tool Score Noted.      Additionally, the following hospital problems were addressed:  Principal Problem:    Impaired mobility and activities of daily living dt exac of MS  Active Problems:    Urinary urgency    MS (multiple sclerosis) (HCC)    Hyperglycemia    Neurogenic bladder    Rhabdomyolysis    Intellectual disability    Paraparesis (HCC)    Posterior tibial tendon dysfunction    Ataxic gait    Pain in joint, lower leg    Foot drop, left foot    Acquired deformity of left foot    Lumbar radiculopathy    PFO (patent foramen ovale)  Resolved Problems:    * No resolved hospital problems. *      ** Total time spent reviewing medical records, evaluating patient, speaking with RN's and consultants where I was focused exclusively on this patient:  minutes. This time is excluding time spent performing procedures or significant events occurring earlier or later in the day requiring my attention and focus. Subjective:   Admit Date: 6/27/2022  PCP: Annette Dixon PA-C  35 y.o. female with PMH of MS, anemia, HPL and obesity transferred to rehab for further management after inpatient admission for MS exacerbation. Received IV steroids and followed by neurology. Once medically optimized she is transferred for further therapies. No acute complaints. Afebrile. Denies chest pain, SOB, N/V, fevers or chills. VSS. Objective:     Vitals:    07/09/22 0845 07/09/22 1820 07/10/22 0700 07/10/22 0715   BP: 108/69 125/70  (!) 111/56   Pulse: (!) 103 (!) 105  84   Resp:    14   Temp: 97.7 °F (36.5 °C) 97.5 °F (36.4 °C)  98.2 °F (36.8 °C)   TempSrc: Oral Oral  Oral   SpO2: 98% 100%  100%   Weight:   174 lb 4 oz (79 kg)    Height:         General appearance: No acute distress,  No conversational dyspnea noted. Dentition intact. Answers questions appropriately  Neurological: Alert, awake, and oriented x3 . Motor and sensory grossly intact. No focal deficits. GCS of 15. Lungs: CTAB, no exp wheezes, No rales No retractions; No use of accessory muscles  Heart:  S1, S2 normal, tachy but regular  Abdomen: (+) BS, soft, non-tender; non distended no guarding or rigidity. Extremities:  no cyanosis, edema bilat lower exts, no calf tenderness bilaterally.  Dry skin noted       Medications:      multivitamin  1 tablet Oral Lunch    oxybutynin  5 mg Oral Nightly    clopidogrel  75 mg Oral Daily    aspirin  81 mg Oral Daily    Vitamin D  2,000 Units Oral Dinner    cyanocobalamin  1,000 mcg IntraMUSCular Weekly    coenzyme Q10  100 mg Oral Daily    lidocaine  3 patch TransDERmal Daily       LABS Reviewed    IMAGING Reviewed    JACKI Lara NP  Rounding Hospitalist    Additional work up or/and treatment plan may be added today or then after based on clinical progression. I am managing a portion of pt care. Some medical issues are handled by other specialists and Primary Rehabilitation provider. Additional work up and treatment should be done in out pt setting by pt PCP and other out pt providers.

## 2022-07-11 PROCEDURE — 1180000000 HC REHAB R&B

## 2022-07-11 PROCEDURE — 99232 SBSQ HOSP IP/OBS MODERATE 35: CPT | Performed by: PHYSICAL MEDICINE & REHABILITATION

## 2022-07-11 PROCEDURE — 97130 THER IVNTJ EA ADDL 15 MIN: CPT

## 2022-07-11 PROCEDURE — 97129 THER IVNTJ 1ST 15 MIN: CPT

## 2022-07-11 PROCEDURE — 6370000000 HC RX 637 (ALT 250 FOR IP): Performed by: PHYSICAL MEDICINE & REHABILITATION

## 2022-07-11 PROCEDURE — 97535 SELF CARE MNGMENT TRAINING: CPT

## 2022-07-11 PROCEDURE — 6370000000 HC RX 637 (ALT 250 FOR IP): Performed by: INTERNAL MEDICINE

## 2022-07-11 PROCEDURE — 97110 THERAPEUTIC EXERCISES: CPT

## 2022-07-11 PROCEDURE — 97530 THERAPEUTIC ACTIVITIES: CPT

## 2022-07-11 PROCEDURE — 99231 SBSQ HOSP IP/OBS SF/LOW 25: CPT | Performed by: NURSE PRACTITIONER

## 2022-07-11 PROCEDURE — 97116 GAIT TRAINING THERAPY: CPT

## 2022-07-11 RX ORDER — CLOPIDOGREL BISULFATE 75 MG/1
75 TABLET ORAL DAILY
Qty: 30 TABLET | Refills: 3 | Status: SHIPPED | OUTPATIENT
Start: 2022-07-12 | End: 2022-10-26

## 2022-07-11 RX ADMIN — CLOPIDOGREL BISULFATE 75 MG: 75 TABLET ORAL at 07:48

## 2022-07-11 RX ADMIN — Medication 2000 UNITS: at 16:37

## 2022-07-11 RX ADMIN — OXYBUTYNIN CHLORIDE 5 MG: 5 TABLET, EXTENDED RELEASE ORAL at 20:04

## 2022-07-11 RX ADMIN — ASPIRIN 81 MG: 81 TABLET, COATED ORAL at 07:48

## 2022-07-11 RX ADMIN — THERA TABS 1 TABLET: TAB at 11:35

## 2022-07-11 RX ADMIN — Medication 100 MG: at 07:48

## 2022-07-11 ASSESSMENT — ENCOUNTER SYMPTOMS
NAUSEA: 0
SHORTNESS OF BREATH: 0
COUGH: 0
ABDOMINAL PAIN: 0
WHEEZING: 0
CHEST TIGHTNESS: 0
ABDOMINAL DISTENTION: 0
TROUBLE SWALLOWING: 0
COLOR CHANGE: 0
VOMITING: 0

## 2022-07-11 NOTE — PROGRESS NOTES
[address] [facility] Cardiology Progress Note        Date:     2022    Patient:    Tori Patton    :    1980  CSN:    [id number]    Rounding Cardiologist: Luna Celis MD     Primary Cardiologist: None    Requesting Physician:  Christal Bhatia DO      Reason for Initial Consult:  Elevated cardiac enzymes after fall / tachycardia. Subjective:    Feels well. No complaints CV wise. Telemetry reviewed and only ST with activity. No other dysrhythmias. Primary to treat underling infection. Echo done and normal except for Moderate PFO with R>L shunt noted. On Plavix and ASA for now. Has anemia. Multiple Sclerosis symptoms improved. 14 system General and Cardiac ROS otherwise negative or unchanged. Assessment:    1. Fall   2. Tachycardia, probable reactive sinus tachycardia, resolved. 3. Elevated Cardiac Enzymes c/w rhadomyolysis ( low normal Troponin noted ), resolved. 4. No prior cardiac history  5. Normal LV Function, LVEF 65%. 6. PFO, Moderate, by echo contrast .  7. Multiple Sclerosis, recent exacerbation  8. UTI  9. Edema  10. Anemia  11. Hyperlipidemia  12. Lumbar radiculopathy  13. DJD    Plan:    1. Cardiac Supportive Care  2. Encourage PO hydration. 3. ASA 81 mg /day  4. Plavix 75 mg / day  5. OK to DC home once ok with others. 6. Follow up with Dr Royal Thomas as noted. 7. Eventual outpatient Cardiac MRI and PFO closure. 8. See Orders    ======================================================       HISTORY OF PRESENT ILLNESS:      Tori Patton is a pleasant 35 y.o. female who presented through the emergency room with severe hip pain status post a fall at home. She was diagnosed with an exacerbation of her multiple sclerosis and rhabdomyolysis secondary to elevated CK levels. Fortunately her x-rays of her hips were unremarkable. She was prescribed IV fluids and IV methylprednisolone.   She is hoping to transition to [facility] therapy as an outpatient for her MS. While in rehab, walking the talley she was noted on telemetry to have a heart rate up to 146 bpm.    Cardiology was asked to see in consultation. Patient History and Records, EMR reviewed. Patient Interviewed and examined. Good historian. States that she is feeling well overall. Denies CP, SOB, LH, Dizziness, TIA or CVA Symptoms. No Orthopnea, Edema or CHF symptoms. No Palpitations. No Syncope. No Fever, Chills or Cold symptoms. No GI,  or Bleeding complaints. Cardiac and general ROS otherwise negative. [address] otherwise negative other than noted. Past Medical History:   Diagnosis Date    Dislocated knee     Right knee    Intellectual disability 2022    Lumbar radiculopathy 2020    MS (multiple sclerosis) ([facility] [address] 2022    PTTD (posterior tibial tendon dysfunction)        Past Surgical History:   Procedure Laterality Date    CATARACT REMOVAL WITH IMPLANT Right     CATARACT REMOVAL WITH IMPLANT Left      SECTION      EYE SURGERY      FOOT SURGERY Left        Prior to Admission medications    Medication Sig Start Date End Date Taking?  Authorizing Provider   aspirin 81 MG EC tablet Take 1 tablet by mouth daily 22  Yes Nora Obrien,    coenzyme Q10 100 MG CAPS capsule Take 1 capsule by mouth daily 22  Yes Nora Obrien DO   mirabegron (MYRBETRIQ) 25 MG TB24 Take 1 tablet by mouth daily  Patient not taking: Reported on 2022   Erika Felix MD   KESIMPTA 20 MG/0.4ML SOAJ  12/15/21   Historical Provider, MD   vitamin D 25 MCG (1000 UT) CAPS Take by mouth    Historical Provider, MD   MULTIPLE VITAMINS-MINERALS ER PO Take 1 tablet by mouth    Historical Provider, MD       Scheduled Meds:   multivitamin  1 tablet Oral Lunch    oxybutynin  5 mg Oral Nightly    clopidogrel  75 mg Oral Daily    aspirin  81 mg Oral Daily    Vitamin D  2,000 Units Oral Dinner    cyanocobalamin  1,000 mcg IntraMUSCular Weekly    coenzyme Q10  100 mg Oral Daily    lidocaine  3 patch TransDERmal Daily     Continuous Infusions:  PRN Meds:acetaminophen, bisacodyl, fleet    No Known Allergies    Social History     Socioeconomic History    Marital status:      Spouse name: Not on file    Number of children: 1    Years of education: Not on file    Highest education level: Not on file   Occupational History    Not on file   Tobacco Use    Smoking status: Never Smoker    Smokeless tobacco: Never Used   Substance and Sexual Activity    Alcohol use: No    Drug use: No    Sexual activity: Not Currently   Other Topics Concern    Not on file   Social History Narrative    Lives With: her parents (mom Sb Kaur) father is sick  and her Son (5 y.o)    Type of Home: Apartment in [facility] [address]    Home Layout: One level    Home Access: Stairs to enter with rails - Number of Steps: 1    Bathroom Shower/Tub: Tub/Shower unit, Equipment: Shower chair,Hand-held shower    Home Equipment: [place] [employer], rolling,Wheelchair-manual    Has the patient had two or more falls in the past year or any fall with injury in the past year?: Yes (one - fell on mothers floor and couldn't get up)    ADL Assistance: Independent    Homemaking Assistance: Independent    Homemaking Responsibilities: Yes    Ambulation Assistance: Independent    Transfer Assistance: Independent    Active : No    Patient's  Info: mother    She is on disability from her MS-Had been a [place] prior to her MS. Was in special classes in school. Pending Waiver services for Aide services. Social Determinants of Health     Financial Resource Strain:     Difficulty of Paying Living Expenses: Not on file   Food Insecurity:     Worried About Running Out of Food in the Last Year: Not on file    Gege of Food in the Last Year: Not on file   Transportation Needs:     Lack of Transportation (Medical):  Not on file    Lack of Transportation (Non-Medical): Not on file   Physical Activity:     Days of Exercise per Week: Not on file    Minutes of Exercise per Session: Not on file   Stress:     Feeling of Stress : Not on file   Social Connections:     Frequency of Communication with Friends and Family: Not on file    Frequency of Social Gatherings with Friends and Family: Not on file    Attends Anabaptist Services: Not on file    Active Member of [address] [place] [address] or Organizations: Not on file    Attends Club or Organization Meetings: Not on file    Marital Status: Not on file   Intimate Partner Violence:     Fear of Current or Ex-Partner: Not on file    Emotionally Abused: Not on file    Physically Abused: Not on file    Sexually Abused: Not on file   Housing Stability:     Unable to Pay for Housing in the Last Year: Not on file    Number of [place] in the Last Year: Not on file    Unstable Housing in the Last Year: Not on file       Family History   Problem Relation Age of Onset    Hypertension Mother     Diabetes Maternal Uncle     Cancer Maternal Grandmother         stomach cancer       Review Of Systems:    14 point ROS negative other than mentioned. Physical Exam:    CURRENT VITALS: BP (!) 114/57   Pulse 85   Temp 98.2 °F (36.8 °C)   Resp 20   Ht 5' 1\" (1.549 m)   Wt 174 lb 4 oz (79 kg)   SpO2 100%   BMI 32.92 kg/m²     CONSTITUTIONAL:  awake, alert, cooperative, no apparent distress,   ENT:  Normocephalic, without obvious abnormality, atraumatic, sinuses nontender on palpation, external ears without lesions,  NECK:  Supple, symmetrical, trachea midline, no adenopathy, thyroid symmetric, not enlarged and no tenderness, skin normal, No bruits. LUNGS:  No increased work of breathing, good air exchange, clear to auscultation bilaterally, no crackles, no wheezing  CARDIOVASCULAR:  Normal apical impulse, regular rate and rhythm, normal S1 and S2,  1/6 Systolic murmur noted.   ABDOMEN:  Obese, normal bowel sounds, soft, non-distended, non-tender, no masses palpated, no hepatosplenomegally  EXTREMETIES: No edema, Pulses Strong Thruout. No ulcers. NEUROLOGIC:  Awake, alert, oriented to name, place and time. Following all commands and moving all extremties. SKIN:  no bruising or bleeding, normal skin color, texture, turgor and no rashes    Labs:  No results found for this or any previous visit (from the past 24 hour(s)). ECG:     SR, RBBB, NSSTs    TELEMETRY:  Sinus rhythm     ECHO:    LVEF 65%  Moderate PFO, with R>L shunts.           Caro Elena MD  [facility] Cardiologist          -----

## 2022-07-11 NOTE — PROGRESS NOTES
[facility] Neurology Daily Progress Note  Name: Ebenezer Quevedo  Age: 35 y.o. Gender: female  CodeStatus: Full Code  Allergies: No Known Allergies    Chief Complaint:No chief complaint on file. Primary Care Provider: Kellie Barrios PA-C  InpatientTreatment Team: Treatment Team: Attending Provider: Sudeep Blakcmon DO; Consulting Physician: Lane Kelley MD; Consulting Physician: Kalrie Santos MD; Nursing Student: Avani Monsalve; Registered Nurse: Huma Camacho RN; Consulting Physician: Jose R Bee MD; Occupational Therapist: Joann Lewis OTR/L; Occupational Therapist: Vasile Thomas OT; : Boone Porter, MSW, LSW; Patient Care Tech: Trip Richardson  Admission Date: 6/27/2022      HPI   Pt seen and examined on rehab unit for neurology follow-up for multiple sclerosis with bilateral lower extremity paraparesis and left foot drop. Patient alert and oriented x3, no acute distress, cooperative. Some improvement in mobility noted. Of note patient has had cardiac work-up that revealed moderate PFO. She has been initiated on aspirin and Plavix with plans for future PFO closure. DC home tomorrow  Vitals:    07/11/22 0718   BP: (!) 114/57   Pulse: 85   Resp: 20   Temp: 98.2 °F (36.8 °C)   SpO2: 100%      Review of Systems   Constitutional: Negative for fatigue and fever. HENT: Negative for hearing loss and trouble swallowing. Eyes: Negative for visual disturbance. Respiratory: Negative for cough, chest tightness, shortness of breath and wheezing. Cardiovascular: Negative for chest pain, palpitations and leg swelling. Gastrointestinal: Negative for abdominal distention, abdominal pain, nausea and vomiting. Genitourinary: Negative for difficulty urinating. Musculoskeletal: Positive for gait problem. Skin: Negative for color change and rash. Neurological: Positive for weakness.  Negative for dizziness, tremors, seizures, syncope, facial asymmetry, speech difficulty, light-headedness, numbness and headaches. Psychiatric/Behavioral: Negative for agitation, confusion and hallucinations. The patient is not nervous/anxious. Physical Exam  Vitals and nursing note reviewed. Constitutional:       General: She is not in acute distress. Appearance: She is not diaphoretic. HENT:      Head: Normocephalic and atraumatic. Eyes:      Pupils: Pupils are equal, round, and reactive to light. Cardiovascular:      Rate and Rhythm: Normal rate and regular rhythm. Pulmonary:      Effort: Pulmonary effort is normal. No respiratory distress. Breath sounds: Normal breath sounds. Abdominal:      General: Bowel sounds are normal. There is no distension. Palpations: Abdomen is soft. Tenderness: There is no abdominal tenderness. Skin:     General: Skin is warm and dry. Neurological:      Mental Status: She is alert and oriented to person, place, and time. Cranial Nerves: No cranial nerve deficit. Motor: Weakness and abnormal muscle tone present. No tremor, atrophy or seizure activity. Gait: Gait abnormal.      Deep Tendon Reflexes: Reflexes abnormal.               Medications:  Reviewed    Infusion Medications:   Scheduled Medications:    multivitamin  1 tablet Oral Lunch    oxybutynin  5 mg Oral Nightly    clopidogrel  75 mg Oral Daily    aspirin  81 mg Oral Daily    Vitamin D  2,000 Units Oral Dinner    cyanocobalamin  1,000 mcg IntraMUSCular Weekly    coenzyme Q10  100 mg Oral Daily    lidocaine  3 patch TransDERmal Daily     PRN Meds: acetaminophen, bisacodyl, fleet    Labs:   No results for input(s): WBC, HGB, HCT, PLT in the last 72 hours. No results for input(s): NA, K, CL, CO2, BUN, CREATININE, CALCIUM, PHOS in the last 72 hours. Invalid input(s): MAGNES  No results for input(s): AST, ALT, BILIDIR, BILITOT, ALKPHOS in the last 72 hours. No results for input(s): INR in the last 72 hours.   No results for input(s): CKTOTAL, TROPONINI in the last 72 hours. Urinalysis:   Lab Results   Component Value Date/Time    NITRU Negative 07/05/2022 02:39 PM    WBCUA 10-20 07/05/2022 02:39 PM    BACTERIA FEW 07/05/2022 02:39 PM    RBCUA 3-5 07/05/2022 02:39 PM    BLOODU TRACE 07/05/2022 02:39 PM    SPECGRAV 1.020 07/05/2022 02:39 PM    GLUCOSEU Negative 07/05/2022 02:39 PM       Radiology:   Most recent    EEG No valid procedures specified. MRI of Brain Results for orders placed during the hospital encounter of 01/20/22    MRI BRAIN W WO CONTRAST    Narrative  EXAMINATION: MRI BRAIN W WO CONTRAST    CLINICAL HISTORY: G35 MS (multiple sclerosis) ([address] ICD10    COMPARISONS: Brain MRI from October 16, 2020    TECHNIQUE:    Multiplanar multisequence images of the brain were obtained before and after IV administration of contrast. Diffusion perfusion imaging was obtained. BRAIN MRI FINDINGS:    There are no extra-axial collections. There is no evidence of hemorrhage. There are no areas of perfusion diffusion signal abnormality to suggest ischemia. The susceptibility images do not demonstrate evidence of hemosiderin deposition within the brain  parenchyma or the leptomeninges. There is preservation of the gray-white matter differentiation. There are marked areas of T2/FLAIR increased signal in the subcortical and periventricular white matter of both hemispheres with areas of confluence. There is involvement of corpus callosum  and there are areas of signal dropout on T1 imaging. There are no areas of abnormal enhancement after IV contrast administration. There is prominence of the sulci and ventricles consistent with moderate global cerebral atrophy and chronic involutional changes out of proportion to the patient's age. The midline structures are intact, the corpus callosum is within normal limits. The region of the pineal gland and the sella turcica are unremarkable. There are no space-occupying lesions in the posterior fossa.  The midline structures are intact, the corpus callosum is within normal limits. The region of the pineal gland and the sella turcica are unremarkable. There are no space-occupying lesions in the posterior fossa. The basilar cisterns are patent. The craniocervical junction is unremarkable. The visualized portions of the orbits are within normal limits, the globes are intact. The visualized portions of the paranasal sinuses are within normal limits. The calvarium and soft tissues are unremarkable. Impression  1. There are no acute intracranial changes, there is no evidence of hemorrhage or acute ischemia. 2. There are extensive areas of T2/FLAIR signal abnormality in the periventricular and subcortical white matter with a confluent distribution. Some lesions are oriented perpendicular to the corpus callosum. The findings are nonspecific but may be  associated with a demyelinating process such as multiple sclerosis versus chronic microangiopathy, vasculitis or other demyelinating process. EXAMINATION: MRI BRAIN WO CONTRAST, MRI CERVICAL SPINE WO CONTRAST    CLINICAL HISTORY: R26.0 Ataxic gait ICD10    COMPARISONS: NONE AVAILABLE    TECHNIQUE:    Multiplanar multisequence MRI images of the cervical spine were obtained without contrast.    FINDINGS:        There is no acute fracture. There is preservation of the vertebral body heights. There is intervertebral disc desiccation and disc space narrowing at every level. The bone marrow signal is within normal limits. The cord is normal in course and caliber. There are areas of signal abnormality noted within the cord to the right side at C3-4, also on the right side at C4, and on the left at C5-6. These may represent foci of demyelination. There is no prevertebral soft tissue swelling. Anterior soft tissues are unremarkable. The craniocervical junction is unremarkable.     C2-3: There is a  indenting the anterior thecal sac but not abutting the cord with mild central canal narrowing. The facet joints are unremarkable. There is no narrowing of the neural foramina. C3-4:There is a less than 2 mm disc bulge flattening the anterior portion of the thecal sac without narrowing of the central canal.  The facet joints are unremarkable. There is no narrowing of the neural foramina. C4-5:There is a less than 2 mm disc bulge flattening the anterior portion of the thecal sac without narrowing of the central canal.  The facet joints are unremarkable. There is no narrowing of the neural foramina. C5-6:There is no disc herniation or central canal narrowing. The facet joints are unremarkable. There is no narrowing of the neural foramina. C6-7:There is no disc herniation or central canal narrowing. The facet joints are unremarkable. There is no narrowing of the neural foramina. C7-T1:There is no disc herniation or central canal narrowing. The facet joints are unremarkable. There is no narrowing of the neural foramina. IMPRESSION:  1. There is no acute fracture or subluxation. 2. The cord is normal in course and caliber. There are foci of signal abnormality most likely between C3 and C6 on both the right and left sides of the cord which may represent areas of demyelination. The findings may be secondary to edema and process  such as multiple sclerosis versus prior ischemia or other etiologies. 3. There is mild multilevel spondylosis including intervertebral disc space narrowing at every level and very degrees of disc bulges as described in greater detail in each level above. MRA of the Head and Neck: No results found for this or any previous visit. No results found for this or any previous visit. No results found for this or any previous visit. CT of the Head: No results found for this or any previous visit. No results found for this or any previous visit.   No results found for this or any previous visit. Carotid duplex: No results found for this or any previous visit. No results found for this or any previous visit. No results found for this or any previous visit. Echo No results found for this or any previous visit. Assessment/Plan:    6/29/2022:  Multiple  sclerosis with exacerbation. Patient with bilateral lower extremity paraparesis and left foot drop at baseline but came in with increasing lower extremity weakness. Treated with IV methylprednisolone with some improvement. Patient on Sidumula 30 outpatient  but this will be discontinued due to her elevated LFTs. She will be initiated on Ocrevus after follow-up with Dr. Cordell Metz with therapies. We will monitor her progress    7/1/2022:  Multiple sclerosis with bilateral lower extremity paraparesis and left foot drop. Continue with therapies  Patient had tachycardia and cardiology was consulted. She had echocardiogram done that showed moderate PFO with right to left shunt. She has been started on aspirin and Plavix per cardiology. Bilateral lower extremity venous duplex was negative. Cardiology to arrange for PFO closure. I have personally performed a face to face diagnostic evaluation on this patient, reviewed all data and investigations, and am the sole provider of all clinical decisions on the neurological status of this patient. And had a exacerbation and her EDSS scores have changed now. She is feeling to the right and requires more help. She has been on Sidumula 30 and had a relapse. She may need to be considered for Renaldo Chi as now she is becoming primary progressive multiple sclerosis. 7/6/22:  Multiple sclerosis with bilateral lower extremity paraparesis and left foot drop. Continue with therapies. Sidumula 30 stopped due to elevated LFTs. Repeat lfts. Plans for Renaldo Chi outpatient. Patient had tachycardia and cardiology was consulted.   She had echocardiogram done that showed moderate PFO with right to left shunt. She has been started on aspirin and Plavix per cardiology. Cardiology to arrange for PFO closure. 7/8/22:  Multiple sclerosis with bilateral lower extremity paraparesis and left foot drop. Continue with therapies. Sheralyn Hark stopped due to elevated LFTs. Repeat lfts improved yesterday, almost normalized. Plans for [facility] outpatient. Patient had tachycardia and cardiology was consulted. She had echocardiogram done that showed moderate PFO with right to left shunt. She has been started on aspirin and Plavix per cardiology. Cardiology to arrange for PFO closure. Mother at bedside and the above was explained to her in detail. I have personally performed a face to face diagnostic evaluation on this patient, reviewed all data and investigations, and am the sole provider of all clinical decisions on the neurological status of this patient. Patient has asymptomatic PFO and I do not see that she has had any new strokes though we will make sure she has not thrown any clots. She is on dual antiplatelet therapy. She has right-sided weakness which is new and an exacerbation of her MS. Her liver function tests are now normal and we will consider Ocrevus as an outpatient. More than 60% time spent on evaluating and managing this patient    7/11/22:   Multiple sclerosis with bilateral lower extremity paraparesis. Sheralyn Hark stopped due to elevated LFTs which have almost normalized. Plans for outpatient Ocrevus. Discharge home tomorrow. Cardiology to arrange for outpatient PFO closure per above. Continue dual antiplatelet therapy. Lower extremity negative for DVTs. Okay to DC from neurology standpoint.   Patient has follow-up appointment scheduled for 9/21/2022 at 3:15 PM.    Collaborating physicians: Dr Charles Levy    Electronically signed by JACKI Langley CNP on 7/11/2022 at 1:43 PM

## 2022-07-11 NOTE — PROGRESS NOTES
Subjective: The patient complains of severe acute on chronic progressive fatigue and paresis and quadrat esthesia secondary to MS flareup partially relieved by rest, medications, PT,  OT, steroids, SLP and rest and exacerbated by recent illness -we will sclerosis flareup. I am concerned about patients medical complexities and barriers to advancing in rehab goals including -her tachycardia-initially presented through the emergency room with severe hip pain status post a fall at home. She was diagnosed with an exacerbation of her multiple sclerosis and rhabdomyolysis secondary to elevated CK levels. Fortunately her x-rays of her hips were unremarkable. She was prescribed IV fluids and IV methylprednisolone. She is hoping to transition to [facility] therapy as an outpatient for her MS.     Patient has had difficulty with endurance, balance, gait ataxia, generalized weakness and numbness. Medically complicated by tachycardia especially with activity. She will need telemetry monitoring while on the unit and to be followed by cardiology and or medical..  I reviewed and discussed cardiac note-Cardiac Supportive Care including:IV and PO hydration for Rhabdomyolysis care. Serial cardiac enzymes. Telemetry for 48 hours. Echocardiogram-sofar work-up of his been unremarkable and cardiology feels that it is a tachycardia always sinus in response to debility and deconditioning. Have okayed her for therapy. I reviewed current care and plans for further care with other rehab providers including nursing and case management. According to recent nursing note, no recent substantive notes. Patient is pending discharge tomorrow we will begin medication reconciliation    I am concerned about the moderate sized PFO that they found on recent echocardiogram.  Cardiology will follow up with her as an outpatient. She no longer needs daily labs I have discontinued them.   We are preparing for her discharge today and will arrange follow-up with cardiology. We discussed possibly getting her a pure wick catheter at night for home use. ROS x10: The patient also complains of severely impaired mobility and activities of daily living. Otherwise no new problems with vision, hearing, nose, mouth, throat, dermal, cardiovascular, GI, , pulmonary, musculoskeletal, psychiatric or neurological. See also Acute Rehab PM&R H&P. Vital signs:  BP (!) 114/57   Pulse 85   Temp 98.2 °F (36.8 °C)   Resp 20   Ht 5' 1\" (1.549 m)   Wt 174 lb 4 oz (79 kg)   SpO2 100%   BMI 32.92 kg/m²   I/O:   PO/Intake:  fair PO intake, regular 3 carb per meal diet    Bowel:   continent    Bladder: continent pure wick catheter at night urine is clear  General:  Patient is well developed,   adequately nourished, and    well kempt. HEENT:    Pupils equal, hearing intact to loud voice, external inspection of ear and nose benign. Inspection of lips, tongue and gums benign    Musculoskeletal: No significant change in strength or tone. All joints stable. Inspection and palpation of digits and nails show no clubbing, cyanosis or inflammatory conditions. Neuro/Psychiatric: Affect: flat but pleasant. Alert and oriented to person, place and situation with  min cues. No significant change in deep tendon reflexes or sensation  Lungs:  Diminished, CTA-B. Respiration effort is   normal at rest.     Heart:   S1 = S2,   RRR-     Abdomen:  Soft, non-tender, no enlargement of liver or spleen.   Extremities:    lower extremity edema    Skin:   Intact to general survey,      Rehabilitation:  Physical Therapy:   Bed mobility:  Bed mobility  Bridging: Stand by assistance (07/04/22 8853)  Rolling to Left: Stand by assistance (07/04/22 2339)  Rolling to Right: Stand by assistance (07/04/22 5823)  Supine to Sit: Minimal assistance (06/28/22 1143)  Sit to Supine: Stand by assistance (07/04/22 5761)  Scooting: Minimal assistance (latearlly in supine) (06/28/22 1143)  Bed Mobility Comments: Mat mobility (07/04/22 0563)  Bed Mobility  Additional Factors: Without handrails; Head of bed flat (mat table) (07/07/22 1528)  Additional Factors: Without handrails; Head of bed flat (mat table) (07/07/22 1528)  Roll Left  Assistance Level: Modified independent (07/08/22 1328)  Skilled Clinical Factors: with HR (06/28/22 1342)  Roll Right  Assistance Level: Modified independent (07/08/22 1328)  Skilled Clinical Factors: with HR (06/28/22 1342)  Sit to Supine  Assistance Level: Supervision (07/08/22 1328)  Skilled Clinical Factors: requiring to use BUE to get BLE on to mat table (07/08/22 1328)  Supine to Sit  Assistance Level: Supervision (07/08/22 1328)  Skilled Clinical Factors: cues for improving technique with fair follow through (07/08/22 1328)  Scooting  Assistance Level: Supervision (07/08/22 1328)  Skilled Clinical Factors: lateral scooting EOB (07/08/22 1328)  Transfers:  Transfers  Sit to Stand: Stand by assistance (07/04/22 0934)  Stand to sit: Stand by assistance (07/04/22 0934)  Bed to Chair: Stand by assistance (07/04/22 0934)  Lateral Transfers: Minimal Assistance;Contact guard assistance (Instruction provided on scooting laterally at EOB emphasizing forward weight shift) (06/28/22 1145)  Car Transfer: Stand by assistance (07/04/22 0934)  Comment: VC for hand placement when sitting down (07/04/22 0934)  Transfer Training  Transfer Training: Yes (07/09/22 0844)  Sit to Stand: Supervision (07/09/22 0844)  Stand to Sit: Supervision (07/09/22 0844)  Transfers  Surface: Wheelchair (07/08/22 1328)  Additional Factors: Verbal cues (07/07/22 1528)  Device: Druscilla Covert (07/08/22 1328)  Sit to Stand  Assistance Level: Modified independent (07/08/22 1328)  Skilled Clinical Factors: maintains good STS technique throughout tx (07/08/22 1328)  Stand to Sit  Assistance Level: Modified independent (07/08/22 1328)  Skilled Clinical Factors: maintains one UE back prior to sitting throughout tx (07/08/22 1328)  Bed To/From Chair  Technique: Stand pivot (07/08/22 1328)  Assistance Level: Stand by assist (07/08/22 1328)  Skilled Clinical Factors: improved technique, good carryover from AM tx (07/08/22 1328)  Stand Pivot  Assistance Level: Supervision (07/08/22 1328)  Skilled Clinical Factors: increased cues when pt fatigued for improved safety with approach to chair (07/08/22 1328)  Car Transfer  Assistance Level: Modified independent (07/08/22 1328)  Skilled Clinical Factors: good technique without cues from this PTA (07/08/22 1328)  Gait:   Ambulation  Surface: carpet (07/04/22 1405)  Device: Henreitta Fester (07/04/22 1405)  Other Apparatus: Wheelchair follow (06/28/22 1149)  Assistance: Contact guard assistance (07/04/22 1405)  Quality of Gait: difficulty with RLE foot clearance, FF posture, downward gaze, short BLE step length. Step to pattern<> occ step strough (07/04/22 1405)  Gait Deviations: Slow Marlys;Decreased step length;Decreased step height;Shuffles (07/04/22 1405)  Distance: 44ft x 2 (07/04/22 1405)  Comments: decreased stride and step length with fatigue after  2nd Gait (07/04/22 1405)  Gait Training: Yes (07/09/22 0844)  Overall Level of Assistance: Stand-by assistance (07/09/22 0844)  Distance (ft): 50 Feet (07/09/22 0844)  Assistive Device: Walker, rolling (07/09/22 0844)  Interventions: Tactile cues (07/09/22 0844)  Base of Support: Widened (07/09/22 0844)  Speed/Marlys: Fluctuations (07/09/22 0844)  Step Length: Right shortened;Left shortened (07/09/22 0844)  Gait Abnormalities: Step to gait; Decreased step clearance (07/09/22 0844)  Right Side Weight Bearing: As tolerated (07/09/22 0844)  Left Side Weight Bearing: As tolerated (07/09/22 0844)  Ambulation  Surface: Carpet (07/08/22 1331)  Device: Rolling walker (07/08/22 1331)  Distance: 50', 30' x 2 (07/08/22 1331)  Activity Comments:  BPM post ambulation (06/30/22 1342)  Assistance Level: Supervision (07/08/22 1331)  Gait Deviations: Decreased step length bilateral;Slow leonardo;Decreased heel strike right;Decreased heel strike left;Narrow base of support (07/08/22 1331)  Skilled Clinical Factors: improved awareness of kaz foot clearance, intermittent reciprocal pattern, mild pelvic instability (07/08/22 1331)  Stairs:  Stairs/Curb  Stairs?: Yes (07/04/22 0906)  Stairs  Curbs: 2\" (07/04/22 0906)  Device: Rolling walker (07/04/22 0906)  Assistance: Stand by assistance;Contact guard assistance (07/04/22 0906)  Comment: Vcs to not whip WW out when placing from curb to floor (07/04/22 0906)  Curb  Curb Height: 4'' (07/08/22 1331)  Device: Rolling walker (07/08/22 1331)  Number of Curbs: 2 (07/08/22 1331)  Additional Factors: Verbal cues (for technique to improve quality) (07/08/22 1331)  Assistance Level: Supervision (07/08/22 1331)  Skilled Clinical Factors: no LOB or instability, 4\" performed per pt's mother confirming pt's home has 4\" curb, not 2\" (07/08/22 1331)  W/C mobility:       Occupational Therapy:   Hand Dominance: Right  ADL  Feeding: Modified independent  (06/28/22 1049)  Grooming: Setup (06/29/22 1341)  Grooming Skilled Clinical Factors: for oral care and grooming (06/29/22 1341)  UE Bathing: Stand by assistance; Increased time to complete (06/29/22 1341)  LE Bathing: Dependent/Total (06/29/22 1341)  LE Bathing Skilled Clinical Factors: +2 assistance. Pt able to bend down in order to wash legs with SBA for safe sitting. +2 to wash posterior lillie area.  1 person for washing 1 person to maintain standing balance (06/29/22 1341)  UE Dressing: Stand by assistance (06/29/22 1341)  LE Dressing: Increased time to complete;Maximum assistance (06/29/22 1341)  LE Dressing Skilled Clinical Factors: to don pants and depends (06/29/22 1341)  Toileting: Dependent/Total (06/29/22 1341)  Toileting Skilled Clinical Factors: Pt incontinent of feces and urine (06/29/22 1343)  Additional Comments: Pt displaying lack of endurance and fatigue throughout session, requiring increased assistance throughout progression of evaluation (06/28/22 1049)  Toilet Transfers  Toilet - Technique: Stand pivot (06/29/22 1339)  Equipment Used: Standard toilet (06/29/22 1339)  Toilet Transfer: Minimal assistance (verbal cues for hand placement) (06/29/22 1339)  Toilet Transfers Comments: Twards end of session, initially Min A for transfers (06/28/22 1054)     [address] - Transfer Type: To and From (06/28/22 1054)  Shower - Transfer To: Shower seat with back (06/28/22 1054)  Shower - Technique: Stand pivot (06/28/22 1054)  Shower Transfers: Moderate assistance (06/28/22 1054)  Shower Transfers Comments: Patient did not complete shower transfer as patient washed up at sink in wheelchair. (06/29/22 1339)    Speech Therapy:      Comprehension: Within Functional Limits (Patient answered simple yes/no questions, basic questions and participated in conversation with good comprehension. no repetition was required)  Verbal Expression:  [facility] with meeting wants and needs. No word finding deficits have been noted during conversation)  Diet/Swallow:        Dysphagia Outcome Severity Scale: Level 7: Normal in all situations  Compensatory Swallowing Strategies : Upright as possible for all oral intake          Lab/X-ray studies reviewed, analyzed and discussed with patient and staff:   No results found for this or any previous visit (from the past 24 hour(s)). XR CERVICAL SPINE   6/19/2022   NEGATIVE CERVICAL SPINE    XR HIP LEFT  6/19/2022  NEGATIVE PELVIS AND LEFT HIP     XR FEMUR RIGHT  6/19/2022: No fracture, dislocation, bone lesion. NEGATIVE RIGHT FEMUR    MRI LUMBAR SPINE : 6/20/2022: Degenerative facet changes of the lumbar spine visualized most prominent at L3-L4 and L5-S1 that demonstrate mild progression in comparison to the prior study    US ABDOMEN  6/20/2022:  NO SIGN OF CHOLELITHIASIS OR BILE DUCT DILATATION. SLUDGE WITHIN THE GALLBLADDER.  NO DEFINITE FOCAL LIVER ABNORMALITY. US DUP LOWER EXTREMITIES BILATERAL VENOUS  6/20/2022   NO SONOGRAPHIC EVIDENCE OF DEEP VENOUS THROMBOSIS WITHIN THE BILATERAL LOWER EXTREMITIES. Patent foramen ovale with right-to-left shunt was visualized. On agitated saline contrast injection- Large amount of bubbles LA and LV   nearly immediately    Previous extensive, complex labs, notes and diagnostics reviewed and analyzed. ALLERGIES:    Allergies as of 06/27/2022    (No Known Allergies)      (please also verify by checking [facility])          Complex Physical Medicine & Rehab Issues Assess & Plan:   1. Severe abnormality of gait and mobility and impaired self-care and ADL's secondary to progressive masturbation of multiple sclerosis. Functional and medical status reassessed regarding patients ability to participate in therapies and patient found to be able to participate in acute intensive comprehensive inpatient rehabilitation program including PT/OT to improve balance, ambulation, ADLs, and to improve the P/AROM. Therapeutic modifications regarding activities in therapies, place, amount of time per day and intensity of therapy made daily. In bed therapies or bedside therapies prn.   2. Bowel and Bladder dysfunction  , Neurogenic bowel and bladder:  frequent toileting, ambulate to bathroom with assistance, check post void residuals. Check for C.difficile x1 if >2 loose stools in 24 hours, continue bowel & bladder program.  Monitor bowel and bladder function. Lactinex 2 PO every AC. MOM prn, Brown Bomb prn, Glycerin suppository prn, enema prn. Encourage therapy and nursing to co-treat and problem solve re continence. Wana Ditropan. 3. Severe MS pain as well as generalized OA pain: reassess pain every shift and prior to and after each therapy session, give prn Tylenol and consider scheduled Tylenol, modalities prn in therapy, masage, Lidoderm, K-pad prn. Consider scheduled AM pain meds.   4. Skin healing   and breakdown risk:  continue pressure relief program.  Daily skin exams and reports from nursing. Discontinue daily labs. 5. Fatigue due to nutritional and hydration deficiency: Add and titrate vitamin B12 vitamin D and CoQ10 continue to monitor I&Os, calorie counts prn, dietary consult prn. Add healthy snack at night. 6. Acute episodic insomnia with situational adjustment disorder:  prn Ambien, monitor for day time sedation. 7. Falls risk elevated:  patient to use call light to get nursing assistance to get up, bed and chair alarm. 8. Elevated DVT risk: progressive activities in PT, continue prophylaxis UMM hose, elevation and Lovenox discontinued. 9. Complex discharge planning:  Complete medication simplification patient and family education as we progressed toward her plan pending DC 7/12/22--home with [facility]--try to get aide with waiver. Parents are elderly and son is only 5. Toward her final weekly team meeting   Thursday to re-assess progress towards goals, discuss and address social, psychological and medical comorbidities and to address difficulties they may be having progressing in therapy. Patient and family education is in progress. The patient is to follow-up with their family physician after discharge. We will try to get pure wick catheter at night and extend waiver services at home. Complex Active General Medical Issues that complicate care Assess & Plan:    1. Urinary urgency,   Neurogenic bladder-recheck stat UA  2.   MS (multiple sclerosis)-consult neurology transition to outpatient Ocrevus therapy if possible. 3.   Hyperglycemia-monitor for diabetes mellitus  4. Rhabdomyolysis-push fluids recheck BMP  5. Baseline mental handicap-just new learning and therapy. 6.   Posterior tibial tendon dysfunction -AFO  7. Malodorous urine-check stat UA check postvoid residual  8. Ataxic gait-focus on balance and therapy  9.    Pain in joint, lower leg, Foot drop, left foot, Acquired deformity of left foot-consult podiatry as needed  10. Lumbar radiculopathy-post effective dose of pain medication  11. Moderate sized PFO, asymptomatic sinus tachycardia with activity dt deconditioning- SP EKG stat-OK,  , add stat labs and cardiac consult-a.m. labs were essentially [id number]  Was OK.  cardiology. Continue telemetry for now. Cardiology and I agree to add aspirin and Plavix to the current dose and the Lovenox       Focus of today's plan-  Initiate and modify therapuetic plan to meet patients individual needs, add rest breaks as needed, and monitor heart rate on telemetry with cardiac work-up as above. Follow through with plans for PFO. Attempt to get a pure wick for her for home use. Arrange follow-up regarding cardiology and nephrology.     Electronically signed by Mykel Bobo DO on 6/29/22 at 8:02 AM EDT Annette Heimlich, D.O., PM&R     Attending    [address]

## 2022-07-11 NOTE — PROGRESS NOTES
Assessment complete, VSS. No c/o pain thus far. Plan for discharge tomorrow, patient excited. Continue plan of care.  Electronically signed by Stas Willams RN on 7/11/22 at 2:33 PM EDT

## 2022-07-11 NOTE — PROGRESS NOTES
OCCUPATIONAL THERAPY  INPATIENT REHAB TREATMENT NOTE  [facility]      NAME: Jose Voss  : 1980 (1 y.o.)  MRN: [id number]  CODE STATUS: Full Code  [facility]    Date of Service: 2022    Referring Physician: Dr. Katelin Prieto  Rehab Diagnosis: Impaired mobility and ADLs D/T Exacerabation of MS    Restrictions  Restrictions/Precautions  Restrictions/Precautions: Fall Risk         Patient's date of birth confirmed: Yes    SAFETY:  Safety Devices  Type of devices: All fall risk precautions in place    SUBJECTIVE:  Subjective: I am ready to go home  Pain: no pain      COGNITION:     Comprehension: Independent  Expression: Independent  Social Interaction: Independent  Problem Solving: Supervision  Memory: Independent    OBJECTIVE:         Feeding  Assistance Level: Independent  Grooming/Oral Hygiene  Assistance Level: Independent  Upper Extremity Bathing  Assistance Level: Modified independent  Lower Extremity Bathing  Assistance Level: Supervision  Upper Extremity Dressing  Assistance Level: Set-up- setup to manage bra. No difficulty with shirt  Lower Extremity Dressing  Assistance Level: Minimal assistance- assist to completely don brief over hips  Putting On/Taking Off Footwear  Assistance Level: Minimal assistance. Assist needed socks only- depending on where she is seated. Pt able to don shoes without difficulty. Toileting  Assistance Level: Modified independent. Urination only.  Pt reports no difficulty cleaning self s/p bowel movement   Toilet Transfers  Equipment: Standard toilet;Grab bars  Assistance Level: Modified independent  Tub/Shower Transfers  Type: Tub  Transfer From: Rolling walker  Transfer To: Tub transfer bench  Additional Factors: Verbal cues  Assistance Level: Stand by assist    Instrumental ADL's  Instrumental ADLs: Yes  Light Housekeeping  Light Housekeeping Level: Fatou Sanchez Housekeeping Level of Assistance: Modified independent-SUP  Light Housekeeping: gather clothes prior to ADL using walker- fair safety    Functional Mobility  Device: Rolling walker  Activity: To/From bathroom  Assistance Level: Supervision  Skilled Clinical Factors: Increased time for ambulation          Education:  Education  Education Given To: Patient  Education Provided: ADL Function;Transfer Training;Plan of Care;Precautions;IADL Function;DME/Home Modifications  Education Provided Comments: Ecducated pt. on techniques for LE dressing and reaching feet; pt. states she has not seen AE for LE dressing but is amenable to education on Monday. Education Method: Verbal  Education Outcome: Verbalized understanding    Equipment recommendations:   tub bench- pt has  LB AE- pt declining need   Walker basket/tray- pt has    Rec home therapy and HHA (IADLs and bathing)      ASSESSMENT:   Pt declined need for DME/AE- reports she will have everything. Her mother bought her a tub bench and we previously completed transfer training. She is still looking into purchasing a pure wick for night time home usage- she reports her doctor and insurance is assisting her with this. Discussed pt using a reacher at home to assist with LB ADLs- she declined and reported she has no difficulty with LB ADLs due to having more functional sitting positions. PLAN OF CARE:  Strengthening,Balance training,Functional mobility training,Neuromuscular re-education,Self-Care / Keri Estrada training,Safety education & training,Home management training  continue with POC    Patient goals : \"My dad can't take care of me now, so I need to get better. He has congestive heart failure. \"  Time Frame for Long term goals : Within 2 weeks, Pt to demonstrate progress in the following areas listed below to achieve specific LTG's as stated in the initial evaluation. Long Term Goal 1: Increase independence in ADLs  Long Term Goal 2: Increaese endurance to complete clothes management  Long Term Goal 3:  Increase static/dynamic balance  Long Term Goal 4:  Increase independence to perform kitchen mobility      Therapy Time:   Individual Group Co-Treat   Time In 08       Time Out 0930         Minutes 60             ADL/IADL trainin minutes     Electronically signed by:    Annamaria Gowers, OT,   2022, 9:41 AM

## 2022-07-11 NOTE — PROGRESS NOTES
Physical Therapy Rehab Treatment Note  Facility/Department: [facility]  Room: R255R255-01       NAME: Johan Herrera  : 1980 (29 y.o.)  MRN: [id number]  CODE STATUS: Full Code    Date of Service: 2022       Restrictions:  Restrictions/Precautions: Fall Risk       SUBJECTIVE:   Subjective: \" I was able to walk with the walker to the bathroom this weekend. \"    Pain  Pain: Pt reports no pain      OBJECTIVE:     Scooting  Assistance Level: Supervision  Skilled Clinical Factors: lateral scooting EOB to improve overall technique    Transfers  Surface: Wheelchair; To chair with arms;From chair with arms  Device: Walker  Sit to Stand  Assistance Level: Modified independent  Skilled Clinical Factors: maintains good technique  Stand to Sit  Assistance Level: Modified independent  Skilled Clinical Factors: maintains one UE back prior to sitting throughout tx  Bed To/From Chair  Technique: Stand pivot  Assistance Level: Modified independent  Skilled Clinical Factors: Good carryover from previous tx for technique and correct direction with turning for safety, no cues from this PTA  Stand Pivot  Assistance Level: Modified independent  Skilled Clinical Factors: no instability or LOB demonstrated, occ cues for improving technique  Car Transfer  Assistance Level: Modified independent  Skilled Clinical Factors: good technique without cues from this PTA    Ambulation  Surface: Carpet; Uneven surface; Level surface  Device: Rolling walker  Distance: 50', 25' x 3  Assistance Level: Supervision  Gait Deviations: Decreased step length bilateral;Slow leonardo;Decreased heel strike right;Decreased heel strike left;Narrow base of support  Skilled Clinical Factors: increased fatigue this session resulting in increased ff posture, decreased foot clearance, intermittent recip pattern and slow leonardo    Neuromuscular Education  Neuromuscular Comments: Pt able to fwd bend to  object off floor without reacher and no assistance from this PTA    PT Exercises  Exercise Treatment: Review of seated HEP: AP, LAQ, Marching, Hip add pillow squeeze, hip abd side kicks x10 ea , STS x5 in 37 seconds     ASSESSMENT/PROGRESS TOWARDS GOALS:   Assessment  Assessment: Pt experiencing increased fatigue secondary to this PT session following ADL with OT. Despite increased fatigue, pt continues to demonstrated good motivation and participation. Increased time required during gait training. Lateral scooting edge of mat table utilized this session to improve overall strength and technique. Pt reports having some difficulty scooting on shower chair. Vc's for improving technique with anterior lean and utilizing BUE. Goals:  Long Term Goals  Long term goal 1: Pt to complete bed mobility with indep  Long term goal 2: Pt to complete functional transfers bed/chair/car with indep  Long term goal 3: Pt to ambulate 50-150ft with LRD and indep  Long term goal 4: Pt to tolerate 5min standing activities indep  Long term goal 5: Pt to manage 2\" curb step indep  Additional Goals?: Yes  Long term goal 6: Pt to complete HEP with indep    PLAN OF CARE/Safety:   Plan Comment: Cont.  POC      Therapy Time:   Individual   Time In 930   Time Out 1030   Minutes 60     Minutes:  Transfer/Bed mobility trainin  Gait trainin  Neuro re education: 5  Therapeutic ex: Jakub Schmidt PTA, 22 at 11:39 AM

## 2022-07-11 NOTE — PROGRESS NOTES
OCCUPATIONAL THERAPY  INPATIENT REHAB TREATMENT NOTE  [facility]      NAME: Lul Lee  : 1980 (48 y.o.)  MRN: [id number]  CODE STATUS: Full Code  Room: R255/R255-01    Date of Service: 2022    Referring Physician: Dr. Shanice Pike  Rehab Diagnosis: Impaired mobility and ADLs D/T Exacerabation of MS    Restrictions  Restrictions/Precautions  Restrictions/Precautions: Fall Risk         Patient's date of birth confirmed: Yes    SAFETY:  Safety Devices  Type of devices: All fall risk precautions in place    SUBJECTIVE:  Subjective: I am ready  Pain: no pain    OBJECTIVE:     While seated, challenged strength, activity tolerance, ROM, and flexibility for improved ADL performance.'    Issued patient HEP handout- pt educated on exercise frequency and technique. Fair technique with exercise walkthrough. Pt would benefit from completing exercises again prior to returning home to increase carryover     Challenged pt through usage of 1# weight to complete BUE exercises. 2 sets x 10 reps completed. Exercises focused on all UE joints and planes of motion including scapular protraction/retraction, shoulder flexion/extension/rotation/horizontal abduction, elbow flexion/extension, supination/pronation, and wrist/digit flexion/extension. Education:  Education  Education Given To: Patient  Education Provided: ADL Function;Plan of Care;Precautions;IADL Function;Home Exercise Program  Education Provided Comments: Ecducated pt. on techniques for LE dressing and reaching feet; pt. states she has not seen AE for LE dressing but is amenable to education on Monday.   Education Method: Verbal  Education Outcome: Verbalized understanding    Equipment recommendations:   Pt to use soup cans or other alternative items as hand weights for HEP    ASSESESMENT:   Good participation in [address]:  Strengthening,Balance training,Functional mobility training,Neuromuscular re-education,Self-Care / Irven Son training,Safety education & training,Home management training  continue with POC    Patient goals : \"My dad can't take care of me now, so I need to get better. He has congestive heart failure. \"  Time Frame for Long term goals : Within 2 weeks, Pt to demonstrate progress in the following areas listed below to achieve specific LTG's as stated in the initial evaluation. Long Term Goal 1: Increase independence in ADLs  Long Term Goal 2: Increaese endurance to complete clothes management  Long Term Goal 3: Increase static/dynamic balance  Long Term Goal 4:  Increase independence to perform kitchen mobility    Therapy Time:   Individual Group Co-Treat   Time In 1330       Time Out 1400         Minutes 30             Therapeutic activities: 30 minutes     Electronically signed by:    Jaja Bashir OT,   7/11/2022, 1:47 PM

## 2022-07-11 NOTE — PLAN OF CARE
Problem: Discharge Planning  Goal: Discharge to home or other facility with appropriate resources  Outcome: Progressing     Problem: ABCDS Injury Assessment  Goal: Absence of physical injury  Outcome: Progressing     Problem: Safety - Adult  Goal: Free from fall injury  Outcome: Progressing     Problem: Skin/Tissue Integrity  Goal: Absence of new skin breakdown  Description: 1. Monitor for areas of redness and/or skin breakdown  2. Assess vascular access sites hourly  3. Every 4-6 hours minimum:  Change oxygen saturation probe site  4. Every 4-6 hours:  If on nasal continuous positive airway pressure, respiratory therapy assess nares and determine need for appliance change or resting period.   Outcome: Progressing     Problem: Pain  Goal: Verbalizes/displays adequate comfort level or baseline comfort level  Outcome: Progressing     Problem: Neurosensory - Adult  Goal: Achieves stable or improved neurological status  Outcome: Progressing  Goal: Achieves maximal functionality and self care  Outcome: Progressing     Problem: Respiratory - Adult  Goal: Achieves optimal ventilation and oxygenation  Outcome: Progressing     Problem: Cardiovascular - Adult  Goal: Maintains optimal cardiac output and hemodynamic stability  Outcome: Progressing     Problem: Skin/Tissue Integrity - Adult  Goal: Skin integrity remains intact  Outcome: Progressing     Problem: Musculoskeletal - Adult  Goal: Return mobility to safest level of function  Outcome: Progressing     Problem: Gastrointestinal - Adult  Goal: Maintains adequate nutritional intake  Outcome: Progressing     Problem: Genitourinary - Adult  Goal: Absence of urinary retention  Outcome: Progressing     Problem: Infection - Adult  Goal: Absence of infection at discharge  Outcome: Progressing     Problem: Metabolic/Fluid and Electrolytes - Adult  Goal: Electrolytes maintained within normal limits  Outcome: Progressing     Problem: Hematologic - Adult  Goal: Maintains hematologic stability  Outcome: Progressing

## 2022-07-11 NOTE — PROGRESS NOTES
Physical Therapy Rehab Treatment Note  [facility]       NAME: Ivan Sheikh  : 1980 (82 y.o.)  MRN: [id number]  CODE STATUS: Full Code    Date of Service: 2022       Restrictions:  Restrictions/Precautions: Fall Risk       SUBJECTIVE:   Subjective: \"I'm doing good. \"    Pain  Pain: Pt reports no pain      OBJECTIVE:     Bed Mobility  Additional Factors: Without handrails; Head of bed flat  Roll Left  Assistance Level: Modified independent  Roll Right  Assistance Level: Modified independent  Sit to Supine  Assistance Level: Modified independent  Supine to Sit  Assistance Level: Modified independent  Scooting  Assistance Level: Modified independent  Skilled Clinical Factors: increased effort    Transfers  Surface: From bed; Wheelchair  Device: Walker  Sit to [employer] Level: Modified independent  Skilled Clinical Factors: maintains good technique  Stand to [employer] Level: Modified independent  Skilled Clinical Factors: maintains one UE back prior to sitting throughout tx  Bed To/From Chair  Technique: Stand pivot  Assistance Level: Modified independent  Skilled Clinical Factors: bed <> w/c      Ambulation  Surface: Carpet; Uneven surface; Level surface  Device: Rolling walker  Distance: 48' two turns  Assistance Level: Modified independent  Gait Deviations: Decreased step length bilateral;Slow leonardo;Decreased heel strike right;Decreased heel strike left;Narrow base of support  Skilled Clinical Factors: intermittent reciprocal pattern, decrased foot clearance, ff posture, no cues provided    Curb  Curb Height: 4''  Device: Rolling walker  Number of Curbs: 2  Assistance Level: Modified independent  Skilled Clinical Factors: No cues provided, good technique and safety maintained      PT Exercises  A/AROM Exercises: Standing: mini squats x10, hamstring curls x10     ASSESSMENT/PROGRESS TOWARDS GOALS:   Assessment  Assessment: Pt demonstrates ability to perform 4\" curbs step x2 with good technique and safety. Pt able to navigate over uneven threshold and change of surface during gait training without instability or LOB. Pt has met all goals at this time. Goals:  Long Term Goals  Long term goal 1: Pt to complete bed mobility with indep - met  Long term goal 2: Pt to complete functional transfers bed/chair/car with indep - met  Long term goal 3: Pt to ambulate 50-150ft with LRD and indep 50 ft met  Long term goal 4: Pt to tolerate 5min standing activities indep - met  Long term goal 5: Pt to manage 2\" curb step indep - met  Additional Goals?: Yes  Long term goal 6: Pt to complete HEP with indep    PLAN OF CARE/Safety:   Plan Comment: Cont.  POC      Therapy Time:   Individual   Time In 1300   Time Out 1330   Minutes 30     Minutes:  Transfer/Bed mobility trainin  Gait trainin  Neuro re education: 0  Therapeutic ex: [facility], [facility], 22 at 3:43 PM

## 2022-07-11 NOTE — DISCHARGE INSTR - COC
Continuity of Care Form    Patient Name: Carlene Jimenez   :  1980  MRN:  [id number]    [facility] date:  2022  Discharge date:  2022    Code Status Order: Full Code   Advance Directives:      Admitting Physician:  Joce Mcneal DO  PCP: Narciso Mejía PA-C    Discharging Nurse: Doc Cintron RN  [address] Unit/Room#: R255/R255-01  Discharging Unit Phone Number: [phone]    Emergency Contact:   Extended Emergency Contact Information  Primary Emergency Contact: Ayesha  [address] Phone: [phone]  Work Phone: [phone]  Mobile Phone: [phone]  Relation: Parent    Past Surgical History:  Past Surgical History:   Procedure Laterality Date    CATARACT REMOVAL WITH IMPLANT Right     CATARACT REMOVAL WITH IMPLANT Left      SECTION  2013    EYE SURGERY      FOOT SURGERY Left        Immunization History:   Immunization History   Administered Date(s) Administered    COVID-19, MODERNA BLUE border, Primary or Immunocompromised, (age 12y+), IM, 100 mcg/0.5mL 2021, 2021    Tdap (Boostrix, Adacel) 1984, 2015       Active Problems:  Patient Active Problem List   Diagnosis Code    Mixed hyperlipidemia E78.2    Posterior tibial tendon dysfunction M76.829    Ataxic gait R26.0    Obesity, unspecified E66.9    Pain in joint, lower leg M25.569    Pseudophakia Z96.1    Foot drop, left foot M21.372    Acquired deformity of left foot M21.962    Lumbar radiculopathy M54.16    Urinary urgency R39.15    MS (multiple sclerosis) ([facility] [address] G35    Impaired mobility and activities of daily living dt exac of MS Z74.09, Z78.9    Hyperglycemia R73.9    Neurogenic bladder N31.9    Acute cystitis N30.00    Rhabdomyolysis M62.82    Intellectual disability F79    Paraparesis ([facility] [address] G82.20    PFO (patent foramen ovale) Q21.1       Isolation/Infection:   Isolation            No Isolation          Patient Infection Status       None to display Therapy and Occupational Therapy  Weight Bearing Status/Restrictions: No weight bearing restrictions  Other Medical Equipment (for information only, NOT a DME order):  walker  Other Treatments: ***    Patient's personal belongings (please select all that are sent with patient):  None    RN SIGNATURE:  Electronically signed by Opal Black RN on 7/12/22 at 10:30 AM EDT    CASE MANAGEMENT/SOCIAL WORK SECTION    Inpatient Status Date: admitted to inpatient on 6/27/22    Readmission Risk Assessment Score:  Readmission Risk              Risk of Unplanned Readmission:  10           Discharging to Facility/ Agency   Name: [facility]  Address:  Phone: [phone]  Fax:    Dialysis Facility (if applicable)   Name:  Address:  Dialysis Schedule:  Phone:  Fax:    / signature: Electronically signed by MEL Wilde LSW on 7/11/22 at 8:56 AM EDT    PHYSICIAN SECTION    Prognosis: Good    Condition at Discharge: Stable    Rehab Potential (if transferring to Rehab):     Recommended Labs or Other Treatments After Discharge:     Physician Certification: I certify the above information and transfer of Eli Muniz  is necessary for the continuing treatment of the diagnosis listed and that she requires Home Care for 30 days.      Update Admission H&P: No change in H&P    PHYSICIAN SIGNATURE:  Champ Chen DO  Electronically signed by MEL Wilde LSW on 7/11/22 at 8:56 AM EDT

## 2022-07-11 NOTE — PROGRESS NOTES
[facility]  Facility/Department: [facility]  Speech Language Pathology   Treatment Note          Johan Herrera  1980  R255/R255-01  [x]   confirmed    Date: 2022    Rehab Diagnosis: Impaired mobility and activities of daily living dt exac of MS      Restrictions/Precautions: Fall Risk    Weight: 174 lb 4 oz (79 kg)     ADULT DIET; Regular; 3 carb choices (45 gm/meal)    SpO2: 100 % (22 0718)  No active isolations    Speech Dx: Cognitive Linguistic Impairment    Subjective:  Alert, Cooperative and Pleasant        Interventions used this date:  Cognitive Skill Development and Instruction in Compensatory Strategies    Objective/Assessment:  Patient progressing towards goals:  Short-term Goals  Timeframe for Short-term Goals: 1-2 weeks  Goal 1: To increase safety awareness and judgment for safe completion of ADLs secondary to pt's cognitive deficits,  pt will complete mid to high level problem solving tasks related to ADLs (e.g. medication management, ADL safety) with 90% accuracy and superised assist.  Goal 2: To increase safety awareness and judgment for safe completion of ADLs secondary to pt's cognitive deficits, pt will sequence common activities of daily living with (verbal/written) steps with 90% accuracy and supervised assist.  Pt completed written 6-step sequencing task with 100% acc min cues  Goal 3: To decrease pt's cognitive deficits through the use of compensatory strategies, the pt will be educated on 3 different memory strategies and verbalize how he/she might use them at home in 3 ways with supervised assist.  Pt gave correct memory compensatory strategy with 90% acc Mod I   Goal 4: To address pt's cognitive deficits and promote recall of personal and medical information, pt will answer questions addressing (remote, recent, delayed) recall with 90% accuracy and min cues. Pt completed delayed recall of pictures with 11/12 acc after 15 minute delay with min cues  Goal 5:  To decrease cognitive deficits and improve attention to tasks for safe completion of ADLs, pt will complete structured tasks addressing (sustained, selective, alternating, divided) attention with 90% accuracy and supervised assist.      Treatment/Activity Tolerance:  Patient tolerated treatment well    Plan:  Continue per POC    Pain Assessment:  Patient does not c/o pain. Pain Re-assessment:  Patient does not c/o pain. Patient/Caregiver Education:  Patient educated on session and progression towards goals. Patient stated verbal understanding of directions.     Safety Devices:  Call light within reach and Chair alarm in place      Speech Therapy Level of Assistance Scale    AUDITORY COMPREHENSION  Rating:  Modified Independent-Supervised Assistance    VERBAL EXPRESSION  Rating:  Independent    MOTOR SPEECH  Rating: Independent    PROBLEM SOLVING  Rating: Minimal Assistance    MEMORY  Rating:  Minimal Assistance          Therapy Time   Time in: 1100  Time out: 1 [address]  Minutes: 30              Signature: Electronically signed by FIDELIA Yeager on 7/11/2022 at 11:38 AM

## 2022-07-11 NOTE — PROGRESS NOTES
CLINICAL PHARMACY NOTE: MEDS TO BEDS    Total # of Prescriptions Filled: 1   The following medications were delivered to the patient:  · Clopidogrel 75 mg Tab    Additional Documentation:

## 2022-07-12 ENCOUNTER — TELEPHONE (OUTPATIENT)
Dept: NEUROLOGY | Age: 42
End: 2022-07-12

## 2022-07-12 VITALS
BODY MASS INDEX: 32.9 KG/M2 | SYSTOLIC BLOOD PRESSURE: 130 MMHG | HEART RATE: 86 BPM | TEMPERATURE: 98 F | RESPIRATION RATE: 18 BRPM | WEIGHT: 174.25 LBS | OXYGEN SATURATION: 97 % | DIASTOLIC BLOOD PRESSURE: 62 MMHG | HEIGHT: 61 IN

## 2022-07-12 PROCEDURE — 97530 THERAPEUTIC ACTIVITIES: CPT

## 2022-07-12 PROCEDURE — 6360000002 HC RX W HCPCS: Performed by: PHYSICAL MEDICINE & REHABILITATION

## 2022-07-12 PROCEDURE — 6370000000 HC RX 637 (ALT 250 FOR IP): Performed by: INTERNAL MEDICINE

## 2022-07-12 PROCEDURE — 97535 SELF CARE MNGMENT TRAINING: CPT

## 2022-07-12 PROCEDURE — 99239 HOSP IP/OBS DSCHRG MGMT >30: CPT | Performed by: PHYSICAL MEDICINE & REHABILITATION

## 2022-07-12 PROCEDURE — 6370000000 HC RX 637 (ALT 250 FOR IP): Performed by: PHYSICAL MEDICINE & REHABILITATION

## 2022-07-12 PROCEDURE — 97110 THERAPEUTIC EXERCISES: CPT

## 2022-07-12 RX ORDER — DIPHENHYDRAMINE HCL 25 MG
25 TABLET ORAL ONCE
Status: CANCELLED
Start: 2022-07-12 | End: 2022-07-12

## 2022-07-12 RX ORDER — METHYLPREDNISOLONE SODIUM SUCCINATE 125 MG/2ML
125 INJECTION, POWDER, LYOPHILIZED, FOR SOLUTION INTRAMUSCULAR; INTRAVENOUS ONCE
Status: CANCELLED | OUTPATIENT
Start: 2022-07-12

## 2022-07-12 RX ORDER — ACETAMINOPHEN 325 MG/1
650 TABLET ORAL ONCE
Status: CANCELLED | OUTPATIENT
Start: 2022-07-12

## 2022-07-12 RX ADMIN — CYANOCOBALAMIN 1000 MCG: 1000 INJECTION, SOLUTION INTRAMUSCULAR; SUBCUTANEOUS at 09:47

## 2022-07-12 RX ADMIN — ASPIRIN 81 MG: 81 TABLET, COATED ORAL at 09:47

## 2022-07-12 RX ADMIN — CLOPIDOGREL BISULFATE 75 MG: 75 TABLET ORAL at 09:47

## 2022-07-12 RX ADMIN — THERA TABS 1 TABLET: TAB at 12:29

## 2022-07-12 RX ADMIN — Medication 100 MG: at 09:47

## 2022-07-12 RX ADMIN — ACETAMINOPHEN 650 MG: 325 TABLET ORAL at 09:46

## 2022-07-12 ASSESSMENT — PAIN DESCRIPTION - DESCRIPTORS: DESCRIPTORS: ACHING

## 2022-07-12 ASSESSMENT — PAIN SCALES - GENERAL: PAINLEVEL_OUTOF10: 6

## 2022-07-12 ASSESSMENT — PAIN DESCRIPTION - LOCATION: LOCATION: HIP

## 2022-07-12 ASSESSMENT — PAIN DESCRIPTION - ORIENTATION: ORIENTATION: LEFT

## 2022-07-12 NOTE — PLAN OF CARE
Problem: Discharge Planning  Goal: Discharge to home or other facility with appropriate resources  Outcome: Progressing     Problem: ABCDS Injury Assessment  Goal: Absence of physical injury  Outcome: Progressing     Problem: Safety - Adult  Goal: Free from fall injury  Outcome: Progressing     Problem: Skin/Tissue Integrity  Goal: Absence of new skin breakdown  Description: 1. Monitor for areas of redness and/or skin breakdown  2. Assess vascular access sites hourly  3. Every 4-6 hours minimum:  Change oxygen saturation probe site  4. Every 4-6 hours:  If on nasal continuous positive airway pressure, respiratory therapy assess nares and determine need for appliance change or resting period.   Outcome: Progressing     Problem: Pain  Goal: Verbalizes/displays adequate comfort level or baseline comfort level  Outcome: Progressing     Problem: Neurosensory - Adult  Goal: Achieves stable or improved neurological status  Outcome: Progressing  Goal: Achieves maximal functionality and self care  Outcome: Progressing     Problem: Cardiovascular - Adult  Goal: Maintains optimal cardiac output and hemodynamic stability  Outcome: Progressing     Problem: Skin/Tissue Integrity - Adult  Goal: Skin integrity remains intact  Outcome: Progressing     Problem: Musculoskeletal - Adult  Goal: Return mobility to safest level of function  Outcome: Progressing     Problem: Gastrointestinal - Adult  Goal: Maintains adequate nutritional intake  Outcome: Progressing     Problem: Genitourinary - Adult  Goal: Absence of urinary retention  Outcome: Progressing     Problem: Infection - Adult  Goal: Absence of infection at discharge  Outcome: Progressing     Problem: Metabolic/Fluid and Electrolytes - Adult  Goal: Electrolytes maintained within normal limits  Outcome: Progressing     Problem: Hematologic - Adult  Goal: Maintains hematologic stability  Outcome: Progressing

## 2022-07-12 NOTE — PROGRESS NOTES
Physical Therapy Rehab Treatment Note  [facility]  [facility]       NAME: Socrates Holley  : 1980 (15 y.o.)  MRN: [id number]  CODE STATUS: Full Code    Date of Service: 2022       Restrictions:  Restrictions/Precautions: Fall Risk       SUBJECTIVE:   Subjective: \"I'm good. \"    Pain  Pain: Pt reports no pain at beginning of tx, reports 6/10 pain in L hip with ambulation. RN down to medicate pt with tylenol      OBJECTIVE:     Bed Mobility  Additional Factors: Without handrails; Head of bed flat  Roll Left  Assistance Level: Modified independent  Roll Right  Assistance Level: Modified independent  Sit to Supine  Assistance Level: Modified independent  Supine to Sit  Assistance Level: Minimal assistance  Skilled Clinical Factors: min assistance for LLE secondary to increased pain  Scooting  Assistance Level: Modified independent    Transfers  Surface: Wheelchair  Device: Walker  Sit to [employer]  Assistance Level: Modified independent  Stand to [employer]  Assistance Level: Modified independent  Bed To/From Chair  Technique: Stand pivot  Assistance Level: Minimal assistance  Skilled Clinical Factors: mat table <> w/c, increased assistance d/t LLE pain, vc's for technique and sequencing    Ambulation  Surface: Carpet  Device: Rolling walker  Distance: 5'  Assistance Level: Contact guard assist  Gait Deviations: Decreased step length bilateral;Slow leonardo;Decreased heel strike right;Decreased heel strike left;Narrow base of support  Skilled Clinical Factors: decreased ability to perform weight shift to LLE, minor buckling    PT Exercises  A/AROM Exercises: Seated: LAQ, Marches x10 Supine: hip abd, hookying marching, hooklying pillow squeeze x10 ea       ASSESSMENT/PROGRESS TOWARDS GOALS:   Assessment  Assessment: Pt requires increased assistance this session secondary to increased pain in L hip. Pt reports no pain with hip ther ex when seated or supine.  Increased difficulty with BLE to EOB with supine > sit. Pt experiencing buckling on RLE and decrease weight shift on L with gait training. Pt reports after tylenol kicks in she will be fine and is able to d/c home. Goals:  Long Term Goals  Long term goal 1: Pt to complete bed mobility with indep - met  Long term goal 2: Pt to complete functional transfers bed/chair/car with indep - met  Long term goal 3: Pt to ambulate 50-150ft with LRD and indep 50 ft met  Long term goal 4: Pt to tolerate 5min standing activities indep - met  Long term goal 5: Pt to manage 2\" curb step indep - met  Additional Goals?: Yes  Long term goal 6: Pt to complete HEP with indep    PLAN OF CARE/Safety:   Plan Comment: Cont.  POC      Therapy Time:   Individual   Time In 930   Time Out 1000   Minutes 30     Minutes:  Transfer/Bed mobility training: 15  Gait trainin  Neuro re education: 0  Therapeutic ex: Jakub 50, PTA, 22 at 12:18 PM

## 2022-07-12 NOTE — DISCHARGE SUMMARY
Subjective: The patient complains of severe acute on chronic progressive fatigue and paresis and quadrat esthesia secondary to MS flareup partially relieved by rest, medications, PT,  OT, steroids, SLP and rest and exacerbated by recent illness -we will sclerosis flareup. I had been concerned about patients medical complexities and barriers to advancing in rehab goals including -her tachycardia-initially presented through the emergency room with severe hip pain status post a fall at home. She was diagnosed with an exacerbation of her multiple sclerosis and rhabdomyolysis secondary to elevated CK levels. Fortunately her x-rays of her hips were unremarkable. She was prescribed IV fluids and IV methylprednisolone. She is hoping to transition to [facility] as an outpatient for her MS.     Patient has had difficulty with endurance, balance, gait ataxia, generalized weakness and numbness. Medically complicated by tachycardia especially with activity. She will need telemetry monitoring while on the unit and to be followed by cardiology and or medical..  I reviewed and discussed cardiac note-Cardiac Supportive Care including:IV and PO hydration for Rhabdomyolysis care. Serial cardiac enzymes. Telemetry for 48 hours. Echocardiogram-sofar work-up of his been unremarkable and cardiology feels that it is a tachycardia always sinus in response to debility and deconditioning. Have okayed her for therapy. I reviewed current care and plans for further care with other rehab providers including nursing and case management. According to recent nursing note,  \"Patient resting comfortably in bed. Bed alarm is on, call light is within reach. Patient denies pain at this time. Will continue to monitor. \"  Patient is pending discharge tomorrow we will begin medication reconciliation    [id number] Course:  The patient was admitted to the Rehabilitation Unit to address ADL and mobility deficits-as detailed above and below. The patient was enrolled in acute PT, OT program.  Weekly team meetings were held to assess functional progress toward their goals-and modify the therapy program.  The patient's medical, emotional, psychosocial and functional issues were addressed. The patient progressed in the rehab program and is now ready for discharge home. Refer to functional assessments summary report for detailed functional status. Refer to the medical problem list below to see the medical issues addressed. The social and DC complexities are detailed in the DC planning section below. Greater than  35 minutes was spent on coordinating patients discharge including follow-up care, medications and patient/family education. Extended time needed because of the potential use of controlled medications are high risk medications and a high risk population individual.  Patient and family were instructed to use lowest effective dose of these medications and slowly titrate off over the next 2 to 4 weeks. They are not to combine opiates with sedatives. I reviewed her [facility] prescription monitoring service data sheets in hopes of eliminating polypharmacy and weaning to the lowest effective dose of pain medications and eliminating the concomitant use of benzodiazepines. I see   no medications of concern. I see   no habits of combining sedatives and narcotics. I am concerned about the moderate sized PFO that they found on recent echocardiogram.  Cardiology will follow up with her as an outpatient. She no longer needs daily labs I have discontinued them. We are preparing for her discharge today and will arrange follow-up with cardiology. We discussed possibly getting her a pure wick catheter at night for home use. ROS x10: The patient also complains of severely impaired mobility and activities of daily living.   Otherwise no new problems with vision, hearing, nose, mouth, throat, dermal, cardiovascular, GI, , pulmonary, musculoskeletal, psychiatric or neurological. See also Acute Rehab PM&R H&P. Vital signs:  /62   Pulse 86   Temp 98 °F (36.7 °C)   Resp 18   Ht 5' 1\" (1.549 m)   Wt 174 lb 4 oz (79 kg)   SpO2 97%   BMI 32.92 kg/m²   I/O:   PO/Intake:  fair PO intake, regular 3 carb per meal diet    Bowel:   continent    Bladder: continent pure wick catheter at night urine is clear  General:  Patient is well developed,   adequately nourished, and    well kempt. HEENT:    Pupils equal, hearing intact to loud voice, external inspection of ear and nose benign. Inspection of lips, tongue and gums benign    Musculoskeletal: No significant change in strength or tone. All joints stable. Inspection and palpation of digits and nails show no clubbing, cyanosis or inflammatory conditions. Neuro/Psychiatric: Affect: flat but pleasant. Alert and oriented to person, place and situation with  min cues. No significant change in deep tendon reflexes or sensation  Lungs:  Diminished, CTA-B. Respiration effort is   normal at rest.     Heart:   S1 = S2,   RRR-     Abdomen:  Soft, non-tender, no enlargement of liver or spleen. Extremities:    lower extremity edema    Skin:   Intact to general survey,      Rehabilitation:  Physical Therapy:   Bed mobility:  Bed mobility  Bridging: Stand by assistance (07/04/22 8475)  Rolling to Left: Stand by assistance (07/04/22 2904)  Rolling to Right: Stand by assistance (07/04/22 1807)  Supine to Sit: Minimal assistance (06/28/22 1143)  Sit to Supine: Stand by assistance (07/04/22 4450)  Scooting: Minimal assistance (latearlly in supine) (06/28/22 1143)  Bed Mobility Comments: Mat mobility (07/04/22 7418)  Bed Mobility  Additional Factors: Without handrails; Head of bed flat (07/11/22 1314)  Additional Factors: Without handrails; Head of bed flat (07/11/22 1314)  Roll Left  Assistance Level: Modified independent (07/11/22 1314)  Skilled Clinical Factors: with HR (06/28/22 1342)  Roll Right  Assistance Level: Modified independent (07/11/22 1314)  Skilled Clinical Factors: with HR (06/28/22 1342)  Sit to Supine  Assistance Level: Modified independent (07/11/22 1314)  Skilled Clinical Factors: requiring to use BUE to get BLE on to mat table (07/08/22 1328)  Supine to Sit  Assistance Level: Modified independent (07/11/22 1314)  Skilled Clinical Factors: cues for improving technique with fair follow through (07/08/22 1328)  Scooting  Assistance Level: Modified independent (07/11/22 1314)  Skilled Clinical Factors: increased effort (07/11/22 1314)  Transfers:  Transfers  Sit to Stand: Stand by assistance (07/04/22 0934)  Stand to sit: Stand by assistance (07/04/22 0934)  Bed to Chair: Stand by assistance (07/04/22 0934)  Lateral Transfers: Minimal Assistance;Contact guard assistance (Instruction provided on scooting laterally at EOB emphasizing forward weight shift) (06/28/22 1145)  Car Transfer: Stand by assistance (07/04/22 0934)  Comment: VC for hand placement when sitting down (07/04/22 0934)  Transfer Training  Transfer Training: Yes (07/09/22 0844)  Sit to Stand: Supervision (07/09/22 0844)  Stand to Sit: Supervision (07/09/22 0844)  Transfers  Surface: From bed; Wheelchair (07/11/22 1314)  Additional Factors: Verbal cues (07/07/22 1528)  Device: Cammie Zaldivar (07/11/22 1314)  Sit to Stand  Assistance Level: Modified independent (07/11/22 1314)  Skilled Clinical Factors: maintains good technique (07/11/22 1314)  Stand to Sit  Assistance Level: Modified independent (07/11/22 1314)  Skilled Clinical Factors: maintains one UE back prior to sitting throughout tx (07/11/22 1314)  Bed To/From Chair  Technique: Stand pivot (07/11/22 1314)  Assistance Level: Modified independent (07/11/22 1314)  Skilled Clinical Factors: bed <> w/c (07/11/22 1314)  Stand Pivot  Assistance Level: Modified independent (07/11/22 0944)  Skilled Clinical Factors: no instability or LOB demonstrated, occ cues for improving technique (07/11/22 0944)  Car Transfer  Assistance Level: Modified independent (07/11/22 0944)  Skilled Clinical Factors: good technique without cues from this PTA (07/11/22 0944)  Gait:   Ambulation  Surface: carpet (07/04/22 1405)  Device: Rolling Walker (07/04/22 1405)  Other Apparatus: Wheelchair follow (06/28/22 1149)  Assistance: Contact guard assistance (07/04/22 1405)  Quality of Gait: difficulty with RLE foot clearance, FF posture, downward gaze, short BLE step length. Step to pattern<> occ step strough (07/04/22 1405)  Gait Deviations: Slow Leonardo;Decreased step length;Decreased step height;Shuffles (07/04/22 1405)  Distance: 44ft x 2 (07/04/22 1405)  Comments: decreased stride and step length with fatigue after  2nd Gait (07/04/22 1405)  Gait Training: Yes (07/09/22 0844)  Overall Level of Assistance: Stand-by assistance (07/09/22 0844)  Distance (ft): 50 Feet (07/09/22 0844)  Assistive Device: Walker, rolling (07/09/22 0844)  Interventions: Tactile cues (07/09/22 0844)  Base of Support: Widened (07/09/22 0844)  Speed/Leonardo: Fluctuations (07/09/22 0844)  Step Length: Right shortened;Left shortened (07/09/22 0844)  Gait Abnormalities: Step to gait; Decreased step clearance (07/09/22 0844)  Right Side Weight Bearing: As tolerated (07/09/22 0844)  Left Side Weight Bearing: As tolerated (07/09/22 0844)  Ambulation  Surface: Carpet; Uneven surface; Level surface (07/11/22 1315)  Device: Rolling walker (07/11/22 0944)  Distance: 50' two turns (07/11/22 1315)  Activity Comments:  BPM post ambulation (06/30/22 1342)  Assistance Level: Modified independent (07/11/22 1315)  Gait Deviations: Decreased step length bilateral;Slow leonardo;Decreased heel strike right;Decreased heel strike left;Narrow base of support (07/11/22 1315)  Skilled Clinical Factors: intermittent reciprocal pattern, decrased foot clearance, ff posture, no cues provided (07/11/22 1415)  Stairs:  Stairs/Curb  Stairs?: Yes (07/04/22 0906)  Stairs  Curbs: 2\" (07/04/22 9660)  Device: Rolling walker (07/04/22 0906)  Assistance: Stand by assistance;Contact guard assistance (07/04/22 0906)  Comment: Vcs to not whip WW out when placing from curb to floor (07/04/22 0906)  Curb  Curb Height: 4'' (07/11/22 1315)  Device: Rolling walker (07/11/22 1315)  Number of Curbs: 2 (07/11/22 1315)  Additional Factors: Verbal cues (for technique to improve quality) (07/08/22 1331)  Assistance Level: Modified independent (07/11/22 1315)  Skilled Clinical Factors: No cues provided, good technique and safety maintained (07/11/22 1315)  W/C mobility:       Occupational Therapy:   Hand Dominance: Right  ADL  Feeding: Modified independent  (06/28/22 1049)  Grooming: Setup (06/29/22 1341)  Grooming Skilled Clinical Factors: for oral care and grooming (06/29/22 1341)  UE Bathing: Stand by assistance; Increased time to complete (06/29/22 1341)  LE Bathing: Dependent/Total (06/29/22 1341)  LE Bathing Skilled Clinical Factors: +2 assistance. Pt able to bend down in order to wash legs with SBA for safe sitting. +2 to wash posterior lillie area.  1 person for washing 1 person to maintain standing balance (06/29/22 1341)  UE Dressing: Stand by assistance (06/29/22 1341)  LE Dressing: Increased time to complete;Maximum assistance (06/29/22 1341)  LE Dressing Skilled Clinical Factors: to don pants and depends (06/29/22 1341)  Toileting: Dependent/Total (06/29/22 1341)  Toileting Skilled Clinical Factors: Pt incontinent of feces and urine (06/29/22 1341)  Additional Comments: Pt displaying lack of endurance and fatigue throughout session, requiring increased assistance throughout progression of evaluation (06/28/22 1049)  Toilet Transfers  Toilet - Technique: Stand pivot (06/29/22 1339)  Equipment Used: Standard toilet (06/29/22 1339)  Toilet Transfer: Minimal assistance (verbal cues for hand placement) (06/29/22 1318)  Toilet Transfers Comments: Twards end of session, initially Min A for transfers (06/28/22 1054)     [address] - Transfer Type: To and From (06/28/22 1054)  Shower - Transfer To: Shower seat with back (06/28/22 1054)  Shower - Technique: Stand pivot (06/28/22 1054)  Shower Transfers: Moderate assistance (06/28/22 1054)  Shower Transfers Comments: Patient did not complete shower transfer as patient washed up at sink in wheelchair. (06/29/22 9679)    Speech Therapy:      Comprehension: Within Functional Limits (Patient answered simple yes/no questions, basic questions and participated in conversation with good comprehension. no repetition was required)  Verbal Expression:  [facility] with meeting wants and needs. No word finding deficits have been noted during conversation)  Diet/Swallow:        Dysphagia Outcome Severity Scale: Level 7: Normal in all situations  Compensatory Swallowing Strategies : Upright as possible for all oral intake          Lab/X-ray studies reviewed, analyzed and discussed with patient and staff:   No results found for this or any previous visit (from the past 24 hour(s)). XR CERVICAL SPINE   6/19/2022   NEGATIVE CERVICAL SPINE    XR HIP LEFT  6/19/2022  NEGATIVE PELVIS AND LEFT HIP     XR FEMUR RIGHT  6/19/2022: No fracture, dislocation, bone lesion. NEGATIVE RIGHT FEMUR    MRI LUMBAR SPINE : 6/20/2022: Degenerative facet changes of the lumbar spine visualized most prominent at L3-L4 and L5-S1 that demonstrate mild progression in comparison to the prior study    US ABDOMEN  6/20/2022:  NO SIGN OF CHOLELITHIASIS OR BILE DUCT DILATATION. SLUDGE WITHIN THE GALLBLADDER. NO DEFINITE FOCAL LIVER ABNORMALITY. US DUP LOWER EXTREMITIES BILATERAL VENOUS  6/20/2022   NO SONOGRAPHIC EVIDENCE OF DEEP VENOUS THROMBOSIS WITHIN THE BILATERAL LOWER EXTREMITIES. Patent foramen ovale with right-to-left shunt was visualized.    On agitated saline contrast injection- Large amount of bubbles LA and LV   nearly immediately    Previous extensive, complex labs, notes and diagnostics reviewed and analyzed. ALLERGIES:    Allergies as of 06/27/2022    (No Known Allergies)      (please also verify by checking [facility])      Complex Physical Medicine & Rehab Issues Assess & Plan:   1. Severe abnormality of gait and mobility and impaired self-care and ADL's secondary to progressive masturbation of multiple sclerosis. Functional and medical status have improved greatly status postacute rehab at [facility].  2. Bowel and Bladder dysfunction  , Neurogenic bowel and bladder:  frequent toileting, ambulate to bathroom with assistance, check post void residuals. Check for C.difficile x1 if >2 loose stools in 24 hours, continue bowel & bladder program.  Monitor bowel and bladder function. Lactinex 2 PO every AC. MOM prn, Brown Bomb prn, Glycerin suppository prn, enema prn. Encourage therapy and nursing to co-treat and problem solve re continence. [place] Ditropan. 3. Severe MS pain as well as generalized OA pain: reassess pain every shift and prior to and after each therapy session, give prn Tylenol and consider scheduled Tylenol, modalities prn in therapy, masage, Lidoderm, K-pad prn. Consider scheduled AM pain meds. 4. Skin healing   and breakdown risk:  continue pressure relief program.  Daily skin exams and reports from nursing. Discontinue daily labs. 5. Fatigue due to nutritional and hydration deficiency: Add and titrate vitamin B12 vitamin D and CoQ10 continue to monitor I&Os, calorie counts prn, dietary consult prn. Patient is to focus on healthy eating practices as an outpatient. 6. Acute episodic insomnia with situational adjustment disorder:  prn Ambien, monitor for day time sedation. 7. Falls risk elevated:  patient to use call light to get nursing assistance to get up, bed and chair alarm. We focused on balance during her rehabilitation stay.   8. Complex discharge planning:  Complete medication simplification patient and family education as we progressed toward her plan pending DC 7/12/22--home with [facility]--try to get aide with waiver. Parents are elderly and son is only 5. Toward her final weekly team meeting   Thursday to re-assess progress towards goals, discuss and address social, psychological and medical comorbidities and to address difficulties they may be having progressing in therapy. Patient and family education is in progress. The patient is to follow-up with their family physician after discharge. We will try to get pure wick catheter at night and extend waiver services at home. Complex Active General Medical Issues that complicated care Assess & Plan:    1. Urinary urgency,   Neurogenic bladder-recheck stat UA  2.   MS (multiple sclerosis)-consult neurology transition to outpatient Ocrevus therapy if possible. 3.   Hyperglycemia-monitor for diabetes mellitus  4. Rhabdomyolysis-push fluids recheck BMP-is to push oral fluids  5. Baseline mental handicap-just new learning and therapy. 6.   Posterior tibial tendon dysfunction -AFO  7. Ataxic gait-focus on balance and therapy  8. Pain in joint, lower leg, Foot drop, left foot, Acquired deformity of left foot-consult podiatry as needed  9. Lumbar radiculopathy-post effective dose of pain medication  10. Moderate sized PFO, asymptomatic sinus tachycardia with activity dt deconditioning- SP EKG stat-OK,  , add stat labs and cardiac consult-a.m. labs were essentially [id number]  Was OK.  cardiology. Continue telemetry for now.   Cardiology and I agree to add aspirin and Plavix to the current dose and the Lovenox         Electronically signed by Neris Lu DO on 6/29/22 at 8:02 AM AARON Pham D.O., PM&R     Attending    [facility] Brooke Aaron

## 2022-07-12 NOTE — FLOWSHEET NOTE
Patient assessment is complete. Patient's plavix was delivered yesterday to the floor. RN will give her discharge instructions shortly.

## 2022-07-12 NOTE — PROGRESS NOTES
OCCUPATIONAL THERAPY  INPATIENT REHAB TREATMENT NOTE  [facility]      NAME: Tori Patton  : 1980 (35 y.o.)  MRN: [id number]  CODE STATUS: Full Code  Room: [facility]/R255-01    Date of Service: 2022    Referring Physician: Dr. Marissa Zuniga  Rehab Diagnosis: Impaired mobility and ADLs D/T Exacerabation of MS    Restrictions  Restrictions/Precautions  Restrictions/Precautions: Fall Risk              Patient's date of birth confirmed: Yes    SAFETY:  Safety Devices  Safety Devices in place: Yes  Type of devices: All fall risk precautions in place    SUBJECTIVE:       Pain at start of treatment: Yes: 210    Pain at end of treatment: Yes: 2/10    Location: L hip  Nursing notified: Declined          OBJECTIVE:     Pt completed SPT from WC > EOM with CGA and verbal cues for safety and L foot clearance. Pt sat EOM with G balance completing BUE therapeutic activities to increase strength/endurance for functional tasks. Pt placed/removed clothespins from vertical yardstick alternating between UE's to complete task. Pt replicated design visual model with min cues to complete correctly. Pt completed functional mobility with use of RW CGA. Pt states her hip pain is much improved at this time improving tolerance for standing and mobility. ASSESSMENT:  Assessment: verbal cues for safety and encouragement  Activity Tolerance: Patient tolerated treatment well      PLAN OF CARE:  Strengthening,Balance training,Functional mobility training,Neuromuscular re-education,Self-Care / Bard Handler training,Safety education & training,Home management training  continue with POC    Patient goals : \"My dad can't take care of me now, so I need to get better. He has congestive heart failure. \"  Time Frame for Long term goals : Within 2 weeks, Pt to demonstrate progress in the following areas listed below to achieve specific LTG's as stated in the initial evaluation. Long Term Goal 1:  Increase independence in ADLs  Long Term Goal 2: Increaese endurance to complete clothes management  Long Term Goal 3: Increase static/dynamic balance  Long Term Goal 4:  Increase independence to perform kitchen mobility        Therapy Time:   Individual Group Co-Treat   Time In 1100       Time Out 1130         Minutes 30                   Therapeutic activities: 30 minutes     Electronically signed by:    Radha Castro OT,   7/12/2022, 1:10 PM

## 2022-07-12 NOTE — CARE COORDINATION
LSW met with pt and discussed discharge for 7/12. LSW also discussed family training and pt stated it went well and she feels ready to discharge home. Given freedom of choice, pt chose [facility]. No concerns voiced at this time.

## 2022-07-12 NOTE — PLAN OF CARE
Problem: Discharge Planning  Goal: Discharge to home or other facility with appropriate resources  7/12/2022 1019 by Rojas Vela RN  Outcome: Progressing  7/12/2022 0432 by Juan A Carr RN  Outcome: Progressing     Problem: ABCDS Injury Assessment  Goal: Absence of physical injury  7/12/2022 1019 by Rojas Vela RN  Outcome: Progressing  7/12/2022 0432 by Juan A Carr RN  Outcome: Progressing     Problem: Safety - Adult  Goal: Free from fall injury  7/12/2022 1019 by Rojas Vela RN  Outcome: Progressing  7/12/2022 0432 by Juan A Carr RN  Outcome: Progressing     Problem: Skin/Tissue Integrity  Goal: Absence of new skin breakdown  Description: 1. Monitor for areas of redness and/or skin breakdown  2. Assess vascular access sites hourly  3. Every 4-6 hours minimum:  Change oxygen saturation probe site  4. Every 4-6 hours:  If on nasal continuous positive airway pressure, respiratory therapy assess nares and determine need for appliance change or resting period.   7/12/2022 1019 by Rojas Vela RN  Outcome: Progressing  7/12/2022 0432 by Juan A Carr RN  Outcome: Progressing     Problem: Skin/Tissue Integrity - Adult  Goal: Skin integrity remains intact  7/12/2022 1019 by Rojas Vela RN  Outcome: Progressing  7/12/2022 0432 by Juan A Carr RN  Outcome: Progressing     Problem: Gastrointestinal - Adult  Goal: Maintains adequate nutritional intake  7/12/2022 1019 by Rojas Vela RN  Outcome: Progressing  7/12/2022 0432 by Juan A Carr RN  Outcome: Progressing

## 2022-07-12 NOTE — DISCHARGE INSTR - DIET
Good nutrition is important when healing from an illness, injury, or surgery. Follow any nutrition recommendations given to you during your hospital stay. If you were given an oral nutrition supplement while in the hospital, continue to take this supplement at home. You can take it with meals, in-between meals, and/or before bedtime. These supplements can be purchased at most local grocery stores, pharmacies, and chain [employer]-stores. If you have any questions about your diet or nutrition, call the hospital and ask for the dietitian. Regular diet/ Drink more fluids.

## 2022-07-12 NOTE — PROGRESS NOTES
[facility]   Facility/Department: [facility]  Speech Language Pathology  Discharge Report        Patient: Abraham Betaty  : 1980    Date: 2022    Initial Status:  Diet:   Regular solids with thin liquids  Dysphagia Outcome Severity Scale:  Ratin    Speech Therapy Level of Assistance Scale: Auditory Comprehension:  Rating: Modified Independent  Verbal Expression:  Rating:Modified Independent  Motor Speech:  Rating: Independent  Problem Solving:  Rating: Moderate Assistance  Memory:  Rating: Moderate Assistance      Long Term Goals:  Long-term Goals  Timeframe for Long-term Goals: 2-3 weeks  Goal 1: Pt will improve his Cognition from mod assist to supervised to mod I level for adequate functional recall and safety awareness for (home, community). Long-term Goals  Timeframe for Long-term Goals: N/A        Patient's Response to Therapy:  Patient denied cognitive deficits PTA. Patient did report participating in learning disability classes in high school, but reported managing medications and ADLs at home without difficulty. Patient continues to require supervised assist intermittently with memory and high level problem solving tasks. Recommend assistance with medication and finance management and intermittent supervision at discharge. Discharge Status:  Diet:   ADULT DIET; Regular; 3 carb choices (45 gm/meal)  Compensatory Swallowing Strategies : Upright as possible for all oral intake  Dysphagia Outcome Severity Scale:  Ratin    Speech Therapy Level of Assistance Scale: Auditory Comprehension:  Rating: Modified Independent-Supervised Assistance  Verbal Expression:  Rating:Independent  Motor Speech:  Rating: Independent  Problem Solving:  Rating: Minimal Assistance  Memory:  Rating: Minimal Assistance        Functional Status at time of Discharge:    · Cognition: Patient demonstrates mild cognitive deficits. · Language: Patient demonstrates no language deficits.     · Motor Speech: Patient demonstrates no motor speech deficits. · Swallow: Patient demonstrates no dysphagia.                                  Patient is discharged to Home               [] Recommend continued speech therapy   [x] Speech Therapy is no longer warranted      Signature:  Electronically signed by FIDELIA Hercules on 7/12/2022 at 1:26 PM

## 2022-07-13 NOTE — PROGRESS NOTES
Facility/Department: [facility]  Physical Therapy Acute Rehab Discharge Summary  [facility]    NAME: Alonzo Hamilton  : 1980  MRN: [id number]    Admission Date: 2022  6:05 PM  Discharge Date: 22    Rehab Diagnosis(es): Impaired mobility and activities of daily living dt exac of MS  Patient Active Problem List    Diagnosis Date Noted    Intellectual disability 2022    Paraparesis ([address]     Rhabdomyolysis 2022    Impaired mobility and activities of daily living dt exac of MS 2022    Hyperglycemia 2022    Neurogenic bladder 2022    Acute cystitis 2022    MS (multiple sclerosis) ([address] 2022    Urinary urgency 2022    PFO (patent foramen ovale) 2022    Foot drop, left foot 2020    Acquired deformity of left foot 2020    Lumbar radiculopathy 2020    Pain in joint, lower leg 2020    Posterior tibial tendon dysfunction     Pseudophakia 10/29/2019    Ataxic gait 2019    Mixed hyperlipidemia 2015    Obesity, unspecified 2005       Past Medical History:   Diagnosis Date    Dislocated knee     Right knee    Intellectual disability 2022    Lumbar radiculopathy 2020    MS (multiple sclerosis) ([address] 2022    PTTD (posterior tibial tendon dysfunction)      Past Surgical History:   Procedure Laterality Date    CATARACT REMOVAL WITH IMPLANT Right     CATARACT REMOVAL WITH IMPLANT Left      SECTION  2013    EYE SURGERY      FOOT SURGERY Left        Indications for Skilled Intervention: Decreased functional mobility ,Decreased ROM,Decreased body mechanics,Decreased strength,Decreased endurance,Decreased balance,Decreased ADL status    GOALS: MET ALL  Long Term Goals  Long term goal 1: Pt to complete bed mobility with indep - met  Long term goal 2: Pt to complete functional transfers bed/chair/car with indep - met  Long term goal 3: Pt to ambulate 50-150ft with LRD and indep 50 ft met  Long term goal 4: Pt to tolerate 5min standing activities indep - met  Long term goal 5: Pt to manage 2\" curb step indep - met  Additional Goals?: Yes  Long term goal 6: Pt to complete HEP with indep - Met    Summary of POC: Throughout rehab stay, pt received skilled physical therapy treatment 1.5 hours daily for 2.0 weeks. Skilled Services Provided: Strengthening,ROM,Balance training,Functional mobility training,Transfer training,Endurance training,Gait training,Stair training,Neuromuscular re-education,Home exercise program,Safety education & training,Patient/Caregiver education & training,Equipment evaluation, education, & procurement (Please refer to daily notes for all treatment details)    ASSESSMENT: Pt achieving goals as outlined above. Pt states readiness to return home. Family training completed. Pt appropriate for DC from acute rehab PT program.     Discharge Plan: DC home at indep level of function. Rec [employer] for ambulation and f/u PT.     Matt Jacobs, PT, 07/13/22 at 3:56 PM

## 2022-07-13 NOTE — PROGRESS NOTES
[facility] OCCUPATIONAL THERAPY DISCHARGE SUMMARY- REHAB     Date: 2022  Patient Name: Eugene Isabel        MRN: [id number]  Account: [de-identified]   : 1980  (35 y.o.)  Room: [place] [address]    Diagnosis:  Impaired mobility and ADLs D/T Exacerabation of MS    Past Medical History:   Diagnosis Date    Dislocated knee     Right knee    Intellectual disability 2022    Lumbar radiculopathy 2020    MS (multiple sclerosis) ([address] 2022    PTTD (posterior tibial tendon dysfunction)      Past Surgical History:   Procedure Laterality Date    CATARACT REMOVAL WITH IMPLANT Right     CATARACT REMOVAL WITH IMPLANT Left      SECTION  2013    EYE SURGERY      FOOT SURGERY Left        Precautions:   Restrictions/Precautions: Fall Risk     Social/Functional History:  Social/Functional History  Lives With: Son (4 y/o)  Type of Home: Apartment  Home Layout: One level  Home Access: Stairs to enter without rails  Entrance Stairs - Number of Steps: 1 (2\" threshold)  Bathroom Shower/Tub: Tub/Shower unit  Bathroom Toilet: Standard  Bathroom Equipment: Shower chair,Hand-held shower  Bathroom Accessibility: Accessible  Home Equipment: Walker, rolling,Wheelchair-manual,Reacher,Long-handled shoehorn  Has the patient had two or more falls in the past year or any fall with injury in the past year?: Yes  Receives Help From: Family  ADL Assistance: Independent (later reports that son helps put on socks when needed)  Homemaking Assistance: Independent  Homemaking Responsibilities: Yes  Ambulation Assistance: Independent Melon #usemelon)  Transfer Assistance: Independent  Active : No  Patient's  Info: mother  Occupation: On disability  Type of Occupation: [employer]: watch tv, bowling with son    Current Functional Status:  ADL    Feeding  Assistance Level: Independent  Grooming/Oral Hygiene  Assistance Level:  Independent  Upper Extremity Bathing  Assistance Level: Modified independent  Lower Extremity Bathing  Assistance Level: Supervision  Upper Extremity Dressing  Assistance Level: Set-up- setup to manage bra. No difficulty with shirt  Lower Extremity Dressing  Assistance Level: Minimal assistance- assist to completely don brief over hips  Putting On/Taking Off Footwear  Assistance Level: Minimal assistance. Assist needed socks only- depending on where she is seated. Pt able to don shoes without difficulty. Toileting  Assistance Level: Modified independent. Urination only. Pt reports no difficulty cleaning self s/p bowel movement   Toilet Transfers  Equipment: Standard toilet;Grab bars  Assistance Level: Modified independent  Tub/Shower Transfers  Type: Tub  Transfer From: Rolling walker  Transfer To: Tub transfer bench  Additional Factors: Verbal cues  Assistance Level: Stand by assist     Instrumental ADL's  Instrumental ADLs: Yes  Light Housekeeping  Light Housekeeping Level: Kalli Solo Housekeeping Level of Assistance: Modified independent-SUP  Light Housekeeping: gather clothes prior to ADL using walker- fair safety     Functional Mobility  Device: Rolling walker  Activity: To/From bathroom  Assistance Level: Supervision  Skilled Clinical Factors: Increased time for ambulation    Pt not concerned regarding LB ADLs/footwear at home- has strategy to self-complete     Orientation Status:   WFL    Cognition Status:  Cognition  Overall Cognitive Status: WFL  Arousal/Alertness: Appropriate responses to stimuli  Following Commands:  Follows one step commands consistently  Attention Span: Appears intact  Memory: Appears intact  Safety Judgement: Good awareness of safety precautions  Problem Solving: Assistance required to generate solutions,Assistance required to identify errors made  Insights: Fully aware of deficits  Initiation: Does not require cues  Sequencing: Does not require cues  Cognition Comment: cues needed to problem solve at times     Perception Status:  Perception  Overall Perceptual Status: WFL    Sensation Status:  Sensation  Overall Sensation Status: [facility]    Vision and Hearing Status:  Vision  Vision: Impaired  Vision Exceptions: Wears glasses for reading  Hearing  Hearing: Within functional limits     UE Function Status:    ROM:   LUE AROM (degrees)  LUE AROM : WFL  Left Hand AROM (degrees)  Left Hand AROM: WFL  RUE AROM (degrees)  RUE AROM : WFL  Right Hand AROM (degrees)  Right Hand AROM: WFL    Strength:  LUE Strength  L Hand General: 4/5  RUE Strength  R Hand General: 4/5    Coordination, Tone, Quality of Movement:    Tone RUE  RUE Tone: Normotonic  Tone LUE  LUE Tone: Normotonic  Coordination  Movements Are Fluid And Coordinated: Yes    D/C Recommendations:    Assist/SUP ADLs  Assist IADLs  Home therapy + HHA     Equipment Recommendations:   Pt declined need for any medical equipment     OT Follow Up:  OT D/C RECOMMENDATIONS  REQUIRES OT FOLLOW-UP: Yes    Home Exercise Program Provided: [x] Yes [] No  If yes, type of HEP: BUE strengthening      Electronically signed by:    Radha Paul OT,    7/13/2022, 11:25 AM

## 2022-07-15 ENCOUNTER — APPOINTMENT (OUTPATIENT)
Dept: GENERAL RADIOLOGY | Age: 42
End: 2022-07-15
Payer: COMMERCIAL

## 2022-07-15 ENCOUNTER — HOSPITAL ENCOUNTER (EMERGENCY)
Age: 42
Discharge: HOME | End: 2022-07-15
Attending: EMERGENCY MEDICINE
Payer: COMMERCIAL

## 2022-07-15 VITALS
DIASTOLIC BLOOD PRESSURE: 78 MMHG | WEIGHT: 170 LBS | HEART RATE: 88 BPM | SYSTOLIC BLOOD PRESSURE: 128 MMHG | RESPIRATION RATE: 19 BRPM | TEMPERATURE: 98.2 F | HEIGHT: 62 IN | BODY MASS INDEX: 31.28 KG/M2 | OXYGEN SATURATION: 95 %

## 2022-07-15 DIAGNOSIS — M25.552 LEFT HIP PAIN: Primary | ICD-10-CM

## 2022-07-15 PROCEDURE — 73502 X-RAY EXAM HIP UNI 2-3 VIEWS: CPT

## 2022-07-15 PROCEDURE — 6360000002 HC RX W HCPCS: Performed by: EMERGENCY MEDICINE

## 2022-07-15 PROCEDURE — 99284 EMERGENCY DEPT VISIT MOD MDM: CPT

## 2022-07-15 PROCEDURE — 96372 THER/PROPH/DIAG INJ SC/IM: CPT

## 2022-07-15 RX ORDER — ACETAMINOPHEN 325 MG/1
650 TABLET ORAL ONCE
Status: DISCONTINUED | OUTPATIENT
Start: 2022-07-15 | End: 2022-07-15

## 2022-07-15 RX ORDER — KETOROLAC TROMETHAMINE 30 MG/ML
30 INJECTION, SOLUTION INTRAMUSCULAR; INTRAVENOUS ONCE
Status: COMPLETED | OUTPATIENT
Start: 2022-07-15 | End: 2022-07-15

## 2022-07-15 RX ADMIN — KETOROLAC TROMETHAMINE 30 MG: 30 INJECTION, SOLUTION INTRAMUSCULAR; INTRAVENOUS at 12:47

## 2022-07-15 ASSESSMENT — PAIN DESCRIPTION - PAIN TYPE: TYPE: ACUTE PAIN

## 2022-07-15 ASSESSMENT — PAIN SCALES - GENERAL
PAINLEVEL_OUTOF10: 6
PAINLEVEL_OUTOF10: 5

## 2022-07-15 ASSESSMENT — PAIN DESCRIPTION - ORIENTATION: ORIENTATION: LEFT

## 2022-07-15 ASSESSMENT — PAIN DESCRIPTION - FREQUENCY: FREQUENCY: CONTINUOUS

## 2022-07-15 ASSESSMENT — PAIN - FUNCTIONAL ASSESSMENT: PAIN_FUNCTIONAL_ASSESSMENT: 0-10

## 2022-07-15 ASSESSMENT — PAIN DESCRIPTION - LOCATION: LOCATION: HIP

## 2022-07-15 ASSESSMENT — PAIN DESCRIPTION - DESCRIPTORS: DESCRIPTORS: ACHING

## 2022-07-15 NOTE — HOME CARE
Tamica Spencer Nurse arrived at home for admission visit. Mother in home, relayed to nurse that patient fell yesterday and again today and has been falling a lot lately. Questionable cognitive issues when family is not around to assist with answering questions. No admission to home care was done today. Patient may require a higher level of care than home care can provide due to falls.

## 2022-07-15 NOTE — ED TRIAGE NOTES
Pt presents to ED from home via EMS with c/o hip pain. Pt reports that pain has been present since Tuesday - denies injury, or trauma. PMHx of MS and reports recent admission for MS flare. Upon assessment, pt is A/Ox4, skin p/w/d, respirations even and unlabored, msp's intact. Pt denies chest pain, SOB, n/v/d, fever, and chills.

## 2022-07-27 ENCOUNTER — TELEPHONE (OUTPATIENT)
Dept: FAMILY MEDICINE CLINIC | Age: 42
End: 2022-07-27

## 2022-07-27 NOTE — TELEPHONE ENCOUNTER
----- Message from Maria Fernanda Ramirez sent at 7/27/2022  2:49 PM EDT -----  Subject: Message to Provider    QUESTIONS  Information for Provider? [facility]   (occp. therapy) patient fell this morning in the closet at her mom's home   (FYI)  ---------------------------------------------------------------------------  --------------  [id number]  [phone]; OK to leave message on voicemail  ---------------------------------------------------------------------------  --------------  SCRIPT ANSWERS  Relationship to Patient? Third Party  Third Party Type? Physician Office?    Representative Name? [employer]

## 2022-08-01 RX ORDER — ASPIRIN 81 MG/1
TABLET, COATED ORAL
Qty: 30 TABLET | Refills: 3 | OUTPATIENT
Start: 2022-08-01

## 2022-08-03 ENCOUNTER — HOSPITAL ENCOUNTER (OUTPATIENT)
Dept: INFUSION THERAPY | Age: 42
Setting detail: INFUSION SERIES
Discharge: HOME | End: 2022-08-03
Payer: COMMERCIAL

## 2022-08-03 VITALS
DIASTOLIC BLOOD PRESSURE: 69 MMHG | TEMPERATURE: 97.9 F | RESPIRATION RATE: 18 BRPM | HEART RATE: 84 BPM | SYSTOLIC BLOOD PRESSURE: 133 MMHG

## 2022-08-03 DIAGNOSIS — G35 MS (MULTIPLE SCLEROSIS) (HCC): Primary | ICD-10-CM

## 2022-08-03 PROCEDURE — 6360000002 HC RX W HCPCS: Performed by: PSYCHIATRY & NEUROLOGY

## 2022-08-03 PROCEDURE — 96375 TX/PRO/DX INJ NEW DRUG ADDON: CPT

## 2022-08-03 PROCEDURE — 6370000000 HC RX 637 (ALT 250 FOR IP): Performed by: PSYCHIATRY & NEUROLOGY

## 2022-08-03 PROCEDURE — 96415 CHEMO IV INFUSION ADDL HR: CPT

## 2022-08-03 PROCEDURE — 96413 CHEMO IV INFUSION 1 HR: CPT

## 2022-08-03 PROCEDURE — 2580000003 HC RX 258: Performed by: PSYCHIATRY & NEUROLOGY

## 2022-08-03 RX ORDER — METHYLPREDNISOLONE SODIUM SUCCINATE 125 MG/2ML
125 INJECTION, POWDER, LYOPHILIZED, FOR SOLUTION INTRAMUSCULAR; INTRAVENOUS ONCE
Status: CANCELLED | OUTPATIENT
Start: 2022-08-17

## 2022-08-03 RX ORDER — DIPHENHYDRAMINE HCL 25 MG
25 TABLET ORAL ONCE
Status: COMPLETED | OUTPATIENT
Start: 2022-08-03 | End: 2022-08-03

## 2022-08-03 RX ORDER — DIPHENHYDRAMINE HCL 25 MG
25 TABLET ORAL ONCE
Status: CANCELLED
Start: 2022-08-17 | End: 2022-08-17

## 2022-08-03 RX ORDER — ACETAMINOPHEN 325 MG/1
650 TABLET ORAL ONCE
Status: CANCELLED | OUTPATIENT
Start: 2022-08-17

## 2022-08-03 RX ORDER — ACETAMINOPHEN 325 MG/1
650 TABLET ORAL ONCE
Status: COMPLETED | OUTPATIENT
Start: 2022-08-03 | End: 2022-08-03

## 2022-08-03 RX ORDER — METHYLPREDNISOLONE SODIUM SUCCINATE 125 MG/2ML
125 INJECTION, POWDER, LYOPHILIZED, FOR SOLUTION INTRAMUSCULAR; INTRAVENOUS ONCE
Status: COMPLETED | OUTPATIENT
Start: 2022-08-03 | End: 2022-08-03

## 2022-08-03 RX ADMIN — ACETAMINOPHEN 650 MG: 325 TABLET ORAL at 12:18

## 2022-08-03 RX ADMIN — DIPHENHYDRAMINE HCL 25 MG: 25 TABLET ORAL at 12:18

## 2022-08-03 RX ADMIN — METHYLPREDNISOLONE SODIUM SUCCINATE 125 MG: 125 INJECTION, POWDER, FOR SOLUTION INTRAMUSCULAR; INTRAVENOUS at 12:18

## 2022-08-03 RX ADMIN — OCRELIZUMAB 300 MG: 300 INJECTION INTRAVENOUS at 12:48

## 2022-08-03 NOTE — FLOWSHEET NOTE
Infusion is compete. Tolerated well. Observation started. Call light within reach. No distress noted.

## 2022-08-03 NOTE — FLOWSHEET NOTE
No side effects noted. AVS printed and given to patient. Left the unit via wheelchair. All equipment used in the care for this patient has been cleaned.

## 2022-08-03 NOTE — FLOWSHEET NOTE
Patient to the floor via wheelchair for her 1st dose of Ocrevus. Vital signs taken. Denies any discomfort. Call light within reach. Mom at side. Education given both verbal and handout regarding this treatment. Verbalized understanding.

## 2022-08-08 ENCOUNTER — OFFICE VISIT (OUTPATIENT)
Dept: FAMILY MEDICINE CLINIC | Age: 42
End: 2022-08-08
Payer: COMMERCIAL

## 2022-08-08 VITALS
DIASTOLIC BLOOD PRESSURE: 78 MMHG | HEIGHT: 61 IN | OXYGEN SATURATION: 97 % | SYSTOLIC BLOOD PRESSURE: 132 MMHG | HEART RATE: 96 BPM | BODY MASS INDEX: 31.87 KG/M2 | WEIGHT: 168.8 LBS | TEMPERATURE: 98 F

## 2022-08-08 DIAGNOSIS — D64.9 ANEMIA, UNSPECIFIED TYPE: ICD-10-CM

## 2022-08-08 DIAGNOSIS — R79.89 ABNORMAL TSH: ICD-10-CM

## 2022-08-08 DIAGNOSIS — R74.8 ELEVATED LIVER ENZYMES: ICD-10-CM

## 2022-08-08 DIAGNOSIS — G35 MULTIPLE SCLEROSIS (HCC): Primary | ICD-10-CM

## 2022-08-08 PROCEDURE — G8417 CALC BMI ABV UP PARAM F/U: HCPCS | Performed by: PHYSICIAN ASSISTANT

## 2022-08-08 PROCEDURE — 1036F TOBACCO NON-USER: CPT | Performed by: PHYSICIAN ASSISTANT

## 2022-08-08 PROCEDURE — 99214 OFFICE O/P EST MOD 30 MIN: CPT | Performed by: PHYSICIAN ASSISTANT

## 2022-08-08 PROCEDURE — G8427 DOCREV CUR MEDS BY ELIG CLIN: HCPCS | Performed by: PHYSICIAN ASSISTANT

## 2022-08-08 PROCEDURE — 1111F DSCHRG MED/CURRENT MED MERGE: CPT | Performed by: PHYSICIAN ASSISTANT

## 2022-08-08 SDOH — ECONOMIC STABILITY: FOOD INSECURITY: WITHIN THE PAST 12 MONTHS, THE FOOD YOU BOUGHT JUST DIDN'T LAST AND YOU DIDN'T HAVE MONEY TO GET MORE.: NEVER TRUE

## 2022-08-08 SDOH — ECONOMIC STABILITY: FOOD INSECURITY: WITHIN THE PAST 12 MONTHS, YOU WORRIED THAT YOUR FOOD WOULD RUN OUT BEFORE YOU GOT MONEY TO BUY MORE.: NEVER TRUE

## 2022-08-08 ASSESSMENT — PATIENT HEALTH QUESTIONNAIRE - PHQ9
SUM OF ALL RESPONSES TO PHQ9 QUESTIONS 1 & 2: 0
SUM OF ALL RESPONSES TO PHQ QUESTIONS 1-9: 0
2. FEELING DOWN, DEPRESSED OR HOPELESS: 0
SUM OF ALL RESPONSES TO PHQ QUESTIONS 1-9: 0
SUM OF ALL RESPONSES TO PHQ QUESTIONS 1-9: 0
1. LITTLE INTEREST OR PLEASURE IN DOING THINGS: 0
SUM OF ALL RESPONSES TO PHQ QUESTIONS 1-9: 0

## 2022-08-08 ASSESSMENT — SOCIAL DETERMINANTS OF HEALTH (SDOH): HOW HARD IS IT FOR YOU TO PAY FOR THE VERY BASICS LIKE FOOD, HOUSING, MEDICAL CARE, AND HEATING?: NOT HARD AT ALL

## 2022-08-08 NOTE — PROGRESS NOTES
Subjective  Leila Butler, 35 y.o. female presents today with:  Chief Complaint   Patient presents with    Follow-up     Patient presents today for ED f/u. HPI  Patient is here for hospital follow-up accompanied by her mother hospitalized  for MS flare during her admission she was noted to have a patent vogel ovale has follow-up with cardiology scheduled - started on plavix - denies abnormal bleeding  In addition her MS treatment was changed Ocrevus to  as a result  break f elevated liver enzymes  Requesting 2 leg Rollator with a seat per the advice of her physical therapist she is still receiving therapy in the home once per week  Review of Systems   Genitourinary:         Lmp - a month ago lasting 2 days light flow regular cycles   Neurological:  Positive for weakness.        Past Medical History:   Diagnosis Date    Dislocated knee     Right knee    Intellectual disability 2022    Lumbar radiculopathy 2020    MS (multiple sclerosis) ([address] 2022    PTTD (posterior tibial tendon dysfunction)      Past Surgical History:   Procedure Laterality Date    CATARACT REMOVAL WITH IMPLANT Right     CATARACT REMOVAL WITH IMPLANT Left      SECTION  2013    EYE SURGERY      FOOT SURGERY Left      Social History     Socioeconomic History    Marital status:      Spouse name: Not on file    Number of children: 1    Years of education: Not on file    Highest education level: Not on file   Occupational History    Not on file   Tobacco Use    Smoking status: Never    Smokeless tobacco: Never   Substance and Sexual Activity    Alcohol use: No    Drug use: No    Sexual activity: Not Currently   Other Topics Concern    Not on file   Social History Narrative    Lives With: her parents (mom Peng Like) father is sick  and her Son (5 y.o)    Type of Home: Apartment in [facility] [address]    Home Layout: One level    Home Access: Stairs to enter with rails - Number of Steps: 1    Bathroom Shower/Tub: Tub/Shower unit, Equipment: Shower chair,Hand-held shower    Home Equipment: Dulcie Sicks, rolling,Wheelchair-manual    Has the patient had two or more falls in the past year or any fall with injury in the past year?: Yes (one - fell on mothers floor and couldn't get up)    ADL Assistance: Independent    Homemaking Assistance: Independent    Homemaking Responsibilities: Yes    Ambulation Assistance: Independent    Transfer Assistance: Independent    Active : No    Patient's  Info: mother    She is on disability from her MS-Had been a [place] prior to her MS. Was in special classes in school. Pending Waiver services for Aide services. Social Determinants of Health     Financial Resource Strain: Low Risk     Difficulty of Paying Living Expenses: Not hard at all   Food Insecurity: No Food Insecurity    Worried About Running Out of Food in the Last Year: Never true    Ran Out of Food in the Last Year: Never true   Transportation Needs: Not on file   Physical Activity: Not on file   Stress: Not on file   Social Connections: Not on file   Intimate Partner Violence: Not on file   Housing Stability: Not on file     Family History   Problem Relation Age of Onset    Hypertension Mother     Diabetes Maternal Uncle     Cancer Maternal Grandmother         stomach cancer      No Known Allergies  Current Outpatient Medications   Medication Sig Dispense Refill    mirabegron (MYRBETRIQ) 25 MG TB24 Take 1 tablet by mouth in the morning. 30 tablet 3    clopidogrel (PLAVIX) 75 MG tablet Take 1 tablet by mouth daily 30 tablet 3    aspirin 81 MG EC tablet Take 1 tablet by mouth daily 30 tablet 3    coenzyme Q10 100 MG CAPS capsule Take 1 capsule by mouth daily 120 capsule 5    vitamin D 25 MCG (1000 UT) CAPS Take by mouth      MULTIPLE VITAMINS-MINERALS ER PO Take 1 tablet by mouth       No current facility-administered medications for this visit. Objective    Vitals:    08/08/22 0917   BP: 132/78   Site: Left Upper Arm   Position: Sitting   Cuff Size: Medium Adult   Pulse: 96   Temp: 98 °F (36.7 °C)   TempSrc: Temporal   SpO2: 97%   Weight: 168 lb 12.8 oz (76.6 kg)   Height: 5' 1\" (1.549 m)     Physical Exam  Constitutional:       General: She is not in acute distress. Appearance: She is obese. She is not ill-appearing. HENT:      Head: Normocephalic and atraumatic. Eyes:      Extraocular Movements: Extraocular movements intact. Conjunctiva/sclera: Conjunctivae normal.      Pupils: Pupils are equal, round, and reactive to light. Neck:      Thyroid: No thyromegaly. Cardiovascular:      Rate and Rhythm: Normal rate and regular rhythm. Heart sounds: Normal heart sounds. No murmur heard. Pulmonary:      Effort: Pulmonary effort is normal. No respiratory distress. Breath sounds: Normal breath sounds. No wheezing or rhonchi. Abdominal:      General: Bowel sounds are normal.      Palpations: Abdomen is soft. There is no mass. Tenderness: There is no abdominal tenderness. There is no guarding. Musculoskeletal:      Cervical back: Normal range of motion and neck supple. Right lower leg: No edema. Left lower leg: No edema. Lymphadenopathy:      Cervical: No cervical adenopathy. Skin:     General: Skin is warm and dry. Coloration: Skin is not jaundiced. Neurological:      Mental Status: She is alert and oriented to person, place, and time. Cranial Nerves: No cranial nerve deficit. Motor: Weakness present. Coordination: Coordination normal.      Gait: Gait normal.      Comments: Motor strength +3-4/5 left le +4/5 right le   +5/5 bilat upper ex   Psychiatric:         Mood and Affect: Mood normal.         Behavior: Behavior normal.         Thought Content: Thought content normal.         Judgment: Judgment normal.          Assessment & Plan    Diagnosis Orders   1.  Multiple sclerosis ([facility] Comprehensive Metabolic Panel    Lipid, Fasting      2. Abnormal TSH  TSH with Reflex      3. Elevated liver enzymes        4. Anemia, unspecified type  Vitamin B12 & Folate    Iron and TIBC            Orders Placed This Encounter   Procedures    TSH with Reflex     Standing Status:   Future     Standing Expiration Date:   8/8/2023    Comprehensive Metabolic Panel     Standing Status:   Future     Standing Expiration Date:   8/8/2023    Lipid, Fasting     Standing Status:   Future     Standing Expiration Date:   8/8/2023    Vitamin B12 & Folate     Standing Status:   Future     Standing Expiration Date:   8/8/2023    Iron and TIBC     Standing Status:   Future     Standing Expiration Date:   8/8/2023     No orders of the defined types were placed in this encounter. There are no discontinued medications. Return in about 1 year (around 8/8/2023).     Shy Ames PA-C

## 2022-08-17 ENCOUNTER — HOSPITAL ENCOUNTER (OUTPATIENT)
Dept: INFUSION THERAPY | Age: 42
Setting detail: INFUSION SERIES
Discharge: HOME | End: 2022-08-17
Payer: COMMERCIAL

## 2022-08-17 VITALS
DIASTOLIC BLOOD PRESSURE: 66 MMHG | TEMPERATURE: 98.8 F | RESPIRATION RATE: 18 BRPM | HEART RATE: 84 BPM | SYSTOLIC BLOOD PRESSURE: 140 MMHG

## 2022-08-17 DIAGNOSIS — G35 MS (MULTIPLE SCLEROSIS) (HCC): Primary | ICD-10-CM

## 2022-08-17 PROCEDURE — 6370000000 HC RX 637 (ALT 250 FOR IP): Performed by: PSYCHIATRY & NEUROLOGY

## 2022-08-17 PROCEDURE — 96415 CHEMO IV INFUSION ADDL HR: CPT

## 2022-08-17 PROCEDURE — 6360000002 HC RX W HCPCS: Performed by: PSYCHIATRY & NEUROLOGY

## 2022-08-17 PROCEDURE — 2580000003 HC RX 258: Performed by: PSYCHIATRY & NEUROLOGY

## 2022-08-17 PROCEDURE — 96413 CHEMO IV INFUSION 1 HR: CPT

## 2022-08-17 RX ORDER — DIPHENHYDRAMINE HCL 25 MG
25 TABLET ORAL ONCE
Status: CANCELLED
Start: 2023-02-01 | End: 2023-02-01

## 2022-08-17 RX ORDER — DIPHENHYDRAMINE HCL 25 MG
25 TABLET ORAL ONCE
Start: 2023-02-01 | End: 2023-02-01

## 2022-08-17 RX ORDER — METHYLPREDNISOLONE SODIUM SUCCINATE 125 MG/2ML
125 INJECTION, POWDER, LYOPHILIZED, FOR SOLUTION INTRAMUSCULAR; INTRAVENOUS ONCE
Status: CANCELLED | OUTPATIENT
Start: 2023-02-01

## 2022-08-17 RX ORDER — ACETAMINOPHEN 325 MG/1
650 TABLET ORAL ONCE
Status: CANCELLED | OUTPATIENT
Start: 2023-02-01

## 2022-08-17 RX ORDER — DIPHENHYDRAMINE HCL 25 MG
25 TABLET ORAL ONCE
Status: COMPLETED | OUTPATIENT
Start: 2022-08-17 | End: 2022-08-17

## 2022-08-17 RX ORDER — METHYLPREDNISOLONE SODIUM SUCCINATE 125 MG/2ML
125 INJECTION, POWDER, LYOPHILIZED, FOR SOLUTION INTRAMUSCULAR; INTRAVENOUS ONCE
OUTPATIENT
Start: 2023-02-01

## 2022-08-17 RX ORDER — METHYLPREDNISOLONE SODIUM SUCCINATE 125 MG/2ML
125 INJECTION, POWDER, LYOPHILIZED, FOR SOLUTION INTRAMUSCULAR; INTRAVENOUS ONCE
Status: COMPLETED | OUTPATIENT
Start: 2022-08-17 | End: 2022-08-17

## 2022-08-17 RX ORDER — ACETAMINOPHEN 325 MG/1
650 TABLET ORAL ONCE
OUTPATIENT
Start: 2023-02-01

## 2022-08-17 RX ORDER — ACETAMINOPHEN 325 MG/1
650 TABLET ORAL ONCE
Status: COMPLETED | OUTPATIENT
Start: 2022-08-17 | End: 2022-08-17

## 2022-08-17 RX ADMIN — OCRELIZUMAB 300 MG: 300 INJECTION INTRAVENOUS at 11:14

## 2022-08-17 RX ADMIN — ACETAMINOPHEN 650 MG: 325 TABLET ORAL at 10:43

## 2022-08-17 RX ADMIN — METHYLPREDNISOLONE SODIUM SUCCINATE 125 MG: 125 INJECTION, POWDER, FOR SOLUTION INTRAMUSCULAR; INTRAVENOUS at 10:43

## 2022-08-17 RX ADMIN — DIPHENHYDRAMINE HCL 25 MG: 25 TABLET ORAL at 10:43

## 2022-08-17 NOTE — FLOWSHEET NOTE
Patient arrived to unit in wheelchair for Ocrevus infusion with family member. No distress noted. Pre medications given and observation started. Call light within reach.

## 2022-08-22 ENCOUNTER — TELEPHONE (OUTPATIENT)
Dept: FAMILY MEDICINE CLINIC | Age: 42
End: 2022-08-22

## 2022-08-25 DIAGNOSIS — G35 MULTIPLE SCLEROSIS (HCC): ICD-10-CM

## 2022-08-25 DIAGNOSIS — R79.89 ABNORMAL TSH: ICD-10-CM

## 2022-08-25 DIAGNOSIS — D64.9 ANEMIA, UNSPECIFIED TYPE: ICD-10-CM

## 2022-08-25 LAB
ALBUMIN SERPL-MCNC: 4.2 G/DL (ref 3.5–4.6)
ALP BLD-CCNC: 53 U/L (ref 40–130)
ALT SERPL-CCNC: 8 U/L (ref 0–33)
ANION GAP SERPL CALCULATED.3IONS-SCNC: 9 MEQ/L (ref 9–15)
AST SERPL-CCNC: 14 U/L (ref 0–35)
BILIRUB SERPL-MCNC: <0.2 MG/DL (ref 0.2–0.7)
BUN BLDV-MCNC: 15 MG/DL (ref 6–20)
CALCIUM SERPL-MCNC: 8.9 MG/DL (ref 8.5–9.9)
CHLORIDE BLD-SCNC: 107 MEQ/L (ref 95–107)
CHOLESTEROL, FASTING: 211 MG/DL (ref 0–199)
CO2: 25 MEQ/L (ref 20–31)
CREAT SERPL-MCNC: 0.64 MG/DL (ref 0.5–0.9)
GFR AFRICAN AMERICAN: >60
GFR NON-AFRICAN AMERICAN: >60
GLOBULIN: 3 G/DL (ref 2.3–3.5)
GLUCOSE BLD-MCNC: 97 MG/DL (ref 70–99)
HDLC SERPL-MCNC: 41 MG/DL (ref 40–59)
LDL CHOLESTEROL CALCULATED: 153 MG/DL (ref 0–129)
POTASSIUM SERPL-SCNC: 4.1 MEQ/L (ref 3.4–4.9)
SODIUM BLD-SCNC: 141 MEQ/L (ref 135–144)
TOTAL PROTEIN: 7.2 G/DL (ref 6.3–8)
TRIGLYCERIDE, FASTING: 84 MG/DL (ref 0–150)
TSH REFLEX: 1.51 UIU/ML (ref 0.44–3.86)

## 2022-08-26 LAB
FOLATE: 10.1 NG/ML
IRON SATURATION: 9 % (ref 20–55)
IRON: 30 UG/DL (ref 37–145)
TOTAL IRON BINDING CAPACITY: 348 UG/DL (ref 250–450)
UNSATURATED IRON BINDING CAPACITY: 318 UG/DL (ref 112–347)
VITAMIN B-12: 792 PG/ML (ref 232–1245)

## 2022-09-16 NOTE — TELEPHONE ENCOUNTER
Pharmacy is requesting medication refill.  Please approve or deny this request.    Rx requested:  Requested Prescriptions     Pending Prescriptions Disp Refills    aspirin 81 MG EC tablet 30 tablet 3     Sig: Take 1 tablet by mouth daily         Last Office Visit:   4/27/2022      Next Visit Date:  Future Appointments   Date Time Provider Joey Wilkerson   9/21/2022  3:15 PM Yuliet Farias MD [facility]   8/8/2023  9:15 AM Shy Ames PA-C [address]

## 2022-09-18 RX ORDER — ASPIRIN 81 MG/1
81 TABLET ORAL DAILY
Qty: 30 TABLET | Refills: 3 | Status: SHIPPED | OUTPATIENT
Start: 2022-09-18

## 2022-09-21 ENCOUNTER — OFFICE VISIT (OUTPATIENT)
Dept: NEUROLOGY | Age: 42
End: 2022-09-21
Payer: COMMERCIAL

## 2022-09-21 VITALS
DIASTOLIC BLOOD PRESSURE: 82 MMHG | BODY MASS INDEX: 33.61 KG/M2 | HEIGHT: 61 IN | SYSTOLIC BLOOD PRESSURE: 110 MMHG | HEART RATE: 76 BPM | WEIGHT: 178 LBS

## 2022-09-21 DIAGNOSIS — R26.0 ATAXIC GAIT: ICD-10-CM

## 2022-09-21 DIAGNOSIS — G35 MS (MULTIPLE SCLEROSIS) (HCC): ICD-10-CM

## 2022-09-21 DIAGNOSIS — Q21.12 PFO (PATENT FORAMEN OVALE): ICD-10-CM

## 2022-09-21 DIAGNOSIS — M21.372 FOOT DROP, LEFT FOOT: ICD-10-CM

## 2022-09-21 DIAGNOSIS — G82.20 PARAPARESIS (HCC): Primary | ICD-10-CM

## 2022-09-21 DIAGNOSIS — R39.15 URINARY URGENCY: ICD-10-CM

## 2022-09-21 DIAGNOSIS — M54.16 LUMBAR RADICULOPATHY: ICD-10-CM

## 2022-09-21 PROBLEM — R74.8 ABNORMAL LEVELS OF OTHER SERUM ENZYMES: Status: ACTIVE | Noted: 2022-08-24

## 2022-09-21 PROBLEM — R00.0 TACHYCARDIA, UNSPECIFIED: Status: ACTIVE | Noted: 2022-08-24

## 2022-09-21 PROCEDURE — G8427 DOCREV CUR MEDS BY ELIG CLIN: HCPCS | Performed by: PSYCHIATRY & NEUROLOGY

## 2022-09-21 PROCEDURE — 1036F TOBACCO NON-USER: CPT | Performed by: PSYCHIATRY & NEUROLOGY

## 2022-09-21 PROCEDURE — G8417 CALC BMI ABV UP PARAM F/U: HCPCS | Performed by: PSYCHIATRY & NEUROLOGY

## 2022-09-21 PROCEDURE — 99214 OFFICE O/P EST MOD 30 MIN: CPT | Performed by: PSYCHIATRY & NEUROLOGY

## 2022-09-21 RX ORDER — MIRABEGRON 25 MG/1
TABLET, FILM COATED, EXTENDED RELEASE ORAL
Qty: 25 TABLET | Refills: 10 | OUTPATIENT
Start: 2022-09-21

## 2022-09-21 RX ORDER — OCRELIZUMAB 300 MG/10ML
300 INJECTION INTRAVENOUS ONCE
COMMUNITY

## 2022-09-21 ASSESSMENT — ENCOUNTER SYMPTOMS
TROUBLE SWALLOWING: 0
CHOKING: 0
SHORTNESS OF BREATH: 0
BACK PAIN: 1
VOMITING: 0
COLOR CHANGE: 0
PHOTOPHOBIA: 0
NAUSEA: 0

## 2022-09-21 NOTE — PROGRESS NOTES
Subjective:      Patient ID: Nicol Oh is a [place] 44 y.o. female who presents today for:  Chief Complaint   Patient presents with    Follow-up     4 month f/u Multiple Sclerosis, pt would like something different for her urine input the pills are not working cant hold her urine for a long time. Pt would like to know if she can get the booster since shes on ocrevus ? Pt would like to know if she can have a brace her left foot ? HPI 57-year-old right-handed female with a history of lumbar radiculopathy with multiple sclerosis with a left foot drop. Patient's EDSS score have not changed and she is still has mild ataxia. When last seen we were contemplating Ocrevus which she is already started. She is having some urinary issues and patient continues to have primary progressive course. Patient was admitted to the hospital in July with lower extremity paraparesis. Her cardiac evaluation does show a moderate PFO and she has been on dual antiplatelet therapy. We recommended closure of the PFO at some point as there is a shunt patient was on Aubagio in the past and given that she had increasing symptoms we had recommended Ocrevus which has been started  Is considerable swelling in the lower extremities mostly lymphedema. She does not have foot drop. The urinary symptoms appear to be having some response to Myrbetriq which we will continue.     Past Medical History:   Diagnosis Date    Dislocated knee     Right knee    Intellectual disability 2022    Lumbar radiculopathy 2020    MS (multiple sclerosis) ([address] 2022    PTTD (posterior tibial tendon dysfunction)      Past Surgical History:   Procedure Laterality Date    CATARACT REMOVAL WITH IMPLANT Right     CATARACT REMOVAL WITH IMPLANT Left      SECTION  2013    EYE SURGERY      FOOT SURGERY Left      Social History     Socioeconomic History    Marital status:      Spouse name: Not on file    Number of children: 1    Years of education: Not on file    Highest education level: Not on file   Occupational History    Not on file   Tobacco Use    Smoking status: Never    Smokeless tobacco: Never   Substance and Sexual Activity    Alcohol use: No    Drug use: No    Sexual activity: Not Currently   Other Topics Concern    Not on file   Social History Narrative    Lives With: her parents (mom Peng Like) father is sick  and her Son (5 y.o)    Type of Home: Apartment in [address] [place] [address] Nw: One level    Home Access: Stairs to enter with rails - Number of Steps: 1    Bathroom Shower/Tub: Tub/Shower unit, Equipment: Shower chair,Hand-held shower    Home Equipment: Caye Orn, rolling,Wheelchair-manual    Has the patient had two or more falls in the past year or any fall with injury in the past year?: Yes (one - fell on mothers floor and couldn't get up)    ADL Assistance: Independent    Homemaking Assistance: Independent    Homemaking Responsibilities: Yes    Ambulation Assistance: Independent    Transfer Assistance: Independent    Active : No    Patient's  Info: mother    She is on disability from her MS-Had been a [place] prior to her MS. Was in special classes in school. Pending Waiver services for Aide services.                        Social Determinants of Health     Financial Resource Strain: Low Risk     Difficulty of Paying Living Expenses: Not hard at all   Food Insecurity: No Food Insecurity    Worried About Running Out of Food in the Last Year: Never true    Ran Out of Food in the Last Year: Never true   Transportation Needs: Not on file   Physical Activity: Not on file   Stress: Not on file   Social Connections: Not on file   Intimate Partner Violence: Not on file   Housing Stability: Not on file     Family History   Problem Relation Age of Onset    Hypertension Mother     Diabetes Maternal Uncle     Cancer Maternal Grandmother         stomach cancer     No Known Allergies    Current Outpatient Medications   Medication Sig Dispense Refill    ocrelizumab (OCREVUS) 300 MG/10ML SOLN injection Infuse 300 mg intravenously once      aspirin 81 MG EC tablet Take 1 tablet by mouth daily 30 tablet 3    mirabegron (MYRBETRIQ) 25 MG TB24 Take 1 tablet by mouth in the morning. 30 tablet 3    clopidogrel (PLAVIX) 75 MG tablet Take 1 tablet by mouth daily 30 tablet 3    coenzyme Q10 100 MG CAPS capsule Take 1 capsule by mouth daily 120 capsule 5    vitamin D 25 MCG (1000 UT) CAPS Take by mouth      MULTIPLE VITAMINS-MINERALS ER PO Take 1 tablet by mouth       No current facility-administered medications for this visit. Review of Systems   Constitutional:  Negative for fever. HENT:  Negative for ear pain, tinnitus and trouble swallowing. Eyes:  Negative for photophobia and visual disturbance. Respiratory:  Negative for choking and shortness of breath. Cardiovascular:  Negative for chest pain and palpitations. Gastrointestinal:  Negative for nausea and vomiting. Musculoskeletal:  Positive for back pain, gait problem and myalgias. Negative for joint swelling, neck pain and neck stiffness. Skin:  Negative for color change. Allergic/Immunologic: Negative for food allergies. Neurological:  Positive for weakness. Negative for dizziness, tremors, seizures, syncope, facial asymmetry, speech difficulty, light-headedness, numbness and headaches. Psychiatric/Behavioral:  Negative for behavioral problems, confusion, hallucinations and sleep disturbance. Objective:   /82 (Site: Left Upper Arm, Position: Sitting, Cuff Size: Medium Adult)   Pulse 76   Ht 5' 1\" (1.549 m)   Wt 178 lb (80.7 kg)   BMI 33.63 kg/m²     Physical Exam  Vitals reviewed. Eyes:      Pupils: Pupils are equal, round, and reactive to light. Cardiovascular:      Rate and Rhythm: Normal rate and regular rhythm. Heart sounds: No murmur heard.   Pulmonary:      Effort: Pulmonary effort is normal.      Breath sounds: Normal breath sounds. Abdominal:      General: Bowel sounds are normal.   Musculoskeletal:         General: Normal range of motion. Cervical back: Normal range of motion. Skin:     General: Skin is warm. Neurological:      Mental Status: She is alert and oriented to person, place, and time. Cranial Nerves: No cranial nerve deficit. Sensory: No sensory deficit. Motor: No abnormal muscle tone. Coordination: Coordination normal.      Deep Tendon Reflexes: Reflexes are normal and symmetric. Babinski sign absent on the right side. Babinski sign absent on the left side. 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 Patient actually has spinal MS and primary progressive MS. She is not quite myelopathic. She has no foot drops. The weakness of the ankle is secondary to her spine. Patient was admitted to the hospital and evaluations noted she does walk but in the office she is in the wheelchair   Psychiatric:         Mood and Affect: Mood normal.       No results found.     Lab Results   Component Value Date/Time    WBC 6.5 07/07/2022 06:22 AM    RBC 3.34 07/07/2022 06:22 AM    HGB 9.4 07/07/2022 06:22 AM    HCT 29.4 07/07/2022 06:22 AM    MCV 88.0 07/07/2022 06:22 AM    MCH 28.2 07/07/2022 06:22 AM    MCHC 32.1 07/07/2022 06:22 AM    RDW 18.3 07/07/2022 06:22 AM     07/07/2022 06:22 AM    MPV 10.7 06/09/2014 04:43 PM     Lab Results   Component Value Date/Time     08/25/2022 08:34 AM    K 4.1 08/25/2022 08:34 AM    K 4.6 07/07/2022 06:22 AM     08/25/2022 08:34 AM    CO2 25 08/25/2022 08:34 AM    BUN 15 08/25/2022 08:34 AM    CREATININE 0.64 08/25/2022 08:34 AM    GFRAA >60.0 08/25/2022 08:34 AM    LABGLOM >60.0 08/25/2022 08:34 AM    GLUCOSE 97 08/25/2022 08:34 AM    PROT 7.2 08/25/2022 08:34 AM    LABALBU 4.2 08/25/2022 08:34 AM    CALCIUM 8.9 08/25/2022 08:34 AM    BILITOT <0.2 08/25/2022 08:34 AM    ALKPHOS 53 08/25/2022 08:34 AM    AST 14 08/25/2022 08:34 AM    ALT 8 08/25/2022 08:34 AM     Lab Results   Component Value Date/Time    PROTIME 15.4 06/19/2022 11:02 AM    INR 1.2 06/19/2022 11:02 AM     Lab Results   Component Value Date/Time    TUPUFNUI50 792 08/25/2022 08:34 AM    FOLATE 10.1 08/25/2022 08:34 AM    IRON 30 08/25/2022 08:34 AM    TIBC 348 08/25/2022 08:34 AM     Lab Results   Component Value Date/Time    TRIG 79 12/13/2019 04:04 PM    HDL 41 08/25/2022 08:34 AM    LDLCALC 153 08/25/2022 08:34 AM     Lab Results   Component Value Date/Time    LABBENZ NotDTCD 01/11/2013 01:41 PM     No results found for: LITHIUM, DILFRTOT, VALPROATE    Assessment:       Diagnosis Orders   1. Paraparesis ([facility]        2. MS (multiple sclerosis) ([facility]        3. Lumbar radiculopathy        4. PFO (patent foramen ovale)        5. Foot drop, left foot        6. Ataxic gait        7. Urinary urgency        Multiple is gnosis with a primary progressive course. Patient was on Aubagio and we had recommended Vern Campo though we were having some difficulty with insurance company which now she has received she is done 2 treatments the next treatment is in 6 months she did not any side effects. Again this is not a treatment and this may just keep her where she is and we have discussed this in detail. Patient was admitted to hospital with a relapse and was treated with steroids with some improvement. She has Sequent urinary issues and we have continued her on Myrbetriq with some response which we will continue. Next well patient does not have a foot drop though weakness of the ankle on the left and she did not respond to a brace. Infectious swelling in the legs and this will cut through her skin. I recommended high shoes which would be better. I recommended that she continue to move herself as if she continues to use the wheelchair more she is going to get weaker.     Patient also has a patent foramen ovale with a right-to-left shunt and is on dual antiplatelet agents she has appointment with cardiology and my recommendation is that this should be fixed as there is a shunt and a very high risk for stroke. She will follow-up with a cardiologist and see what they advise and then we will see      Facundo Villegas MD, Latia Jaffe, American Board of Psychiatry & Neurology  Board Certified in Vascular Neurology  Board Certified in Neuromuscular Medicine  Certified in [facility]:      No orders of the defined types were placed in this encounter. No orders of the defined types were placed in this encounter. No follow-ups on file.       Chloe Gonsales MD

## 2022-10-25 NOTE — TELEPHONE ENCOUNTER
Pharmacy is requesting medication refill.  Please approve or deny this request.    Rx requested:  Requested Prescriptions     Pending Prescriptions Disp Refills    clopidogrel (PLAVIX) 75 MG tablet [Pharmacy Med Name: CLOPIDOGREL 75 MG TABLET 75 Tablet] 30 tablet 10     Sig: TAKE 1 TABLET BY MOUTH DAILY         Last Office Visit:   9/21/2022      Next Visit Date:  Future Appointments   Date Time Provider Joey Wilkerson   2/3/2023 11:00 AM Emre Acevedo MD [address] [place] [address]   8/8/2023  9:15 AM Shy Ames PA-C [address] [address] [place]

## 2022-10-26 RX ORDER — CLOPIDOGREL BISULFATE 75 MG/1
75 TABLET ORAL DAILY
Qty: 30 TABLET | Refills: 0 | Status: SHIPPED | OUTPATIENT
Start: 2022-10-26

## 2022-12-16 RX ORDER — CLOPIDOGREL BISULFATE 75 MG/1
75 TABLET ORAL DAILY
Qty: 30 TABLET | Refills: 4 | Status: SHIPPED | OUTPATIENT
Start: 2022-12-16

## 2022-12-16 NOTE — TELEPHONE ENCOUNTER
Pharmacy is requesting medication refill.  Please approve or deny this request.    Rx requested:  Requested Prescriptions     Pending Prescriptions Disp Refills    clopidogrel (PLAVIX) 75 MG tablet [Pharmacy Med Name: CLOPIDOGREL 75 MG TABLET 75 Tablet] 30 tablet 4     Sig: TAKE 1 TABLET BY MOUTH DAILY         Last Office Visit:   Visit date not found      Next Visit Date:  Future Appointments   Date Time Provider Joey Rhea   2/3/2023 11:00 AM Darien Monsalve  [facility] -   8/8/2023  9:15 AM Shy Calderon PA-C [address]

## 2023-01-10 RX ORDER — MIRABEGRON 25 MG/1
TABLET, FILM COATED, EXTENDED RELEASE ORAL
Qty: 30 TABLET | Refills: 10 | Status: SHIPPED | OUTPATIENT
Start: 2023-01-10

## 2023-01-26 PROBLEM — Z98.890 HISTORY OF STRABISMUS SURGERY: Status: ACTIVE | Noted: 2023-01-26

## 2023-01-26 PROBLEM — Z78.9 NEVER SMOKED TOBACCO: Status: ACTIVE | Noted: 2023-01-26

## 2023-01-26 PROBLEM — H50.30 INTERMITTENT EXOTROPIA: Status: ACTIVE | Noted: 2023-01-26

## 2023-01-26 PROBLEM — R74.8 CARDIAC ENZYMES ELEVATED: Status: ACTIVE | Noted: 2023-01-26

## 2023-01-26 PROBLEM — H11.241 CONJUNCTIVAL SCARRING OF SOCKET OF RIGHT EYE: Status: ACTIVE | Noted: 2023-01-26

## 2023-02-13 ENCOUNTER — HOSPITAL ENCOUNTER (OUTPATIENT)
Dept: INFUSION THERAPY | Age: 43
Setting detail: INFUSION SERIES
Discharge: HOME | End: 2023-02-13
Payer: COMMERCIAL

## 2023-02-13 VITALS
SYSTOLIC BLOOD PRESSURE: 116 MMHG | TEMPERATURE: 97.7 F | HEART RATE: 74 BPM | DIASTOLIC BLOOD PRESSURE: 61 MMHG | RESPIRATION RATE: 16 BRPM

## 2023-02-13 DIAGNOSIS — G35 MS (MULTIPLE SCLEROSIS) (HCC): Primary | ICD-10-CM

## 2023-02-13 PROCEDURE — 96375 TX/PRO/DX INJ NEW DRUG ADDON: CPT

## 2023-02-13 PROCEDURE — 96413 CHEMO IV INFUSION 1 HR: CPT

## 2023-02-13 PROCEDURE — 2580000003 HC RX 258: Performed by: PSYCHIATRY & NEUROLOGY

## 2023-02-13 PROCEDURE — 96415 CHEMO IV INFUSION ADDL HR: CPT

## 2023-02-13 PROCEDURE — 6370000000 HC RX 637 (ALT 250 FOR IP): Performed by: PSYCHIATRY & NEUROLOGY

## 2023-02-13 PROCEDURE — 6360000002 HC RX W HCPCS: Performed by: PSYCHIATRY & NEUROLOGY

## 2023-02-13 RX ORDER — DIPHENHYDRAMINE HCL 25 MG
25 TABLET ORAL ONCE
Status: COMPLETED | OUTPATIENT
Start: 2023-02-13 | End: 2023-02-13

## 2023-02-13 RX ORDER — ACETAMINOPHEN 325 MG/1
650 TABLET ORAL ONCE
Status: COMPLETED | OUTPATIENT
Start: 2023-02-13 | End: 2023-02-13

## 2023-02-13 RX ORDER — METHYLPREDNISOLONE SODIUM SUCCINATE 125 MG/2ML
125 INJECTION, POWDER, LYOPHILIZED, FOR SOLUTION INTRAMUSCULAR; INTRAVENOUS ONCE
OUTPATIENT
Start: 2023-07-31

## 2023-02-13 RX ORDER — DIPHENHYDRAMINE HCL 25 MG
25 TABLET ORAL ONCE
Start: 2023-07-31 | End: 2023-07-31

## 2023-02-13 RX ORDER — METHYLPREDNISOLONE SODIUM SUCCINATE 125 MG/2ML
125 INJECTION, POWDER, LYOPHILIZED, FOR SOLUTION INTRAMUSCULAR; INTRAVENOUS ONCE
Status: COMPLETED | OUTPATIENT
Start: 2023-02-13 | End: 2023-02-13

## 2023-02-13 RX ORDER — ACETAMINOPHEN 325 MG/1
650 TABLET ORAL ONCE
OUTPATIENT
Start: 2023-07-31

## 2023-02-13 RX ADMIN — METHYLPREDNISOLONE SODIUM SUCCINATE 125 MG: 125 INJECTION, POWDER, FOR SOLUTION INTRAMUSCULAR; INTRAVENOUS at 10:51

## 2023-02-13 RX ADMIN — OCRELIZUMAB 600 MG: 300 INJECTION INTRAVENOUS at 11:00

## 2023-02-13 RX ADMIN — ACETAMINOPHEN 650 MG: 325 TABLET ORAL at 10:21

## 2023-02-13 RX ADMIN — DIPHENHYDRAMINE HCL 25 MG: 25 TABLET ORAL at 10:21

## 2023-02-13 NOTE — PROGRESS NOTES
Observation complete. No sxs reactions noted. Card given to make next appt, but also to remind need a new order. Pt to see doc in May. All equipment used in the care for this patient has been cleaned.

## 2023-02-13 NOTE — PROGRESS NOTES
Pt tolerating well. Chair reclined. Rate increased to 200 ml/hr, infusing well. Call light in reach.

## 2023-02-13 NOTE — PROGRESS NOTES
Pt to [facility] via w/c with mother, Peter Hennessy, for ocrevus. Made comfortable in chair. States no c/o. States sxs of MS about the same at this point. Has had two infusions so far.

## 2023-02-17 LAB
ANION GAP SERPL CALCULATED.3IONS-SCNC: 10 MMOL/L (ref 10–20)
BICARBONATE: 28 MMOL/L (ref 21–32)
CALCIUM SERPL-MCNC: 9.7 MG/DL (ref 8.6–10.3)
CHLORIDE BLD-SCNC: 107 MMOL/L (ref 98–107)
CREAT SERPL-MCNC: 0.76 MG/DL (ref 0.5–1)
EGFR FEMALE: >90 ML/MIN/1.73M2
ERYTHROCYTE [DISTWIDTH] IN BLOOD BY AUTOMATED COUNT: 16.4 % (ref 11.5–14)
GLUCOSE: 93 MG/DL (ref 74–99)
HCT VFR BLD CALC: 33.5 % (ref 36–46)
HEMOGLOBIN: 10.1 G/DL (ref 12–16)
MAGNESIUM: 1.91 MG/DL (ref 1.6–2.4)
MCHC RBC AUTO-ENTMCNC: 30.1 G/DL (ref 32–36)
MCV RBC AUTO: 85 FL (ref 80–100)
PLATELET # BLD: 300 X10E9/L (ref 150–450)
POTASSIUM SERPL-SCNC: 3.6 MMOL/L (ref 3.5–5.3)
RBC # BLD: 3.96 X10E12/L (ref 4–5.2)
SEDIMENTATION RATE, ERYTHROCYTE: 72 MM/H (ref 0–20)
SODIUM BLD-SCNC: 141 MMOL/L (ref 136–145)
UREA NITROGEN: 18 MG/DL (ref 6–23)
WBC: 8.9 X10E9/L (ref 4.4–11.3)

## 2023-02-21 ENCOUNTER — TELEPHONE (OUTPATIENT)
Dept: NEUROLOGY | Age: 43
End: 2023-02-21

## 2023-02-21 DIAGNOSIS — G35 MS (MULTIPLE SCLEROSIS) (HCC): Primary | ICD-10-CM

## 2023-02-21 NOTE — TELEPHONE ENCOUNTER
Patient is on ocrevus and states that she has been falling and is wanting a prescription for that .  Next infusion is august 2023    Please advise       [phone]

## 2023-02-22 RX ORDER — METHYLPREDNISOLONE 4 MG/1
TABLET ORAL
Qty: 1 KIT | Refills: 0 | Status: SHIPPED | OUTPATIENT
Start: 2023-02-22 | End: 2023-02-28

## 2023-03-03 ENCOUNTER — HOSPITAL ENCOUNTER (OUTPATIENT)
Dept: MRI IMAGING | Age: 43
End: 2023-03-03
Payer: COMMERCIAL

## 2023-03-03 DIAGNOSIS — G35 MS (MULTIPLE SCLEROSIS) (HCC): ICD-10-CM

## 2023-03-03 PROCEDURE — A9579 GAD-BASE MR CONTRAST NOS,1ML: HCPCS | Performed by: PSYCHIATRY & NEUROLOGY

## 2023-03-03 PROCEDURE — 70553 MRI BRAIN STEM W/O & W/DYE: CPT

## 2023-03-03 PROCEDURE — 6360000004 HC RX CONTRAST MEDICATION: Performed by: PSYCHIATRY & NEUROLOGY

## 2023-03-03 RX ADMIN — GADOTERIDOL 20 ML: 279.3 INJECTION, SOLUTION INTRAVENOUS at 10:01

## 2023-05-08 RX ORDER — CLOPIDOGREL BISULFATE 75 MG/1
75 TABLET ORAL DAILY
Qty: 30 TABLET | Refills: 10 | Status: SHIPPED | OUTPATIENT
Start: 2023-05-08

## 2023-05-08 NOTE — TELEPHONE ENCOUNTER
Pharmacy is requesting medication refill.  Please approve or deny this request.    Rx requested:  Requested Prescriptions     Pending Prescriptions Disp Refills    clopidogrel (PLAVIX) 75 MG tablet [Pharmacy Med Name: CLOPIDOGREL 75 MG TABLET 75 Tablet] 30 tablet 10     Sig: TAKE 1 TABLET BY MOUTH DAILY         Last Office Visit:   9/21/2022      Next Visit Date:  Future Appointments   Date Time Provider Joey Wilkerson   5/25/2023  3:00 PM [facility]   8/8/2023  9:15 AM Syh Joshi PA-C [address]

## 2023-05-25 ENCOUNTER — OFFICE VISIT (OUTPATIENT)
Dept: NEUROLOGY | Age: 43
End: 2023-05-25
Payer: COMMERCIAL

## 2023-05-25 VITALS
WEIGHT: 177 LBS | SYSTOLIC BLOOD PRESSURE: 112 MMHG | DIASTOLIC BLOOD PRESSURE: 64 MMHG | HEART RATE: 82 BPM | BODY MASS INDEX: 33.44 KG/M2

## 2023-05-25 DIAGNOSIS — G82.20 PARAPARESIS (HCC): ICD-10-CM

## 2023-05-25 DIAGNOSIS — M21.372 FOOT DROP, LEFT FOOT: ICD-10-CM

## 2023-05-25 DIAGNOSIS — R27.0 ATAXIA: ICD-10-CM

## 2023-05-25 DIAGNOSIS — Z74.09 IMPAIRED MOBILITY AND ACTIVITIES OF DAILY LIVING: ICD-10-CM

## 2023-05-25 DIAGNOSIS — R26.0 ATAXIC GAIT: ICD-10-CM

## 2023-05-25 DIAGNOSIS — R39.15 URINARY URGENCY: ICD-10-CM

## 2023-05-25 DIAGNOSIS — Z78.9 IMPAIRED MOBILITY AND ACTIVITIES OF DAILY LIVING: ICD-10-CM

## 2023-05-25 DIAGNOSIS — G35 MS (MULTIPLE SCLEROSIS) (HCC): Primary | ICD-10-CM

## 2023-05-25 PROBLEM — Q21.10 ATRIAL SEPTAL DEFECT, UNSPECIFIED: Status: ACTIVE | Noted: 2022-11-10

## 2023-05-25 PROCEDURE — 99214 OFFICE O/P EST MOD 30 MIN: CPT | Performed by: PSYCHIATRY & NEUROLOGY

## 2023-05-25 PROCEDURE — 1036F TOBACCO NON-USER: CPT | Performed by: PSYCHIATRY & NEUROLOGY

## 2023-05-25 PROCEDURE — G8417 CALC BMI ABV UP PARAM F/U: HCPCS | Performed by: PSYCHIATRY & NEUROLOGY

## 2023-05-25 PROCEDURE — G8427 DOCREV CUR MEDS BY ELIG CLIN: HCPCS | Performed by: PSYCHIATRY & NEUROLOGY

## 2023-05-25 RX ORDER — FERROUS SULFATE TAB EC 324 MG (65 MG FE EQUIVALENT) 324 (65 FE) MG
324 TABLET DELAYED RESPONSE ORAL DAILY
COMMUNITY
Start: 2023-05-17

## 2023-05-25 ASSESSMENT — ENCOUNTER SYMPTOMS
BACK PAIN: 0
PHOTOPHOBIA: 0
COLOR CHANGE: 0
CHOKING: 0
TROUBLE SWALLOWING: 0
SHORTNESS OF BREATH: 0
NAUSEA: 0
VOMITING: 0

## 2023-05-26 RX ORDER — IBUPROFEN 400 MG/1
400 TABLET ORAL EVERY 6 HOURS PRN
Start: 2023-08-13

## 2023-05-26 RX ORDER — EPINEPHRINE 1 MG/ML
0.3 INJECTION, SOLUTION, CONCENTRATE INTRAVENOUS PRN
OUTPATIENT
Start: 2023-08-13

## 2023-05-26 RX ORDER — ACETAMINOPHEN 325 MG/1
650 TABLET ORAL ONCE
OUTPATIENT
Start: 2023-08-13 | End: 2023-08-13

## 2023-05-26 RX ORDER — DIPHENHYDRAMINE HCL 25 MG
25 TABLET ORAL ONCE
OUTPATIENT
Start: 2023-08-13 | End: 2023-08-13

## 2023-05-26 RX ORDER — SODIUM CHLORIDE 9 MG/ML
INJECTION, SOLUTION INTRAVENOUS CONTINUOUS
OUTPATIENT
Start: 2023-08-13

## 2023-05-26 RX ORDER — DIPHENHYDRAMINE HCL 25 MG
50 TABLET ORAL EVERY 4 HOURS PRN
Start: 2023-08-13

## 2023-05-26 RX ORDER — ACETAMINOPHEN 325 MG/1
650 TABLET ORAL
OUTPATIENT
Start: 2023-08-13

## 2023-05-26 RX ORDER — SODIUM CHLORIDE 9 MG/ML
5-250 INJECTION, SOLUTION INTRAVENOUS PRN
OUTPATIENT
Start: 2023-08-13

## 2023-05-26 RX ORDER — DIPHENHYDRAMINE HYDROCHLORIDE 50 MG/ML
25 INJECTION INTRAMUSCULAR; INTRAVENOUS
OUTPATIENT
Start: 2023-08-13

## 2023-05-26 RX ORDER — ONDANSETRON 2 MG/ML
4 INJECTION INTRAMUSCULAR; INTRAVENOUS PRN
OUTPATIENT
Start: 2023-08-13

## 2023-05-26 RX ORDER — METHYLPREDNISOLONE SODIUM SUCCINATE 125 MG/2ML
125 INJECTION, POWDER, LYOPHILIZED, FOR SOLUTION INTRAMUSCULAR; INTRAVENOUS ONCE
OUTPATIENT
Start: 2023-08-13

## 2023-07-05 RX ORDER — DIMENHYDRINATE 50 MG
TABLET ORAL
Qty: 30 CAPSULE | Refills: 10 | OUTPATIENT
Start: 2023-07-05

## 2023-07-07 RX ORDER — DIMENHYDRINATE 50 MG
TABLET ORAL
Qty: 30 CAPSULE | Refills: 10 | OUTPATIENT
Start: 2023-07-07

## 2023-08-08 ENCOUNTER — OFFICE VISIT (OUTPATIENT)
Dept: FAMILY MEDICINE CLINIC | Age: 43
End: 2023-08-08
Payer: COMMERCIAL

## 2023-08-08 VITALS
DIASTOLIC BLOOD PRESSURE: 76 MMHG | OXYGEN SATURATION: 97 % | HEIGHT: 61 IN | HEART RATE: 85 BPM | SYSTOLIC BLOOD PRESSURE: 118 MMHG | BODY MASS INDEX: 37 KG/M2 | WEIGHT: 196 LBS | TEMPERATURE: 97.6 F

## 2023-08-08 DIAGNOSIS — Q21.12 PFO (PATENT FORAMEN OVALE): ICD-10-CM

## 2023-08-08 DIAGNOSIS — R82.90 FOUL SMELLING URINE: ICD-10-CM

## 2023-08-08 DIAGNOSIS — D64.9 ANEMIA, UNSPECIFIED TYPE: ICD-10-CM

## 2023-08-08 DIAGNOSIS — J30.9 ALLERGIC RHINITIS, UNSPECIFIED SEASONALITY, UNSPECIFIED TRIGGER: ICD-10-CM

## 2023-08-08 DIAGNOSIS — Z13.1 ENCOUNTER FOR SCREENING EXAMINATION FOR IMPAIRED GLUCOSE REGULATION AND DIABETES MELLITUS: ICD-10-CM

## 2023-08-08 DIAGNOSIS — G35 MS (MULTIPLE SCLEROSIS) (HCC): Primary | ICD-10-CM

## 2023-08-08 DIAGNOSIS — R35.0 URINARY FREQUENCY: ICD-10-CM

## 2023-08-08 DIAGNOSIS — Z13.220 LIPID SCREENING: ICD-10-CM

## 2023-08-08 DIAGNOSIS — Z12.31 SCREENING MAMMOGRAM, ENCOUNTER FOR: ICD-10-CM

## 2023-08-08 DIAGNOSIS — G35 MS (MULTIPLE SCLEROSIS) (HCC): ICD-10-CM

## 2023-08-08 LAB
ALBUMIN SERPL-MCNC: 4.1 G/DL (ref 3.5–4.6)
ALP SERPL-CCNC: 64 U/L (ref 40–130)
ALT SERPL-CCNC: 12 U/L (ref 0–33)
ANION GAP SERPL CALCULATED.3IONS-SCNC: 12 MEQ/L (ref 9–15)
AST SERPL-CCNC: 17 U/L (ref 0–35)
BACTERIA URNS QL MICRO: ABNORMAL /HPF
BASOPHILS # BLD: 0 K/UL (ref 0–0.2)
BASOPHILS NFR BLD: 0.3 %
BILIRUB SERPL-MCNC: <0.2 MG/DL (ref 0.2–0.7)
BILIRUB UR QL STRIP: NEGATIVE
BILIRUBIN, POC: NORMAL
BLOOD URINE, POC: NORMAL
BUN SERPL-MCNC: 21 MG/DL (ref 6–20)
CALCIUM SERPL-MCNC: 9.3 MG/DL (ref 8.5–9.9)
CHLORIDE SERPL-SCNC: 102 MEQ/L (ref 95–107)
CHOLEST SERPL-MCNC: 214 MG/DL (ref 0–199)
CLARITY UR: ABNORMAL
CLARITY, POC: YELLOW
CO2 SERPL-SCNC: 23 MEQ/L (ref 20–31)
COLOR UR: YELLOW
COLOR, POC: YELLOW
CREAT SERPL-MCNC: 0.83 MG/DL (ref 0.5–0.9)
EOSINOPHIL # BLD: 0.1 K/UL (ref 0–0.7)
EOSINOPHIL NFR BLD: 1.5 %
EPI CELLS #/AREA URNS AUTO: ABNORMAL /HPF (ref 0–5)
ERYTHROCYTE [DISTWIDTH] IN BLOOD BY AUTOMATED COUNT: 18.6 % (ref 11.5–14.5)
GLOBULIN SER CALC-MCNC: 4 G/DL (ref 2.3–3.5)
GLUCOSE SERPL-MCNC: 94 MG/DL (ref 70–99)
GLUCOSE UR STRIP-MCNC: NEGATIVE MG/DL
GLUCOSE URINE, POC: NORMAL
HCT VFR BLD AUTO: 35.4 % (ref 37–47)
HDLC SERPL-MCNC: 43 MG/DL (ref 40–59)
HGB BLD-MCNC: 11.2 G/DL (ref 12–16)
HGB UR QL STRIP: ABNORMAL
HYALINE CASTS #/AREA URNS AUTO: ABNORMAL /HPF (ref 0–5)
KETONES UR STRIP-MCNC: NEGATIVE MG/DL
KETONES, POC: NORMAL
LDL CHOLESTEROL CALCULATED: 160 MG/DL (ref 0–129)
LEUKOCYTE EST, POC: NORMAL
LEUKOCYTE ESTERASE UR QL STRIP: ABNORMAL
LYMPHOCYTES # BLD: 0.9 K/UL (ref 1–4.8)
LYMPHOCYTES NFR BLD: 11.3 %
MCH RBC QN AUTO: 26.2 PG (ref 27–31.3)
MCHC RBC AUTO-ENTMCNC: 31.8 % (ref 33–37)
MCV RBC AUTO: 82.4 FL (ref 79.4–94.8)
MONOCYTES # BLD: 0.7 K/UL (ref 0.2–0.8)
MONOCYTES NFR BLD: 9.1 %
NEUTROPHILS # BLD: 6.2 K/UL (ref 1.4–6.5)
NEUTS SEG NFR BLD: 77.8 %
NITRITE UR QL STRIP: NEGATIVE
NITRITE, POC: NORMAL
PH UR STRIP: 6 [PH] (ref 5–9)
PH, POC: 6
PLATELET # BLD AUTO: 250 K/UL (ref 130–400)
POTASSIUM SERPL-SCNC: 4.4 MEQ/L (ref 3.4–4.9)
PROT SERPL-MCNC: 8.1 G/DL (ref 6.3–8)
PROT UR STRIP-MCNC: NEGATIVE MG/DL
PROTEIN, POC: NORMAL
RBC # BLD AUTO: 4.29 M/UL (ref 4.2–5.4)
RBC #/AREA URNS HPF: ABNORMAL /HPF (ref 0–2)
SODIUM SERPL-SCNC: 137 MEQ/L (ref 135–144)
SP GR UR STRIP: 1.03 (ref 1–1.03)
SPECIFIC GRAVITY, POC: 1.03
TRIGLYCERIDE, FASTING: 53 MG/DL (ref 0–150)
URINE REFLEX TO CULTURE: YES
UROBILINOGEN UR STRIP-ACNC: 0.2 E.U./DL
UROBILINOGEN, POC: NORMAL
WBC # BLD AUTO: 8 K/UL (ref 4.8–10.8)
WBC #/AREA URNS AUTO: ABNORMAL /HPF (ref 0–5)
YEAST URNS QL MICRO: PRESENT /HPF

## 2023-08-08 PROCEDURE — 1036F TOBACCO NON-USER: CPT | Performed by: PHYSICIAN ASSISTANT

## 2023-08-08 PROCEDURE — G8427 DOCREV CUR MEDS BY ELIG CLIN: HCPCS | Performed by: PHYSICIAN ASSISTANT

## 2023-08-08 PROCEDURE — 99213 OFFICE O/P EST LOW 20 MIN: CPT | Performed by: PHYSICIAN ASSISTANT

## 2023-08-08 PROCEDURE — 81003 URINALYSIS AUTO W/O SCOPE: CPT | Performed by: PHYSICIAN ASSISTANT

## 2023-08-08 PROCEDURE — G8417 CALC BMI ABV UP PARAM F/U: HCPCS | Performed by: PHYSICIAN ASSISTANT

## 2023-08-08 RX ORDER — LORATADINE 10 MG/1
10 TABLET ORAL DAILY
Qty: 30 TABLET | Refills: 3 | Status: SHIPPED | OUTPATIENT
Start: 2023-08-08

## 2023-08-08 SDOH — ECONOMIC STABILITY: FOOD INSECURITY: WITHIN THE PAST 12 MONTHS, THE FOOD YOU BOUGHT JUST DIDN'T LAST AND YOU DIDN'T HAVE MONEY TO GET MORE.: NEVER TRUE

## 2023-08-08 SDOH — ECONOMIC STABILITY: HOUSING INSECURITY
IN THE LAST 12 MONTHS, WAS THERE A TIME WHEN YOU DID NOT HAVE A STEADY PLACE TO SLEEP OR SLEPT IN A SHELTER (INCLUDING NOW)?: NO

## 2023-08-08 SDOH — ECONOMIC STABILITY: INCOME INSECURITY: HOW HARD IS IT FOR YOU TO PAY FOR THE VERY BASICS LIKE FOOD, HOUSING, MEDICAL CARE, AND HEATING?: NOT HARD AT ALL

## 2023-08-08 SDOH — ECONOMIC STABILITY: FOOD INSECURITY: WITHIN THE PAST 12 MONTHS, YOU WORRIED THAT YOUR FOOD WOULD RUN OUT BEFORE YOU GOT MONEY TO BUY MORE.: NEVER TRUE

## 2023-08-08 ASSESSMENT — PATIENT HEALTH QUESTIONNAIRE - PHQ9
SUM OF ALL RESPONSES TO PHQ QUESTIONS 1-9: 0
SUM OF ALL RESPONSES TO PHQ9 QUESTIONS 1 & 2: 0
SUM OF ALL RESPONSES TO PHQ QUESTIONS 1-9: 0
2. FEELING DOWN, DEPRESSED OR HOPELESS: 0
SUM OF ALL RESPONSES TO PHQ QUESTIONS 1-9: 0
1. LITTLE INTEREST OR PLEASURE IN DOING THINGS: 0
SUM OF ALL RESPONSES TO PHQ QUESTIONS 1-9: 0

## 2023-08-08 ASSESSMENT — ENCOUNTER SYMPTOMS: RHINORRHEA: 1

## 2023-08-08 NOTE — PROGRESS NOTES
ieSubjective  [place] Fontan, 38 y.o. female presents today with:  Chief Complaint   Patient presents with    H&P     Patient presents today for a physical. Patient is c/o bad odor in her urine for a few months. HPI  Patient is here along with her mother and grandmother for follow-up   MS-followed by Tae Plaza on Ocrevus-stable reports leg weakness - ambulatory with a walker   PFO- corrected 2022 [address]- denies cp sob- followed by Sandy Garcia md ([address]) and Angelica Lima today of urinary frequency and foul-smelling urine for the past several months denies flank pain dysuria gross hematuria admits to minimal water intake  Myrbetriq isnt helping asking for gemtesa  Has occ constipation improved with increased hydration  Review of Systems   HENT:  Positive for postnasal drip and rhinorrhea. Genitourinary:         Lmp a month ago - cycles are regular lasting 2 days   Allergic/Immunologic: Positive for environmental allergies. All other systems reviewed and are negative. Past Medical History:   Diagnosis Date    Dislocated knee     Right knee    Intellectual disability 2022    Lumbar radiculopathy 2020    MS (multiple sclerosis) ([address]) 2022    PFO (patent foramen ovale) 2022    Patent foramen ovale with right-to-left shunt was visualized.   On agitated saline contrast injection- Large amount of bubbles LA and LV  nearly immediately    PTTD (posterior tibial tendon dysfunction)      Past Surgical History:   Procedure Laterality Date    CARDIAC SURGERY  2022    correction of PFO - Naif marie [facility]    CATARACT REMOVAL WITH IMPLANT Right     CATARACT REMOVAL WITH IMPLANT Left      SECTION  2013    EYE SURGERY      FOOT SURGERY Left      Social History     Socioeconomic History    Marital status:      Spouse name: Not on file    Number of children: 1    Years of education: Not on file    Highest education level: Not on file   Occupational History    Not

## 2023-08-09 LAB
BACTERIA UR CULT: NORMAL
IRON SATURATION: 13 % (ref 20–55)
IRON: 42 UG/DL (ref 37–145)
TOTAL IRON BINDING CAPACITY: 335 UG/DL (ref 250–450)
UNSATURATED IRON BINDING CAPACITY: 293 UG/DL (ref 112–347)

## 2023-08-16 ENCOUNTER — HOSPITAL ENCOUNTER (OUTPATIENT)
Dept: INFUSION THERAPY | Age: 43
Setting detail: INFUSION SERIES
End: 2023-08-16

## 2023-08-17 ENCOUNTER — HOSPITAL ENCOUNTER (OUTPATIENT)
Dept: WOMENS IMAGING | Age: 43
Discharge: HOME | End: 2023-08-19
Payer: COMMERCIAL

## 2023-08-17 DIAGNOSIS — Z12.31 SCREENING MAMMOGRAM, ENCOUNTER FOR: ICD-10-CM

## 2023-08-17 PROCEDURE — 77063 BREAST TOMOSYNTHESIS BI: CPT

## 2023-08-24 ENCOUNTER — HOSPITAL ENCOUNTER (OUTPATIENT)
Dept: INFUSION THERAPY | Age: 43
Setting detail: INFUSION SERIES
Discharge: HOME | End: 2023-08-24
Payer: COMMERCIAL

## 2023-08-24 VITALS
RESPIRATION RATE: 16 BRPM | TEMPERATURE: 97.8 F | DIASTOLIC BLOOD PRESSURE: 68 MMHG | HEART RATE: 85 BPM | SYSTOLIC BLOOD PRESSURE: 112 MMHG

## 2023-08-24 DIAGNOSIS — G35 MS (MULTIPLE SCLEROSIS) (HCC): Primary | ICD-10-CM

## 2023-08-24 PROCEDURE — 2580000003 HC RX 258: Performed by: PSYCHIATRY & NEUROLOGY

## 2023-08-24 PROCEDURE — 6360000002 HC RX W HCPCS: Performed by: PSYCHIATRY & NEUROLOGY

## 2023-08-24 PROCEDURE — 96415 CHEMO IV INFUSION ADDL HR: CPT

## 2023-08-24 PROCEDURE — 96413 CHEMO IV INFUSION 1 HR: CPT

## 2023-08-24 PROCEDURE — 96375 TX/PRO/DX INJ NEW DRUG ADDON: CPT

## 2023-08-24 PROCEDURE — 6370000000 HC RX 637 (ALT 250 FOR IP): Performed by: PSYCHIATRY & NEUROLOGY

## 2023-08-24 RX ORDER — ONDANSETRON 2 MG/ML
4 INJECTION INTRAMUSCULAR; INTRAVENOUS PRN
OUTPATIENT
Start: 2024-02-22

## 2023-08-24 RX ORDER — DIPHENHYDRAMINE HYDROCHLORIDE 50 MG/ML
25 INJECTION INTRAMUSCULAR; INTRAVENOUS
OUTPATIENT
Start: 2024-02-22

## 2023-08-24 RX ORDER — ACETAMINOPHEN 325 MG/1
650 TABLET ORAL ONCE
OUTPATIENT
Start: 2024-02-22 | End: 2024-02-22

## 2023-08-24 RX ORDER — ACETAMINOPHEN 325 MG/1
650 TABLET ORAL ONCE
Status: COMPLETED | OUTPATIENT
Start: 2023-08-24 | End: 2023-08-24

## 2023-08-24 RX ORDER — ACETAMINOPHEN 325 MG/1
650 TABLET ORAL
OUTPATIENT
Start: 2024-02-22

## 2023-08-24 RX ORDER — IBUPROFEN 400 MG/1
400 TABLET ORAL EVERY 6 HOURS PRN
Start: 2024-02-22

## 2023-08-24 RX ORDER — DIPHENHYDRAMINE HCL 25 MG
25 TABLET ORAL ONCE
OUTPATIENT
Start: 2024-02-22 | End: 2024-02-22

## 2023-08-24 RX ORDER — EPINEPHRINE 1 MG/ML
0.3 INJECTION, SOLUTION, CONCENTRATE INTRAVENOUS PRN
OUTPATIENT
Start: 2024-02-22

## 2023-08-24 RX ORDER — DIPHENHYDRAMINE HCL 25 MG
25 TABLET ORAL ONCE
Status: COMPLETED | OUTPATIENT
Start: 2023-08-24 | End: 2023-08-24

## 2023-08-24 RX ORDER — SODIUM CHLORIDE 9 MG/ML
5-250 INJECTION, SOLUTION INTRAVENOUS PRN
OUTPATIENT
Start: 2024-02-22

## 2023-08-24 RX ORDER — SODIUM CHLORIDE 9 MG/ML
INJECTION, SOLUTION INTRAVENOUS CONTINUOUS
OUTPATIENT
Start: 2024-02-22

## 2023-08-24 RX ORDER — DIPHENHYDRAMINE HCL 25 MG
50 TABLET ORAL EVERY 4 HOURS PRN
Start: 2024-02-22

## 2023-08-24 RX ADMIN — DIPHENHYDRAMINE HCL 25 MG: 25 TABLET ORAL at 10:14

## 2023-08-24 RX ADMIN — ACETAMINOPHEN 650 MG: 325 TABLET ORAL at 10:14

## 2023-08-24 RX ADMIN — METHYLPREDNISOLONE SODIUM SUCCINATE 125 MG: 125 INJECTION, POWDER, FOR SOLUTION INTRAMUSCULAR; INTRAVENOUS at 10:14

## 2023-08-24 RX ADMIN — OCRELIZUMAB 600 MG: 300 INJECTION INTRAVENOUS at 10:52

## 2023-08-24 ASSESSMENT — PAIN SCALES - GENERAL: PAINLEVEL_OUTOF10: 0

## 2023-08-24 NOTE — FLOWSHEET NOTE
IV started in L AC. Patient tolerated well. Premedication given.   Electronically signed by Sathya Messina RN on 8/24/2023 at 10:29 AM

## 2023-08-24 NOTE — FLOWSHEET NOTE
Patient arrived to room via wheelchair. Patient transferred from [facility] to [facility]. Vital signs obtained. Patient denies any c/o pain. Call light within reach.    Electronically signed by Rosa Chapa RN on 8/24/2023 at 9:33 AM

## 2023-08-24 NOTE — FLOWSHEET NOTE
1052: Infusion started. Patient made aware of signs and symptoms of allergic reactions. Patient given print out on medication. 1320: Infusion completed. Patient tolerated 2 hour infusion without any complications. IV was removed. Patient discharged home via wheelchair with family. Patient given reminder card for next infusion. All equipment used in the care for this patient has been cleaned.   Electronically signed by Debo Sheldon RN on 8/24/2023 at 1:41 PM

## 2023-08-30 DIAGNOSIS — J30.9 ALLERGIC RHINITIS, UNSPECIFIED SEASONALITY, UNSPECIFIED TRIGGER: ICD-10-CM

## 2023-08-30 RX ORDER — LORATADINE 10 MG/1
TABLET ORAL
Qty: 30 TABLET | Refills: 3 | OUTPATIENT
Start: 2023-08-30

## 2023-09-11 ENCOUNTER — TRANSCRIBE ORDERS (OUTPATIENT)
Dept: ADMINISTRATIVE | Age: 43
End: 2023-09-11

## 2023-09-11 DIAGNOSIS — R60.9 EDEMA, UNSPECIFIED TYPE: Primary | ICD-10-CM

## 2023-09-12 LAB
D DIMER: 632 NG/ML FEU
FIBRIN D-DIMER (NG/ML FEU) IN PLATELET POOR PLASMA: 632 NG/ML FEU

## 2023-09-19 ENCOUNTER — HOSPITAL ENCOUNTER (OUTPATIENT)
Dept: ULTRASOUND IMAGING | Age: 43
Discharge: HOME | End: 2023-09-21
Attending: INTERNAL MEDICINE
Payer: COMMERCIAL

## 2023-09-19 DIAGNOSIS — R60.9 EDEMA, UNSPECIFIED TYPE: ICD-10-CM

## 2023-09-19 PROCEDURE — 93970 EXTREMITY STUDY: CPT

## 2023-10-23 PROBLEM — R00.0 TACHYCARDIA: Status: ACTIVE | Noted: 2023-10-23

## 2023-10-23 PROBLEM — G35 MULTIPLE SCLEROSIS (MULTI): Status: ACTIVE | Noted: 2023-10-23

## 2023-10-23 PROBLEM — Q21.12 PFO (PATENT FORAMEN OVALE) (HHS-HCC): Status: ACTIVE | Noted: 2023-10-23

## 2023-10-23 PROBLEM — R60.9 EDEMA: Status: ACTIVE | Noted: 2023-10-23

## 2023-10-23 PROBLEM — Z98.890 HISTORY OF STRABISMUS SURGERY: Status: ACTIVE | Noted: 2023-10-23

## 2023-10-23 PROBLEM — E78.5 HYPERLIPIDEMIA: Status: ACTIVE | Noted: 2023-10-23

## 2023-10-23 PROBLEM — H11.241 CONJUNCTIVAL SCARRING OF SOCKET OF RIGHT EYE: Status: ACTIVE | Noted: 2023-10-23

## 2023-10-23 RX ORDER — MIRABEGRON 25 MG/1
2 TABLET, FILM COATED, EXTENDED RELEASE ORAL
COMMUNITY
Start: 2022-09-21

## 2023-10-23 RX ORDER — OCRELIZUMAB 300 MG/10ML
INJECTION INTRAVENOUS
COMMUNITY
End: 2023-11-29

## 2023-10-23 RX ORDER — FERROUS SULFATE 325(65) MG
65 TABLET ORAL
COMMUNITY
End: 2023-11-29

## 2023-10-23 RX ORDER — VIT C/E/ZN/COPPR/LUTEIN/ZEAXAN 250MG-90MG
1 CAPSULE ORAL DAILY
COMMUNITY

## 2023-10-23 RX ORDER — FUROSEMIDE 20 MG/1
20 TABLET ORAL
COMMUNITY
End: 2023-11-29 | Stop reason: SDUPTHER

## 2023-11-01 ENCOUNTER — APPOINTMENT (OUTPATIENT)
Dept: CARDIOLOGY | Facility: CLINIC | Age: 43
End: 2023-11-01
Payer: COMMERCIAL

## 2023-11-15 PROBLEM — R60.9 EDEMA: Status: ACTIVE | Noted: 2023-11-15

## 2023-11-22 ENCOUNTER — APPOINTMENT (OUTPATIENT)
Dept: CARDIOLOGY | Facility: CLINIC | Age: 43
End: 2023-11-22
Payer: COMMERCIAL

## 2023-11-22 ENCOUNTER — OFFICE VISIT (OUTPATIENT)
Dept: NEUROLOGY | Age: 43
End: 2023-11-22
Payer: COMMERCIAL

## 2023-11-22 VITALS
BODY MASS INDEX: 35.9 KG/M2 | DIASTOLIC BLOOD PRESSURE: 61 MMHG | WEIGHT: 190 LBS | HEART RATE: 88 BPM | SYSTOLIC BLOOD PRESSURE: 108 MMHG

## 2023-11-22 DIAGNOSIS — R39.15 URINARY URGENCY: ICD-10-CM

## 2023-11-22 DIAGNOSIS — R26.0 ATAXIC GAIT: ICD-10-CM

## 2023-11-22 DIAGNOSIS — G35 MS (MULTIPLE SCLEROSIS) (HCC): ICD-10-CM

## 2023-11-22 DIAGNOSIS — M54.16 LUMBAR RADICULOPATHY: ICD-10-CM

## 2023-11-22 DIAGNOSIS — G82.20 PARAPARESIS (HCC): ICD-10-CM

## 2023-11-22 DIAGNOSIS — M21.372 FOOT DROP, LEFT FOOT: ICD-10-CM

## 2023-11-22 DIAGNOSIS — G35 MS (MULTIPLE SCLEROSIS) (HCC): Primary | ICD-10-CM

## 2023-11-22 LAB
ALBUMIN SERPL-MCNC: 3.9 G/DL (ref 3.5–4.6)
ALP SERPL-CCNC: 72 U/L (ref 40–130)
ALT SERPL-CCNC: 11 U/L (ref 0–33)
AST SERPL-CCNC: 11 U/L (ref 0–35)
BASOPHILS # BLD: 0.1 K/UL (ref 0–0.2)
BASOPHILS NFR BLD: 0.5 %
BILIRUB DIRECT SERPL-MCNC: <0.2 MG/DL (ref 0–0.4)
BILIRUB INDIRECT SERPL-MCNC: NORMAL MG/DL (ref 0–0.6)
BILIRUB SERPL-MCNC: <0.2 MG/DL (ref 0.2–0.7)
EOSINOPHIL # BLD: 0.1 K/UL (ref 0–0.7)
EOSINOPHIL NFR BLD: 1.2 %
ERYTHROCYTE [DISTWIDTH] IN BLOOD BY AUTOMATED COUNT: 17 % (ref 11.5–14.5)
HCT VFR BLD AUTO: 37.6 % (ref 37–47)
HGB BLD-MCNC: 11.6 G/DL (ref 12–16)
LYMPHOCYTES # BLD: 1.1 K/UL (ref 1–4.8)
LYMPHOCYTES NFR BLD: 9.9 %
MCH RBC QN AUTO: 25.7 PG (ref 27–31.3)
MCHC RBC AUTO-ENTMCNC: 30.9 % (ref 33–37)
MCV RBC AUTO: 83.4 FL (ref 79.4–94.8)
MONOCYTES # BLD: 0.9 K/UL (ref 0.2–0.8)
MONOCYTES NFR BLD: 8 %
NEUTROPHILS # BLD: 8.5 K/UL (ref 1.4–6.5)
NEUTS SEG NFR BLD: 79.7 %
PLATELET # BLD AUTO: 328 K/UL (ref 130–400)
PROT SERPL-MCNC: 7.5 G/DL (ref 6.3–8)
RBC # BLD AUTO: 4.51 M/UL (ref 4.2–5.4)
WBC # BLD AUTO: 10.7 K/UL (ref 4.8–10.8)

## 2023-11-22 PROCEDURE — 99214 OFFICE O/P EST MOD 30 MIN: CPT | Performed by: PSYCHIATRY & NEUROLOGY

## 2023-11-22 PROCEDURE — G8427 DOCREV CUR MEDS BY ELIG CLIN: HCPCS | Performed by: PSYCHIATRY & NEUROLOGY

## 2023-11-22 PROCEDURE — G8484 FLU IMMUNIZE NO ADMIN: HCPCS | Performed by: PSYCHIATRY & NEUROLOGY

## 2023-11-22 PROCEDURE — G8417 CALC BMI ABV UP PARAM F/U: HCPCS | Performed by: PSYCHIATRY & NEUROLOGY

## 2023-11-22 PROCEDURE — 1036F TOBACCO NON-USER: CPT | Performed by: PSYCHIATRY & NEUROLOGY

## 2023-11-22 RX ORDER — FUROSEMIDE 20 MG/1
TABLET ORAL
COMMUNITY
Start: 2023-09-11

## 2023-11-22 RX ORDER — ATORVASTATIN CALCIUM 20 MG/1
20 TABLET, FILM COATED ORAL
COMMUNITY
Start: 2023-09-11

## 2023-11-22 ASSESSMENT — ENCOUNTER SYMPTOMS
SHORTNESS OF BREATH: 0
VOMITING: 0
BACK PAIN: 1
COLOR CHANGE: 0
TROUBLE SWALLOWING: 0
NAUSEA: 0
CHOKING: 0
PHOTOPHOBIA: 0

## 2023-11-22 NOTE — PROGRESS NOTES
Subjective:      Patient ID: Tory Mayers is a 37 y.o. female who presents today for:  Chief Complaint   Patient presents with    6 Month Follow-Up     Pt states that she is doing good. Pt had a fall in October (2 of them). Pt is having some weakness mostly in the legs. HPI 40-year-old right-handed female with history of primary progressive multiple sclerosis. Patient has paraparesis and is on Ocrevus. 1 fall in October this is her second fall. She is having some more weakness mostly in the legs. No reports of acute relapse though. Last infusion was August.  Has not had recent labs. Patient has not any acute change in her walking abilities. She has good days and bad days. Aiden symptoms have not improved with the low-dose of Myrbetriq. Past Medical History:   Diagnosis Date    Dislocated knee     Right knee    Intellectual disability 2022    Lumbar radiculopathy 2020    MS (multiple sclerosis) ([address]) 2022    PFO (patent foramen ovale) 2022    Patent foramen ovale with right-to-left shunt was visualized.   On agitated saline contrast injection- Large amount of bubbles LA and LV  nearly immediately    PTTD (posterior tibial tendon dysfunction)      Past Surgical History:   Procedure Laterality Date    CARDIAC SURGERY  2022    correction of PFO - Naif marie [facility]    CATARACT REMOVAL WITH IMPLANT Right     CATARACT REMOVAL WITH IMPLANT Left      SECTION  2013    EYE SURGERY      FOOT SURGERY Left      Social History     Socioeconomic History    Marital status:      Spouse name: Not on file    Number of children: 1    Years of education: Not on file    Highest education level: Not on file   Occupational History    Not on file   Tobacco Use    Smoking status: Never    Smokeless tobacco: Never   Substance and Sexual Activity    Alcohol use: No    Drug use: No    Sexual activity: Not Currently   Other Topics Concern    Not on file   Social History Narrative

## 2023-11-24 RX ORDER — MIRABEGRON 25 MG/1
TABLET, FILM COATED, EXTENDED RELEASE ORAL
Qty: 30 TABLET | Refills: 10 | Status: SHIPPED | OUTPATIENT
Start: 2023-11-24

## 2023-11-29 ENCOUNTER — OFFICE VISIT (OUTPATIENT)
Dept: CARDIOLOGY | Facility: CLINIC | Age: 43
End: 2023-11-29
Payer: COMMERCIAL

## 2023-11-29 VITALS
DIASTOLIC BLOOD PRESSURE: 77 MMHG | SYSTOLIC BLOOD PRESSURE: 118 MMHG | BODY MASS INDEX: 35.33 KG/M2 | HEART RATE: 87 BPM | WEIGHT: 187 LBS

## 2023-11-29 DIAGNOSIS — R00.0 TACHYCARDIA: Primary | ICD-10-CM

## 2023-11-29 DIAGNOSIS — E78.2 MIXED HYPERLIPIDEMIA: ICD-10-CM

## 2023-11-29 DIAGNOSIS — G35 MULTIPLE SCLEROSIS (MULTI): ICD-10-CM

## 2023-11-29 DIAGNOSIS — R60.9 EDEMA, UNSPECIFIED TYPE: ICD-10-CM

## 2023-11-29 DIAGNOSIS — R79.89 ELEVATED D-DIMER: ICD-10-CM

## 2023-11-29 DIAGNOSIS — Q21.12 PFO (PATENT FORAMEN OVALE) (HHS-HCC): ICD-10-CM

## 2023-11-29 PROCEDURE — 99214 OFFICE O/P EST MOD 30 MIN: CPT | Performed by: INTERNAL MEDICINE

## 2023-11-29 PROCEDURE — 3008F BODY MASS INDEX DOCD: CPT | Performed by: INTERNAL MEDICINE

## 2023-11-29 RX ORDER — ATORVASTATIN CALCIUM 20 MG/1
20 TABLET, FILM COATED ORAL NIGHTLY
Qty: 90 TABLET | Refills: 3 | Status: SHIPPED | OUTPATIENT
Start: 2023-11-29

## 2023-11-29 RX ORDER — CLOPIDOGREL BISULFATE 75 MG/1
75 TABLET ORAL DAILY
COMMUNITY
End: 2023-11-29 | Stop reason: WASHOUT

## 2023-11-29 RX ORDER — ATORVASTATIN CALCIUM 20 MG/1
1 TABLET, FILM COATED ORAL NIGHTLY
COMMUNITY
Start: 2023-09-11 | End: 2023-11-29 | Stop reason: SDUPTHER

## 2023-11-29 RX ORDER — FUROSEMIDE 20 MG/1
TABLET ORAL
Qty: 45 TABLET | Refills: 3 | Status: SHIPPED | OUTPATIENT
Start: 2023-11-29

## 2023-11-29 NOTE — PROGRESS NOTES
CARDIOLOGY OFFICE NOTE    Date:   11/29/2023    Patient:    Donya Moore    YOB: 1980    Primary Physician: Tammie Villalba PA-C       Reason for Visit:   Chief Complaint   Patient presents with    Follow-up        HPI:     Donya Moore was seen in cardiac evaluation at the  Cardiology office November 29, 2023.      The patients problems are listed as in the impression below.    Electronic medical records reviewed.    Patient returns.  Has had edema.  Medications were adjusted.  He is chronically disabled with multiple sclerosis.  Has been seeing neurology.  Has been somewhat progressive.    From a cardiovascular standpoint appears to be doing well.  She feels about the same overall up.  She did have a positive D-dimer with negative duplex of her lower extremities.    Her edema is much improved.    She has no new cardiovascular complaints.    Patient denies Chest Pain, SOB, Lightheadedness, Dizziness, TIA or CVA symptoms.  No CHF or Edema.  No Palpitations.  No GI,  or Bleeding Issues. No Recent Fever or Chills.     Cardiovascular and general review of systems is otherwise negative.    A 14-system review is otherwise negative, other than noted.     PHYSICAL EXAMINATION:      Vitals:    11/29/23 1405   BP: 118/77   Pulse: 87     General: No acute distress. Vital signs as noted. Alert and oriented.  Head And Neck Examination: No jugular venous distention, no carotid bruits, no mass. Carotid upstrokes preserved. Oral mucosa moist. No xanthelasma. Head and neck examination otherwise unremarkable.  Lungs: Clear to auscultation and percussion. No wheezes, no rales, and no rhonchi.  Chest: Excursion appeared to be normal. No chest wall tenderness on palpation.  Heart: Normal S1 and S2. No S3. No S4. No rub. Grade 2/6 systolic murmur, best heard at the left sternal border. Point of maximal impulse was within normal limits.  Abdomen: Soft. Nontender. No organomegaly. No bruits. No masses.  Obese.  Extremities: 1+ bipedal edema. No clubbing. No cyanosis. Pulses are strong throughout. No bruits.  Musculoskeletal Exam: No ulcers, otherwise unremarkable.  Neuro: Multiple sclerosis but neurologically appeared grossly intact except for multiple sclerosis changes.  .  IMPRESSION:      Cardiovascular status stable  Asymptomatic  Bilateral lower extremity edema, improved  Sinus tachycardia, resolved  Fall, prior  Ataxia  Patent foramen ovale, large, status post Amplatzer PFO closure 12/2022.  Normal LV systolic function, ejection fraction 65% by echocardiogram 7/2022  Negative cardiac MRI, ejection fraction 69%  Multiple sclerosis  Urinary tract infections  Anemia  Hyperlipidemia  Rhabdomyolysis, resolved 7/2022  Lumbar disc disease  Degenerative joint disease  Obesity  Otherwise as per assessment below.    RECOMMENDATIONS:      Patient overall feels well despite her multiple sclerosis.  Would suggest continuing her medications from a cardiovascular standpoint.  Refills were provided.    Exercise dietary program was encouraged.  Hydration.    [employer] portal use was encouraged.    We will plan to see back in 6 months with Laboratory Studies and ECG as ordered.     Patient will follow up with their primary physician for general care.    The patient knows to contact medical care earlier if need be.      ALLERGIES:     No Known Allergies     MEDICATIONS:     Current Outpatient Medications   Medication Instructions    atorvastatin (Lipitor) 20 mg tablet 1 tablet, oral, Nightly    cholecalciferol (Vitamin D-3) 25 MCG (1000 UT) capsule 1 capsule, oral, Daily    clopidogrel (PLAVIX) 75 mg, oral, Daily    furosemide (LASIX) 20 mg, oral, TAKE 1 TABLET MON WED FRI AND AS NEEDED     mirabegron (Myrbetriq) 25 mg tablet extended release 24 hr 24 hr tablet 1 tablet, oral, Daily before breakfast    multivit with minerals/lutein (MULTIVITAMIN 50 PLUS ORAL) 1 tablet, oral, Daily       ELECTROCARDIOGRAM:      None    CARDIAC  TESTING:      None    LABORATORY DATA:      November 2023:  Chem-7, CBC, liver function is normal.      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 /  Cardiology      Of Note:  kenxus voice recognition dictation software was utilized partially in the preparation of this note, therefore, inaccuracies in spelling, word choice and punctuation may have occurred which were not recognized the time of signing.    Patient was seen and examined with total time of visit including chart preparation, rooming, and chart completion exceeding 40 minutes.      ----

## 2023-12-14 ENCOUNTER — TELEPHONE (OUTPATIENT)
Dept: NEUROLOGY | Age: 43
End: 2023-12-14

## 2023-12-14 NOTE — TELEPHONE ENCOUNTER
Pt called with concerns about her legs being weak when she stands up to move. Pt was asking about steroids. I asked pt how long symptoms she stated for a few minutes

## 2023-12-15 RX ORDER — METHYLPREDNISOLONE 4 MG/1
TABLET ORAL
Qty: 1 KIT | Refills: 0 | Status: SHIPPED | OUTPATIENT
Start: 2023-12-15 | End: 2023-12-21

## 2024-02-06 ENCOUNTER — OFFICE VISIT (OUTPATIENT)
Dept: CARDIOLOGY | Facility: CLINIC | Age: 44
End: 2024-02-06
Payer: COMMERCIAL

## 2024-02-06 VITALS
BODY MASS INDEX: 36.82 KG/M2 | DIASTOLIC BLOOD PRESSURE: 70 MMHG | SYSTOLIC BLOOD PRESSURE: 130 MMHG | HEART RATE: 66 BPM | WEIGHT: 195 LBS | HEIGHT: 61 IN

## 2024-02-06 DIAGNOSIS — E78.2 MIXED HYPERLIPIDEMIA: ICD-10-CM

## 2024-02-06 DIAGNOSIS — Z78.9 NEVER SMOKED CIGARETTES: ICD-10-CM

## 2024-02-06 DIAGNOSIS — R00.0 TACHYCARDIA: ICD-10-CM

## 2024-02-06 DIAGNOSIS — Q21.12 PFO (PATENT FORAMEN OVALE) (HHS-HCC): ICD-10-CM

## 2024-02-06 PROCEDURE — 1036F TOBACCO NON-USER: CPT | Performed by: INTERNAL MEDICINE

## 2024-02-06 PROCEDURE — 99213 OFFICE O/P EST LOW 20 MIN: CPT | Performed by: INTERNAL MEDICINE

## 2024-02-06 RX ORDER — OCRELIZUMAB 300 MG/10ML
300 INJECTION INTRAVENOUS ONCE
COMMUNITY

## 2024-02-06 RX ORDER — EPINEPHRINE 0.22MG
100 AEROSOL WITH ADAPTER (ML) INHALATION DAILY
COMMUNITY
Start: 2023-06-10

## 2024-02-06 NOTE — PATIENT INSTRUCTIONS
Follow up as needed  Continue to follow with Dr. Santos as scheduled    Continue same medications/treatment.  Patient educated on proper medication use.  Please bring all medicines, vitamins and herbal supplements with you when you come to the office.    I, Ani Moreland LPN, am scribing for and in the presence of  Dr. Natasha Nevarez MD, FACC

## 2024-02-06 NOTE — PROGRESS NOTES
Referred by Dr. Cole ref. provider found provider found for   Chief Complaint   Patient presents with    Follow-up     1 year follow up        History of Present Illness  Donya Moore is a 43 y.o. year old female patient status post PFO closure.  Doing well asymptomatic no complaint no symptoms of chest pain.  She is limited in her activity because of previous stroke.  However her echo from a year ago showed adequately positioned PFO closure device no shunt.  Discussed with the patient in length at this point we will monitor periodically.  She may need echo every 1 to 2 years.  I have a follow-up with Dr. Gomez her primary cardiologist.  She will see me back only as needed    Past Medical History  No past medical history on file.    Social History  Social History     Tobacco Use    Smoking status: Never    Smokeless tobacco: Never   Substance Use Topics    Alcohol use: Never    Drug use: Never       Family History     Family History   Problem Relation Name Age of Onset    Hyperlipidemia Mother      Hypertension Mother      Diabetes Mother         Review of Systems  As per HPI, all other systems reviewed and negative.    Allergies:  No Known Allergies     Outpatient Medications:  Current Outpatient Medications   Medication Instructions    atorvastatin (LIPITOR) 20 mg, oral, Nightly    cholecalciferol (Vitamin D-3) 25 MCG (1000 UT) capsule 1 capsule, oral, Daily    coenzyme Q-10 100 mg, oral, Daily    furosemide (Lasix) 20 mg tablet TAKE 1 TABLET MON WED FRI AND AS NEEDED    mirabegron (Myrbetriq) 25 mg tablet extended release 24 hr 24 hr tablet 1 tablet, oral, Daily before breakfast    multivit with minerals/lutein (MULTIVITAMIN 50 PLUS ORAL) 1 tablet, oral, Daily    Ocrevus 300 mg, intravenous, Once, at day 1 and 15         Vitals:  Vitals:    02/06/24 1530   BP: 130/70   Pulse: 66       Physical Exam:  Physical Exam  Vitals and nursing note reviewed.   Constitutional:       Appearance: Normal appearance.    HENT:      Head: Normocephalic.   Eyes:      Pupils: Pupils are equal, round, and reactive to light.   Cardiovascular:      Rate and Rhythm: Normal rate and regular rhythm.      Pulses: Normal pulses.      Heart sounds: Normal heart sounds.   Pulmonary:      Effort: Pulmonary effort is normal.      Breath sounds: Normal breath sounds.   Musculoskeletal:         General: Normal range of motion.      Cervical back: Normal range of motion.   Skin:     General: Skin is dry.   Neurological:      General: No focal deficit present.      Mental Status: She is alert and oriented to person, place, and time.   Psychiatric:         Behavior: Behavior is cooperative.             Assessment/Plan   Diagnoses and all orders for this visit:  PFO (patent foramen ovale)  Mixed hyperlipidemia  Tachycardia  Never smoked cigarettes          Natasha Nevarez MD [facility]  Interventional Cardiology   of [facility]     Thank you for allowing me to participate in the care of this patient. Please do not hesitate to contact me with any further questions or concerns.

## 2024-02-08 ENCOUNTER — OFFICE VISIT (OUTPATIENT)
Dept: FAMILY MEDICINE CLINIC | Age: 44
End: 2024-02-08
Payer: COMMERCIAL

## 2024-02-08 VITALS
SYSTOLIC BLOOD PRESSURE: 122 MMHG | OXYGEN SATURATION: 100 % | HEART RATE: 81 BPM | WEIGHT: 195 LBS | HEIGHT: 61 IN | DIASTOLIC BLOOD PRESSURE: 80 MMHG | BODY MASS INDEX: 36.82 KG/M2 | TEMPERATURE: 97 F

## 2024-02-08 DIAGNOSIS — Z13.220 LIPID SCREENING: ICD-10-CM

## 2024-02-08 DIAGNOSIS — G82.20 PARAPARESIS (HCC): ICD-10-CM

## 2024-02-08 DIAGNOSIS — R82.90 FOUL SMELLING URINE: ICD-10-CM

## 2024-02-08 DIAGNOSIS — D50.0 IRON DEFICIENCY ANEMIA DUE TO CHRONIC BLOOD LOSS: ICD-10-CM

## 2024-02-08 DIAGNOSIS — G35 MS (MULTIPLE SCLEROSIS) (HCC): Primary | ICD-10-CM

## 2024-02-08 DIAGNOSIS — Z13.1 ENCOUNTER FOR SCREENING EXAMINATION FOR IMPAIRED GLUCOSE REGULATION AND DIABETES MELLITUS: ICD-10-CM

## 2024-02-08 DIAGNOSIS — Z12.11 COLON CANCER SCREENING: ICD-10-CM

## 2024-02-08 PROCEDURE — G8417 CALC BMI ABV UP PARAM F/U: HCPCS | Performed by: PHYSICIAN ASSISTANT

## 2024-02-08 PROCEDURE — G8484 FLU IMMUNIZE NO ADMIN: HCPCS | Performed by: PHYSICIAN ASSISTANT

## 2024-02-08 PROCEDURE — 1036F TOBACCO NON-USER: CPT | Performed by: PHYSICIAN ASSISTANT

## 2024-02-08 PROCEDURE — G8427 DOCREV CUR MEDS BY ELIG CLIN: HCPCS | Performed by: PHYSICIAN ASSISTANT

## 2024-02-08 PROCEDURE — 99214 OFFICE O/P EST MOD 30 MIN: CPT | Performed by: PHYSICIAN ASSISTANT

## 2024-02-08 ASSESSMENT — PATIENT HEALTH QUESTIONNAIRE - PHQ9
SUM OF ALL RESPONSES TO PHQ QUESTIONS 1-9: 0
2. FEELING DOWN, DEPRESSED OR HOPELESS: 0
SUM OF ALL RESPONSES TO PHQ9 QUESTIONS 1 & 2: 0
1. LITTLE INTEREST OR PLEASURE IN DOING THINGS: 0

## 2024-02-08 NOTE — PROGRESS NOTES
and Sexual Activity    Alcohol use: No    Drug use: No    Sexual activity: Not Currently   Other Topics Concern    Not on file   Social History Narrative    Lives With: her parents (mom Shaniqua) father is sick  and her Son (9 y.o)    Type of Home: Apartment in [place]    Home Layout: One level    Home Access: Stairs to enter with rails - Number of Steps: 1    Bathroom Shower/Tub: Tub/Shower unit, Equipment: Shower chair,Hand-held shower    Home Equipment: Walker, rolling,Wheelchair-manual    Has the patient had two or more falls in the past year or any fall with injury in the past year?: Yes (one - fell on mothers floor and couldn't get up)    ADL Assistance: Independent    Homemaking Assistance: Independent    Homemaking Responsibilities: Yes    Ambulation Assistance: Independent    Transfer Assistance: Independent    Active : No    Patient's  Info: mother    She is on disability from her MS-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  Was in special classes in school.    Pending Waiver services for Aide services.                       Social Determinants of Health     Financial Resource Strain: Low Risk  (8/8/2023)    Overall Financial Resource Strain (CARDIA)     Difficulty of Paying Living Expenses: Not hard at all   Food Insecurity: Not on file (8/8/2023)   Transportation Needs: Unknown (8/8/2023)    PRAPARE - Transportation     Lack of Transportation (Medical): Not on file     Lack of Transportation (Non-Medical): No   Physical Activity: Not on file   Stress: Not on file   Social Connections: Not on file   Intimate Partner Violence: Not on file   Housing Stability: Unknown (8/8/2023)    Housing Stability Vital Sign     Unable to Pay for Housing in the Last Year: Not on file     Number of Places Lived in the Last Year: Not on file     Unstable Housing in the Last Year: No     Family History   Problem Relation Age of Onset    Hypertension Mother     Diabetes Maternal Uncle     Cancer

## 2024-02-09 DIAGNOSIS — Z13.1 ENCOUNTER FOR SCREENING EXAMINATION FOR IMPAIRED GLUCOSE REGULATION AND DIABETES MELLITUS: ICD-10-CM

## 2024-02-09 DIAGNOSIS — R82.90 FOUL SMELLING URINE: ICD-10-CM

## 2024-02-09 DIAGNOSIS — D50.0 IRON DEFICIENCY ANEMIA DUE TO CHRONIC BLOOD LOSS: ICD-10-CM

## 2024-02-09 DIAGNOSIS — Z13.220 LIPID SCREENING: ICD-10-CM

## 2024-02-09 LAB
ALBUMIN SERPL-MCNC: 4 G/DL (ref 3.5–4.6)
ALP SERPL-CCNC: 81 U/L (ref 40–130)
ALT SERPL-CCNC: 10 U/L (ref 0–33)
ANION GAP SERPL CALCULATED.3IONS-SCNC: 15 MEQ/L (ref 9–15)
AST SERPL-CCNC: 13 U/L (ref 0–35)
BACTERIA URNS QL MICRO: ABNORMAL /HPF
BASOPHILS # BLD: 0.1 K/UL (ref 0–0.2)
BASOPHILS NFR BLD: 0.6 %
BILIRUB SERPL-MCNC: 0.5 MG/DL (ref 0.2–0.7)
BILIRUB UR QL STRIP: NEGATIVE
BUN SERPL-MCNC: 13 MG/DL (ref 6–20)
CALCIUM SERPL-MCNC: 9.7 MG/DL (ref 8.5–9.9)
CHLORIDE SERPL-SCNC: 103 MEQ/L (ref 95–107)
CHOLEST SERPL-MCNC: 125 MG/DL (ref 0–199)
CLARITY UR: ABNORMAL
CO2 SERPL-SCNC: 24 MEQ/L (ref 20–31)
COLOR UR: YELLOW
CREAT SERPL-MCNC: 0.8 MG/DL (ref 0.5–0.9)
EOSINOPHIL # BLD: 0.2 K/UL (ref 0–0.7)
EOSINOPHIL NFR BLD: 2.2 %
EPI CELLS #/AREA URNS AUTO: ABNORMAL /HPF (ref 0–5)
ERYTHROCYTE [DISTWIDTH] IN BLOOD BY AUTOMATED COUNT: 17.4 % (ref 11.5–14.5)
GLOBULIN SER CALC-MCNC: 3.6 G/DL (ref 2.3–3.5)
GLUCOSE SERPL-MCNC: 99 MG/DL (ref 70–99)
GLUCOSE UR STRIP-MCNC: NEGATIVE MG/DL
HCT VFR BLD AUTO: 34.4 % (ref 37–47)
HDLC SERPL-MCNC: 37 MG/DL (ref 40–59)
HGB BLD-MCNC: 10.6 G/DL (ref 12–16)
HGB UR QL STRIP: ABNORMAL
HYALINE CASTS #/AREA URNS AUTO: ABNORMAL /HPF (ref 0–5)
KETONES UR STRIP-MCNC: NEGATIVE MG/DL
LDL CHOLESTEROL CALCULATED: 75 MG/DL (ref 0–129)
LEUKOCYTE ESTERASE UR QL STRIP: ABNORMAL
LYMPHOCYTES # BLD: 1.1 K/UL (ref 1–4.8)
LYMPHOCYTES NFR BLD: 12.8 %
MCH RBC QN AUTO: 25.5 PG (ref 27–31.3)
MCHC RBC AUTO-ENTMCNC: 30.8 % (ref 33–37)
MCV RBC AUTO: 82.9 FL (ref 79.4–94.8)
MONOCYTES # BLD: 0.8 K/UL (ref 0.2–0.8)
MONOCYTES NFR BLD: 8.5 %
NEUTROPHILS # BLD: 6.7 K/UL (ref 1.4–6.5)
NEUTS SEG NFR BLD: 75.7 %
NITRITE UR QL STRIP: POSITIVE
PH UR STRIP: >=9 [PH] (ref 5–9)
PLATELET # BLD AUTO: 290 K/UL (ref 130–400)
POTASSIUM SERPL-SCNC: 4 MEQ/L (ref 3.4–4.9)
PROT SERPL-MCNC: 7.6 G/DL (ref 6.3–8)
PROT UR STRIP-MCNC: 30 MG/DL
RBC # BLD AUTO: 4.15 M/UL (ref 4.2–5.4)
RBC #/AREA URNS AUTO: ABNORMAL /HPF (ref 0–5)
SODIUM SERPL-SCNC: 142 MEQ/L (ref 135–144)
SP GR UR STRIP: 1.01 (ref 1–1.03)
TRIGLYCERIDE, FASTING: 63 MG/DL (ref 0–150)
TSH REFLEX: 1.87 UIU/ML (ref 0.44–3.86)
UROBILINOGEN UR STRIP-ACNC: 0.2 E.U./DL
WBC # BLD AUTO: 8.8 K/UL (ref 4.8–10.8)
WBC #/AREA URNS AUTO: ABNORMAL /HPF (ref 0–5)

## 2024-02-12 ENCOUNTER — TELEPHONE (OUTPATIENT)
Dept: ENDOSCOPY | Age: 44
End: 2024-02-12

## 2024-02-12 DIAGNOSIS — N30.00 ACUTE CYSTITIS WITHOUT HEMATURIA: Primary | ICD-10-CM

## 2024-02-12 LAB
BACTERIA UR CULT: ABNORMAL
BACTERIA UR CULT: ABNORMAL
ORGANISM: ABNORMAL

## 2024-02-12 RX ORDER — FLUCONAZOLE 150 MG/1
150 TABLET ORAL ONCE
Qty: 1 TABLET | Refills: 1 | Status: SHIPPED | OUTPATIENT
Start: 2024-02-12 | End: 2024-02-12

## 2024-02-12 RX ORDER — SULFAMETHOXAZOLE AND TRIMETHOPRIM 800; 160 MG/1; MG/1
1 TABLET ORAL 2 TIMES DAILY
Qty: 20 TABLET | Refills: 0 | Status: SHIPPED | OUTPATIENT
Start: 2024-02-12 | End: 2024-02-22

## 2024-02-23 ENCOUNTER — PREP FOR PROCEDURE (OUTPATIENT)
Dept: GASTROENTEROLOGY | Age: 44
End: 2024-02-23

## 2024-02-23 ENCOUNTER — TELEPHONE (OUTPATIENT)
Dept: FAMILY MEDICINE CLINIC | Age: 44
End: 2024-02-23

## 2024-02-23 RX ORDER — SODIUM CHLORIDE 0.9 % (FLUSH) 0.9 %
5-40 SYRINGE (ML) INJECTION EVERY 12 HOURS SCHEDULED
Status: CANCELLED | OUTPATIENT
Start: 2024-02-23

## 2024-02-23 RX ORDER — SODIUM CHLORIDE 9 MG/ML
INJECTION, SOLUTION INTRAVENOUS PRN
Status: CANCELLED | OUTPATIENT
Start: 2024-02-23

## 2024-02-23 RX ORDER — SODIUM CHLORIDE 0.9 % (FLUSH) 0.9 %
5-40 SYRINGE (ML) INJECTION PRN
Status: CANCELLED | OUTPATIENT
Start: 2024-02-23

## 2024-02-23 RX ORDER — SODIUM CHLORIDE 9 MG/ML
INJECTION, SOLUTION INTRAVENOUS CONTINUOUS
Status: CANCELLED | OUTPATIENT
Start: 2024-02-23

## 2024-02-23 NOTE — TELEPHONE ENCOUNTER
Patients mother calling in regarding patient. [facility] will be faxing over documents required for incontinence products    Please be on look out    She did not have fax number to confirm who it was sent to or if it needs to be sent back but did say it may have her address of [id number] on it, not the patient.

## 2024-02-26 NOTE — ED PROVIDER NOTES
[facility] [facility]  EMERGENCY DEPARTMENT ENCOUNTER      Pt Name: Leigh Ann Medley  MRN: [id number]  [place] 1980  Date of evaluation: 7/15/2022  Provider: Jacqueline Miguel MD    CHIEF COMPLAINT       Chief Complaint   Patient presents with    Hip Pain     Pt c/o left hip pain w/o injury, or trauma - ongoing since Tuesday         HISTORY OF PRESENT ILLNESS   (Location/Symptom, Timing/Onset, Context/Setting, Quality, Duration, Modifying Factors, Severity)  Note limiting factors. Patient presents to ED with complaint of left hip pain. The pain started on Tuesday and has not improved since. She admits to having MS and is unable to walk. She admits to taking Tylenol which alleviated the pain enough to allow her to transfer from a wheelchair to the hospital bed. Hip Pain       Nursing Notes were reviewed. REVIEW OF SYSTEMS    (2-9 systems for level 4, 10 or more for level 5)     Review of Systems   Musculoskeletal:  Positive for arthralgias (left hip pain). Neurological:  Positive for weakness (MS diagnosis). All other systems reviewed and are negative. Except as noted above the remainder of the review of systems was reviewed and negative.        PAST MEDICAL HISTORY     Past Medical History:   Diagnosis Date    Dislocated knee     Right knee    Intellectual disability 2022    Lumbar radiculopathy 2020    MS (multiple sclerosis) ([facility] [address] 2022    PTTD (posterior tibial tendon dysfunction)          SURGICAL HISTORY       Past Surgical History:   Procedure Laterality Date    CATARACT REMOVAL WITH IMPLANT Right     CATARACT REMOVAL WITH IMPLANT Left      SECTION  2013    EYE SURGERY      FOOT SURGERY Left          CURRENT MEDICATIONS       Current Discharge Medication List        CONTINUE these medications which have NOT CHANGED    Details   clopidogrel (PLAVIX) 75 MG tablet Take 1 tablet by mouth daily  Qty: 30 tablet, Refills: 3      aspirin 81 MG EC tablet Take 1 tablet by Discard all be  prescriptions for Valtrex   Agree with her dosage of 2000 mg twice a day for 1 day with a outbreak   Two OK to have 30 tablets dispensed for her to use as directed.   mouth daily  Qty: 30 tablet, Refills: 3      coenzyme Q10 100 MG CAPS capsule Take 1 capsule by mouth daily  Qty: 120 capsule, Refills: 5      mirabegron (MYRBETRIQ) 25 MG TB24 Take 1 tablet by mouth daily  Qty: 30 tablet, Refills: 3      vitamin D 25 MCG (1000 UT) CAPS Take by mouth      MULTIPLE VITAMINS-MINERALS ER PO Take 1 tablet by mouth             ALLERGIES     Patient has no known allergies. FAMILY HISTORY       Family History   Problem Relation Age of Onset    Hypertension Mother     Diabetes Maternal Uncle     Cancer Maternal Grandmother         stomach cancer          SOCIAL HISTORY       Social History     Socioeconomic History    Marital status:      Spouse name: None    Number of children: 1    Years of education: None    Highest education level: None   Tobacco Use    Smoking status: Never    Smokeless tobacco: Never   Substance and Sexual Activity    Alcohol use: No    Drug use: No    Sexual activity: Not Currently   Social History Narrative    Lives With: her parents (mom Taylor Lozano) father is sick  and her Son (5 y.o)    Type of Home: Apartment in [place] [address] [address]    Home Layout: One level    Home Access: Stairs to enter with rails - Number of Steps: 1    Bathroom Shower/Tub: Tub/Shower unit, Equipment: Shower chair,Hand-held shower    Home Equipment: Justin Spore, rolling,Wheelchair-manual    Has the patient had two or more falls in the past year or any fall with injury in the past year?: Yes (one - fell on mothers floor and couldn't get up)    ADL Assistance: Independent    Homemaking Assistance: Independent    Homemaking Responsibilities: Yes    Ambulation Assistance: Independent    Transfer Assistance: Independent    Active : No    Patient's  Info: mother    She is on disability from her MS-Had been a [place] prior to her MS. Was in special classes in school. Pending Waiver services for Aide services.                          SCREENINGS    Anjali Coma Scale  Eye Opening: Spontaneous  Best Verbal Response: Oriented  Best Motor Response: Obeys commands  Anjali Coma Scale Score: 15          PHYSICAL EXAM    (up to 7 for level 4, 8 or more for level 5)     ED Triage Vitals [07/15/22 1218]   BP Temp Temp Source Heart Rate Resp SpO2 Height Weight   128/78 98.2 °F (36.8 °C) Temporal 88 19 95 % 5' 1.5\" (1.562 m) 170 lb (77.1 kg)       Physical Exam  HENT:      Head: Normocephalic and atraumatic. Mouth/Throat:      Mouth: Mucous membranes are moist.      Pharynx: Oropharynx is clear. Eyes:      Conjunctiva/sclera: Conjunctivae normal.   Cardiovascular:      Rate and Rhythm: Normal rate and regular rhythm. Pulses: Normal pulses. Heart sounds: Normal heart sounds. Pulmonary:      Effort: Pulmonary effort is normal.      Breath sounds: Normal breath sounds. Abdominal:      General: Bowel sounds are normal.      Palpations: Abdomen is soft. Musculoskeletal:      Cervical back: Normal range of motion and neck supple. Right hip: Decreased strength. Left hip: Tenderness present. Decreased strength. Right foot: Decreased range of motion. Left foot: Foot drop present. Comments: Patient has paraparesis b/l  extremities   Skin:     General: Skin is warm and dry. Neurological:      General: No focal deficit present. Mental Status: She is alert and oriented to person, place, and time. DIAGNOSTIC RESULTS     EKG: n/a    RADIOLOGY:   Left hip xr- NEG ACUTE     Interpretation per the Radiologist below, if available at the time of this note:    XR HIP LEFT (2-3 VIEWS)    (Results Pending)       LABS:  Labs Reviewed - No data to display    All other labs were within normal range or not returned as of this dictation.     EMERGENCY DEPARTMENT COURSE and DIFFERENTIAL DIAGNOSIS/MDM:   Vitals:    Vitals:    07/15/22 1218   BP: 128/78   Pulse: 88   Resp: 19   Temp: 98.2 °F (36.8 °C)   TempSrc: Temporal   SpO2: 95%   Weight: 170 lb (77.1 kg) Height: 5' 1.5\" (1.562 m)       MDM  Number of Diagnoses or Management Options  Left hip pain  Diagnosis management comments: X-ray negative, recommend supportive care. Patient will be discharged home in good condition. Patient has been hemodynamically stable throughout ED course and is appropriate for outpatient follow up. Patient should follow up with PCP in 2-3 days or return to ED immediately for any new or worsening symptoms. Patient is well appearing on discharge and agreeable with plan of care. Procedures    FINAL IMPRESSION      1.  Left hip pain          DISPOSITION/PLAN   DISPOSITION Decision To Discharge 07/15/2022 01:00:27 PM      (Please note that portions of this note were completed with a voice recognition program.  Efforts were made to edit the dictations but occasionally words are mis-transcribed.)    Michaela Bowling MD (electronically signed)  Attending Emergency Physician       Michaela Bowling MD  07/15/22 [id number]

## 2024-03-05 RX ORDER — POLYETHYLENE GLYCOL 3350, SODIUM CHLORIDE, SODIUM BICARBONATE, POTASSIUM CHLORIDE 420; 11.2; 5.72; 1.48 G/4L; G/4L; G/4L; G/4L
4000 POWDER, FOR SOLUTION ORAL ONCE
Qty: 4000 ML | Refills: 0 | Status: SHIPPED | OUTPATIENT
Start: 2024-03-05 | End: 2024-03-05

## 2024-03-06 ENCOUNTER — HOSPITAL ENCOUNTER (OUTPATIENT)
Dept: INFUSION THERAPY | Age: 44
Setting detail: INFUSION SERIES
Discharge: HOME | End: 2024-03-06
Payer: COMMERCIAL

## 2024-03-06 VITALS
TEMPERATURE: 96.8 F | HEART RATE: 72 BPM | RESPIRATION RATE: 18 BRPM | DIASTOLIC BLOOD PRESSURE: 64 MMHG | SYSTOLIC BLOOD PRESSURE: 97 MMHG | OXYGEN SATURATION: 95 %

## 2024-03-06 DIAGNOSIS — G35 MS (MULTIPLE SCLEROSIS) (HCC): Primary | ICD-10-CM

## 2024-03-06 PROCEDURE — 96374 THER/PROPH/DIAG INJ IV PUSH: CPT

## 2024-03-06 PROCEDURE — 2580000003 HC RX 258: Performed by: PSYCHIATRY & NEUROLOGY

## 2024-03-06 PROCEDURE — 96415 CHEMO IV INFUSION ADDL HR: CPT

## 2024-03-06 PROCEDURE — 6360000002 HC RX W HCPCS: Performed by: PSYCHIATRY & NEUROLOGY

## 2024-03-06 PROCEDURE — 96413 CHEMO IV INFUSION 1 HR: CPT

## 2024-03-06 PROCEDURE — 6370000000 HC RX 637 (ALT 250 FOR IP): Performed by: PSYCHIATRY & NEUROLOGY

## 2024-03-06 RX ORDER — DIPHENHYDRAMINE HCL 25 MG
50 TABLET ORAL EVERY 4 HOURS PRN
Start: 2024-08-21

## 2024-03-06 RX ORDER — IBUPROFEN 400 MG/1
400 TABLET ORAL EVERY 6 HOURS PRN
Start: 2024-08-21

## 2024-03-06 RX ORDER — SODIUM CHLORIDE 9 MG/ML
5-250 INJECTION, SOLUTION INTRAVENOUS PRN
OUTPATIENT
Start: 2024-08-21

## 2024-03-06 RX ORDER — SODIUM CHLORIDE 9 MG/ML
INJECTION, SOLUTION INTRAVENOUS CONTINUOUS
OUTPATIENT
Start: 2024-08-21

## 2024-03-06 RX ORDER — METHYLPREDNISOLONE SODIUM SUCCINATE 125 MG/2ML
125 INJECTION, POWDER, LYOPHILIZED, FOR SOLUTION INTRAMUSCULAR; INTRAVENOUS ONCE
OUTPATIENT
Start: 2024-08-21

## 2024-03-06 RX ORDER — SODIUM CHLORIDE 9 MG/ML
INJECTION, SOLUTION INTRAVENOUS
Status: DISCONTINUED
Start: 2024-03-06 | End: 2024-03-07 | Stop reason: HOSPADM

## 2024-03-06 RX ORDER — ACETAMINOPHEN 325 MG/1
650 TABLET ORAL ONCE
OUTPATIENT
Start: 2024-08-21 | End: 2024-08-21

## 2024-03-06 RX ORDER — WATER 10 ML/10ML
INJECTION INTRAMUSCULAR; INTRAVENOUS; SUBCUTANEOUS
Status: DISPENSED
Start: 2024-03-06 | End: 2024-03-06

## 2024-03-06 RX ORDER — METHYLPREDNISOLONE SODIUM SUCCINATE 125 MG/2ML
125 INJECTION, POWDER, LYOPHILIZED, FOR SOLUTION INTRAMUSCULAR; INTRAVENOUS ONCE
Status: COMPLETED | OUTPATIENT
Start: 2024-03-06 | End: 2024-03-06

## 2024-03-06 RX ORDER — EPINEPHRINE 1 MG/ML
0.3 INJECTION, SOLUTION, CONCENTRATE INTRAVENOUS PRN
OUTPATIENT
Start: 2024-08-21

## 2024-03-06 RX ORDER — DIPHENHYDRAMINE HCL 25 MG
25 TABLET ORAL ONCE
Status: COMPLETED | OUTPATIENT
Start: 2024-03-06 | End: 2024-03-06

## 2024-03-06 RX ORDER — ACETAMINOPHEN 325 MG/1
650 TABLET ORAL
OUTPATIENT
Start: 2024-08-21

## 2024-03-06 RX ORDER — DIPHENHYDRAMINE HCL 25 MG
25 TABLET ORAL ONCE
OUTPATIENT
Start: 2024-08-21 | End: 2024-08-21

## 2024-03-06 RX ORDER — ACETAMINOPHEN 325 MG/1
650 TABLET ORAL ONCE
Status: COMPLETED | OUTPATIENT
Start: 2024-03-06 | End: 2024-03-06

## 2024-03-06 RX ORDER — ONDANSETRON 2 MG/ML
4 INJECTION INTRAMUSCULAR; INTRAVENOUS PRN
OUTPATIENT
Start: 2024-08-21

## 2024-03-06 RX ORDER — DIPHENHYDRAMINE HYDROCHLORIDE 50 MG/ML
25 INJECTION INTRAMUSCULAR; INTRAVENOUS
OUTPATIENT
Start: 2024-08-21

## 2024-03-06 RX ORDER — SODIUM CHLORIDE 9 MG/ML
5-250 INJECTION, SOLUTION INTRAVENOUS PRN
Status: DISCONTINUED | OUTPATIENT
Start: 2024-03-06 | End: 2024-03-07 | Stop reason: HOSPADM

## 2024-03-06 RX ADMIN — OCRELIZUMAB 600 MG: 300 INJECTION INTRAVENOUS at 12:25

## 2024-03-06 RX ADMIN — ACETAMINOPHEN 325MG 650 MG: 325 TABLET ORAL at 11:51

## 2024-03-06 RX ADMIN — DIPHENHYDRAMINE HCL 25 MG: 25 TABLET ORAL at 11:50

## 2024-03-06 RX ADMIN — METHYLPREDNISOLONE SODIUM SUCCINATE 125 MG: 125 INJECTION, POWDER, LYOPHILIZED, FOR SOLUTION INTRAMUSCULAR; INTRAVENOUS at 11:51

## 2024-03-06 ASSESSMENT — PAIN SCALES - GENERAL: PAINLEVEL_OUTOF10: 0

## 2024-03-06 NOTE — PROGRESS NOTES
Patient arrived on unit by wheelchair with family. Alert, oriented, resting in chair. Vital sisgns obtained.

## 2024-03-06 NOTE — PROGRESS NOTES
Follow-up with the GI doctor as discussed as well as your primary care doctor as discussed. Return if you develop any new or worsening symptoms.   Take your medications as prescribed Observation complete. Patient with no symptoms. Patient tolerated 2 hour rate well. Provided reminder card for next dose due. Left unit by wheelchair  with family assistance.All equipment used in the care for this patient has been cleaned.    Tetracycline Counseling: Patient counseled regarding possible photosensitivity and increased risk for sunburn.  Patient instructed to avoid sunlight, if possible.  When exposed to sunlight, patients should wear protective clothing, sunglasses, and sunscreen.  The patient was instructed to call the office immediately if the following severe adverse effects occur:  hearing changes, easy bruising/bleeding, severe headache, or vision changes.  The patient verbalized understanding of the proper use and possible adverse effects of tetracycline.  All of the patient's questions and concerns were addressed. Patient understands to avoid pregnancy while on therapy due to potential birth defects. Erythromycin Pregnancy And Lactation Text: This medication is Pregnancy Category B and is considered safe during pregnancy. It is also excreted in breast milk. Topical Sulfur Applications Counseling: Topical Sulfur Counseling: Patient counseled that this medication may cause skin irritation or allergic reactions.  In the event of skin irritation, the patient was advised to reduce the amount of the drug applied or use it less frequently.   The patient verbalized understanding of the proper use and possible adverse effects of topical sulfur application.  All of the patient's questions and concerns were addressed. Topical Clindamycin Counseling: Patient counseled that this medication may cause skin irritation or allergic reactions.  In the event of skin irritation, the patient was advised to reduce the amount of the drug applied or use it less frequently.   The patient verbalized understanding of the proper use and possible adverse effects of clindamycin.  All of the patient's questions and concerns were addressed. Doxycycline Pregnancy And Lactation Text: This medication is Pregnancy Category D and not consider safe during pregnancy. It is also excreted in breast milk but is considered safe for shorter treatment courses. Topical Retinoid Pregnancy And Lactation Text: This medication is Pregnancy Category C. It is unknown if this medication is excreted in breast milk. Isotretinoin Counseling: Patient should get monthly blood tests, not donate blood, not drive at night if vision affected, not share medication, and not undergo elective surgery for 6 months after tx completed. Side effects reviewed, pt to contact office should one occur. Topical Clindamycin Pregnancy And Lactation Text: This medication is Pregnancy Category B and is considered safe during pregnancy. It is unknown if it is excreted in breast milk. Dapsone Pregnancy And Lactation Text: This medication is Pregnancy Category C and is not considered safe during pregnancy or breast feeding. High Dose Vitamin A Pregnancy And Lactation Text: High dose vitamin A therapy is contraindicated during pregnancy and breast feeding. Birth Control Pills Counseling: Birth Control Pill Counseling: I discussed with the patient the potential side effects of OCPs including but not limited to increased risk of stroke, heart attack, thrombophlebitis, deep venous thrombosis, hepatic adenomas, breast changes, GI upset, headaches, and depression.  The patient verbalized understanding of the proper use and possible adverse effects of OCPs. All of the patient's questions and concerns were addressed. Benzoyl Peroxide Counseling: Patient counseled that medicine may cause skin irritation and bleach clothing.  In the event of skin irritation, the patient was advised to reduce the amount of the drug applied or use it less frequently.   The patient verbalized understanding of the proper use and possible adverse effects of benzoyl peroxide.  All of the patient's questions and concerns were addressed. Topical Sulfur Applications Pregnancy And Lactation Text: This medication is Pregnancy Category C and has an unknown safety profile during pregnancy. It is unknown if this topical medication is excreted in breast milk. Spironolactone Pregnancy And Lactation Text: This medication can cause feminization of the male fetus and should be avoided during pregnancy. The active metabolite is also found in breast milk. Doxycycline Counseling:  Patient counseled regarding possible photosensitivity and increased risk for sunburn.  Patient instructed to avoid sunlight, if possible.  When exposed to sunlight, patients should wear protective clothing, sunglasses, and sunscreen.  The patient was instructed to call the office immediately if the following severe adverse effects occur:  hearing changes, easy bruising/bleeding, severe headache, or vision changes.  The patient verbalized understanding of the proper use and possible adverse effects of doxycycline.  All of the patient's questions and concerns were addressed. Azithromycin Counseling:  I discussed with the patient the risks of azithromycin including but not limited to GI upset, allergic reaction, drug rash, diarrhea, and yeast infections. Use Enhanced Medication Counseling?: No Bactrim Pregnancy And Lactation Text: This medication is Pregnancy Category D and is known to cause fetal risk.  It is also excreted in breast milk. Minocycline Counseling: Patient advised regarding possible photosensitivity and discoloration of the teeth, skin, lips, tongue and gums.  Patient instructed to avoid sunlight, if possible.  When exposed to sunlight, patients should wear protective clothing, sunglasses, and sunscreen.  The patient was instructed to call the office immediately if the following severe adverse effects occur:  hearing changes, easy bruising/bleeding, severe headache, or vision changes.  The patient verbalized understanding of the proper use and possible adverse effects of minocycline.  All of the patient's questions and concerns were addressed. Benzoyl Peroxide Pregnancy And Lactation Text: This medication is Pregnancy Category C. It is unknown if benzoyl peroxide is excreted in breast milk. Detail Level: Simple Bactrim Counseling:  I discussed with the patient the risks of sulfa antibiotics including but not limited to GI upset, allergic reaction, drug rash, diarrhea, dizziness, photosensitivity, and yeast infections.  Rarely, more serious reactions can occur including but not limited to aplastic anemia, agranulocytosis, methemoglobinemia, blood dyscrasias, liver or kidney failure, lung infiltrates or desquamative/blistering drug rashes. Minocycline Pregnancy And Lactation Text: This medication is Pregnancy Category D and not consider safe during pregnancy. It is also excreted in breast milk. Dapsone Counseling: I discussed with the patient the risks of dapsone including but not limited to hemolytic anemia, agranulocytosis, rashes, methemoglobinemia, kidney failure, peripheral neuropathy, headaches, GI upset, and liver toxicity.  Patients who start dapsone require monitoring including baseline LFTs and weekly CBCs for the first month, then every month thereafter.  The patient verbalized understanding of the proper use and possible adverse effects of dapsone.  All of the patient's questions and concerns were addressed. Isotretinoin Pregnancy And Lactation Text: This medication is Pregnancy Category X and is considered extremely dangerous during pregnancy. It is unknown if it is excreted in breast milk. Birth Control Pills Pregnancy And Lactation Text: This medication should be avoided if pregnant and for the first 30 days post-partum. Tazorac Pregnancy And Lactation Text: This medication is not safe during pregnancy. It is unknown if this medication is excreted in breast milk. Spironolactone Counseling: Patient advised regarding risks of diarrhea, abdominal pain, hyperkalemia, birth defects (for female patients), liver toxicity and renal toxicity. The patient may need blood work to monitor liver and kidney function and potassium levels while on therapy. The patient verbalized understanding of the proper use and possible adverse effects of spironolactone.  All of the patient's questions and concerns were addressed. Erythromycin Counseling:  I discussed with the patient the risks of erythromycin including but not limited to GI upset, allergic reaction, drug rash, diarrhea, increase in liver enzymes, and yeast infections. Azithromycin Pregnancy And Lactation Text: This medication is considered safe during pregnancy and is also secreted in breast milk. Tazorac Counseling:  Patient advised that medication is irritating and drying.  Patient may need to apply sparingly and wash off after an hour before eventually leaving it on overnight.  The patient verbalized understanding of the proper use and possible adverse effects of tazorac.  All of the patient's questions and concerns were addressed. High Dose Vitamin A Counseling: Side effects reviewed, pt to contact office should one occur. Topical Retinoid counseling:  Patient advised to apply a pea-sized amount only at bedtime and wait 30 minutes after washing their face before applying.  If too drying, patient may add a non-comedogenic moisturizer. The patient verbalized understanding of the proper use and possible adverse effects of retinoids.  All of the patient's questions and concerns were addressed.

## 2024-03-25 RX ORDER — CLOPIDOGREL BISULFATE 75 MG/1
75 TABLET ORAL DAILY
Qty: 90 TABLET | Refills: 1 | Status: SHIPPED | OUTPATIENT
Start: 2024-03-25

## 2024-03-25 NOTE — TELEPHONE ENCOUNTER
Pharmacy is requesting medication refill. Please approve or deny this request.    Rx requested:  Requested Prescriptions     Pending Prescriptions Disp Refills    clopidogrel (PLAVIX) 75 MG tablet [Pharmacy Med Name: CLOPIDOGREL 75 MG TABLET 75 Tablet] 90 tablet 1     Sig: TAKE 1 TABLET BY MOUTH DAILY         Last Office Visit:   11/22/2023      Next Visit Date:  Future Appointments   Date Time Provider Department Center   5/22/2024  2:15 PM Facundo Hoyos MD [facility] Neurology -   9/9/2024 11:00 AM Shy Ames PA-C [facility]

## 2024-03-28 RX ORDER — MIRABEGRON 50 MG/1
50 TABLET, FILM COATED, EXTENDED RELEASE ORAL DAILY
Qty: 30 TABLET | Refills: 3 | Status: SHIPPED | OUTPATIENT
Start: 2024-03-28

## 2024-04-23 ENCOUNTER — HOSPITAL ENCOUNTER (EMERGENCY)
Age: 44
Discharge: HOME | End: 2024-04-23
Payer: COMMERCIAL

## 2024-04-23 ENCOUNTER — APPOINTMENT (OUTPATIENT)
Dept: GENERAL RADIOLOGY | Age: 44
End: 2024-04-23
Payer: COMMERCIAL

## 2024-04-23 ENCOUNTER — APPOINTMENT (OUTPATIENT)
Dept: CT IMAGING | Age: 44
End: 2024-04-23
Payer: COMMERCIAL

## 2024-04-23 VITALS
HEIGHT: 61 IN | SYSTOLIC BLOOD PRESSURE: 147 MMHG | HEART RATE: 80 BPM | DIASTOLIC BLOOD PRESSURE: 78 MMHG | TEMPERATURE: 98.2 F | OXYGEN SATURATION: 100 % | BODY MASS INDEX: 36.82 KG/M2 | RESPIRATION RATE: 20 BRPM | WEIGHT: 195 LBS

## 2024-04-23 DIAGNOSIS — S82.831A CLOSED FRACTURE OF PROXIMAL END OF RIGHT FIBULA, UNSPECIFIED FRACTURE MORPHOLOGY, INITIAL ENCOUNTER: Primary | ICD-10-CM

## 2024-04-23 PROCEDURE — 73700 CT LOWER EXTREMITY W/O DYE: CPT

## 2024-04-23 PROCEDURE — 73552 X-RAY EXAM OF FEMUR 2/>: CPT

## 2024-04-23 PROCEDURE — 73590 X-RAY EXAM OF LOWER LEG: CPT

## 2024-04-23 PROCEDURE — 73502 X-RAY EXAM HIP UNI 2-3 VIEWS: CPT

## 2024-04-23 PROCEDURE — 6370000000 HC RX 637 (ALT 250 FOR IP): Performed by: PHYSICIAN ASSISTANT

## 2024-04-23 PROCEDURE — 99284 EMERGENCY DEPT VISIT MOD MDM: CPT

## 2024-04-23 RX ORDER — HYDROCODONE BITARTRATE AND ACETAMINOPHEN 5; 325 MG/1; MG/1
1 TABLET ORAL EVERY 6 HOURS PRN
Qty: 12 TABLET | Refills: 0 | Status: SHIPPED | OUTPATIENT
Start: 2024-04-23 | End: 2024-04-26

## 2024-04-23 RX ORDER — HYDROCODONE BITARTRATE AND ACETAMINOPHEN 5; 325 MG/1; MG/1
1 TABLET ORAL ONCE
Status: COMPLETED | OUTPATIENT
Start: 2024-04-23 | End: 2024-04-23

## 2024-04-23 RX ADMIN — HYDROCODONE BITARTRATE AND ACETAMINOPHEN 1 TABLET: 5; 325 TABLET ORAL at 18:44

## 2024-04-23 ASSESSMENT — PAIN DESCRIPTION - ORIENTATION: ORIENTATION: RIGHT

## 2024-04-23 ASSESSMENT — PAIN DESCRIPTION - DESCRIPTORS: DESCRIPTORS: ACHING

## 2024-04-23 ASSESSMENT — ENCOUNTER SYMPTOMS
EYE DISCHARGE: 0
ABDOMINAL DISTENTION: 0
NAUSEA: 0
VOMITING: 0
APNEA: 0
VOICE CHANGE: 0
ANAL BLEEDING: 0
BACK PAIN: 0

## 2024-04-23 ASSESSMENT — PAIN SCALES - GENERAL
PAINLEVEL_OUTOF10: 9
PAINLEVEL_OUTOF10: 9

## 2024-04-23 ASSESSMENT — PAIN - FUNCTIONAL ASSESSMENT
PAIN_FUNCTIONAL_ASSESSMENT: 0-10
PAIN_FUNCTIONAL_ASSESSMENT: 0-10

## 2024-04-23 ASSESSMENT — LIFESTYLE VARIABLES
HOW OFTEN DO YOU HAVE A DRINK CONTAINING ALCOHOL: NEVER
HOW MANY STANDARD DRINKS CONTAINING ALCOHOL DO YOU HAVE ON A TYPICAL DAY: PATIENT DOES NOT DRINK

## 2024-04-23 ASSESSMENT — PAIN DESCRIPTION - PAIN TYPE: TYPE: ACUTE PAIN

## 2024-04-23 ASSESSMENT — PAIN DESCRIPTION - LOCATION
LOCATION: LEG
LOCATION: LEG

## 2024-04-23 NOTE — ED PROVIDER NOTES
HYDROCODONE-ACETAMINOPHEN (NORCO) 5-325 MG PER TABLET    Take 1 tablet by mouth every 6 hours as needed for Pain for up to 3 days. Intended supply: 3 days. Take lowest dose possible to manage pain Max Daily Amount: 4 tablets     Controlled Substances Monitoring:     RX Monitoring Periodic Controlled Substance Monitoring Chronic Pain > 50 MEDD   4/17/2023  10:56 AM No signs of potential drug abuse or diversion identified. Re-evaluated the status of the patient's underlying condition causing pain.       (Please note that portions of this note were completed with a voice recognition program.  Efforts were made to edit the dictations but occasionally words are mis-transcribed.)    Luis Bhatia PA-C (electronically signed)  Attending Emergency Physician    Supervising Attending Physician: Dr. Dutton.      Luis Bhatia PA-C  04/23/24 [id number]

## 2024-04-23 NOTE — DISCHARGE INSTRUCTIONS
Follow-up with orthopedics and primary care return to if any symptoms worsen or new symptom develop.  Use your walker and wheelchair.

## 2024-04-30 ENCOUNTER — OFFICE VISIT (OUTPATIENT)
Dept: ORTHOPEDIC SURGERY | Age: 44
End: 2024-04-30
Payer: COMMERCIAL

## 2024-04-30 VITALS
OXYGEN SATURATION: 99 % | HEART RATE: 100 BPM | HEIGHT: 61 IN | WEIGHT: 190 LBS | TEMPERATURE: 97.3 F | BODY MASS INDEX: 35.87 KG/M2

## 2024-04-30 DIAGNOSIS — S82.831A OTHER CLOSED FRACTURE OF PROXIMAL END OF RIGHT FIBULA, INITIAL ENCOUNTER: Primary | ICD-10-CM

## 2024-04-30 PROCEDURE — 27780 TREATMENT OF FIBULA FRACTURE: CPT | Performed by: STUDENT IN AN ORGANIZED HEALTH CARE EDUCATION/TRAINING PROGRAM

## 2024-04-30 PROCEDURE — G8427 DOCREV CUR MEDS BY ELIG CLIN: HCPCS | Performed by: STUDENT IN AN ORGANIZED HEALTH CARE EDUCATION/TRAINING PROGRAM

## 2024-04-30 PROCEDURE — G8417 CALC BMI ABV UP PARAM F/U: HCPCS | Performed by: STUDENT IN AN ORGANIZED HEALTH CARE EDUCATION/TRAINING PROGRAM

## 2024-04-30 PROCEDURE — 1036F TOBACCO NON-USER: CPT | Performed by: STUDENT IN AN ORGANIZED HEALTH CARE EDUCATION/TRAINING PROGRAM

## 2024-04-30 PROCEDURE — 99204 OFFICE O/P NEW MOD 45 MIN: CPT | Performed by: STUDENT IN AN ORGANIZED HEALTH CARE EDUCATION/TRAINING PROGRAM

## 2024-04-30 RX ORDER — FERROUS SULFATE 324(65)MG
1 TABLET, DELAYED RELEASE (ENTERIC COATED) ORAL
COMMUNITY
Start: 2024-03-28 | End: 2025-03-28

## 2024-04-30 ASSESSMENT — ENCOUNTER SYMPTOMS
EYES NEGATIVE: 1
ALLERGIC/IMMUNOLOGIC NEGATIVE: 1
RESPIRATORY NEGATIVE: 1
GASTROINTESTINAL NEGATIVE: 1

## 2024-04-30 NOTE — PROGRESS NOTES
Orthopedic Surgery and Sports Medicine    Subjective:      Patient ID: Vipin Reyes is a 43 y.o. female who presents today for:  Chief Complaint   Patient presents with    Leg Injury     Patient presents today for Right fibula fracture. DOI: 2024. Pain is currently 9/10. Taking OTC tylenol for the pain.        ADOLFO Lew is a 43-year-old female who presents today for evaluation of her right knee pain.  She had an injury on 2024.  Her foot got stuck and she twisted her knee.  Her pain is located only in the knee.  She denies any ankle pain.  Most of her pain is actually over the medial aspect of the knee.  She ambulates with a walker at baseline because she has multiple sclerosis.  She was given a knee immobilizer in the emergency room.      Past Medical History:   Diagnosis Date    Dislocated knee     Right knee    Intellectual disability 2022    Lumbar radiculopathy 2020    MS (multiple sclerosis) ([facility]) 2022    PFO (patent foramen ovale) 2022    Patent foramen ovale with right-to-left shunt was visualized.  On agitated saline contrast injection- Large amount of bubbles LA and LV  nearly immediately    PTTD (posterior tibial tendon dysfunction)       Past Surgical History:   Procedure Laterality Date    CARDIAC SURGERY  2022    correction of PFO - Naif marie [facility]    CATARACT REMOVAL WITH IMPLANT Right     CATARACT REMOVAL WITH IMPLANT Left      SECTION  2013    EYE SURGERY      FOOT SURGERY Left      Social History     Socioeconomic History    Marital status:      Spouse name: Not on file    Number of children: 1    Years of education: Not on file    Highest education level: Not on file   Occupational History    Not on file   Tobacco Use    Smoking status: Never    Smokeless tobacco: Never   Substance and Sexual Activity    Alcohol use: No    Drug use: No    Sexual activity: Not Currently   Other Topics Concern    Not on file   Social History Narrative

## 2024-05-14 PROBLEM — R79.89 POSITIVE D-DIMER: Status: ACTIVE | Noted: 2023-11-29

## 2024-05-22 ENCOUNTER — PATIENT MESSAGE (OUTPATIENT)
Dept: NEUROLOGY | Age: 44
End: 2024-05-22

## 2024-05-22 ENCOUNTER — OFFICE VISIT (OUTPATIENT)
Dept: NEUROLOGY | Age: 44
End: 2024-05-22
Payer: COMMERCIAL

## 2024-05-22 VITALS
BODY MASS INDEX: 36.75 KG/M2 | WEIGHT: 194.5 LBS | HEART RATE: 81 BPM | DIASTOLIC BLOOD PRESSURE: 75 MMHG | SYSTOLIC BLOOD PRESSURE: 131 MMHG

## 2024-05-22 DIAGNOSIS — R39.15 URINARY URGENCY: ICD-10-CM

## 2024-05-22 DIAGNOSIS — M54.16 LUMBAR RADICULOPATHY: ICD-10-CM

## 2024-05-22 DIAGNOSIS — R26.0 ATAXIC GAIT: ICD-10-CM

## 2024-05-22 DIAGNOSIS — G35 MS (MULTIPLE SCLEROSIS) (HCC): Primary | ICD-10-CM

## 2024-05-22 DIAGNOSIS — M21.372 FOOT DROP, LEFT FOOT: ICD-10-CM

## 2024-05-22 PROCEDURE — 99214 OFFICE O/P EST MOD 30 MIN: CPT | Performed by: PSYCHIATRY & NEUROLOGY

## 2024-05-22 PROCEDURE — 1036F TOBACCO NON-USER: CPT | Performed by: PSYCHIATRY & NEUROLOGY

## 2024-05-22 PROCEDURE — G8417 CALC BMI ABV UP PARAM F/U: HCPCS | Performed by: PSYCHIATRY & NEUROLOGY

## 2024-05-22 PROCEDURE — G8427 DOCREV CUR MEDS BY ELIG CLIN: HCPCS | Performed by: PSYCHIATRY & NEUROLOGY

## 2024-05-22 NOTE — PROGRESS NOTES
SECTION  2013    EYE SURGERY      FOOT SURGERY Left      Social History     Socioeconomic History    Marital status:      Spouse name: Not on file    Number of children: 1    Years of education: Not on file    Highest education level: Not on file   Occupational History    Not on file   Tobacco Use    Smoking status: Never    Smokeless tobacco: Never   Substance and Sexual Activity    Alcohol use: No    Drug use: No    Sexual activity: Not Currently   Other Topics Concern    Not on file   Social History Narrative    Lives With: her parents (mom Shaniqua) father is sick  and her Son (9 y.o)    Type of Home: Apartment in [place]    Home Layout: One level    Home Access: Stairs to enter with rails - Number of Steps: 1    Bathroom Shower/Tub: Tub/Shower unit, Equipment: Shower chair,Hand-held shower    Home Equipment: Walker, rolling,Wheelchair-manual    Has the patient had two or more falls in the past year or any fall with injury in the past year?: Yes (one - fell on mothers floor and couldn't get up)    ADL Assistance: Independent    Homemaking Assistance: Independent    Homemaking Responsibilities: Yes    Ambulation Assistance: Independent    Transfer Assistance: Independent    Active : No    Patient's  Info: mother    She is on disability from her MS-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  Was in special classes in school.    Pending Waiver services for Aide services.                       Social Determinants of Health     Financial Resource Strain: Low Risk  (2023)    Overall Financial Resource Strain (CARDIA)     Difficulty of Paying Living Expenses: Not hard at all   Food Insecurity: Not on file (2023)   Transportation Needs: Unknown (2023)    PRAPARE - Transportation     Lack of Transportation (Medical): Not on file     Lack of Transportation (Non-Medical): No   Physical Activity: Not on file   Stress: Not on file   Social Connections: Not on file

## 2024-05-29 ENCOUNTER — OFFICE VISIT (OUTPATIENT)
Dept: CARDIOLOGY | Facility: CLINIC | Age: 44
End: 2024-05-29
Payer: COMMERCIAL

## 2024-05-29 VITALS
HEART RATE: 81 BPM | DIASTOLIC BLOOD PRESSURE: 82 MMHG | WEIGHT: 196 LBS | HEIGHT: 61 IN | BODY MASS INDEX: 37 KG/M2 | SYSTOLIC BLOOD PRESSURE: 132 MMHG

## 2024-05-29 DIAGNOSIS — Q21.12 PFO (PATENT FORAMEN OVALE) (HHS-HCC): ICD-10-CM

## 2024-05-29 DIAGNOSIS — R00.0 TACHYCARDIA: ICD-10-CM

## 2024-05-29 DIAGNOSIS — G35 MULTIPLE SCLEROSIS (MULTI): ICD-10-CM

## 2024-05-29 DIAGNOSIS — R60.9 EDEMA, UNSPECIFIED TYPE: ICD-10-CM

## 2024-05-29 DIAGNOSIS — R79.89 ELEVATED D-DIMER: ICD-10-CM

## 2024-05-29 DIAGNOSIS — E78.2 MIXED HYPERLIPIDEMIA: ICD-10-CM

## 2024-05-29 PROCEDURE — 1036F TOBACCO NON-USER: CPT | Performed by: INTERNAL MEDICINE

## 2024-05-29 PROCEDURE — 99213 OFFICE O/P EST LOW 20 MIN: CPT | Performed by: INTERNAL MEDICINE

## 2024-05-29 NOTE — PROGRESS NOTES
CARDIOLOGY OFFICE NOTE     Date:   5/29/2024    Patient:    Donya Moore    YOB: 1980    Primary Physician: Tammie Villalba PA-C       Reason for Visit: 6-month cardiology follow-up.    HPI:     Donya Moore was seen in cardiac evaluation at the  Cardiology office May 29, 2024.      The patients problems are listed as in the impression below.    Electronic medical records reviewed.    Patient returns.  She feels well overall.  No changes.  She did have a fractured right fibula recently after a mechanical fall.    She has no cardiovascular complaints.  She is fairly active at home.    Patient denies Chest Pain, SOB, Lightheadedness, Dizziness, TIA or CVA symptoms.  No CHF or Edema.  No Palpitations.  No GI,  or Bleeding Issues. No Recent Fever or Chills.     Cardiovascular and general review of systems is otherwise negative.    A 14-system review is otherwise negative, other than noted.     PHYSICAL EXAMINATION:      Vitals:    05/29/24 1435   BP: 132/82   Pulse: 81       General: No acute distress. Vital signs as noted. Alert and oriented.  Head And Neck Examination: No jugular venous distention, no carotid bruits, no mass. Carotid upstrokes preserved. Oral mucosa moist. No xanthelasma. Head and neck examination otherwise unremarkable.  Lungs: Clear to auscultation and percussion. No wheezes, no rales, and no rhonchi.  Chest: Excursion appeared to be normal. No chest wall tenderness on palpation.  Heart: Normal S1 and S2. No S3. No S4. No rub. Grade 2/6 systolic murmur, best heard at the left sternal border. Point of maximal impulse was within normal limits.  Abdomen: Soft. Nontender. No organomegaly. No bruits. No masses. Obese.  Extremities: 1+ bipedal edema. No clubbing. No cyanosis. Pulses are strong throughout. No bruits.  Musculoskeletal Exam: No ulcers, otherwise unremarkable.  Neuro: Multiple sclerosis but neurologically appeared grossly intact except for multiple sclerosis  changes.  .  IMPRESSION:       Cardiovascular status stable  Asymptomatic  Bilateral lower extremity edema, improved  Sinus tachycardia, resolved  Fall, prior  Ataxia  Patent foramen ovale, large, status post Amplatzer PFO closure 12/2022.  Normal LV systolic function, ejection fraction 65% by echocardiogram 7/2022  Negative cardiac MRI, ejection fraction 69%  Multiple sclerosis  Urinary tract infections  Anemia  Hyperlipidemia  Rhabdomyolysis, resolved 7/2022  Lumbar disc disease  Degenerative joint disease  Obesity  Otherwise as per assessment below.    RECOMMENDATIONS:      Would suggest that she continue her current medications.  Refills were provided.    Exercise dietary program was encouraged.    Hydration    [facility] portal use was encouraged.    We will plan to see back in 1 year with Laboratory Studies and ECG as ordered.     Patient will follow up with their primary physician for general care.    The patient knows to contact medical care earlier if need be.      ALLERGIES:     No Known Allergies     MEDICATIONS:     Current Outpatient Medications   Medication Instructions    atorvastatin (LIPITOR) 20 mg, oral, Nightly    cholecalciferol (Vitamin D-3) 25 MCG (1000 UT) capsule 1 capsule, oral, Daily    coenzyme Q-10 100 mg, oral, Daily    ferrous sulfate 325 (65 Fe) MG EC tablet 1 tablet, oral, 3 times daily (morning, midday, late afternoon)    furosemide (Lasix) 20 mg tablet TAKE 1 TABLET MON WED FRI AND AS NEEDED    mirabegron (Myrbetriq) 25 mg tablet extended release 24 hr 24 hr tablet 2 tablets, oral, Daily before breakfast    multivit with minerals/lutein (MULTIVITAMIN 50 PLUS ORAL) 1 tablet, oral, Daily    Ocrevus 300 mg, intravenous, Once, at day 1 and 15       ELECTROCARDIOGRAM:      None this visit    CARDIAC TESTING:      None this visit    LABORATORY DATA:      February 2024:    Cholesterol 125, triglycerides 63, HDL 37, LDL 75.    TSH was normal.    Chem-7, CBC normal except for hemoglobin  10.6.              PROBLEM LIST:     Patient Active Problem List   Diagnosis    Tachycardia    PFO (patent foramen ovale) (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    Patient was seen and examined with total time of visit including chart preparation, rooming, and chart completion exceeding 40 minutes.      ----

## 2024-05-29 NOTE — PATIENT INSTRUCTIONS
-Dr. Santos would like you to watch your diet, exercise and hydrate    -Labs to be done prior to next appointment   These labs should be fasting.      -Please bring all medicines, vitamins, and herbal supplements with you in original bottles to every appointment!!!!    -Prescriptions will not be filled unless you are compliant with your follow up appointments or have a follow up appointment scheduled as per instruction of your physician. Refills should be requested at the time of your visit.

## 2024-06-11 ENCOUNTER — OFFICE VISIT (OUTPATIENT)
Dept: ORTHOPEDIC SURGERY | Age: 44
End: 2024-06-11
Payer: COMMERCIAL

## 2024-06-11 VITALS
TEMPERATURE: 98.7 F | HEART RATE: 88 BPM | WEIGHT: 194 LBS | HEIGHT: 61 IN | BODY MASS INDEX: 36.63 KG/M2 | OXYGEN SATURATION: 97 %

## 2024-06-11 DIAGNOSIS — M25.561 CHRONIC PAIN OF RIGHT KNEE: Primary | ICD-10-CM

## 2024-06-11 DIAGNOSIS — G89.29 CHRONIC PAIN OF RIGHT KNEE: Primary | ICD-10-CM

## 2024-06-11 PROCEDURE — G8427 DOCREV CUR MEDS BY ELIG CLIN: HCPCS | Performed by: STUDENT IN AN ORGANIZED HEALTH CARE EDUCATION/TRAINING PROGRAM

## 2024-06-11 PROCEDURE — G8417 CALC BMI ABV UP PARAM F/U: HCPCS | Performed by: STUDENT IN AN ORGANIZED HEALTH CARE EDUCATION/TRAINING PROGRAM

## 2024-06-11 PROCEDURE — 99214 OFFICE O/P EST MOD 30 MIN: CPT | Performed by: STUDENT IN AN ORGANIZED HEALTH CARE EDUCATION/TRAINING PROGRAM

## 2024-06-11 PROCEDURE — 1036F TOBACCO NON-USER: CPT | Performed by: STUDENT IN AN ORGANIZED HEALTH CARE EDUCATION/TRAINING PROGRAM

## 2024-06-11 ASSESSMENT — ENCOUNTER SYMPTOMS
ALLERGIC/IMMUNOLOGIC NEGATIVE: 1
EYES NEGATIVE: 1
GASTROINTESTINAL NEGATIVE: 1
RESPIRATORY NEGATIVE: 1

## 2024-06-11 NOTE — PROGRESS NOTES
Orthopedic Surgery and Sports Medicine    Subjective:      Patient ID: Vipin Reyes is a 43 y.o. female who presents today for:  Chief Complaint   Patient presents with    Follow-up     Patient presents for a 6 week follow up.        ADOLFO Lew is a 43-year-old female who presents today for follow up evaluation of her right knee pain, proximal fibula fracture.  She had an injury on 2024.  Her foot got stuck and she twisted her knee.  Her pain is located only in the knee.  She denies any ankle pain.  Most of her pain is actually over the medial aspect of the knee.  She ambulates with a walker at baseline because she has multiple sclerosis.      Overall she is doing well.  She denies any pain in the knee.  She has been ambulating with her walker.      Past Medical History:   Diagnosis Date    Dislocated knee     Right knee    Intellectual disability 2022    Lumbar radiculopathy 2020    MS (multiple sclerosis) ([facility]) 2022    PFO (patent foramen ovale) 2022    Patent foramen ovale with right-to-left shunt was visualized.  On agitated saline contrast injection- Large amount of bubbles LA and LV  nearly immediately    PTTD (posterior tibial tendon dysfunction)       Past Surgical History:   Procedure Laterality Date    CARDIAC SURGERY  2022    correction of PFO - Naif marie [facility]    CATARACT REMOVAL WITH IMPLANT Right     CATARACT REMOVAL WITH IMPLANT Left      SECTION  2013    EYE SURGERY      FOOT SURGERY Left      Social History     Socioeconomic History    Marital status:      Spouse name: Not on file    Number of children: 1    Years of education: Not on file    Highest education level: Not on file   Occupational History    Not on file   Tobacco Use    Smoking status: Never    Smokeless tobacco: Never   Substance and Sexual Activity    Alcohol use: No    Drug use: No    Sexual activity: Not Currently   Other Topics Concern    Not on file   Social History Narrative

## 2024-07-12 ENCOUNTER — TELEPHONE (OUTPATIENT)
Dept: NEUROLOGY | Age: 44
End: 2024-07-12

## 2024-07-12 NOTE — TELEPHONE ENCOUNTER
Patient is having weakness and has had a few falls and is requesting steroids.      She is also having more urinary frequency and some incontinence.  She is currently on Myrbetriq 50mg QD.  Can this be increased?    Please advise

## 2024-07-15 RX ORDER — METHYLPREDNISOLONE 4 MG/1
TABLET ORAL
Qty: 1 KIT | Refills: 0 | Status: SHIPPED | OUTPATIENT
Start: 2024-07-15 | End: 2024-07-21

## 2024-08-21 NOTE — TELEPHONE ENCOUNTER
Patients mother is requesting an increase in medication.   Please advise        Requesting medication refill. Please approve or deny this request.    Rx requested:  Requested Prescriptions     Pending Prescriptions Disp Refills    mirabegron (MYRBETRIQ) 50 MG TB24 30 tablet 3     Sig: Take 50 mg by mouth daily         Last Office Visit:   5/22/2024      Next Visit Date:  Future Appointments   Date Time Provider Department Center   9/9/2024 11:00 AM Shy Ames PA-C [facility] DEP   9/26/2024 12:40 PM [facility] ROOM 1 [facility]   11/20/2024  2:00 PM Facundo Hoyos MD [place] NEURO Neurology -               Last refill 3/8/24. Please approve or deny.

## 2024-08-22 RX ORDER — MIRABEGRON 50 MG/1
50 TABLET, EXTENDED RELEASE ORAL DAILY
Qty: 30 TABLET | Refills: 3 | Status: SHIPPED | OUTPATIENT
Start: 2024-08-22

## 2024-09-12 RX ORDER — DIPHENHYDRAMINE HYDROCHLORIDE 50 MG/ML
50 INJECTION INTRAMUSCULAR; INTRAVENOUS
OUTPATIENT
Start: 2024-09-12

## 2024-09-12 RX ORDER — ACETAMINOPHEN 325 MG/1
650 TABLET ORAL
OUTPATIENT
Start: 2024-09-12

## 2024-09-12 RX ORDER — DIPHENHYDRAMINE HCL 25 MG
25 TABLET ORAL ONCE
Start: 2024-09-12 | End: 2024-09-12

## 2024-09-12 RX ORDER — ACETAMINOPHEN 325 MG/1
650 TABLET ORAL ONCE
Start: 2024-09-12 | End: 2024-09-12

## 2024-09-12 RX ORDER — SODIUM CHLORIDE 9 MG/ML
INJECTION, SOLUTION INTRAVENOUS CONTINUOUS
OUTPATIENT
Start: 2024-09-12

## 2024-09-13 ENCOUNTER — TELEPHONE (OUTPATIENT)
Dept: FAMILY MEDICINE CLINIC | Age: 44
End: 2024-09-13

## 2024-09-13 NOTE — TELEPHONE ENCOUNTER
Pauline from [facility] waver program called to request last office notes be   faxed to [phone]    Phone # [phone]

## 2024-09-20 ENCOUNTER — OFFICE VISIT (OUTPATIENT)
Dept: FAMILY MEDICINE CLINIC | Age: 44
End: 2024-09-20
Payer: COMMERCIAL

## 2024-09-20 VITALS
HEIGHT: 61 IN | OXYGEN SATURATION: 99 % | HEART RATE: 105 BPM | SYSTOLIC BLOOD PRESSURE: 114 MMHG | WEIGHT: 186 LBS | TEMPERATURE: 97.6 F | BODY MASS INDEX: 35.12 KG/M2 | DIASTOLIC BLOOD PRESSURE: 80 MMHG

## 2024-09-20 DIAGNOSIS — Z12.31 SCREENING MAMMOGRAM, ENCOUNTER FOR: ICD-10-CM

## 2024-09-20 DIAGNOSIS — G82.20 PARAPARESIS (HCC): ICD-10-CM

## 2024-09-20 DIAGNOSIS — Z13.29 THYROID DISORDER SCREEN: ICD-10-CM

## 2024-09-20 DIAGNOSIS — E55.9 VITAMIN D DEFICIENCY: ICD-10-CM

## 2024-09-20 DIAGNOSIS — E78.2 MIXED HYPERLIPIDEMIA: ICD-10-CM

## 2024-09-20 DIAGNOSIS — D64.9 ANEMIA, UNSPECIFIED TYPE: ICD-10-CM

## 2024-09-20 DIAGNOSIS — Z87.74 S/P PATENT FORAMEN OVALE CLOSURE: ICD-10-CM

## 2024-09-20 DIAGNOSIS — G35 MS (MULTIPLE SCLEROSIS) (HCC): Primary | ICD-10-CM

## 2024-09-20 PROCEDURE — 1036F TOBACCO NON-USER: CPT | Performed by: PHYSICIAN ASSISTANT

## 2024-09-20 PROCEDURE — G8427 DOCREV CUR MEDS BY ELIG CLIN: HCPCS | Performed by: PHYSICIAN ASSISTANT

## 2024-09-20 PROCEDURE — G8417 CALC BMI ABV UP PARAM F/U: HCPCS | Performed by: PHYSICIAN ASSISTANT

## 2024-09-20 PROCEDURE — 99214 OFFICE O/P EST MOD 30 MIN: CPT | Performed by: PHYSICIAN ASSISTANT

## 2024-09-20 RX ORDER — CLOPIDOGREL BISULFATE 75 MG/1
75 TABLET ORAL DAILY
Qty: 30 TABLET | Refills: 10 | OUTPATIENT
Start: 2024-09-20

## 2024-09-20 SDOH — ECONOMIC STABILITY: FOOD INSECURITY: WITHIN THE PAST 12 MONTHS, YOU WORRIED THAT YOUR FOOD WOULD RUN OUT BEFORE YOU GOT MONEY TO BUY MORE.: NEVER TRUE

## 2024-09-20 SDOH — ECONOMIC STABILITY: FOOD INSECURITY: WITHIN THE PAST 12 MONTHS, THE FOOD YOU BOUGHT JUST DIDN'T LAST AND YOU DIDN'T HAVE MONEY TO GET MORE.: NEVER TRUE

## 2024-09-20 SDOH — ECONOMIC STABILITY: INCOME INSECURITY: HOW HARD IS IT FOR YOU TO PAY FOR THE VERY BASICS LIKE FOOD, HOUSING, MEDICAL CARE, AND HEATING?: NOT HARD AT ALL

## 2024-09-24 DIAGNOSIS — E55.9 VITAMIN D DEFICIENCY: ICD-10-CM

## 2024-09-24 DIAGNOSIS — D64.9 ANEMIA, UNSPECIFIED TYPE: ICD-10-CM

## 2024-09-24 DIAGNOSIS — Z13.29 THYROID DISORDER SCREEN: ICD-10-CM

## 2024-09-24 DIAGNOSIS — E78.2 MIXED HYPERLIPIDEMIA: ICD-10-CM

## 2024-09-24 LAB
ALBUMIN SERPL-MCNC: 3.9 G/DL (ref 3.5–4.6)
ALP SERPL-CCNC: 80 U/L (ref 40–130)
ALT SERPL-CCNC: 11 U/L (ref 0–33)
ANION GAP SERPL CALCULATED.3IONS-SCNC: 15 MEQ/L (ref 9–15)
AST SERPL-CCNC: 10 U/L (ref 0–35)
BASOPHILS # BLD: 0 K/UL (ref 0–0.2)
BASOPHILS NFR BLD: 0.3 %
BILIRUB SERPL-MCNC: 0.4 MG/DL (ref 0.2–0.7)
BUN SERPL-MCNC: 19 MG/DL (ref 6–20)
CALCIUM SERPL-MCNC: 9.2 MG/DL (ref 8.5–9.9)
CHLORIDE SERPL-SCNC: 102 MEQ/L (ref 95–107)
CO2 SERPL-SCNC: 22 MEQ/L (ref 20–31)
CREAT SERPL-MCNC: 0.74 MG/DL (ref 0.5–0.9)
EOSINOPHIL # BLD: 0.2 K/UL (ref 0–0.7)
EOSINOPHIL NFR BLD: 1.3 %
ERYTHROCYTE [DISTWIDTH] IN BLOOD BY AUTOMATED COUNT: 17.2 % (ref 11.5–14.5)
GLOBULIN SER CALC-MCNC: 3.9 G/DL (ref 2.3–3.5)
GLUCOSE SERPL-MCNC: 91 MG/DL (ref 70–99)
HCT VFR BLD AUTO: 38.7 % (ref 37–47)
HGB BLD-MCNC: 12.3 G/DL (ref 12–16)
LYMPHOCYTES # BLD: 1.5 K/UL (ref 1–4.8)
LYMPHOCYTES NFR BLD: 12.1 %
MCH RBC QN AUTO: 28.1 PG (ref 27–31.3)
MCHC RBC AUTO-ENTMCNC: 31.8 % (ref 33–37)
MCV RBC AUTO: 88.6 FL (ref 79.4–94.8)
MONOCYTES # BLD: 0.8 K/UL (ref 0.2–0.8)
MONOCYTES NFR BLD: 5.9 %
NEUTROPHILS # BLD: 10.2 K/UL (ref 1.4–6.5)
NEUTS SEG NFR BLD: 79.9 %
PLATELET # BLD AUTO: 274 K/UL (ref 130–400)
POTASSIUM SERPL-SCNC: 3.9 MEQ/L (ref 3.4–4.9)
PROT SERPL-MCNC: 7.8 G/DL (ref 6.3–8)
RBC # BLD AUTO: 4.37 M/UL (ref 4.2–5.4)
SODIUM SERPL-SCNC: 139 MEQ/L (ref 135–144)
TSH REFLEX: 1.25 UIU/ML (ref 0.44–3.86)
VITAMIN D 25-HYDROXY: 33.6 NG/ML (ref 30–100)
WBC # BLD AUTO: 12.8 K/UL (ref 4.8–10.8)

## 2024-09-25 DIAGNOSIS — R82.90 FOUL SMELLING URINE: Primary | ICD-10-CM

## 2024-09-26 ENCOUNTER — HOSPITAL ENCOUNTER (OUTPATIENT)
Dept: WOMENS IMAGING | Age: 44
Discharge: HOME | End: 2024-09-28
Payer: COMMERCIAL

## 2024-09-26 DIAGNOSIS — Z12.31 SCREENING MAMMOGRAM, ENCOUNTER FOR: ICD-10-CM

## 2024-09-26 DIAGNOSIS — R82.90 FOUL SMELLING URINE: ICD-10-CM

## 2024-09-26 LAB
BACTERIA URNS QL MICRO: ABNORMAL /HPF
BILIRUB UR QL STRIP: NEGATIVE
CLARITY UR: ABNORMAL
COLOR UR: YELLOW
EPI CELLS #/AREA URNS AUTO: >100 /HPF (ref 0–5)
GLUCOSE UR STRIP-MCNC: NEGATIVE MG/DL
HGB UR QL STRIP: NEGATIVE
HYALINE CASTS #/AREA URNS AUTO: ABNORMAL /HPF (ref 0–5)
KETONES UR STRIP-MCNC: ABNORMAL MG/DL
LEUKOCYTE ESTERASE UR QL STRIP: ABNORMAL
NITRITE UR QL STRIP: NEGATIVE
PH UR STRIP: 5.5 [PH] (ref 5–9)
PROT UR STRIP-MCNC: ABNORMAL MG/DL
RBC #/AREA URNS AUTO: ABNORMAL /HPF (ref 0–5)
SP GR UR STRIP: 1.03 (ref 1–1.03)
UROBILINOGEN UR STRIP-ACNC: 1 E.U./DL
WBC #/AREA URNS AUTO: ABNORMAL /HPF (ref 0–5)

## 2024-09-26 PROCEDURE — 77063 BREAST TOMOSYNTHESIS BI: CPT

## 2024-09-28 DIAGNOSIS — N30.00 ACUTE CYSTITIS WITHOUT HEMATURIA: Primary | ICD-10-CM

## 2024-09-28 LAB
BACTERIA UR CULT: ABNORMAL
BACTERIA UR CULT: ABNORMAL
ORGANISM: ABNORMAL

## 2024-09-28 RX ORDER — AMOXICILLIN 500 MG/1
500 CAPSULE ORAL 2 TIMES DAILY
Qty: 20 CAPSULE | Refills: 0 | Status: SHIPPED | OUTPATIENT
Start: 2024-09-28 | End: 2024-10-08

## 2024-10-03 ENCOUNTER — HOSPITAL ENCOUNTER (OUTPATIENT)
Dept: INFUSION THERAPY | Age: 44
Setting detail: INFUSION SERIES
Discharge: HOME | End: 2024-10-03
Payer: COMMERCIAL

## 2024-10-03 VITALS
OXYGEN SATURATION: 98 % | DIASTOLIC BLOOD PRESSURE: 62 MMHG | TEMPERATURE: 97.2 F | HEART RATE: 74 BPM | SYSTOLIC BLOOD PRESSURE: 140 MMHG | RESPIRATION RATE: 18 BRPM

## 2024-10-03 DIAGNOSIS — G35 MS (MULTIPLE SCLEROSIS) (HCC): Primary | ICD-10-CM

## 2024-10-03 PROCEDURE — 6360000002 HC RX W HCPCS: Performed by: PSYCHIATRY & NEUROLOGY

## 2024-10-03 PROCEDURE — 6370000000 HC RX 637 (ALT 250 FOR IP): Performed by: PSYCHIATRY & NEUROLOGY

## 2024-10-03 PROCEDURE — 2500000003 HC RX 250 WO HCPCS: Performed by: PSYCHIATRY & NEUROLOGY

## 2024-10-03 PROCEDURE — 96415 CHEMO IV INFUSION ADDL HR: CPT

## 2024-10-03 PROCEDURE — 96365 THER/PROPH/DIAG IV INF INIT: CPT

## 2024-10-03 PROCEDURE — 96374 THER/PROPH/DIAG INJ IV PUSH: CPT

## 2024-10-03 PROCEDURE — 2580000003 HC RX 258: Performed by: PSYCHIATRY & NEUROLOGY

## 2024-10-03 PROCEDURE — 96413 CHEMO IV INFUSION 1 HR: CPT

## 2024-10-03 RX ORDER — DIPHENHYDRAMINE HCL 25 MG
25 TABLET ORAL ONCE
Start: 2025-04-03 | End: 2025-04-03

## 2024-10-03 RX ORDER — DIPHENHYDRAMINE HCL 25 MG
25 TABLET ORAL ONCE
Status: COMPLETED | OUTPATIENT
Start: 2024-10-03 | End: 2024-10-03

## 2024-10-03 RX ORDER — ACETAMINOPHEN 325 MG/1
650 TABLET ORAL ONCE
Start: 2025-04-03 | End: 2025-04-03

## 2024-10-03 RX ORDER — ACETAMINOPHEN 325 MG/1
650 TABLET ORAL
OUTPATIENT
Start: 2025-04-03

## 2024-10-03 RX ORDER — ACETAMINOPHEN 325 MG/1
650 TABLET ORAL ONCE
Status: COMPLETED | OUTPATIENT
Start: 2024-10-03 | End: 2024-10-03

## 2024-10-03 RX ORDER — DIPHENHYDRAMINE HYDROCHLORIDE 50 MG/ML
50 INJECTION INTRAMUSCULAR; INTRAVENOUS
OUTPATIENT
Start: 2025-04-03

## 2024-10-03 RX ORDER — SODIUM CHLORIDE 9 MG/ML
INJECTION, SOLUTION INTRAVENOUS CONTINUOUS
OUTPATIENT
Start: 2025-04-03

## 2024-10-03 RX ADMIN — DIPHENHYDRAMINE HCL 25 MG: 25 TABLET ORAL at 10:35

## 2024-10-03 RX ADMIN — ACETAMINOPHEN 325MG 650 MG: 325 TABLET ORAL at 10:35

## 2024-10-03 RX ADMIN — METHYLPREDNISOLONE SODIUM SUCCINATE 125 MG: 125 INJECTION INTRAMUSCULAR; INTRAVENOUS at 10:36

## 2024-10-03 RX ADMIN — OCRELIZUMAB 600 MG: 300 INJECTION INTRAVENOUS at 11:03

## 2024-10-03 NOTE — FLOWSHEET NOTE
Patient arrived to the unit via wheelchair. Vital signs taken and stable. Patient oriented to room and call light. Call light within reach.    1035 - IV placed without complications. Brisk blood return noted. Pt tolerated well. Premedications given, pt tolerated well. Pt educated on signs and symptoms of medication reactions. Patient verbalized understanding, no further questions. 30 minute observation started. Call light within reach.     1104 - Observation complete. Denies any adverse reactions at this time.  Ocrevus Infusion started at 40 mL/hr per orders. Call light within reach.     1122 - Infusion increased to 100 mL/hr. Pt without signs/symptoms of reaction at this time. Call light within reach.     1130 - Vital signs stable. Infusion increased to 200 mL/hr. Call light within reach.     1142 - Reminder card given to patient to schedule next infusion. Pt and family members verbalized understanding.     1202 - Infusion increased to 250 mL/hr. Pt without signs/symptoms of reaction at this time. Call light within reach.     1232 -  Infusion increased to 300 mL/hr. Pt without signs/symptoms of reaction at this time. Call light within reach.     1337 - Infusion completed. Patient without signs/symptoms of reaction. Observation period started for 30 minutes. Call light within reach.     1414 - Observation complete. Denies any adverse reactions at this time. IV removed without complication. Pt tolerated well. Catheter appears intact, dressing applied. No drainage noted. Patient left the unit via wheelchair. All equipment used in the care for this patient has been cleaned.

## 2024-10-07 RX ORDER — CLOPIDOGREL BISULFATE 75 MG/1
75 TABLET ORAL DAILY
Qty: 30 TABLET | Refills: 10 | OUTPATIENT
Start: 2024-10-07

## 2024-10-08 RX ORDER — CLOPIDOGREL BISULFATE 75 MG/1
75 TABLET ORAL DAILY
Qty: 30 TABLET | Refills: 10 | OUTPATIENT
Start: 2024-10-08

## 2024-10-09 RX ORDER — MIRABEGRON 50 MG/1
50 TABLET, FILM COATED, EXTENDED RELEASE ORAL DAILY
Qty: 30 TABLET | Refills: 3 | Status: SHIPPED | OUTPATIENT
Start: 2024-10-09

## 2024-10-09 NOTE — TELEPHONE ENCOUNTER
Pharmacy is  requesting medication refill. Please approve or deny this request.    Rx requested:  Requested Prescriptions     Pending Prescriptions Disp Refills    MYRBETRIQ 50 MG TB24 [Pharmacy Med Name: MYRBETRIQ ER 50 MG TABLET] 30 tablet 3     Sig: TAKE 1 TABLET BY MOUTH EVERY DAY         Last Office Visit:   5/22/2024      Next Visit Date:  Future Appointments   Date Time Provider Department Center   11/20/2024  2:00 PM Facundo Hoyos MD [place] NEURO Neurology -   4/21/2025 11:00 AM Shy Ames PA-C [facility] ECC DEP

## 2024-10-25 RX ORDER — CLOPIDOGREL BISULFATE 75 MG/1
TABLET ORAL
Qty: 30 TABLET | Refills: 10 | Status: SHIPPED | OUTPATIENT
Start: 2024-10-25

## 2024-10-25 NOTE — TELEPHONE ENCOUNTER
Pharmacy is  requesting medication refill. Please approve or deny this request.    Rx requested:  Requested Prescriptions     Pending Prescriptions Disp Refills    clopidogrel (PLAVIX) 75 MG tablet [Pharmacy Med Name: CLOPIDOGREL 75 MG TABLET 75 Tablet] 30 tablet 10     Sig: TAKE 1 TABLET BY MOUTH ONCE DAILY *EMERGENCY REFILL*         Last Office Visit:   5/22/2024      Next Visit Date:  Future Appointments   Date Time Provider Department Center   11/20/2024  2:00 PM Facundo Hoyos MD [place] NEURO Neurology -   4/21/2025 11:00 AM Shy Ames PA-C [facility] DEP   ;     Detail Level: Zone

## 2024-11-20 ENCOUNTER — OFFICE VISIT (OUTPATIENT)
Dept: NEUROLOGY | Age: 44
End: 2024-11-20
Payer: MEDICAID

## 2024-11-20 VITALS — SYSTOLIC BLOOD PRESSURE: 120 MMHG | DIASTOLIC BLOOD PRESSURE: 70 MMHG | HEART RATE: 97 BPM | OXYGEN SATURATION: 97 %

## 2024-11-20 DIAGNOSIS — R41.89 COGNITIVE IMPAIRMENT: ICD-10-CM

## 2024-11-20 DIAGNOSIS — M54.16 LUMBAR RADICULOPATHY: ICD-10-CM

## 2024-11-20 DIAGNOSIS — G82.20 PARAPARESIS (HCC): ICD-10-CM

## 2024-11-20 DIAGNOSIS — G35 MS (MULTIPLE SCLEROSIS) (HCC): Primary | ICD-10-CM

## 2024-11-20 DIAGNOSIS — R39.15 URINARY URGENCY: ICD-10-CM

## 2024-11-20 DIAGNOSIS — I89.0 LYMPHEDEMA: ICD-10-CM

## 2024-11-20 PROCEDURE — 99214 OFFICE O/P EST MOD 30 MIN: CPT | Performed by: PSYCHIATRY & NEUROLOGY

## 2024-11-20 NOTE — PROGRESS NOTES
this side she has not accumulated any further disability.  She has urinary urgency which is responded to Myrbetriq though most of the time she does not make it due to mechanical issues.  History of patient developed some short-term memory issues very common and seen in multiple sclerosis for which there is no 1 particular treatment.  I do lengthy discussion regarding her exercise as otherwise she is likely to lose her legs.      Facundo Hoyos MD, KERA  Diplomate, American Board of Psychiatry & Neurology  Board Certified in Vascular Neurology  Board Certified in Neuromuscular Medicine  Certified in Neurorehabilitation         Plan:      No orders of the defined types were placed in this encounter.    No orders of the defined types were placed in this encounter.      No follow-ups on file.      Facundo Hoyos MD

## 2024-12-26 ENCOUNTER — OFFICE VISIT (OUTPATIENT)
Age: 44
End: 2024-12-26
Payer: MEDICAID

## 2024-12-26 VITALS
SYSTOLIC BLOOD PRESSURE: 122 MMHG | WEIGHT: 185 LBS | TEMPERATURE: 98 F | DIASTOLIC BLOOD PRESSURE: 78 MMHG | OXYGEN SATURATION: 97 % | BODY MASS INDEX: 34.93 KG/M2 | HEIGHT: 61 IN | HEART RATE: 97 BPM

## 2024-12-26 DIAGNOSIS — Z20.1 CONTACT WITH AND (SUSPECTED) EXPOSURE TO TUBERCULOSIS: Primary | ICD-10-CM

## 2024-12-26 DIAGNOSIS — R22.40 MASS OF SHIN: ICD-10-CM

## 2024-12-26 DIAGNOSIS — Q21.12 PFO (PATENT FORAMEN OVALE): ICD-10-CM

## 2024-12-26 PROCEDURE — 99212 OFFICE O/P EST SF 10 MIN: CPT | Performed by: PHYSICIAN ASSISTANT

## 2024-12-26 NOTE — PROGRESS NOTES
range of motion.      Cervical back: Normal range of motion and neck supple.   Lymphadenopathy:      Cervical: No cervical adenopathy.   Skin:     General: Skin is warm and dry.      Coloration: Skin is not jaundiced.      Comments: Few postinflammatory areas of the left shin noted   area in question has a subcu nontender mass beneath area of hyperpigmentation   Neurological:      General: No focal deficit present.      Mental Status: She is alert and oriented to person, place, and time.      Cranial Nerves: No cranial nerve deficit.      Motor: No weakness.      Coordination: Coordination normal.      Gait: Gait normal.   Psychiatric:         Mood and Affect: Mood normal.         Behavior: Behavior normal.         Thought Content: Thought content normal.         Judgment: Judgment normal.              Assessment & Plan    Diagnosis Orders   1. Contact with and (suspected) exposure to tuberculosis        2. Mass of shin      suspect contusion- apply warm compress      3. PFO (patent foramen ovale)      followed by [facility]            No orders of the defined types were placed in this encounter.    No orders of the defined types were placed in this encounter.    There are no discontinued medications.  Return if symptoms worsen or fail to improve.    Shy Ames PA-C

## 2025-01-15 NOTE — TELEPHONE ENCOUNTER
Patient requesting medication refill. Please approve or deny this request.    Rx requested:  Requested Prescriptions     Pending Prescriptions Disp Refills    furosemide (LASIX) 20 MG tablet 60 tablet 1     Sig: Take 1 tablet by mouth 1 TABLET EVERY MON, WEDS AND FRI AND AS NEEDED         Last Office Visit:   12/26/2024      Next Visit Date:  Future Appointments   Date Time Provider Department Center   4/21/2025 11:00 AM Shy Ames PA-C [place]   5/21/2025  2:00 PM Facundo Hoyos MD [place] NEURO Neurology -

## 2025-01-16 RX ORDER — FUROSEMIDE 20 MG/1
20 TABLET ORAL
Qty: 30 TABLET | Refills: 2 | OUTPATIENT
Start: 2025-01-16

## 2025-02-10 ENCOUNTER — TELEPHONE (OUTPATIENT)
Age: 45
End: 2025-02-10

## 2025-02-10 NOTE — TELEPHONE ENCOUNTER
Physician Services Order form dropped off, needs filled out, signed & faxed- please call sister Kamila @ [phone] to      Copy in  & mailbox

## 2025-02-11 NOTE — TELEPHONE ENCOUNTER
Form has been faxed. Spoke with patient's sister made aware form has been sign and faxed and awaiting at the .

## 2025-02-12 ENCOUNTER — TELEPHONE (OUTPATIENT)
Age: 45
End: 2025-02-12

## 2025-02-12 NOTE — TELEPHONE ENCOUNTER
[facility] care calling in requesting last visit notes be faxed over so they can start the home care    LOV 12/26/24    Fax - [phone]  Phone - [phone]

## 2025-03-14 RX ORDER — DIPHENHYDRAMINE HYDROCHLORIDE 50 MG/ML
50 INJECTION, SOLUTION INTRAMUSCULAR; INTRAVENOUS EVERY 4 HOURS PRN
Start: 2025-04-03

## 2025-03-14 RX ORDER — ACETAMINOPHEN 325 MG/1
650 TABLET ORAL EVERY 4 HOURS PRN
Start: 2025-04-03

## 2025-03-14 RX ORDER — ACETAMINOPHEN 325 MG/1
650 TABLET ORAL ONCE
OUTPATIENT
Start: 2025-04-03 | End: 2025-04-03

## 2025-03-14 RX ORDER — DIPHENHYDRAMINE HCL 25 MG
25 TABLET ORAL ONCE
OUTPATIENT
Start: 2025-04-03 | End: 2025-04-03

## 2025-04-16 ENCOUNTER — HOSPITAL ENCOUNTER (OUTPATIENT)
Dept: INFUSION THERAPY | Age: 45
Setting detail: INFUSION SERIES
Discharge: HOME | End: 2025-04-16
Payer: MEDICAID

## 2025-04-16 VITALS
OXYGEN SATURATION: 97 % | RESPIRATION RATE: 18 BRPM | HEART RATE: 87 BPM | SYSTOLIC BLOOD PRESSURE: 122 MMHG | DIASTOLIC BLOOD PRESSURE: 64 MMHG | TEMPERATURE: 97.3 F

## 2025-04-16 DIAGNOSIS — G35 MS (MULTIPLE SCLEROSIS) (HCC): Primary | ICD-10-CM

## 2025-04-16 PROCEDURE — 96415 CHEMO IV INFUSION ADDL HR: CPT

## 2025-04-16 PROCEDURE — 6370000000 HC RX 637 (ALT 250 FOR IP): Performed by: PSYCHIATRY & NEUROLOGY

## 2025-04-16 PROCEDURE — 96375 TX/PRO/DX INJ NEW DRUG ADDON: CPT

## 2025-04-16 PROCEDURE — 6360000002 HC RX W HCPCS: Performed by: PSYCHIATRY & NEUROLOGY

## 2025-04-16 PROCEDURE — 96413 CHEMO IV INFUSION 1 HR: CPT

## 2025-04-16 PROCEDURE — 2580000003 HC RX 258: Performed by: PSYCHIATRY & NEUROLOGY

## 2025-04-16 RX ORDER — DIPHENHYDRAMINE HYDROCHLORIDE 50 MG/ML
50 INJECTION, SOLUTION INTRAMUSCULAR; INTRAVENOUS EVERY 4 HOURS PRN
Start: 2025-10-15

## 2025-04-16 RX ORDER — DIPHENHYDRAMINE HCL 25 MG
25 TABLET ORAL ONCE
Status: COMPLETED | OUTPATIENT
Start: 2025-04-16 | End: 2025-04-16

## 2025-04-16 RX ORDER — ACETAMINOPHEN 325 MG/1
650 TABLET ORAL ONCE
OUTPATIENT
Start: 2025-10-15 | End: 2025-10-15

## 2025-04-16 RX ORDER — ACETAMINOPHEN 325 MG/1
650 TABLET ORAL EVERY 4 HOURS PRN
Start: 2025-10-15

## 2025-04-16 RX ORDER — DIPHENHYDRAMINE HCL 25 MG
25 TABLET ORAL ONCE
OUTPATIENT
Start: 2025-10-15 | End: 2025-10-15

## 2025-04-16 RX ORDER — ACETAMINOPHEN 325 MG/1
650 TABLET ORAL ONCE
Status: COMPLETED | OUTPATIENT
Start: 2025-04-16 | End: 2025-04-16

## 2025-04-16 RX ADMIN — ACETAMINOPHEN 650 MG: 325 TABLET ORAL at 10:34

## 2025-04-16 RX ADMIN — DIPHENHYDRAMINE HCL 25 MG: 25 TABLET ORAL at 10:33

## 2025-04-16 RX ADMIN — METHYLPREDNISOLONE SODIUM SUCCINATE 125 MG: 125 INJECTION, POWDER, FOR SOLUTION INTRAMUSCULAR; INTRAVENOUS at 10:34

## 2025-04-16 RX ADMIN — OCRELIZUMAB 600 MG: 300 INJECTION INTRAVENOUS at 10:49

## 2025-04-16 NOTE — FLOWSHEET NOTE
Patient to the floor via wheelchair for her Ocrevus infusion. Vital signs taken. No distress noted. Call light within reach. Caregiver at side.

## 2025-04-16 NOTE — FLOWSHEET NOTE
Premeds are complete. Tolerated well.  Observation started. Call light within reach. Family at side.

## 2025-04-16 NOTE — FLOWSHEET NOTE
No side effects noted. Patient left the unit with her sister via wheelchair. All equipment used in the care for this patient has been cleaned.

## 2025-04-21 ENCOUNTER — OFFICE VISIT (OUTPATIENT)
Age: 45
End: 2025-04-21
Payer: MEDICAID

## 2025-04-21 ENCOUNTER — RESULTS FOLLOW-UP (OUTPATIENT)
Age: 45
End: 2025-04-21

## 2025-04-21 VITALS
OXYGEN SATURATION: 99 % | BODY MASS INDEX: 35.3 KG/M2 | HEIGHT: 61 IN | TEMPERATURE: 97.6 F | DIASTOLIC BLOOD PRESSURE: 72 MMHG | HEART RATE: 88 BPM | WEIGHT: 187 LBS | SYSTOLIC BLOOD PRESSURE: 110 MMHG

## 2025-04-21 DIAGNOSIS — Z87.74 S/P PATENT FORAMEN OVALE CLOSURE: ICD-10-CM

## 2025-04-21 DIAGNOSIS — G35 MS (MULTIPLE SCLEROSIS) (HCC): Primary | ICD-10-CM

## 2025-04-21 DIAGNOSIS — G35 MS (MULTIPLE SCLEROSIS) (HCC): ICD-10-CM

## 2025-04-21 DIAGNOSIS — Z13.220 LIPID SCREENING: ICD-10-CM

## 2025-04-21 DIAGNOSIS — Z12.11 COLON CANCER SCREENING: ICD-10-CM

## 2025-04-21 DIAGNOSIS — G82.20 PARAPARESIS (HCC): ICD-10-CM

## 2025-04-21 LAB
ALBUMIN SERPL-MCNC: 4 G/DL (ref 3.5–4.6)
ALP SERPL-CCNC: 77 U/L (ref 40–130)
ALT SERPL-CCNC: 16 U/L (ref 0–33)
ANION GAP SERPL CALCULATED.3IONS-SCNC: 11 MEQ/L (ref 9–15)
AST SERPL-CCNC: 14 U/L (ref 0–35)
BASOPHILS # BLD: 0.1 K/UL (ref 0–0.2)
BASOPHILS NFR BLD: 0.5 %
BILIRUB SERPL-MCNC: 0.3 MG/DL (ref 0.2–0.7)
BUN SERPL-MCNC: 15 MG/DL (ref 6–20)
CALCIUM SERPL-MCNC: 9 MG/DL (ref 8.5–9.9)
CHLORIDE SERPL-SCNC: 102 MEQ/L (ref 95–107)
CHOLEST SERPL-MCNC: 146 MG/DL (ref 0–199)
CO2 SERPL-SCNC: 25 MEQ/L (ref 20–31)
CREAT SERPL-MCNC: 0.8 MG/DL (ref 0.5–0.9)
EOSINOPHIL # BLD: 0.2 K/UL (ref 0–0.7)
EOSINOPHIL NFR BLD: 1.6 %
ERYTHROCYTE [DISTWIDTH] IN BLOOD BY AUTOMATED COUNT: 15.6 % (ref 11.5–14.5)
GLOBULIN SER CALC-MCNC: 3.4 G/DL (ref 2.3–3.5)
GLUCOSE SERPL-MCNC: 101 MG/DL (ref 70–99)
HCT VFR BLD AUTO: 38.7 % (ref 37–47)
HDLC SERPL-MCNC: 35 MG/DL (ref 40–59)
HGB BLD-MCNC: 11.9 G/DL (ref 12–16)
LDL CHOLESTEROL: 87 MG/DL (ref 0–129)
LYMPHOCYTES # BLD: 1.2 K/UL (ref 1–4.8)
LYMPHOCYTES NFR BLD: 11 %
MCH RBC QN AUTO: 28.1 PG (ref 27–31.3)
MCHC RBC AUTO-ENTMCNC: 30.7 % (ref 33–37)
MCV RBC AUTO: 91.3 FL (ref 79.4–94.8)
MONOCYTES # BLD: 0.6 K/UL (ref 0.2–0.8)
MONOCYTES NFR BLD: 5.4 %
NEUTROPHILS # BLD: 8.9 K/UL (ref 1.4–6.5)
NEUTS SEG NFR BLD: 81.1 %
PLATELET # BLD AUTO: 318 K/UL (ref 130–400)
POTASSIUM SERPL-SCNC: 4.3 MEQ/L (ref 3.4–4.9)
PROT SERPL-MCNC: 7.4 G/DL (ref 6.3–8)
RBC # BLD AUTO: 4.24 M/UL (ref 4.2–5.4)
SODIUM SERPL-SCNC: 138 MEQ/L (ref 135–144)
TRIGLYCERIDE, FASTING: 120 MG/DL (ref 0–150)
WBC # BLD AUTO: 10.9 K/UL (ref 4.8–10.8)

## 2025-04-21 PROCEDURE — 99213 OFFICE O/P EST LOW 20 MIN: CPT | Performed by: PHYSICIAN ASSISTANT

## 2025-04-21 PROCEDURE — 99214 OFFICE O/P EST MOD 30 MIN: CPT | Performed by: PHYSICIAN ASSISTANT

## 2025-04-21 SDOH — ECONOMIC STABILITY: FOOD INSECURITY: WITHIN THE PAST 12 MONTHS, YOU WORRIED THAT YOUR FOOD WOULD RUN OUT BEFORE YOU GOT MONEY TO BUY MORE.: NEVER TRUE

## 2025-04-21 SDOH — ECONOMIC STABILITY: FOOD INSECURITY: WITHIN THE PAST 12 MONTHS, THE FOOD YOU BOUGHT JUST DIDN'T LAST AND YOU DIDN'T HAVE MONEY TO GET MORE.: NEVER TRUE

## 2025-04-21 ASSESSMENT — PATIENT HEALTH QUESTIONNAIRE - PHQ9
1. LITTLE INTEREST OR PLEASURE IN DOING THINGS: NOT AT ALL
SUM OF ALL RESPONSES TO PHQ QUESTIONS 1-9: 0
SUM OF ALL RESPONSES TO PHQ QUESTIONS 1-9: 0
2. FEELING DOWN, DEPRESSED OR HOPELESS: NOT AT ALL
SUM OF ALL RESPONSES TO PHQ QUESTIONS 1-9: 0
SUM OF ALL RESPONSES TO PHQ QUESTIONS 1-9: 0

## 2025-04-21 NOTE — PROGRESS NOTES
Subjective  Vipin Reyes, 44 y.o. female presents today with:  Chief Complaint   Patient presents with    Follow-up     Patient presents today or 4 month f/up. Patient would like an order for incontinence - underwear- pads- and bed pads. Patient would like an order for cologuard.       HPI  Patient is here to establish care accompanied by her sister who is also her caretaker  MS followed by neuro patient is ambulatory feels she is gaining strength in her leg states she is still receiving Ocrevus  Status post PFO closure on Plavix has occasional bruising on her arms but no abnormal bleeding    Review of Systems      Past Medical History:   Diagnosis Date    Dislocated knee     Right knee    Intellectual disability 2022    Lumbar radiculopathy 2020    MS (multiple sclerosis) ([facility]) 2022    PFO (patent foramen ovale) 2022    Patent foramen ovale with right-to-left shunt was visualized.  On agitated saline contrast injection- Large amount of bubbles LA and LV  nearly immediately    PTTD (posterior tibial tendon dysfunction)      Past Surgical History:   Procedure Laterality Date    CARDIAC SURGERY  2022    correction of PFO - Naif marie [facility]    CATARACT REMOVAL WITH IMPLANT Right     CATARACT REMOVAL WITH IMPLANT Left      SECTION  2013    EYE SURGERY      FOOT SURGERY Left      Social History     Socioeconomic History    Marital status:      Spouse name: Not on file    Number of children: 1    Years of education: Not on file    Highest education level: Not on file   Occupational History    Not on file   Tobacco Use    Smoking status: Never    Smokeless tobacco: Never   Substance and Sexual Activity    Alcohol use: No    Drug use: No    Sexual activity: Not Currently   Other Topics Concern    Not on file   Social History Narrative    Lives With: her parents (mom Shaniqua) father is sick  and her Son (9 y.o)    Type of Home: Apartment in [place] [address]    Home

## 2025-05-21 ENCOUNTER — OFFICE VISIT (OUTPATIENT)
Age: 45
End: 2025-05-21
Payer: MEDICAID

## 2025-05-21 VITALS — HEART RATE: 90 BPM | SYSTOLIC BLOOD PRESSURE: 128 MMHG | DIASTOLIC BLOOD PRESSURE: 73 MMHG

## 2025-05-21 DIAGNOSIS — G82.20 PARAPARESIS (HCC): ICD-10-CM

## 2025-05-21 DIAGNOSIS — Q21.12 PFO (PATENT FORAMEN OVALE): ICD-10-CM

## 2025-05-21 DIAGNOSIS — R39.15 URINARY URGENCY: ICD-10-CM

## 2025-05-21 DIAGNOSIS — R41.89 COGNITIVE IMPAIRMENT: ICD-10-CM

## 2025-05-21 DIAGNOSIS — G35 MS (MULTIPLE SCLEROSIS) (HCC): Primary | ICD-10-CM

## 2025-05-21 DIAGNOSIS — M21.372 FOOT DROP, LEFT FOOT: ICD-10-CM

## 2025-05-21 DIAGNOSIS — R26.0 ATAXIC GAIT: ICD-10-CM

## 2025-05-21 PROCEDURE — 99214 OFFICE O/P EST MOD 30 MIN: CPT | Performed by: PSYCHIATRY & NEUROLOGY

## 2025-05-21 PROCEDURE — 99213 OFFICE O/P EST LOW 20 MIN: CPT | Performed by: PSYCHIATRY & NEUROLOGY

## 2025-05-21 RX ORDER — MIRABEGRON 50 MG/1
50 TABLET, FILM COATED, EXTENDED RELEASE ORAL DAILY
Qty: 30 TABLET | Refills: 3 | Status: SHIPPED | OUTPATIENT
Start: 2025-05-21

## 2025-05-21 NOTE — PROGRESS NOTES
Subjective:      Patient ID: Vipin Reyes is a 44 y.o. female who presents today for:  Chief Complaint   Patient presents with    6 Month Follow-Up     Pt states she is doing good. Pt sister wants to know about the ocrevus and what is does for the MS and wanted to know what type of ms does she have. Pt has been doing her leg exercises        HPI 44-year-old right-handed female with a known history of multiple sclerosis.  Patient urinary urgency with paraparesis lumbar colopathy and cognitive impairment.  Patient has not relapsed.  Patient is on Plavix and has been noted to have a patent foramen ovale followed by cardiology..  Patient's EDSS score is 5-6 and remains paraparetic with foot drop on the left.  Patient is on Ocrevus and last infusion April  Patient urinary urgency and she had responded to Myrbetriq and she is not on all this medications    Past Medical History:   Diagnosis Date    Dislocated knee     Right knee    Intellectual disability 2022    Lumbar radiculopathy 2020    MS (multiple sclerosis) ([facility]) 2022    PFO (patent foramen ovale) 2022    Patent foramen ovale with right-to-left shunt was visualized.  On agitated saline contrast injection- Large amount of bubbles LA and LV  nearly immediately    PTTD (posterior tibial tendon dysfunction)      Past Surgical History:   Procedure Laterality Date    CARDIAC SURGERY  2022    correction of PFO - Naif marie [facility]    CATARACT REMOVAL WITH IMPLANT Right     CATARACT REMOVAL WITH IMPLANT Left      SECTION  2013    EYE SURGERY      FOOT SURGERY Left      Social History     Socioeconomic History    Marital status:      Spouse name: Not on file    Number of children: 1    Years of education: Not on file    Highest education level: Not on file   Occupational History    Not on file   Tobacco Use    Smoking status: Never    Smokeless tobacco: Never   Substance and Sexual Activity    Alcohol use: No    Drug use: No

## 2025-05-28 ENCOUNTER — APPOINTMENT (OUTPATIENT)
Dept: CARDIOLOGY | Facility: CLINIC | Age: 45
End: 2025-05-28
Payer: COMMERCIAL

## 2025-05-28 VITALS
SYSTOLIC BLOOD PRESSURE: 120 MMHG | DIASTOLIC BLOOD PRESSURE: 80 MMHG | WEIGHT: 185 LBS | HEART RATE: 97 BPM | HEIGHT: 60 IN | BODY MASS INDEX: 36.32 KG/M2

## 2025-05-28 DIAGNOSIS — Q21.12 PFO (PATENT FORAMEN OVALE) (HHS-HCC): ICD-10-CM

## 2025-05-28 DIAGNOSIS — G35 MULTIPLE SCLEROSIS (MULTI): ICD-10-CM

## 2025-05-28 DIAGNOSIS — R60.9 EDEMA, UNSPECIFIED TYPE: ICD-10-CM

## 2025-05-28 DIAGNOSIS — E78.2 MIXED HYPERLIPIDEMIA: ICD-10-CM

## 2025-05-28 DIAGNOSIS — R00.0 TACHYCARDIA: ICD-10-CM

## 2025-05-28 DIAGNOSIS — R79.89 ELEVATED D-DIMER: ICD-10-CM

## 2025-05-28 PROCEDURE — 1036F TOBACCO NON-USER: CPT | Performed by: INTERNAL MEDICINE

## 2025-05-28 PROCEDURE — 3008F BODY MASS INDEX DOCD: CPT | Performed by: INTERNAL MEDICINE

## 2025-05-28 PROCEDURE — 99214 OFFICE O/P EST MOD 30 MIN: CPT | Performed by: INTERNAL MEDICINE

## 2025-05-28 NOTE — PROGRESS NOTES
CARDIOLOGY OFFICE NOTE     Date:   5/28/2025    Patient:    Donya Moore    YOB: 1980    Primary Physician: Tammie Villalba PA-C         REASON FOR VISIT / CHIEF COMPLAINT:     Cardiology follow-up.    HPI:     Donya Moore was seen in cardiac evaluation at the [facility] office May 28, 2025.      The patients problems are listed as in the impression below.    Electronic medical records reviewed.    Patient returns.  She feels well.  She is asymptomatic.  She has no limitations.    Patient denies Chest Pain, SOB, Lightheadedness, Dizziness, TIA or CVA symptoms.  No CHF or Edema.  No Palpitations.  No GI,  or Bleeding Issues. No Recent Fever or Chills.     Cardiovascular and general review of systems is otherwise negative.    A 14-system review is otherwise negative, other than noted.     PHYSICAL EXAMINATION:      Vitals:    05/28/25 1355   BP: 120/80   Pulse: 97     General: No acute distress. Alert and oriented.  Head And Neck Examination: No jugular venous distention, no carotid bruits, no mass. Carotid upstrokes preserved. Oral mucosa moist. No xanthelasma. Head and neck examination otherwise unremarkable.  Lungs: Clear to auscultation and percussion. No wheezes, no rales, and no rhonchi.  Chest: Excursion appeared to be normal. No chest wall tenderness on palpation.  Heart: Normal S1 and S2. No S3. No S4. No rub. Grade 2/6 systolic murmur, best heard at the left sternal border. Point of maximal impulse was within normal limits.  Abdomen: Soft. Nontender. No organomegaly. No bruits. No masses. Obese.  Extremities: 1+ bipedal edema. No clubbing. No cyanosis. Pulses are strong throughout. No bruits.  Musculoskeletal Exam: No ulcers, otherwise unremarkable.  Neuro: Multiple sclerosis but neurologically appeared grossly intact except for multiple sclerosis changes.  .  IMPRESSION:       Cardiovascular status stable  Asymptomatic  Bilateral lower extremity edema, improved  Sinus  tachycardia, resolved  Fall, prior  Ataxia  Patent foramen ovale, large, status post Amplatzer PFO closure 12/2022.  Normal LV systolic function, ejection fraction 65% by echocardiogram 7/2022  Negative cardiac MRI, ejection fraction 69%  Multiple sclerosis  Urinary tract infections  Anemia  Hyperlipidemia  Rhabdomyolysis, resolved 7/2022  Lumbar disc disease  Degenerative joint disease  Obesity  Otherwise as per assessment below.    RECOMMENDATIONS:      Patient continues to do well overall.  Would suggest continuing current medications.  Refills were provided.    Exercise dietary program.    Hydration.    [employer] portal use was encouraged.    We will plan to see back in 1 year with Laboratory Studies and ECG as ordered.     Patient will follow up with their primary physician for general care.    The patient knows to contact medical care earlier if need be.      ALLERGIES:     Patient has no known allergies.     MEDICATIONS:     Current Outpatient Medications   Medication Instructions    atorvastatin (LIPITOR) 20 mg, oral, Nightly    cholecalciferol (Vitamin D-3) 25 MCG (1000 UT) capsule 1 capsule, Daily    coenzyme Q-10 100 mg, Daily    mirabegron (Myrbetriq) 25 mg tablet extended release 24 hr 24 hr tablet 2 tablets, Daily before breakfast    multivit with minerals/lutein (MULTIVITAMIN 50 PLUS ORAL) 1 tablet, Daily    Ocrevus 300 mg, Once       ELECTROCARDIOGRAM:      None this visit    CARDIAC TESTING:      None this visit    LABORATORY DATA:      April 2025:  Chem-7, CBC, liver function test, TSH normal except for hemoglobin 11.9 glucose 101.  Cholesterol 146, triglyceride 120, HDL 36, LDL 87.          PROBLEM LIST:     Problem List[1]          [facility], [facility]      Of Note:  [employer] voice recognition dictation software was utilized partially in the preparation of this note, therefore, inaccuracies in spelling, word choice and punctuation may have occurred which were not  recognized at the time of signing.    Patient was seen and examined with total time of visit including chart preparation, rooming, and chart completion exceeding 40 minutes.      I, Amina WAYNE am scribing for, and in the presence of Dr. George Santos MD, [facility].    I, Dr. Geogre Santos MD, FAC, personally performed the services described in the documentation as scribed by Amina WAYNE in my presence, and confirm it is both accurate and complete.              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

## 2025-05-28 NOTE — PATIENT INSTRUCTIONS
Follow up 1 year with EKG  Fasting labs to be done approximately 1 week prior to next appointment. (Print lab reqs for patient)    Ok to remain off Furosemide (Lasix)      DID YOU KNOW  We have a pharmacy here in the [facility].  They can fill all prescriptions, not just cardiac medications.  Prescriptions from other pharmacies can easily be transferred to the  pharmacy by the  pharmacist on site.   pharmacies offer FREE HOME DELIVERY on medications to anywhere in [place]. They can sync your medications. Typically prescriptions can be ready in 10 - 15 minutes. If pharmacy is unable to fill your  prescription or if cost is more than your paying now the Pharmacist can easily transfer back to your Pharmacy of choice. Pharmacy phone # [phone].     Please bring all medicines, vitamins, and herbal supplements with you in original bottles to every appointment!!!!    Prescriptions will not be filled unless you are compliant with your follow up appointments or have a follow up appointment scheduled as per instruction of your physician. Refills should be requested at the time of your visit.

## 2025-07-08 ENCOUNTER — TELEPHONE (OUTPATIENT)
Age: 45
End: 2025-07-08

## 2025-07-08 NOTE — TELEPHONE ENCOUNTER
Rebeka from [facility] - called  Vidhi sebastian ot receive fax for supplies from 6/12/25  Can these please be refaxed - [phone]

## 2025-07-08 NOTE — TELEPHONE ENCOUNTER
Mother called in (HIPAA ) stating that [facility] contacted the patient and told them that they never received a script from us for the patient's incontinence supplies. She said they have been trying to reach out to us.    Last I can see in the chart where a script was sent successfully was on 2025.    Please advise.

## 2025-09-03 DIAGNOSIS — R39.15 URINARY URGENCY: ICD-10-CM

## 2025-09-04 RX ORDER — MIRABEGRON 50 MG/1
50 TABLET, FILM COATED, EXTENDED RELEASE ORAL DAILY
Qty: 90 TABLET | Refills: 1 | Status: SHIPPED | OUTPATIENT
Start: 2025-09-04

## 2026-05-27 ENCOUNTER — APPOINTMENT (OUTPATIENT)
Dept: CARDIOLOGY | Facility: CLINIC | Age: 46
End: 2026-05-27
Payer: COMMERCIAL